# Patient Record
Sex: FEMALE | Race: WHITE | URBAN - METROPOLITAN AREA
[De-identification: names, ages, dates, MRNs, and addresses within clinical notes are randomized per-mention and may not be internally consistent; named-entity substitution may affect disease eponyms.]

---

## 2022-07-11 ENCOUNTER — OFFICE (OUTPATIENT)
Dept: URBAN - METROPOLITAN AREA CLINIC 109 | Facility: CLINIC | Age: 77
Setting detail: OPHTHALMOLOGY
End: 2022-07-11
Payer: MEDICARE

## 2022-07-11 DIAGNOSIS — H40.013: ICD-10-CM

## 2022-07-11 DIAGNOSIS — H34.8312: ICD-10-CM

## 2022-07-11 DIAGNOSIS — D31.40: ICD-10-CM

## 2022-07-11 DIAGNOSIS — H25.13: ICD-10-CM

## 2022-07-11 PROCEDURE — 92250 FUNDUS PHOTOGRAPHY W/I&R: CPT | Performed by: OPHTHALMOLOGY

## 2022-07-11 PROCEDURE — 92004 COMPRE OPH EXAM NEW PT 1/>: CPT | Performed by: OPHTHALMOLOGY

## 2022-07-11 PROCEDURE — 92083 EXTENDED VISUAL FIELD XM: CPT | Performed by: OPHTHALMOLOGY

## 2022-07-11 PROCEDURE — 92020 GONIOSCOPY: CPT | Performed by: OPHTHALMOLOGY

## 2022-07-11 ASSESSMENT — CORNEAL DYSTROPHY - POSTERIOR
OS_POSTERIORDYSTROPHY: GUTTATA
OD_POSTERIORDYSTROPHY: GUTTATA

## 2022-07-11 ASSESSMENT — CONFRONTATIONAL VISUAL FIELD TEST (CVF)
OD_FINDINGS: FULL
OS_FINDINGS: FULL

## 2022-07-11 ASSESSMENT — SUPERFICIAL PUNCTATE KERATITIS (SPK)
OD_SPK: 1+
OS_SPK: 1+

## 2022-07-12 ASSESSMENT — VISUAL ACUITY
OS_BCVA: 20/400
OD_BCVA: 20/125

## 2022-07-12 ASSESSMENT — SPHEQUIV_DERIVED
OS_SPHEQUIV: -1.5
OD_SPHEQUIV: -0.875

## 2022-07-12 ASSESSMENT — REFRACTION_AUTOREFRACTION
OD_AXIS: 091
OS_SPHERE: 0.00
OD_CYLINDER: -2.25
OS_AXIS: 098
OD_SPHERE: +0.25
OS_CYLINDER: -3.00

## 2022-07-14 ENCOUNTER — OFFICE (OUTPATIENT)
Dept: URBAN - METROPOLITAN AREA CLINIC 32 | Facility: CLINIC | Age: 77
Setting detail: OPHTHALMOLOGY
End: 2022-07-14
Payer: MEDICARE

## 2022-07-14 DIAGNOSIS — H40.013: ICD-10-CM

## 2022-07-14 DIAGNOSIS — D31.40: ICD-10-CM

## 2022-07-14 DIAGNOSIS — H35.033: ICD-10-CM

## 2022-07-14 DIAGNOSIS — H43.813: ICD-10-CM

## 2022-07-14 DIAGNOSIS — H43.393: ICD-10-CM

## 2022-07-14 DIAGNOSIS — H25.13: ICD-10-CM

## 2022-07-14 DIAGNOSIS — H34.8112: ICD-10-CM

## 2022-07-14 PROCEDURE — 67028 INJECTION EYE DRUG: CPT | Performed by: OPHTHALMOLOGY

## 2022-07-14 PROCEDURE — 92235 FLUORESCEIN ANGRPH MLTIFRAME: CPT | Performed by: OPHTHALMOLOGY

## 2022-07-14 PROCEDURE — 92134 CPTRZ OPH DX IMG PST SGM RTA: CPT | Performed by: OPHTHALMOLOGY

## 2022-07-14 PROCEDURE — 92201 OPSCPY EXTND RTA DRAW UNI/BI: CPT | Performed by: OPHTHALMOLOGY

## 2022-07-14 PROCEDURE — 92014 COMPRE OPH EXAM EST PT 1/>: CPT | Performed by: OPHTHALMOLOGY

## 2022-07-14 ASSESSMENT — REFRACTION_AUTOREFRACTION
OD_SPHERE: +0.25
OD_AXIS: 091
OS_AXIS: 098
OS_CYLINDER: -3.00
OS_SPHERE: 0.00
OD_CYLINDER: -2.25

## 2022-07-14 ASSESSMENT — SUPERFICIAL PUNCTATE KERATITIS (SPK)
OS_SPK: 1+
OD_SPK: 1+

## 2022-07-14 ASSESSMENT — CONFRONTATIONAL VISUAL FIELD TEST (CVF)
OD_FINDINGS: FULL
OS_FINDINGS: FULL

## 2022-07-14 ASSESSMENT — CORNEAL DYSTROPHY - POSTERIOR
OS_POSTERIORDYSTROPHY: GUTTATA
OD_POSTERIORDYSTROPHY: GUTTATA

## 2022-07-14 ASSESSMENT — TONOMETRY
OD_IOP_MMHG: 15
OS_IOP_MMHG: 19

## 2022-07-14 ASSESSMENT — VISUAL ACUITY
OS_BCVA: CF3FT
OD_BCVA: 20/125

## 2022-07-14 ASSESSMENT — SPHEQUIV_DERIVED
OD_SPHEQUIV: -0.875
OS_SPHEQUIV: -1.5

## 2022-07-25 ENCOUNTER — OFFICE (OUTPATIENT)
Dept: URBAN - METROPOLITAN AREA CLINIC 109 | Facility: CLINIC | Age: 77
Setting detail: OPHTHALMOLOGY
End: 2022-07-25
Payer: MEDICARE

## 2022-07-25 DIAGNOSIS — H25.13: ICD-10-CM

## 2022-07-25 DIAGNOSIS — H35.033: ICD-10-CM

## 2022-07-25 DIAGNOSIS — H34.8112: ICD-10-CM

## 2022-07-25 PROCEDURE — 99214 OFFICE O/P EST MOD 30 MIN: CPT | Performed by: OPHTHALMOLOGY

## 2022-07-25 ASSESSMENT — REFRACTION_AUTOREFRACTION
OD_AXIS: 080
OD_CYLINDER: -1.75
OS_CYLINDER: -3.00
OS_SPHERE: +0.25
OS_AXIS: 092
OD_SPHERE: +0.50

## 2022-07-25 ASSESSMENT — VISUAL ACUITY
OD_BCVA: 20/60-1
OS_BCVA: 20/150

## 2022-07-25 ASSESSMENT — SUPERFICIAL PUNCTATE KERATITIS (SPK)
OD_SPK: 1+
OS_SPK: 1+

## 2022-07-25 ASSESSMENT — CORNEAL DYSTROPHY - POSTERIOR
OD_POSTERIORDYSTROPHY: GUTTATA
OS_POSTERIORDYSTROPHY: GUTTATA

## 2022-07-25 ASSESSMENT — KERATOMETRY
OD_K1POWER_DIOPTERS: 44.37
OS_AXISANGLE_DEGREES: 007
OS_K1POWER_DIOPTERS: 44.50
OD_AXISANGLE_DEGREES: 159
OD_K2POWER_DIOPTERS: 45.12
OS_K2POWER_DIOPTERS: 46.00

## 2022-07-25 ASSESSMENT — CONFRONTATIONAL VISUAL FIELD TEST (CVF)
OS_FINDINGS: FULL
OD_FINDINGS: FULL

## 2022-07-25 ASSESSMENT — AXIALLENGTH_DERIVED
OS_AL: 23.4371
OD_AL: 23.28

## 2022-07-25 ASSESSMENT — SPHEQUIV_DERIVED
OS_SPHEQUIV: -1.25
OD_SPHEQUIV: -0.375

## 2022-08-01 ENCOUNTER — OFFICE (OUTPATIENT)
Dept: URBAN - METROPOLITAN AREA CLINIC 109 | Facility: CLINIC | Age: 77
Setting detail: OPHTHALMOLOGY
End: 2022-08-01
Payer: MEDICARE

## 2022-08-01 DIAGNOSIS — H40.013: ICD-10-CM

## 2022-08-01 PROCEDURE — 92133 CPTRZD OPH DX IMG PST SGM ON: CPT | Performed by: OPHTHALMOLOGY

## 2022-08-01 PROCEDURE — 99213 OFFICE O/P EST LOW 20 MIN: CPT | Performed by: OPHTHALMOLOGY

## 2022-08-01 ASSESSMENT — REFRACTION_AUTOREFRACTION
OD_AXIS: 080
OD_SPHERE: +0.50
OS_CYLINDER: -3.00
OS_SPHERE: +0.25
OD_CYLINDER: -1.75
OS_AXIS: 092

## 2022-08-01 ASSESSMENT — SPHEQUIV_DERIVED
OD_SPHEQUIV: -0.375
OS_SPHEQUIV: -1.25

## 2022-08-01 ASSESSMENT — KERATOMETRY
OS_K2POWER_DIOPTERS: 46.00
OD_K2POWER_DIOPTERS: 45.12
OS_AXISANGLE_DEGREES: 007
OD_AXISANGLE_DEGREES: 159
OD_K1POWER_DIOPTERS: 44.37
OS_K1POWER_DIOPTERS: 44.50

## 2022-08-01 ASSESSMENT — CONFRONTATIONAL VISUAL FIELD TEST (CVF)
OS_FINDINGS: FULL
OD_FINDINGS: FULL

## 2022-08-01 ASSESSMENT — CORNEAL DYSTROPHY - POSTERIOR
OS_POSTERIORDYSTROPHY: GUTTATA
OD_POSTERIORDYSTROPHY: GUTTATA

## 2022-08-01 ASSESSMENT — AXIALLENGTH_DERIVED
OD_AL: 23.28
OS_AL: 23.4371

## 2022-08-01 ASSESSMENT — SUPERFICIAL PUNCTATE KERATITIS (SPK)
OS_SPK: 1+
OD_SPK: 1+

## 2022-08-01 ASSESSMENT — VISUAL ACUITY
OD_BCVA: 20/70-2
OS_BCVA: 20/150

## 2022-08-01 ASSESSMENT — TONOMETRY
OS_IOP_MMHG: 16
OD_IOP_MMHG: 16

## 2022-08-08 ENCOUNTER — OFFICE (OUTPATIENT)
Dept: URBAN - METROPOLITAN AREA CLINIC 109 | Facility: CLINIC | Age: 77
Setting detail: OPHTHALMOLOGY
End: 2022-08-08
Payer: MEDICARE

## 2022-08-08 DIAGNOSIS — H25.12: ICD-10-CM

## 2022-08-08 DIAGNOSIS — H25.13: ICD-10-CM

## 2022-08-08 DIAGNOSIS — H34.8112: ICD-10-CM

## 2022-08-08 PROCEDURE — 92134 CPTRZ OPH DX IMG PST SGM RTA: CPT | Performed by: OPHTHALMOLOGY

## 2022-08-08 PROCEDURE — 99213 OFFICE O/P EST LOW 20 MIN: CPT | Performed by: OPHTHALMOLOGY

## 2022-08-08 PROCEDURE — 92136 OPHTHALMIC BIOMETRY: CPT | Performed by: OPHTHALMOLOGY

## 2022-08-08 ASSESSMENT — REFRACTION_AUTOREFRACTION
OS_AXIS: 108
OD_CYLINDER: -1.50
OS_CYLINDER: -4.00
OD_AXIS: 072
OS_SPHERE: +0.75
OD_SPHERE: +0.50

## 2022-08-08 ASSESSMENT — CONFRONTATIONAL VISUAL FIELD TEST (CVF)
OD_FINDINGS: FULL
OS_FINDINGS: FULL

## 2022-08-08 ASSESSMENT — KERATOMETRY
OS_K1POWER_DIOPTERS: 44.37
OS_K1POWER_DIOPTERS: 44.37
OD_AXISANGLE_DEGREES: 171
OS_K1K2_AVERAGE: 45.245
OD_AXISANGLE_DEGREES: 171
OD_K1POWER_DIOPTERS: 44.37
OD_K1POWER_DIOPTERS: 44.37
OD_K2POWER_DIOPTERS: 45.37
OS_K2POWER_DIOPTERS: 46.12
OS_CYLAXISANGLE_DEGREES: 9
OS_AXISANGLE_DEGREES: 009
OD_CYLPOWER_DEGREES: 1
OS_K2POWER_DIOPTERS: 46.12
OS_CYLPOWER_DEGREES: 1.75
OD_K1K2_AVERAGE: 44.87
OD_K2POWER_DIOPTERS: 45.37
OD_CYLAXISANGLE_DEGREES: 171
OS_AXISANGLE_DEGREES: 009
OD_AXISANGLE2_DEGREES: 171
OS_AXISANGLE2_DEGREES: 009

## 2022-08-08 ASSESSMENT — AXIALLENGTH_DERIVED
OD_AL: 23.19
OS_AL: 23.439
OS_AL: 23.439

## 2022-08-08 ASSESSMENT — CORNEAL DYSTROPHY - POSTERIOR
OS_POSTERIORDYSTROPHY: GUTTATA
OD_POSTERIORDYSTROPHY: GUTTATA

## 2022-08-08 ASSESSMENT — SUPERFICIAL PUNCTATE KERATITIS (SPK)
OS_SPK: 1+
OD_SPK: 1+

## 2022-08-08 ASSESSMENT — REFRACTION_MANIFEST
OS_VA1: 20/NI
OS_AXIS: 108
OS_CYLINDER: -4.00
OS_SPHERE: +0.75

## 2022-08-08 ASSESSMENT — VISUAL ACUITY
OD_BCVA: 20/70-2
OS_BCVA: 20/150

## 2022-08-08 ASSESSMENT — SPHEQUIV_DERIVED
OD_SPHEQUIV: -0.25
OS_SPHEQUIV: -1.25
OS_SPHEQUIV: -1.25

## 2022-08-15 ENCOUNTER — OFFICE (OUTPATIENT)
Dept: URBAN - METROPOLITAN AREA CLINIC 32 | Facility: CLINIC | Age: 77
Setting detail: OPHTHALMOLOGY
End: 2022-08-15
Payer: MEDICARE

## 2022-08-15 DIAGNOSIS — H34.8112: ICD-10-CM

## 2022-08-15 PROCEDURE — 92012 INTRM OPH EXAM EST PATIENT: CPT | Performed by: OPHTHALMOLOGY

## 2022-08-15 PROCEDURE — 92250 FUNDUS PHOTOGRAPHY W/I&R: CPT | Performed by: OPHTHALMOLOGY

## 2022-08-15 PROCEDURE — 67028 INJECTION EYE DRUG: CPT | Performed by: OPHTHALMOLOGY

## 2022-08-15 ASSESSMENT — AXIALLENGTH_DERIVED
OD_AL: 23.19
OS_AL: 23.439

## 2022-08-15 ASSESSMENT — KERATOMETRY
OS_K2POWER_DIOPTERS: 46.12
OS_K1POWER_DIOPTERS: 44.37
OD_K2POWER_DIOPTERS: 45.37
OD_K1POWER_DIOPTERS: 44.37
OS_AXISANGLE_DEGREES: 009
OD_AXISANGLE_DEGREES: 171

## 2022-08-15 ASSESSMENT — CORNEAL DYSTROPHY - POSTERIOR
OS_POSTERIORDYSTROPHY: GUTTATA
OD_POSTERIORDYSTROPHY: GUTTATA

## 2022-08-15 ASSESSMENT — REFRACTION_AUTOREFRACTION
OD_CYLINDER: -1.50
OD_SPHERE: +0.50
OS_SPHERE: +0.75
OS_AXIS: 108
OS_CYLINDER: -4.00
OD_AXIS: 072

## 2022-08-15 ASSESSMENT — CONFRONTATIONAL VISUAL FIELD TEST (CVF)
OS_FINDINGS: FULL
OD_FINDINGS: FULL

## 2022-08-15 ASSESSMENT — VISUAL ACUITY
OS_BCVA: 20/70-
OD_BCVA: 20/60

## 2022-08-15 ASSESSMENT — SPHEQUIV_DERIVED
OS_SPHEQUIV: -1.25
OD_SPHEQUIV: -0.25

## 2022-08-15 ASSESSMENT — SUPERFICIAL PUNCTATE KERATITIS (SPK)
OD_SPK: 1+
OS_SPK: 1+

## 2022-08-30 ENCOUNTER — AMBULATORY SURGERY CENTER (OUTPATIENT)
Dept: URBAN - METROPOLITAN AREA SURGERY 19 | Facility: SURGERY | Age: 77
Setting detail: OPHTHALMOLOGY
End: 2022-08-30
Payer: MEDICARE

## 2022-08-30 DIAGNOSIS — H52.222: ICD-10-CM

## 2022-08-30 DIAGNOSIS — H25.12: ICD-10-CM

## 2022-08-30 PROCEDURE — 66984 XCAPSL CTRC RMVL W/O ECP: CPT | Performed by: OPHTHALMOLOGY

## 2022-08-30 PROCEDURE — V2787 ASTIGMATISM-CORRECT FUNCTION: HCPCS | Performed by: OPHTHALMOLOGY

## 2022-08-31 ENCOUNTER — RX ONLY (RX ONLY)
Age: 77
End: 2022-08-31

## 2022-08-31 ENCOUNTER — OFFICE (OUTPATIENT)
Dept: URBAN - METROPOLITAN AREA CLINIC 109 | Facility: CLINIC | Age: 77
Setting detail: OPHTHALMOLOGY
End: 2022-08-31
Payer: MEDICARE

## 2022-08-31 DIAGNOSIS — Z96.1: ICD-10-CM

## 2022-08-31 PROCEDURE — 99024 POSTOP FOLLOW-UP VISIT: CPT | Performed by: OPTOMETRIST

## 2022-08-31 ASSESSMENT — REFRACTION_AUTOREFRACTION
OS_AXIS: 108
OD_SPHERE: +0.50
OS_SPHERE: +0.75
OD_CYLINDER: -1.50
OS_CYLINDER: -4.00
OD_AXIS: 072

## 2022-08-31 ASSESSMENT — KERATOMETRY
OD_K2POWER_DIOPTERS: 45.37
OS_AXISANGLE_DEGREES: 009
OD_K1POWER_DIOPTERS: 44.37
OD_AXISANGLE_DEGREES: 171
OS_K2POWER_DIOPTERS: 46.12
OS_K1POWER_DIOPTERS: 44.37

## 2022-08-31 ASSESSMENT — SPHEQUIV_DERIVED
OD_SPHEQUIV: -0.25
OS_SPHEQUIV: -1.25

## 2022-08-31 ASSESSMENT — VISUAL ACUITY
OS_BCVA: 20/70-1
OD_BCVA: 20/25

## 2022-08-31 ASSESSMENT — CORNEAL DYSTROPHY - POSTERIOR
OD_POSTERIORDYSTROPHY: GUTTATA
OS_POSTERIORDYSTROPHY: GUTTATA

## 2022-08-31 ASSESSMENT — AXIALLENGTH_DERIVED
OS_AL: 23.439
OD_AL: 23.19

## 2022-08-31 ASSESSMENT — TONOMETRY: OS_IOP_MMHG: 21

## 2022-08-31 ASSESSMENT — SUPERFICIAL PUNCTATE KERATITIS (SPK)
OD_SPK: 1+
OS_SPK: 1+

## 2022-09-20 ENCOUNTER — OFFICE (OUTPATIENT)
Dept: URBAN - METROPOLITAN AREA CLINIC 32 | Facility: CLINIC | Age: 77
Setting detail: OPHTHALMOLOGY
End: 2022-09-20
Payer: MEDICARE

## 2022-09-20 DIAGNOSIS — H34.8112: ICD-10-CM

## 2022-09-20 PROCEDURE — 67028 INJECTION EYE DRUG: CPT | Performed by: OPHTHALMOLOGY

## 2022-09-20 PROCEDURE — 92134 CPTRZ OPH DX IMG PST SGM RTA: CPT | Performed by: OPHTHALMOLOGY

## 2022-09-20 ASSESSMENT — REFRACTION_AUTOREFRACTION
OD_CYLINDER: -1.50
OD_AXIS: 072
OD_SPHERE: +0.50
OS_SPHERE: +0.75
OS_CYLINDER: -4.00
OS_AXIS: 108

## 2022-09-20 ASSESSMENT — CORNEAL DYSTROPHY - POSTERIOR
OS_POSTERIORDYSTROPHY: GUTTATA
OD_POSTERIORDYSTROPHY: GUTTATA

## 2022-09-20 ASSESSMENT — KERATOMETRY
OS_K2POWER_DIOPTERS: 46.12
OD_AXISANGLE_DEGREES: 171
OD_K1POWER_DIOPTERS: 44.37
OD_K2POWER_DIOPTERS: 45.37
OS_AXISANGLE_DEGREES: 009
OS_K1POWER_DIOPTERS: 44.37

## 2022-09-20 ASSESSMENT — VISUAL ACUITY
OD_BCVA: 20/20
OS_BCVA: 20/60-2

## 2022-09-20 ASSESSMENT — SUPERFICIAL PUNCTATE KERATITIS (SPK)
OS_SPK: 1+
OD_SPK: 1+

## 2022-09-20 ASSESSMENT — AXIALLENGTH_DERIVED
OD_AL: 23.19
OS_AL: 23.439

## 2022-09-20 ASSESSMENT — SPHEQUIV_DERIVED
OS_SPHEQUIV: -1.25
OD_SPHEQUIV: -0.25

## 2022-09-28 ENCOUNTER — OFFICE (OUTPATIENT)
Dept: URBAN - METROPOLITAN AREA CLINIC 109 | Facility: CLINIC | Age: 77
Setting detail: OPHTHALMOLOGY
End: 2022-09-28
Payer: MEDICARE

## 2022-09-28 DIAGNOSIS — H25.11: ICD-10-CM

## 2022-09-28 DIAGNOSIS — H34.8112: ICD-10-CM

## 2022-09-28 DIAGNOSIS — Z96.1: ICD-10-CM

## 2022-09-28 PROCEDURE — 99024 POSTOP FOLLOW-UP VISIT: CPT | Performed by: OPHTHALMOLOGY

## 2022-09-28 ASSESSMENT — KERATOMETRY
OS_AXISANGLE_DEGREES: 009
OS_K1POWER_DIOPTERS: 44.37
OD_K2POWER_DIOPTERS: 45.37
OD_AXISANGLE_DEGREES: 171
OS_K2POWER_DIOPTERS: 46.12
OD_K1POWER_DIOPTERS: 44.37

## 2022-09-28 ASSESSMENT — AXIALLENGTH_DERIVED
OD_AL: 23.19
OS_AL: 23.439

## 2022-09-28 ASSESSMENT — REFRACTION_AUTOREFRACTION
OD_SPHERE: +0.50
OS_CYLINDER: -4.00
OS_AXIS: 108
OD_CYLINDER: -1.50
OS_SPHERE: +0.75
OD_AXIS: 072

## 2022-09-28 ASSESSMENT — TONOMETRY: OS_IOP_MMHG: 18

## 2022-09-28 ASSESSMENT — CORNEAL DYSTROPHY - POSTERIOR
OD_POSTERIORDYSTROPHY: GUTTATA
OS_POSTERIORDYSTROPHY: GUTTATA

## 2022-09-28 ASSESSMENT — VISUAL ACUITY
OS_BCVA: 20/60-2
OD_BCVA: 20/20-3

## 2022-09-28 ASSESSMENT — SUPERFICIAL PUNCTATE KERATITIS (SPK)
OS_SPK: 1+
OD_SPK: 1+

## 2022-09-28 ASSESSMENT — SPHEQUIV_DERIVED
OD_SPHEQUIV: -0.25
OS_SPHEQUIV: -1.25

## 2022-09-28 ASSESSMENT — CONFRONTATIONAL VISUAL FIELD TEST (CVF)
OD_FINDINGS: FULL
OS_FINDINGS: FULL

## 2022-10-26 ENCOUNTER — APPOINTMENT (RX ONLY)
Dept: URBAN - METROPOLITAN AREA CLINIC 341 | Facility: CLINIC | Age: 77
Setting detail: DERMATOLOGY
End: 2022-10-26

## 2022-10-26 DIAGNOSIS — D485 NEOPLASM OF UNCERTAIN BEHAVIOR OF SKIN: ICD-10-CM

## 2022-10-26 PROBLEM — D48.5 NEOPLASM OF UNCERTAIN BEHAVIOR OF SKIN: Status: ACTIVE | Noted: 2022-10-26

## 2022-10-26 PROCEDURE — ? BIOPSY BY SHAVE METHOD

## 2022-10-26 PROCEDURE — 11102 TANGNTL BX SKIN SINGLE LES: CPT

## 2022-10-26 ASSESSMENT — LOCATION ZONE DERM: LOCATION ZONE: HAND

## 2022-10-26 ASSESSMENT — LOCATION DETAILED DESCRIPTION DERM: LOCATION DETAILED: LEFT RADIAL DORSAL HAND

## 2022-10-26 ASSESSMENT — LOCATION SIMPLE DESCRIPTION DERM: LOCATION SIMPLE: LEFT HAND

## 2022-11-08 ENCOUNTER — OFFICE (OUTPATIENT)
Dept: URBAN - METROPOLITAN AREA CLINIC 32 | Facility: CLINIC | Age: 77
Setting detail: OPHTHALMOLOGY
End: 2022-11-08
Payer: MEDICARE

## 2022-11-08 DIAGNOSIS — H34.8112: ICD-10-CM

## 2022-11-08 DIAGNOSIS — H43.813: ICD-10-CM

## 2022-11-08 DIAGNOSIS — H43.393: ICD-10-CM

## 2022-11-08 PROBLEM — H25.11 CATARACT SENILE NUCLEAR SCLEROSIS;  , RIGHT EYE: Status: ACTIVE | Noted: 2022-08-31

## 2022-11-08 PROBLEM — D31.40 IRIS NEVUS ; RIGHT EYE: Status: ACTIVE | Noted: 2022-07-11

## 2022-11-08 PROBLEM — H40.013 GLAUCOMA SUSPECT, LOW RISK 1-2 FACTORS; BOTH EYES: Status: ACTIVE | Noted: 2022-07-11

## 2022-11-08 PROBLEM — H16.223 DRY EYE SYNDROME K SICCA; BOTH EYES: Status: ACTIVE | Noted: 2022-07-11

## 2022-11-08 PROBLEM — H35.033 HYPERTENSIVE RETINOPATHY; BOTH EYES: Status: ACTIVE | Noted: 2022-07-14

## 2022-11-08 PROBLEM — H16.223 KERATOCONJUNCTIVITIS SICCA ; BOTH EYES: Status: ACTIVE | Noted: 2022-07-11

## 2022-11-08 PROBLEM — Z96.1 PSEUDOPHAKIA ; LEFT EYE: Status: ACTIVE | Noted: 2022-08-31

## 2022-11-08 PROCEDURE — 67028 INJECTION EYE DRUG: CPT | Performed by: OPHTHALMOLOGY

## 2022-11-08 PROCEDURE — 92134 CPTRZ OPH DX IMG PST SGM RTA: CPT | Performed by: OPHTHALMOLOGY

## 2022-11-08 ASSESSMENT — REFRACTION_AUTOREFRACTION
OD_CYLINDER: -1.50
OS_AXIS: 108
OD_AXIS: 072
OS_SPHERE: +0.75
OS_CYLINDER: -4.00
OD_SPHERE: +0.50

## 2022-11-08 ASSESSMENT — SUPERFICIAL PUNCTATE KERATITIS (SPK)
OD_SPK: 1+
OS_SPK: 1+

## 2022-11-08 ASSESSMENT — CONFRONTATIONAL VISUAL FIELD TEST (CVF)
OS_FINDINGS: FULL
OD_FINDINGS: FULL

## 2022-11-08 ASSESSMENT — CORNEAL DYSTROPHY - POSTERIOR
OS_POSTERIORDYSTROPHY: GUTTATA
OD_POSTERIORDYSTROPHY: GUTTATA

## 2022-11-08 ASSESSMENT — KERATOMETRY
OD_K1POWER_DIOPTERS: 44.37
OD_K2POWER_DIOPTERS: 45.37
OS_K2POWER_DIOPTERS: 46.12
OS_AXISANGLE_DEGREES: 009
OS_K1POWER_DIOPTERS: 44.37
OD_AXISANGLE_DEGREES: 171

## 2022-11-08 ASSESSMENT — VISUAL ACUITY
OD_BCVA: 20/25-
OS_BCVA: 20/60-

## 2022-11-08 ASSESSMENT — AXIALLENGTH_DERIVED
OS_AL: 23.439
OD_AL: 23.19

## 2022-11-08 ASSESSMENT — SPHEQUIV_DERIVED
OD_SPHEQUIV: -0.25
OS_SPHEQUIV: -1.25

## 2022-11-21 ENCOUNTER — APPOINTMENT (RX ONLY)
Dept: URBAN - METROPOLITAN AREA CLINIC 341 | Facility: CLINIC | Age: 77
Setting detail: DERMATOLOGY
End: 2022-11-21

## 2022-11-21 DIAGNOSIS — L57.0 ACTINIC KERATOSIS: ICD-10-CM | Status: RESOLVED

## 2022-11-21 DIAGNOSIS — D22 MELANOCYTIC NEVI: ICD-10-CM

## 2022-11-21 DIAGNOSIS — L81.4 OTHER MELANIN HYPERPIGMENTATION: ICD-10-CM

## 2022-11-21 DIAGNOSIS — L82.1 OTHER SEBORRHEIC KERATOSIS: ICD-10-CM

## 2022-11-21 DIAGNOSIS — D18.0 HEMANGIOMA: ICD-10-CM

## 2022-11-21 PROBLEM — D18.01 HEMANGIOMA OF SKIN AND SUBCUTANEOUS TISSUE: Status: ACTIVE | Noted: 2022-11-21

## 2022-11-21 PROBLEM — D22.5 MELANOCYTIC NEVI OF TRUNK: Status: ACTIVE | Noted: 2022-11-21

## 2022-11-21 PROCEDURE — ? PATHOLOGY DISCUSSION

## 2022-11-21 PROCEDURE — ? COUNSELING

## 2022-11-21 PROCEDURE — 99213 OFFICE O/P EST LOW 20 MIN: CPT

## 2022-11-21 PROCEDURE — ? OTC TREATMENT REGIMEN

## 2022-11-21 ASSESSMENT — LOCATION DETAILED DESCRIPTION DERM
LOCATION DETAILED: LEFT INFERIOR UPPER BACK
LOCATION DETAILED: LEFT RADIAL DORSAL HAND
LOCATION DETAILED: LEFT POSTERIOR SHOULDER
LOCATION DETAILED: EPIGASTRIC SKIN
LOCATION DETAILED: INFERIOR THORACIC SPINE

## 2022-11-21 ASSESSMENT — LOCATION SIMPLE DESCRIPTION DERM
LOCATION SIMPLE: LEFT UPPER BACK
LOCATION SIMPLE: LEFT HAND
LOCATION SIMPLE: UPPER BACK
LOCATION SIMPLE: ABDOMEN
LOCATION SIMPLE: LEFT SHOULDER

## 2022-11-21 ASSESSMENT — LOCATION ZONE DERM
LOCATION ZONE: HAND
LOCATION ZONE: ARM
LOCATION ZONE: TRUNK

## 2023-01-03 ENCOUNTER — OFFICE (OUTPATIENT)
Dept: URBAN - METROPOLITAN AREA CLINIC 93 | Facility: CLINIC | Age: 78
Setting detail: OPHTHALMOLOGY
End: 2023-01-03
Payer: MEDICARE

## 2023-01-03 DIAGNOSIS — H43.393: ICD-10-CM

## 2023-01-03 DIAGNOSIS — H43.813: ICD-10-CM

## 2023-01-03 DIAGNOSIS — H34.8112: ICD-10-CM

## 2023-01-03 PROCEDURE — 92012 INTRM OPH EXAM EST PATIENT: CPT | Performed by: OPHTHALMOLOGY

## 2023-01-03 PROCEDURE — 67028 INJECTION EYE DRUG: CPT | Performed by: OPHTHALMOLOGY

## 2023-01-03 PROCEDURE — 92134 CPTRZ OPH DX IMG PST SGM RTA: CPT | Performed by: OPHTHALMOLOGY

## 2023-01-03 ASSESSMENT — KERATOMETRY
OS_AXISANGLE_DEGREES: 009
OD_K1POWER_DIOPTERS: 44.37
OS_K1POWER_DIOPTERS: 44.37
OS_K2POWER_DIOPTERS: 46.12
OD_K2POWER_DIOPTERS: 45.37
OD_AXISANGLE_DEGREES: 171

## 2023-01-03 ASSESSMENT — AXIALLENGTH_DERIVED
OD_AL: 23.19
OS_AL: 23.439

## 2023-01-03 ASSESSMENT — SPHEQUIV_DERIVED
OD_SPHEQUIV: -0.25
OS_SPHEQUIV: -1.25

## 2023-01-03 ASSESSMENT — REFRACTION_AUTOREFRACTION
OS_SPHERE: +0.75
OS_AXIS: 108
OD_AXIS: 072
OD_CYLINDER: -1.50
OS_CYLINDER: -4.00
OD_SPHERE: +0.50

## 2023-01-03 ASSESSMENT — VISUAL ACUITY
OS_BCVA: 20/60-1
OD_BCVA: 20/20-3

## 2023-03-14 ENCOUNTER — OFFICE (OUTPATIENT)
Dept: URBAN - METROPOLITAN AREA CLINIC 32 | Facility: CLINIC | Age: 78
Setting detail: OPHTHALMOLOGY
End: 2023-03-14
Payer: MEDICARE

## 2023-03-14 DIAGNOSIS — H43.393: ICD-10-CM

## 2023-03-14 DIAGNOSIS — H34.8112: ICD-10-CM

## 2023-03-14 DIAGNOSIS — H43.813: ICD-10-CM

## 2023-03-14 PROCEDURE — 92134 CPTRZ OPH DX IMG PST SGM RTA: CPT | Performed by: OPHTHALMOLOGY

## 2023-03-14 PROCEDURE — 67028 INJECTION EYE DRUG: CPT | Performed by: OPHTHALMOLOGY

## 2023-03-14 ASSESSMENT — CORNEAL DYSTROPHY - POSTERIOR
OS_POSTERIORDYSTROPHY: GUTTATA
OD_POSTERIORDYSTROPHY: GUTTATA

## 2023-03-14 ASSESSMENT — SPHEQUIV_DERIVED
OS_SPHEQUIV: -1.25
OD_SPHEQUIV: -0.25

## 2023-03-14 ASSESSMENT — CONFRONTATIONAL VISUAL FIELD TEST (CVF)
OS_FINDINGS: FULL
OD_FINDINGS: FULL

## 2023-03-14 ASSESSMENT — SUPERFICIAL PUNCTATE KERATITIS (SPK)
OD_SPK: 1+
OS_SPK: 1+

## 2023-03-14 ASSESSMENT — VISUAL ACUITY
OD_BCVA: 20/25
OS_BCVA: 20/60-2

## 2023-03-14 ASSESSMENT — AXIALLENGTH_DERIVED
OS_AL: 23.439
OD_AL: 23.19

## 2023-03-14 ASSESSMENT — REFRACTION_AUTOREFRACTION
OD_AXIS: 072
OS_CYLINDER: -4.00
OD_CYLINDER: -1.50
OS_AXIS: 108
OD_SPHERE: +0.50
OS_SPHERE: +0.75

## 2023-03-14 ASSESSMENT — KERATOMETRY
OS_K2POWER_DIOPTERS: 46.12
OS_AXISANGLE_DEGREES: 009
OS_K1POWER_DIOPTERS: 44.37
OD_K2POWER_DIOPTERS: 45.37
OD_AXISANGLE_DEGREES: 171
OD_K1POWER_DIOPTERS: 44.37

## 2023-07-25 ENCOUNTER — INPATIENT (INPATIENT)
Facility: HOSPITAL | Age: 78
LOS: 8 days | Discharge: NURSING, REHAB OR LONG-TERM CARE | DRG: 444 | End: 2023-08-03
Attending: HOSPITALIST | Admitting: STUDENT IN AN ORGANIZED HEALTH CARE EDUCATION/TRAINING PROGRAM
Payer: MEDICARE

## 2023-07-25 VITALS
HEIGHT: 63 IN | WEIGHT: 160.06 LBS | TEMPERATURE: 98 F | HEART RATE: 108 BPM | OXYGEN SATURATION: 97 % | DIASTOLIC BLOOD PRESSURE: 76 MMHG | SYSTOLIC BLOOD PRESSURE: 114 MMHG | RESPIRATION RATE: 18 BRPM

## 2023-07-25 LAB
ALBUMIN SERPL ELPH-MCNC: 3.2 G/DL — LOW (ref 3.3–5)
ALP SERPL-CCNC: 104 U/L — SIGNIFICANT CHANGE UP (ref 40–120)
ALT FLD-CCNC: 58 U/L — SIGNIFICANT CHANGE UP (ref 12–78)
ANION GAP SERPL CALC-SCNC: 9 MMOL/L — SIGNIFICANT CHANGE UP (ref 5–17)
APTT BLD: 29.8 SEC — SIGNIFICANT CHANGE UP (ref 24.5–35.6)
AST SERPL-CCNC: 109 U/L — HIGH (ref 15–37)
BILIRUB SERPL-MCNC: 1.1 MG/DL — SIGNIFICANT CHANGE UP (ref 0.2–1.2)
BUN SERPL-MCNC: 28 MG/DL — HIGH (ref 7–23)
CALCIUM SERPL-MCNC: 9.2 MG/DL — SIGNIFICANT CHANGE UP (ref 8.5–10.1)
CHLORIDE SERPL-SCNC: 104 MMOL/L — SIGNIFICANT CHANGE UP (ref 96–108)
CK MB BLD-MCNC: 0.3 % — SIGNIFICANT CHANGE UP (ref 0–3.5)
CK MB CFR SERPL CALC: 14.6 NG/ML — HIGH (ref 0–3.6)
CK SERPL-CCNC: 5502 U/L — HIGH (ref 26–192)
CO2 SERPL-SCNC: 22 MMOL/L — SIGNIFICANT CHANGE UP (ref 22–31)
CREAT SERPL-MCNC: 1.5 MG/DL — HIGH (ref 0.5–1.3)
EGFR: 36 ML/MIN/1.73M2 — LOW
GLUCOSE SERPL-MCNC: 126 MG/DL — HIGH (ref 70–99)
HCT VFR BLD CALC: 43.2 % — SIGNIFICANT CHANGE UP (ref 34.5–45)
HGB BLD-MCNC: 14.8 G/DL — SIGNIFICANT CHANGE UP (ref 11.5–15.5)
INR BLD: 1.28 RATIO — HIGH (ref 0.85–1.18)
MCHC RBC-ENTMCNC: 31.2 PG — SIGNIFICANT CHANGE UP (ref 27–34)
MCHC RBC-ENTMCNC: 34.3 GM/DL — SIGNIFICANT CHANGE UP (ref 32–36)
MCV RBC AUTO: 90.9 FL — SIGNIFICANT CHANGE UP (ref 80–100)
NT-PROBNP SERPL-SCNC: 3588 PG/ML — HIGH (ref 0–450)
PLATELET # BLD AUTO: 199 K/UL — SIGNIFICANT CHANGE UP (ref 150–400)
POTASSIUM SERPL-MCNC: 3.7 MMOL/L — SIGNIFICANT CHANGE UP (ref 3.5–5.3)
POTASSIUM SERPL-SCNC: 3.7 MMOL/L — SIGNIFICANT CHANGE UP (ref 3.5–5.3)
PROT SERPL-MCNC: 7.5 G/DL — SIGNIFICANT CHANGE UP (ref 6–8.3)
PROTHROM AB SERPL-ACNC: 14.9 SEC — HIGH (ref 9.5–13)
RBC # BLD: 4.75 M/UL — SIGNIFICANT CHANGE UP (ref 3.8–5.2)
RBC # FLD: 13.5 % — SIGNIFICANT CHANGE UP (ref 10.3–14.5)
SODIUM SERPL-SCNC: 135 MMOL/L — SIGNIFICANT CHANGE UP (ref 135–145)
TROPONIN I, HIGH SENSITIVITY RESULT: 76.9 NG/L — HIGH
WBC # BLD: 18.46 K/UL — HIGH (ref 3.8–10.5)
WBC # FLD AUTO: 18.46 K/UL — HIGH (ref 3.8–10.5)

## 2023-07-25 PROCEDURE — 93010 ELECTROCARDIOGRAM REPORT: CPT

## 2023-07-25 PROCEDURE — 99285 EMERGENCY DEPT VISIT HI MDM: CPT

## 2023-07-25 PROCEDURE — 71045 X-RAY EXAM CHEST 1 VIEW: CPT | Mod: 26

## 2023-07-25 PROCEDURE — 70450 CT HEAD/BRAIN W/O DYE: CPT | Mod: 26,MA

## 2023-07-25 PROCEDURE — 71250 CT THORAX DX C-: CPT | Mod: 26,MA

## 2023-07-25 PROCEDURE — 73080 X-RAY EXAM OF ELBOW: CPT | Mod: 26,50

## 2023-07-25 PROCEDURE — 72125 CT NECK SPINE W/O DYE: CPT | Mod: 26,MA

## 2023-07-25 RX ORDER — TETANUS TOXOID, REDUCED DIPHTHERIA TOXOID AND ACELLULAR PERTUSSIS VACCINE, ADSORBED 5; 2.5; 8; 8; 2.5 [IU]/.5ML; [IU]/.5ML; UG/.5ML; UG/.5ML; UG/.5ML
0.5 SUSPENSION INTRAMUSCULAR ONCE
Refills: 0 | Status: COMPLETED | OUTPATIENT
Start: 2023-07-25 | End: 2023-07-25

## 2023-07-25 RX ORDER — SODIUM CHLORIDE 9 MG/ML
1000 INJECTION INTRAMUSCULAR; INTRAVENOUS; SUBCUTANEOUS ONCE
Refills: 0 | Status: COMPLETED | OUTPATIENT
Start: 2023-07-25 | End: 2023-07-25

## 2023-07-25 RX ADMIN — SODIUM CHLORIDE 1000 MILLILITER(S): 9 INJECTION INTRAMUSCULAR; INTRAVENOUS; SUBCUTANEOUS at 22:39

## 2023-07-25 NOTE — ED ADULT NURSE NOTE - OBJECTIVE STATEMENT
Received pt awake and alert, c/o slip out of chair, hit back of head, noted with abrasion to arms and knees, on ASA.

## 2023-07-25 NOTE — ED PROVIDER NOTE - SKIN, MLM
Skin normal color for race, warm, dry and intact. No evidence of rash.  Abrasions/"rug burn" to b/l elbows.  Abrasions to b/l knee.

## 2023-07-25 NOTE — ED PROVIDER NOTE - MUSCULOSKELETAL, MLM
Spine appears normal, range of motion is not limited, no muscle or joint tenderness.  Full ROM of b/l elbows and knees.  Hips and pelvis stable.  No LS spine tenderness or deformity

## 2023-07-25 NOTE — ED PROVIDER NOTE - DIFFERENTIAL DIAGNOSIS
Differential Diagnosis Ddx includes but not limited to concussion, ICH, subdural/epidural hematoma, c-spine fracture, fracture/dislocation of elbow/knee, new-onset a-fib, CHF, ACS, MI, NSTEMI, pulmonary edema

## 2023-07-25 NOTE — ED ADULT NURSE NOTE - PRO INTERPRETER NEED 2
Pt here for consult for bone mets. Arrived per stretcher by transport. Pain is level 8 on scale 0-10. Family present during consult.     Héctor Pike RN English

## 2023-07-25 NOTE — ED PROVIDER NOTE - CARE PLAN
1 Principal Discharge DX:	New onset atrial fibrillation  Secondary Diagnosis:	Acute CHF  Secondary Diagnosis:	ACS (acute coronary syndrome)  Secondary Diagnosis:	Rhabdomyolysis

## 2023-07-25 NOTE — ED PROVIDER NOTE - CLINICAL SUMMARY MEDICAL DECISION MAKING FREE TEXT BOX
78 year old female p/w b/l elbow pain and posterior headache s/p fall backwards out of chair.  No LOC. On ASA 81.  Patient found to be in a-fib with RVR on EKG, offers no cardiac complaints.  CT head/c-spine, x-ray elbows, labs, CE, BNP, cardiology consult, admit

## 2023-07-25 NOTE — ED PROVIDER NOTE - CONSTITUTIONAL, MLM
Well appearing, awake, alert, oriented to person, place, time/situation and in no apparent distress.  Head NC/AT with full ROM of c-spine, no midline tenderness or deformity normal...

## 2023-07-25 NOTE — ED ADULT TRIAGE NOTE - CHIEF COMPLAINT QUOTE
c/o fall today while sitting in chair. endorsing head trauma, takes 81mg ASA every day. denies headache, dizziness, lightheadedness, n/v. abrasions noted to b/l forearms, b/l knees. no active bleeding noted. pmhx htn, hld.

## 2023-07-25 NOTE — ED PROVIDER NOTE - OBJECTIVE STATEMENT
78-year-old female with a history of HTN, HLD presents with bilateral elbow pain, posterior headache, bilateral knee pain status post mechanical fall.  Patient lives alone, daughter-in-law at the bedside.  Patient states that she was sitting in a chair, and she attempted to stand up and the chair went out from underneath her.  She fell backwards hitting the back of her head.  Denies LOC.  She managed to turn over onto her abdomen and crawled on her elbows and knees to get to her phone to call her daughter-in-law.  Patient states she was unable to get up on her own.  Unknown last tetanus.  Patient takes ASA 81 mg daily.  She walks unassisted. Currently only c/o b/l elbow pain, states headache has resolved. Patient does not have a doctor or cardiologist.  Daughter-in-law states that patient did not like her primary care doctor and has not seen anyone in over a year.

## 2023-07-25 NOTE — ED PROVIDER NOTE - NSICDXFAMILYHX_GEN_ALL_CORE_FT
FAMILY HISTORY:  Mother  Still living? No  Family history of myocardial infarction, Age at diagnosis: Age Unknown

## 2023-07-25 NOTE — ED PROVIDER NOTE - NSICDXPASTMEDICALHX_GEN_ALL_CORE_FT
PAST MEDICAL HISTORY:  HTN (hypertension)     Hyperlipidemia     Seasonal allergies     Tinnitus right ear

## 2023-07-25 NOTE — ED ADULT NURSE NOTE - NSFALLRISKINTERV_ED_ALL_ED

## 2023-07-25 NOTE — ED PROVIDER NOTE - IV ALTEPLASE INCLUSION HIDDEN
show Rhomboid Transposition Flap Text: The defect edges were debeveled with a #15 scalpel blade.  Given the location of the defect and the proximity to free margins a rhomboid transposition flap was deemed most appropriate.  Using a sterile surgical marker, an appropriate rhomboid flap was drawn incorporating the defect.    The area thus outlined was incised deep to adipose tissue with a #15 scalpel blade.  The skin margins were undermined to an appropriate distance in all directions utilizing iris scissors.

## 2023-07-25 NOTE — ED PROVIDER NOTE - PROGRESS NOTE DETAILS
Results of work-up discussed with patient and daughter-in-law.  No known history of a-fib. Patient states that after she fell she was on the floor for 1.5 hours before she was able to get up and crawl to her phone.  Patient states that she takes only over-the-counter medications, vitamins and a baby aspirin.  Explained to patient that she needs to be admitted for new onset A-fib, CHF, ACS, and cardiology consultation. She will likely be lost to follow up.  Patient agreeable to admission.

## 2023-07-25 NOTE — ED PROVIDER NOTE - CPE EDP NEURO NORM
GENERAL PRE-PROCEDURE:   Procedure:  US guided renal biopsy  Date/Time:  7/15/2020 8:51 AM    Verbal consent obtained?: Yes    Written consent obtained?: Yes    Risks and benefits: Risks, benefits and alternatives were discussed    Consent given by:  Patient  Patient states understanding of procedure being performed: Yes    Patient's understanding of procedure matches consent: Yes    Procedure consent matches procedure scheduled: Yes    Expected level of sedation:  Moderate  Appropriately NPO:  Yes  ASA Class:  Class 1- healthy patient  Mallampati  :  Grade 1- soft palate, uvula, tonsillar pillars, and posterior pharyngeal wall visible  Lungs:  Lungs clear with good breath sounds bilaterally  Heart:  Normal heart sounds and rate  History & Physical reviewed:  History and physical reviewed and no updates needed  Statement of review:  I have reviewed the lab findings, diagnostic data, medications, and the plan for sedation    
normal...

## 2023-07-26 DIAGNOSIS — R09.89 OTHER SPECIFIED SYMPTOMS AND SIGNS INVOLVING THE CIRCULATORY AND RESPIRATORY SYSTEMS: ICD-10-CM

## 2023-07-26 DIAGNOSIS — D72.829 ELEVATED WHITE BLOOD CELL COUNT, UNSPECIFIED: ICD-10-CM

## 2023-07-26 DIAGNOSIS — M62.82 RHABDOMYOLYSIS: ICD-10-CM

## 2023-07-26 DIAGNOSIS — I48.91 UNSPECIFIED ATRIAL FIBRILLATION: ICD-10-CM

## 2023-07-26 DIAGNOSIS — Z96.659 PRESENCE OF UNSPECIFIED ARTIFICIAL KNEE JOINT: Chronic | ICD-10-CM

## 2023-07-26 DIAGNOSIS — R79.89 OTHER SPECIFIED ABNORMAL FINDINGS OF BLOOD CHEMISTRY: ICD-10-CM
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
## 2023-07-26 DIAGNOSIS — J84.10 PULMONARY FIBROSIS, UNSPECIFIED: ICD-10-CM

## 2023-07-26 DIAGNOSIS — E78.5 HYPERLIPIDEMIA, UNSPECIFIED: ICD-10-CM

## 2023-07-26 DIAGNOSIS — I10 ESSENTIAL (PRIMARY) HYPERTENSION: ICD-10-CM

## 2023-07-26 DIAGNOSIS — R77.8 OTHER SPECIFIED ABNORMALITIES OF PLASMA PROTEINS: ICD-10-CM

## 2023-07-26 DIAGNOSIS — Z29.9 ENCOUNTER FOR PROPHYLACTIC MEASURES, UNSPECIFIED: ICD-10-CM

## 2023-07-26 DIAGNOSIS — K81.0 ACUTE CHOLECYSTITIS: ICD-10-CM

## 2023-07-26 LAB
A1C WITH ESTIMATED AVERAGE GLUCOSE RESULT: 5.6 % — SIGNIFICANT CHANGE UP (ref 4–5.6)
ALBUMIN SERPL ELPH-MCNC: 3.1 G/DL — LOW (ref 3.3–5)
ALP SERPL-CCNC: 99 U/L — SIGNIFICANT CHANGE UP (ref 40–120)
ALT FLD-CCNC: 58 U/L — SIGNIFICANT CHANGE UP (ref 12–78)
ANION GAP SERPL CALC-SCNC: 8 MMOL/L — SIGNIFICANT CHANGE UP (ref 5–17)
AST SERPL-CCNC: 106 U/L — HIGH (ref 15–37)
BASOPHILS # BLD AUTO: 0.03 K/UL — SIGNIFICANT CHANGE UP (ref 0–0.2)
BASOPHILS # BLD AUTO: 0.04 K/UL — SIGNIFICANT CHANGE UP (ref 0–0.2)
BASOPHILS NFR BLD AUTO: 0.2 % — SIGNIFICANT CHANGE UP (ref 0–2)
BASOPHILS NFR BLD AUTO: 0.2 % — SIGNIFICANT CHANGE UP (ref 0–2)
BILIRUB SERPL-MCNC: 0.9 MG/DL — SIGNIFICANT CHANGE UP (ref 0.2–1.2)
BUN SERPL-MCNC: 27 MG/DL — HIGH (ref 7–23)
CALCIUM SERPL-MCNC: 8.3 MG/DL — LOW (ref 8.5–10.1)
CHLORIDE SERPL-SCNC: 107 MMOL/L — SIGNIFICANT CHANGE UP (ref 96–108)
CHOLEST SERPL-MCNC: 130 MG/DL — SIGNIFICANT CHANGE UP
CK MB BLD-MCNC: 0.2 % — SIGNIFICANT CHANGE UP (ref 0–3.5)
CK MB CFR SERPL CALC: 14 NG/ML — HIGH (ref 0–3.6)
CK SERPL-CCNC: 5621 U/L — HIGH (ref 26–192)
CO2 SERPL-SCNC: 24 MMOL/L — SIGNIFICANT CHANGE UP (ref 22–31)
CREAT SERPL-MCNC: 1.2 MG/DL — SIGNIFICANT CHANGE UP (ref 0.5–1.3)
EGFR: 47 ML/MIN/1.73M2 — LOW
EOSINOPHIL # BLD AUTO: 0.01 K/UL — SIGNIFICANT CHANGE UP (ref 0–0.5)
EOSINOPHIL # BLD AUTO: 0.05 K/UL — SIGNIFICANT CHANGE UP (ref 0–0.5)
EOSINOPHIL NFR BLD AUTO: 0.1 % — SIGNIFICANT CHANGE UP (ref 0–6)
EOSINOPHIL NFR BLD AUTO: 0.3 % — SIGNIFICANT CHANGE UP (ref 0–6)
ESTIMATED AVERAGE GLUCOSE: 114 MG/DL — SIGNIFICANT CHANGE UP (ref 68–114)
GLUCOSE SERPL-MCNC: 113 MG/DL — HIGH (ref 70–99)
HCT VFR BLD CALC: 42.2 % — SIGNIFICANT CHANGE UP (ref 34.5–45)
HDLC SERPL-MCNC: 48 MG/DL — LOW
HGB BLD-MCNC: 13.9 G/DL — SIGNIFICANT CHANGE UP (ref 11.5–15.5)
IMM GRANULOCYTES NFR BLD AUTO: 0.4 % — SIGNIFICANT CHANGE UP (ref 0–0.9)
IMM GRANULOCYTES NFR BLD AUTO: 0.8 % — SIGNIFICANT CHANGE UP (ref 0–0.9)
LACTATE SERPL-SCNC: 1.1 MMOL/L — SIGNIFICANT CHANGE UP (ref 0.7–2)
LIPID PNL WITH DIRECT LDL SERPL: 67 MG/DL — SIGNIFICANT CHANGE UP
LYMPHOCYTES # BLD AUTO: 0.6 K/UL — LOW (ref 1–3.3)
LYMPHOCYTES # BLD AUTO: 0.76 K/UL — LOW (ref 1–3.3)
LYMPHOCYTES # BLD AUTO: 3.3 % — LOW (ref 13–44)
LYMPHOCYTES # BLD AUTO: 5.4 % — LOW (ref 13–44)
MAGNESIUM SERPL-MCNC: 2.3 MG/DL — SIGNIFICANT CHANGE UP (ref 1.6–2.6)
MANUAL SMEAR VERIFICATION: SIGNIFICANT CHANGE UP
MCHC RBC-ENTMCNC: 31 PG — SIGNIFICANT CHANGE UP (ref 27–34)
MCHC RBC-ENTMCNC: 32.9 GM/DL — SIGNIFICANT CHANGE UP (ref 32–36)
MCV RBC AUTO: 94 FL — SIGNIFICANT CHANGE UP (ref 80–100)
MONOCYTES # BLD AUTO: 1.15 K/UL — HIGH (ref 0–0.9)
MONOCYTES # BLD AUTO: 1.63 K/UL — HIGH (ref 0–0.9)
MONOCYTES NFR BLD AUTO: 8.1 % — SIGNIFICANT CHANGE UP (ref 2–14)
MONOCYTES NFR BLD AUTO: 8.8 % — SIGNIFICANT CHANGE UP (ref 2–14)
NEUTROPHILS # BLD AUTO: 12.17 K/UL — HIGH (ref 1.8–7.4)
NEUTROPHILS # BLD AUTO: 15.99 K/UL — HIGH (ref 1.8–7.4)
NEUTROPHILS NFR BLD AUTO: 85.8 % — HIGH (ref 43–77)
NEUTROPHILS NFR BLD AUTO: 86.6 % — HIGH (ref 43–77)
NON HDL CHOLESTEROL: 82 MG/DL — SIGNIFICANT CHANGE UP
NRBC # BLD: 0 /100 WBCS — SIGNIFICANT CHANGE UP (ref 0–0)
NRBC # BLD: 0 /100 WBCS — SIGNIFICANT CHANGE UP (ref 0–0)
PHOSPHATE SERPL-MCNC: 3.2 MG/DL — SIGNIFICANT CHANGE UP (ref 2.5–4.5)
PLAT MORPH BLD: NORMAL — SIGNIFICANT CHANGE UP
PLATELET # BLD AUTO: 178 K/UL — SIGNIFICANT CHANGE UP (ref 150–400)
POTASSIUM SERPL-MCNC: 3.1 MMOL/L — LOW (ref 3.5–5.3)
POTASSIUM SERPL-SCNC: 3.1 MMOL/L — LOW (ref 3.5–5.3)
PROCALCITONIN SERPL-MCNC: 2.42 NG/ML — HIGH
PROT SERPL-MCNC: 6.3 G/DL — SIGNIFICANT CHANGE UP (ref 6–8.3)
RAPID RVP RESULT: SIGNIFICANT CHANGE UP
RBC # BLD: 4.49 M/UL — SIGNIFICANT CHANGE UP (ref 3.8–5.2)
RBC # FLD: 13.6 % — SIGNIFICANT CHANGE UP (ref 10.3–14.5)
RBC BLD AUTO: NORMAL — SIGNIFICANT CHANGE UP
SARS-COV-2 RNA SPEC QL NAA+PROBE: SIGNIFICANT CHANGE UP
SODIUM SERPL-SCNC: 139 MMOL/L — SIGNIFICANT CHANGE UP (ref 135–145)
T3 SERPL-MCNC: 69 NG/DL — LOW (ref 80–200)
T4 FREE SERPL-MCNC: 1.8 NG/DL — SIGNIFICANT CHANGE UP (ref 0.9–1.8)
TRIGL SERPL-MCNC: 74 MG/DL — SIGNIFICANT CHANGE UP
TROPONIN I, HIGH SENSITIVITY RESULT: 76.1 NG/L — HIGH
TSH SERPL-MCNC: 1.48 UIU/ML — SIGNIFICANT CHANGE UP (ref 0.36–3.74)
WBC # BLD: 14.18 K/UL — HIGH (ref 3.8–10.5)
WBC # FLD AUTO: 14.18 K/UL — HIGH (ref 3.8–10.5)

## 2023-07-26 PROCEDURE — 99223 1ST HOSP IP/OBS HIGH 75: CPT

## 2023-07-26 PROCEDURE — 93010 ELECTROCARDIOGRAM REPORT: CPT

## 2023-07-26 PROCEDURE — 76705 ECHO EXAM OF ABDOMEN: CPT | Mod: 26

## 2023-07-26 PROCEDURE — 93306 TTE W/DOPPLER COMPLETE: CPT | Mod: 26

## 2023-07-26 PROCEDURE — 78226 HEPATOBILIARY SYSTEM IMAGING: CPT | Mod: 26

## 2023-07-26 PROCEDURE — 93970 EXTREMITY STUDY: CPT | Mod: 26

## 2023-07-26 PROCEDURE — 71045 X-RAY EXAM CHEST 1 VIEW: CPT | Mod: 26

## 2023-07-26 PROCEDURE — 99233 SBSQ HOSP IP/OBS HIGH 50: CPT | Mod: GC

## 2023-07-26 RX ORDER — PIPERACILLIN AND TAZOBACTAM 4; .5 G/20ML; G/20ML
3.38 INJECTION, POWDER, LYOPHILIZED, FOR SOLUTION INTRAVENOUS ONCE
Refills: 0 | Status: COMPLETED | OUTPATIENT
Start: 2023-07-26 | End: 2023-07-26

## 2023-07-26 RX ORDER — SODIUM CHLORIDE 9 MG/ML
1000 INJECTION, SOLUTION INTRAVENOUS
Refills: 0 | Status: DISCONTINUED | OUTPATIENT
Start: 2023-07-26 | End: 2023-07-26

## 2023-07-26 RX ORDER — SODIUM CHLORIDE 9 MG/ML
1000 INJECTION, SOLUTION INTRAVENOUS
Refills: 0 | Status: DISCONTINUED | OUTPATIENT
Start: 2023-07-26 | End: 2023-07-27

## 2023-07-26 RX ORDER — ACETAMINOPHEN 500 MG
650 TABLET ORAL EVERY 6 HOURS
Refills: 0 | Status: DISCONTINUED | OUTPATIENT
Start: 2023-07-26 | End: 2023-08-03

## 2023-07-26 RX ORDER — METOPROLOL TARTRATE 50 MG
25 TABLET ORAL
Refills: 0 | Status: DISCONTINUED | OUTPATIENT
Start: 2023-07-26 | End: 2023-07-28

## 2023-07-26 RX ORDER — ENOXAPARIN SODIUM 100 MG/ML
70 INJECTION SUBCUTANEOUS EVERY 12 HOURS
Refills: 0 | Status: DISCONTINUED | OUTPATIENT
Start: 2023-07-26 | End: 2023-07-26

## 2023-07-26 RX ORDER — SODIUM CHLORIDE 9 MG/ML
1000 INJECTION INTRAMUSCULAR; INTRAVENOUS; SUBCUTANEOUS ONCE
Refills: 0 | Status: COMPLETED | OUTPATIENT
Start: 2023-07-26 | End: 2023-07-26

## 2023-07-26 RX ORDER — POTASSIUM CHLORIDE 20 MEQ
40 PACKET (EA) ORAL ONCE
Refills: 0 | Status: COMPLETED | OUTPATIENT
Start: 2023-07-26 | End: 2023-07-26

## 2023-07-26 RX ORDER — PIPERACILLIN AND TAZOBACTAM 4; .5 G/20ML; G/20ML
3.38 INJECTION, POWDER, LYOPHILIZED, FOR SOLUTION INTRAVENOUS EVERY 8 HOURS
Refills: 0 | Status: DISCONTINUED | OUTPATIENT
Start: 2023-07-26 | End: 2023-07-31

## 2023-07-26 RX ORDER — ENOXAPARIN SODIUM 100 MG/ML
70 INJECTION SUBCUTANEOUS ONCE
Refills: 0 | Status: COMPLETED | OUTPATIENT
Start: 2023-07-26 | End: 2023-07-26

## 2023-07-26 RX ORDER — POTASSIUM CHLORIDE 20 MEQ
40 PACKET (EA) ORAL EVERY 4 HOURS
Refills: 0 | Status: DISCONTINUED | OUTPATIENT
Start: 2023-07-26 | End: 2023-07-26

## 2023-07-26 RX ORDER — MORPHINE SULFATE 50 MG/1
3 CAPSULE, EXTENDED RELEASE ORAL ONCE
Refills: 0 | Status: DISCONTINUED | OUTPATIENT
Start: 2023-07-26 | End: 2023-07-26

## 2023-07-26 RX ORDER — LANOLIN ALCOHOL/MO/W.PET/CERES
3 CREAM (GRAM) TOPICAL AT BEDTIME
Refills: 0 | Status: DISCONTINUED | OUTPATIENT
Start: 2023-07-26 | End: 2023-08-03

## 2023-07-26 RX ORDER — DILTIAZEM HCL 120 MG
20 CAPSULE, EXT RELEASE 24 HR ORAL ONCE
Refills: 0 | Status: COMPLETED | OUTPATIENT
Start: 2023-07-26 | End: 2023-07-26

## 2023-07-26 RX ORDER — DILTIAZEM HCL 120 MG
10 CAPSULE, EXT RELEASE 24 HR ORAL ONCE
Refills: 0 | Status: COMPLETED | OUTPATIENT
Start: 2023-07-26 | End: 2023-07-26

## 2023-07-26 RX ORDER — ONDANSETRON 8 MG/1
4 TABLET, FILM COATED ORAL EVERY 8 HOURS
Refills: 0 | Status: DISCONTINUED | OUTPATIENT
Start: 2023-07-26 | End: 2023-08-03

## 2023-07-26 RX ORDER — ASPIRIN/CALCIUM CARB/MAGNESIUM 324 MG
81 TABLET ORAL DAILY
Refills: 0 | Status: DISCONTINUED | OUTPATIENT
Start: 2023-07-26 | End: 2023-07-26

## 2023-07-26 RX ADMIN — SODIUM CHLORIDE 1000 MILLILITER(S): 9 INJECTION INTRAMUSCULAR; INTRAVENOUS; SUBCUTANEOUS at 03:12

## 2023-07-26 RX ADMIN — Medication 25 MILLIGRAM(S): at 06:14

## 2023-07-26 RX ADMIN — Medication 40 MILLIEQUIVALENT(S): at 15:18

## 2023-07-26 RX ADMIN — Medication 81 MILLIGRAM(S): at 15:14

## 2023-07-26 RX ADMIN — SODIUM CHLORIDE 60 MILLILITER(S): 9 INJECTION, SOLUTION INTRAVENOUS at 17:15

## 2023-07-26 RX ADMIN — Medication 10 MILLIGRAM(S): at 21:32

## 2023-07-26 RX ADMIN — PIPERACILLIN AND TAZOBACTAM 25 GRAM(S): 4; .5 INJECTION, POWDER, LYOPHILIZED, FOR SOLUTION INTRAVENOUS at 21:09

## 2023-07-26 RX ADMIN — Medication 25 MILLIGRAM(S): at 17:15

## 2023-07-26 RX ADMIN — Medication 40 MILLIEQUIVALENT(S): at 06:14

## 2023-07-26 RX ADMIN — MORPHINE SULFATE 3 MILLIGRAM(S): 50 CAPSULE, EXTENDED RELEASE ORAL at 11:18

## 2023-07-26 RX ADMIN — ENOXAPARIN SODIUM 70 MILLIGRAM(S): 100 INJECTION SUBCUTANEOUS at 00:48

## 2023-07-26 RX ADMIN — TETANUS TOXOID, REDUCED DIPHTHERIA TOXOID AND ACELLULAR PERTUSSIS VACCINE, ADSORBED 0.5 MILLILITER(S): 5; 2.5; 8; 8; 2.5 SUSPENSION INTRAMUSCULAR at 00:18

## 2023-07-26 RX ADMIN — SODIUM CHLORIDE 1000 MILLILITER(S): 9 INJECTION INTRAMUSCULAR; INTRAVENOUS; SUBCUTANEOUS at 00:18

## 2023-07-26 RX ADMIN — PIPERACILLIN AND TAZOBACTAM 25 GRAM(S): 4; .5 INJECTION, POWDER, LYOPHILIZED, FOR SOLUTION INTRAVENOUS at 07:51

## 2023-07-26 RX ADMIN — Medication 3 MILLIGRAM(S): at 00:53

## 2023-07-26 RX ADMIN — Medication 20 MILLIGRAM(S): at 00:24

## 2023-07-26 RX ADMIN — MORPHINE SULFATE 3 MILLIGRAM(S): 50 CAPSULE, EXTENDED RELEASE ORAL at 11:11

## 2023-07-26 RX ADMIN — PIPERACILLIN AND TAZOBACTAM 200 GRAM(S): 4; .5 INJECTION, POWDER, LYOPHILIZED, FOR SOLUTION INTRAVENOUS at 03:12

## 2023-07-26 RX ADMIN — PIPERACILLIN AND TAZOBACTAM 25 GRAM(S): 4; .5 INJECTION, POWDER, LYOPHILIZED, FOR SOLUTION INTRAVENOUS at 15:13

## 2023-07-26 NOTE — H&P ADULT - TIME BILLING
activities including direct patient care, counseling and/or coordinating care, reviewing notes/lab data/imaging, discussion with pt/family re: atrial fibrillation and stroke risk and necessary work up; discussion with multidisciplinary team (excluding time spent on resident teaching)

## 2023-07-26 NOTE — PROGRESS NOTE ADULT - PROBLEM SELECTOR PLAN 7
History of HTN, no longer on medications  Used to be on ACE-inhibitor and BB-thiazide  Continue with lopressor 25mg BID  Monitor hemodynamics Lives alone and walks unassisted at baseline  Fell off chair and was unable to get up  Fall precautions  PT evaluation No prior history of kidney disease  Possibly prerenal azotemia or secondary to rhabdo  No baseline to compare it to so could be CKD  Will continue gentle IV fluids and monitor renal indices

## 2023-07-26 NOTE — CONSULT NOTE ADULT - ASSESSMENT
78y female with PMHx of HTN, HLD presented to the ED for the evaluation of fall out of chair. States that she was sitting on the chair and fell backwards, hitting her head. Was on the floor for over an hour and was unable to get up.    pulm nodule - 1.8 cm  eval for acute enrique  HTN  HLD  OP  OA  Dyspnea  Ataxic gait  Hard of hearing  AF  Rhabdo    serial renal indices  I and O  monitor VS and HD and Sat  trend LABS  on o2 support - monitor for dyspnea - Blood gas - CT imaging without acute pulm path -   Pulm nodule - 1.8 cm - will need f/u and repeat imaging and likely PET scan  Fall prec - PT assessment  AF management - eval for HF - on AC full dose -   TTE  trend TROPS  cardio eval  tele monitoring  proBNP noted  caution with IVF

## 2023-07-26 NOTE — H&P ADULT - PROBLEM SELECTOR PLAN 6
No prior history of kidney disease  Possibly prerenal azotemia or secondary to rhabdo  No baseline to compare it to so could be CKD  Will continue gentle IV fluids and monitor renal indices Lives alone and walks unassisted at baseline  Fell off chair and was unable to get up  Fall precautions  PT evaluation

## 2023-07-26 NOTE — PROGRESS NOTE ADULT - PROBLEM SELECTOR PLAN 1
Admitted to telemetry  Given 20mg IV cardizem in the ER with improvement in rates  started lopressor 25mg BID for rate control (was on BB in the past)  [id number] 4: will start full dose LMWH for thromboprophylaxis  Can consider change to DOAC (i.e. eliquis) in AM   ordered 2D ECHO to assess cardiac structure and function, r/o valvular pathology will f/u  Monitor and replete electrolytes for optimal arrhythmia control  repeat EKG showed rate of 95   Check TSH and free thyroxine in AM  LE doppler resulted - no DVT evidence b/l   Cardiology consulted: Dr. Louie -CT scan in the ER revealed findings consistent w/ acute enrique (Distended GB w/ jd-cholecystic fluid)  -RUQ ultrasound showed Cholelithiasis with a small amount of pericholecystic fluid and nonspecific gallbladder wall thickening  -has no n/v/d or RUQ pain    -surgery consulted will f/u   -HIDA scan positive, scheduling percutaneous choley w/ surg tomorrow, will D/C heparin  -pt is currently medically optimized for surgery   -NPO currently if HIDA scan negative will start a diet  -currently on zosyn -CT scan in the ER revealed findings consistent w/ acute enrique (Distended GB w/ jd-cholecystic fluid)  -RUQ ultrasound showed Cholelithiasis with a small amount of pericholecystic fluid and nonspecific gallbladder wall thickening  -HIDA scan positive, scheduling percutaneous choley w/ surg tomorrow, will D/C heparin  -pt is currently medically optimized for surgery   - C/w zosyn   - Clear liquid diet for now, NPO after midnight   - Surgery following -CT scan in the ER revealed findings consistent w/ acute enrique (Distended GB w/ jd-cholecystic fluid)  -RUQ ultrasound showed Cholelithiasis with a small amount of pericholecystic fluid and nonspecific gallbladder wall thickening  -HIDA scan positive, scheduling percutaneous choley w/ surg tomorrow, will D/C heparin  -pt is currently medically optimized for surgery   - C/w  iv abx zosyn   - Clear liquid diet for now, NPO after midnight   - Surgery following

## 2023-07-26 NOTE — H&P ADULT - PROBLEM SELECTOR PLAN 1
Admit to telemetry  Given 20mg IV cardizem in the ER with improvement in rates  Will start lopressor 25mg BID for rate control (was on BB in the past)  [id number] 4: will start full dose LMWH for thromboprophylaxis  Can consider change to DOAC (i.e. eliquis) in AM  Check 2D ECHO to assess cardiac structure and function, r/o valvular pathology  Monitor and replete electrolytes for optimal arrhythmia control  Check TSH and free thyroxine  Cardiology consult Dr. Louie Admit to telemetry  Given 20mg IV cardizem in the ER with improvement in rates  Will start lopressor 25mg BID for rate control (was on BB in the past)  [id number] 4: will start full dose LMWH for thromboprophylaxis  Can consider change to DOAC (i.e. eliquis) in AM  Check 2D ECHO to assess cardiac structure and function, r/o valvular pathology  Monitor and replete electrolytes for optimal arrhythmia control  Check TSH and free thyroxine in AM  Will check LE dopplers to rule out DVT given left calf tenderness  Cardiology consulted: Dr. Louie

## 2023-07-26 NOTE — PROGRESS NOTE ADULT - PROBLEM SELECTOR PLAN 9
VTE prophylaxis: already therapeutically anticoagulated on LMWH  Fall precautions Used to be on atorvastatin 40mg daily, no longer taking  Would resume statin drug when CK improves  Follow up lipid profile History of HTN, no longer on medications  Used to be on ACE-inhibitor and BB-thiazide  Continue with lopressor 25mg BID  Monitor hemodynamics History of HTN, no longer on medications  Used to be on ACE-inhibitor and BB-thiazide  Continue with lopressor 25mg BID

## 2023-07-26 NOTE — CONSULT NOTE ADULT - NS ATTEND AMEND GEN_ALL_CORE FT
Pt seen and examined this morning.  Admitted overnight for fall and diagnosed with new a fib.  Imaging incidentally found distended GB with stones and mild pericholecystic fluid and wall thickening.  WBC elevated to 14K.  Pt denies known history of gallstones.  Denies typical billary symptoms or colic.  Does describe an episode of nausea and vomiting this past weekend but only following one meal which seemed to have resolved spontaneously.  No other sick contacts.    Pt found in bed. Comfortable but SOB.  NC oxygen in place.  Bruises to b/l upper extremity/elbows secondary to fall.  Denies abdominal pain, nausea.  Abdomen mildly obese, soft, nondistended, non tender.  Negative Martell's sign.  Non palpable masses    Suspicious fo acute on chronic cholecystitis however in light of new cardiac findings further Cardiac workup is indicated to be cleared for any potential surgical intervention if indicated.   In the interim, HIDA scan has been order to confirm or r/o acute cholecystitis.  If medically stable/cleared for surgery may consider cholecystectomy prior to discharge, if however she cannot be immediately cleared, then consideration for percutaneous cholecystostomy by IR if HIDA positive for acute cholecystitis.    Continue IV antibiotics.  Supplemental O2 as needed.    Will follow with any additional recommendations following HIDA scan.

## 2023-07-26 NOTE — H&P ADULT - PROBLEM SELECTOR PLAN 5
Patient is without chest pain on admission  EKG with afib RVR with likely rate related ischemic changes  Suspect demand ischemia in setting of rapid AF and rhabdomyolysis  Will trend cardiac enzymes  Follow up 2D ECHO  Repeat EKG when rate is controlled  Continue ASA 81mg daily  Resume statin when CK improves Patient is without chest pain on admission, doubt ACS  EKG with afib RVR with likely rate related ischemic changes  Suspect demand ischemia in setting of rapid AF and rhabdomyolysis  Will trend cardiac enzymes  Follow up 2D ECHO  Repeat EKG when rate is controlled  Continue ASA 81mg daily  Resume statin when CK improves No prior history of kidney disease  Possibly prerenal azotemia or secondary to rhabdo  No baseline to compare it to so could be CKD  Will continue gentle IV fluids and monitor renal indices

## 2023-07-26 NOTE — PROGRESS NOTE ADULT - PROBLEM SELECTOR PLAN 8
Used to be on atorvastatin 40mg daily, no longer taking  Would resume statin drug when CK improves  Follow up lipid profile History of HTN, no longer on medications  Used to be on ACE-inhibitor and BB-thiazide  Continue with lopressor 25mg BID  Monitor hemodynamics Lives alone and walks unassisted at baseline  Fell off chair and was unable to get up  Fall precautions  PT evaluation

## 2023-07-26 NOTE — PROGRESS NOTE ADULT - SUBJECTIVE AND OBJECTIVE BOX
Pt seen and examined at bedside, denies complaints at this time. Vital signs stable. Denies pain. Patient NPO except medications. Denies any nausea, vomiting, chest pain, palpitations, shortness of breath.    T(C): 36.3 (07-26-23 @ 08:10), Max: 37 (07-26-23 @ 03:30)  HR: 86 (07-26-23 @ 08:10) (86 - 125)  BP: 131/71 (07-26-23 @ 08:10) (100/55 - 141/62)  RR: 30 (07-26-23 @ 08:10) (18 - 30)  SpO2: 97% (07-26-23 @ 08:10) (97% - 99%)  Wt(kg): --    PHYSICAL EXAM:  General: no acute distress, on 02  HEENT: normocephalic, anicteric  Pulm: labored respirations  Cardio: RRR  Abdomen: soft, nontender, nondistended    I&O's Detail      LABS:                        13.9   14.18 )-----------( 178      ( 26 Jul 2023 03:00 )             42.2     07-26    139  |  107  |  27<H>  ----------------------------<  113<H>  3.1<L>   |  24  |  1.20    Ca    8.3<L>      26 Jul 2023 03:00  Phos  3.2     07-26  Mg     2.3     07-26    TPro  6.3  /  Alb  3.1<L>  /  TBili  0.9  /  DBili  x   /  AST  106<H>  /  ALT  58  /  AlkPhos  99  07-26    PT/INR - ( 25 Jul 2023 22:37 )   PT: 14.9 sec;   INR: 1.28 ratio         PTT - ( 25 Jul 2023 22:37 )  PTT:29.8 sec  Urinalysis Basic - ( 26 Jul 2023 03:00 )    Color: x / Appearance: x / SG: x / pH: x  Gluc: 113 mg/dL / Ketone: x  / Bili: x / Urobili: x   Blood: x / Protein: x / Nitrite: x   Leuk Esterase: x / RBC: x / WBC x   Sq Epi: x / Non Sq Epi: x / Bacteria: x        CAPILLARY BLOOD GLUCOSE          RADIOLOGY & ADDITIONAL STUDIES:    MEDICATIONS:  acetaminophen     Tablet .. 650 milliGRAM(s) Oral every 6 hours PRN  acetaminophen     Tablet .. 650 milliGRAM(s) Oral every 6 hours PRN  aluminum hydroxide/magnesium hydroxide/simethicone Suspension 30 milliLiter(s) Oral every 4 hours PRN  aspirin enteric coated 81 milliGRAM(s) Oral daily  enoxaparin Injectable 70 milliGRAM(s) SubCutaneous every 12 hours  lactated ringers. 1000 milliLiter(s) IV Continuous <Continuous>  melatonin 3 milliGRAM(s) Oral at bedtime PRN  metoprolol tartrate 25 milliGRAM(s) Oral two times a day  ondansetron Injectable 4 milliGRAM(s) IV Push every 8 hours PRN  piperacillin/tazobactam IVPB.. 3.375 Gram(s) IV Intermittent every 8 hours  potassium chloride   Powder 40 milliEquivalent(s) Oral once              77 y/o F presenting with asymptomatic cholecystitis found on imaging.      - Pt NPO except meds  - IV Zosyn  - RUQ U/S showed a 2.0 cm gallstone and gallbladder sludge with mild nonspecific wall thickening and a small amount of pericholecystic fluid.  - HIDA ordered, will f/u results  - Pain control, anti-pyretics prn  - F/u urine, blood cultures  - Further recs pending HIDA scan       Surgical Team  Spectralink: [id number]

## 2023-07-26 NOTE — ED ADULT NURSE REASSESSMENT NOTE - NS ED NURSE REASSESS COMMENT FT1
Pt to be admitted to telemetry unit, Pt made aware of plan, resting comfortably on stretcher, will continue to monitor closely.

## 2023-07-26 NOTE — CONSULT NOTE ADULT - ATTENDING COMMENTS
I have personally seen and examined the patient in detail.  I have spoken to the provider regarding the assessment and plan of care.  I have made changes to the note accordingly.    78y female with PMHx of HTN, HLD presented to the ED for the evaluation of fall out of chair.   Cardiology consulted for new onset atrial fibrillation with RVR.    - Patient is not complaining of any cardiac symptoms at this time.  - No clear evidence of acute ischemia, trops elevated but downtrended 76.9 --> 76.1. No need to further trend.  - CK elevated 5502 --> 5621, however doubt acute plaque rupture in setting of fall and rhabdomyolysis.   - Monitor closely    New onset Afib with RVR  - c/w metoprolol  - Monitor on telemetry  - Continue full AC with lovenox with eventual transition to DOAC pending surgery plan for possible cholecystectomy    ?CHF  - Patient with shortness of breath, now on 2L O2 NC and elevated BNP 3588. Not on home O2, and no prior history of HF  - No previous TTE  - TTE already ordered, follow up results  - HOLD IV fluids and observe for now  - Consider Lasix if patient has worsened difficulty breathing

## 2023-07-26 NOTE — CONSULT NOTE ADULT - SUBJECTIVE AND OBJECTIVE BOX
------------CHARTING IN PROGRESS-----------    Patient is a 78y old  Female who presents with a chief complaint of fall, new onset afib RVR (2023 06:44)      HPI:  78y female with PMHx of HTN, HLD presented to the ED for the evaluation of fall out of chair. States that she was sitting on the chair and fell backwards, hitting her head. Was on the floor for over an hour and was unable to get up. Eventually was able to flip over and crawl on her elbows and knees to call her daughter in law for assistance. She initially had a headache which has resolved. Only has some mild soreness in her elbows. States that she was feeling unwell on  and had an episode of vomiting then. Now feels back to her baseline. No fevers or chills. No urinary symptoms. Denies chest pain, palpitations, dyspnea, current headache, dizziness, abdominal pain, n/v/d. She has no prior history of kidney issues.    In the ED, she was given IV cardizem 20mg IVP with improvement in her rates and 70mg of subcutaneous enoxaparin. (2023 01:00)      INTERVAL HPI: Patient seen and examined at bedside. Patient now on 2L O2 NC. Patient not on home O2. Patient reports that she does not have history of atrial fibrillation, abnormal heart rhythm, heart failure. Never has seen an outpatient cardiologist. Denies chest pain, palpitations. Improved SOB with oxygen supplementation. Patient reports that she does not have shortness of breath when walking long distances.     PAST MEDICAL & SURGICAL HISTORY:  HTN (hypertension)      Hyperlipidemia      Tinnitus  right ear      Seasonal allergies      S/P knee replacement                ECHO  FINDINGS:  No prior ECHO      MEDICATIONS  (STANDING):  aspirin enteric coated 81 milliGRAM(s) Oral daily  enoxaparin Injectable 70 milliGRAM(s) SubCutaneous every 12 hours  lactated ringers. 1000 milliLiter(s) (60 mL/Hr) IV Continuous <Continuous>  metoprolol tartrate 25 milliGRAM(s) Oral two times a day  piperacillin/tazobactam IVPB.. 3.375 Gram(s) IV Intermittent every 8 hours  potassium chloride   Powder 40 milliEquivalent(s) Oral once    MEDICATIONS  (PRN):  acetaminophen     Tablet .. 650 milliGRAM(s) Oral every 6 hours PRN Mild Pain (1 - 3)  acetaminophen     Tablet .. 650 milliGRAM(s) Oral every 6 hours PRN Temp greater or equal to 38C (100.4F)  aluminum hydroxide/magnesium hydroxide/simethicone Suspension 30 milliLiter(s) Oral every 4 hours PRN Dyspepsia  melatonin 3 milliGRAM(s) Oral at bedtime PRN Insomnia  ondansetron Injectable 4 milliGRAM(s) IV Push every 8 hours PRN Nausea and/or Vomiting      FAMILY HISTORY:  Family history of myocardial infarction (Mother)      Denies Family history of CAD or early MI      Constitutional: denies fever, chills  HEENT: denies blurry vision, difficulty hearing  Respiratory: denies SOB, SHEPPARD, cough  Cardiovascular: denies CP, palpitations, orthopnea, PND, LE edema  Gastrointestinal: denies nausea, vomiting, abdominal pain  Genitourinary: denies urinary changes  Skin: Denies rashes, itching  Neurologic: denies headache, weakness, dizziness  Hematology/Oncology: denies bleeding, easy bruising  ROS negative except as noted above      SOCIAL HISTORY:    No tobacco, Alcohol or Ddrug use    Vital Signs Last 24 Hrs  T(C): 36.3 (2023 08:10), Max: 37 (2023 03:30)  T(F): 97.4 (2023 08:10), Max: 98.6 (2023 03:30)  HR: 86 (2023 08:10) (86 - 125)  BP: 131/71 (2023 08:10) (100/55 - 141/62)  BP(mean): --  RR: 30 (2023 08:10) (18 - 30)  SpO2: 97% (2023 08:10) (97% - 99%)    Parameters below as of 2023 08:10  Patient On (Oxygen Delivery Method): nasal cannula  O2 Flow (L/min): 2      Physical Exam:  General: NAD, sitting up in bed  HEENT: NCAT, dry mucous membranes   Neurology: A&Ox3, nonfocal, sensation intact   Respiratory: CTA B/L, increased WOB  CV: irregular rhythm, +S1/S2  Abdominal: Soft, NT, ND +BSx4, no palpable masses  Extremities: No C/C/E, + peripheral pulses  Heme: +ecchymoses bilateral elbows      EC/26 2:03 Afib, rate 95    20:54 Afib with RVR, rate 143    I&O's Detail      LABS:                        13.9   14.18 )-----------( 178      ( 2023 03:00 )             42.2     07-    139  |  107  |  27<H>  ----------------------------<  113<H>  3.1<L>   |  24  |  1.20    Ca    8.3<L>      2023 03:00  Phos  3.2       Mg     2.3         TPro  6.3  /  Alb  3.1<L>  /  TBili  0.9  /  DBili  x   /  AST  106<H>  /  ALT  58  /  AlkPhos  99  07-26    CARDIAC MARKERS ( 2023 03:00 )  x     / x     / 5621 U/L / x     / 14.0 ng/mL  CARDIAC MARKERS ( 2023 22:37 )  x     / x     / 5502 U/L / x     / 14.6 ng/mL      PT/INR - ( 2023 22:37 )   PT: 14.9 sec;   INR: 1.28 ratio         PTT - ( 2023 22:37 )  PTT:29.8 sec  Urinalysis Basic - ( 2023 03:00 )    Color: x / Appearance: x / SG: x / pH: x  Gluc: 113 mg/dL / Ketone: x  / Bili: x / Urobili: x   Blood: x / Protein: x / Nitrite: x   Leuk Esterase: x / RBC: x / WBC x   Sq Epi: x / Non Sq Epi: x / Bacteria: x      I&O's Summary    BNP 3588  RADIOLOGY & ADDITIONAL STUDIES: Patient is a 78y old  Female who presents with a chief complaint of fall, new onset afib RVR (2023 06:44)      HPI:  78y female with PMHx of HTN, HLD presented to the ED for the evaluation of fall out of chair. States that she was sitting on the chair and fell backwards, hitting her head. Was on the floor for over an hour and was unable to get up. Eventually was able to flip over and crawl on her elbows and knees to call her daughter in law for assistance. She initially had a headache which has resolved. Only has some mild soreness in her elbows. States that she was feeling unwell on  and had an episode of vomiting then. Now feels back to her baseline. No fevers or chills. No urinary symptoms. Denies chest pain, palpitations, dyspnea, current headache, dizziness, abdominal pain, n/v/d. She has no prior history of kidney issues.    In the ED, she was given IV cardizem 20mg IVP with improvement in her rates and 70mg of subcutaneous enoxaparin. (2023 01:00)      INTERVAL HPI: Patient seen and examined at bedside. Patient now on 2L O2 NC. Patient not on home O2. Patient reports that she does not have history of atrial fibrillation, abnormal heart rhythm, heart failure. Never has seen an outpatient cardiologist. Did not feel palpitations when she arrived to ED although she was tachycardic, in atrial fibrillation with RVR. Denies chest pain, palpitations. Improved SOB with oxygen supplementation. Patient reports that she normally does not have shortness of breath when walking long distances. Of note, patient was ill over the weekend with nausea and vomiting but no abdominal pain; found to have cholelithiasis with distended thick-walled gallbladder, concern for acute cholecystitis on CT.     PAST MEDICAL & SURGICAL HISTORY:  HTN (hypertension)      Hyperlipidemia      Tinnitus  right ear      Seasonal allergies      S/P knee replacement                ECHO  FINDINGS:  No prior ECHO      MEDICATIONS  (STANDING):  aspirin enteric coated 81 milliGRAM(s) Oral daily  enoxaparin Injectable 70 milliGRAM(s) SubCutaneous every 12 hours  lactated ringers. 1000 milliLiter(s) (60 mL/Hr) IV Continuous <Continuous>  metoprolol tartrate 25 milliGRAM(s) Oral two times a day  piperacillin/tazobactam IVPB.. 3.375 Gram(s) IV Intermittent every 8 hours  potassium chloride   Powder 40 milliEquivalent(s) Oral once    MEDICATIONS  (PRN):  acetaminophen     Tablet .. 650 milliGRAM(s) Oral every 6 hours PRN Mild Pain (1 - 3)  acetaminophen     Tablet .. 650 milliGRAM(s) Oral every 6 hours PRN Temp greater or equal to 38C (100.4F)  aluminum hydroxide/magnesium hydroxide/simethicone Suspension 30 milliLiter(s) Oral every 4 hours PRN Dyspepsia  melatonin 3 milliGRAM(s) Oral at bedtime PRN Insomnia  ondansetron Injectable 4 milliGRAM(s) IV Push every 8 hours PRN Nausea and/or Vomiting      FAMILY HISTORY:  Family history of myocardial infarction (Mother)      Denies Family history of CAD or early MI      Constitutional: denies fever, chills  HEENT: denies blurry vision, difficulty hearing  Respiratory: denies SOB, SHEPPARD, cough  Cardiovascular: denies CP, palpitations, orthopnea, PND, LE edema  Gastrointestinal: denies nausea, vomiting, abdominal pain  Hematology/Oncology: denies bleeding, easy bruising  ROS negative except as noted above      SOCIAL HISTORY:    No tobacco, Alcohol or Ddrug use    Vital Signs Last 24 Hrs  T(C): 36.3 (2023 08:10), Max: 37 (2023 03:30)  T(F): 97.4 (2023 08:10), Max: 98.6 (2023 03:30)  HR: 86 (2023 08:10) (86 - 125)  BP: 131/71 (2023 08:10) (100/55 - 141/62)  BP(mean): --  RR: 30 (2023 08:10) (18 - 30)  SpO2: 97% (2023 08:10) (97% - 99%)    Parameters below as of 2023 08:10  Patient On (Oxygen Delivery Method): nasal cannula  O2 Flow (L/min): 2      Physical Exam:  General: NAD, sitting up in bed  HEENT: NCAT, dry mucous membranes   Neurology: A&Ox3, nonfocal, sensation intact   Respiratory: CTA B/L, increased WOB  CV: irregular rhythm, +S1/S2  Abdominal: Soft, NT, ND +BSx4, no palpable masses  Extremities: No C/C/E, + peripheral pulses  Heme: +ecchymoses bilateral elbows      EC/26 2:03 Afib, rate 95    20:54 Afib with RVR, rate 143    I&O's Detail      LABS:                        13.9   14.18 )-----------( 178      ( 2023 03:00 )             42.2     07    139  |  107  |  27<H>  ----------------------------<  113<H>  3.1<L>   |  24  |  1.20    Ca    8.3<L>      2023 03:00  Phos  3.2       Mg     2.3         TPro  6.3  /  Alb  3.1<L>  /  TBili  0.9  /  DBili  x   /  AST  106<H>  /  ALT  58  /  AlkPhos  99  0726    CARDIAC MARKERS ( 2023 03:00 )  x     / x     / 5621 U/L / x     / 14.0 ng/mL  CARDIAC MARKERS ( 2023 22:37 )  x     / x     / 5502 U/L / x     / 14.6 ng/mL      PT/INR - ( 2023 22:37 )   PT: 14.9 sec;   INR: 1.28 ratio         PTT - ( 2023 22:37 )  PTT:29.8 sec  Urinalysis Basic - ( 2023 03:00 )    Color: x / Appearance: x / SG: x / pH: x  Gluc: 113 mg/dL / Ketone: x  / Bili: x / Urobili: x   Blood: x / Protein: x / Nitrite: x   Leuk Esterase: x / RBC: x / WBC x   Sq Epi: x / Non Sq Epi: x / Bacteria: x      I&O's Summary    BNP 3588  RADIOLOGY & ADDITIONAL STUDIES:

## 2023-07-26 NOTE — H&P ADULT - NSHPSOCIALHISTORY_GEN_ALL_CORE
Lives at home alone in [place]. Remote smoking history in her teens, was exposed to secondhand smoke from her . Denies EtOH use or illicit drug use. Ambulates independently.

## 2023-07-26 NOTE — PROGRESS NOTE ADULT - PROBLEM SELECTOR PLAN 3
WBC 18.46k on admission - possibly reactive  Did feel unwell with vomiting on 7/24 - now resolved, no other symptoms  Will check UA, blood/urine cultures  US doppler was unremarkable for DVT   Monitor off antibiotics for now  Trend TLC and monitor for fevers Patient with elevated CK after fall and being on the floor  on admission does not appear volume overloaded despite elevated proBNP   Got 1L of NS in the ED  hold IVF with LR at 75cc for now due to SOB this morning- will repeat CXR and f/u with ECHO to assess for volume overload and heart function   Trend CK and monitor renal function Patient with elevated CK after fall and being on the floor  on admission does not appear volume overloaded despite elevated proBNP   Got 1L of NS in the ED , continue iv fluid   will repeat CXR   no  pvc and f/u with ECHO to assess for volume overload and heart function   Trend CK and monitor renal function

## 2023-07-26 NOTE — CARE COORDINATION ASSESSMENT. - OTHER PERTINENT REFERRAL INFORMATION
CM met with patient, daughter Melissa, daughter in law Bernice and son Adolfo at bedside to explain role and transitions of care. Patient lives alone in a private house with 1 steps to get in and 12 to the second floor. However patient has her bedroom in the first floor.  Daughter and son are her caregiver.  No home care or DMEs.  Family will transport patient home when ready to be discharge.  Unclear needs at this moment.  CM explained  home care expectation, process, insurance provision and home safety with good understanding.  CM to make referral if appropriate. Patient and family verbalized understanding of plans after discharge and are in agreement.  Resource folder left at bedside.  All questions answered to the best of my abilities.  CM remains available throughout the hospital stay.

## 2023-07-26 NOTE — H&P ADULT - NSHPPHYSICALEXAM_GEN_ALL_CORE
T(C): 36.7 (07-25-23 @ 22:27), Max: 36.7 (07-25-23 @ 22:27)  HR: 95 (07-26-23 @ 00:26) (95 - 125)  BP: 100/55 (07-26-23 @ 00:26) (100/55 - 114/76)  RR: 20 (07-26-23 @ 00:26) (18 - 20)  SpO2: 98% (07-26-23 @ 00:26) (97% - 98%)    General: No apparent distress, pleasant  Head: normocephalic, atraumatic  Eyes: EOMI, anicteric  ENT: moist mucous membranes, no pharyngeal exudates  Heart: irregularly irregular, S1, S2  Chest: CTA b/l, no rales, rhonchi, or wheezes  Abd: BS+, soft, NT, ND  Back: no CVA tenderness  Extr: no edema or cyanosis; left calf with tenderness to palpation  Skin: warm, well perfused; bilateral elbow abrasions  Neuro: AA&Ox3, no focal weakness, sensation to light touch intact  Psych: normal affect

## 2023-07-26 NOTE — CONSULT NOTE ADULT - SUBJECTIVE AND OBJECTIVE BOX
SURGERY PA CONSULT NOTE:    CHIEF COMPLAINT:  Patient is a 78y old  Female who presents with a chief complaint of fall, new onset afib RVR (26 Jul 2023 01:00)    HPI FROM ED:  78y female with PMHx of HTN, HLD presented to the ED for the evaluation of fall out of chair. States that she was sitting on the chair and fell backwards, hitting her head. Was on the floor for over an hour and was unable to get up. Eventually was able to flip over and crawl on her elbows and knees to call her daughter in law for assistance. She initially had a headache which has resolved. Only has some mild soreness in her elbows. States that she was feeling unwell on Monday 7/24 and had an episode of vomiting then. Now feels back to her baseline. No fevers or chills. No urinary symptoms. Denies chest pain, palpitations, dyspnea, current headache, dizziness, abdominal pain, n/v/d. She has no prior history of kidney issues.  In the ED, she was given IV cardizem 20mg IVP with improvement in her rates and 70mg of subcutaneous enoxaparin. (26 Jul 2023 01:00)    INTERVAL:   History verified as above from pt. 77 y/o female w/ PMH HTN, HLD p/w fall out of chair while at home. Pt reports attempting to stand up from chair at home but instead fell and hit her head. Pt denies weakness or dizziness that precipitated event. General surgery was consulted after a noncontrast CT scan in the ER revealed findings consistent w/ acute enrique (Distended GB w/ jd-cholecystic fluid). Pt reports that she an upset stomach on Monday and was unable to tolerate PO intake, reporting that she would vomit when attempting to do so. At rest, pt denies abdominal pain or N/V during that time. Pt reports that symptoms on following Tuesday had resolved, endorses feeling at her baseline. Denies having prior knowledge of cholelithiasis or past gallbladder disease.  Denies subjective fever, chills, N/V at this time, change in bowel/urinary habits, BRBPR, melena, or LOC    PAST MEDICAL & SURGICAL HISTORY:  HTN (hypertension)    Hyperlipidemia    Tinnitus  right ear    Seasonal allergies    S/P knee replacement    REVIEW OF SYSTEMS:  General/Constitutional: No acute distress, no headache, weakness, fevers, or chills   HEENT: Denies auditory or visual changes/disturbances, no vertigo, no throat pain, no dysphagia    Respiratory: Denies cough/hemoptysis, denies wheezing/SOB/dyspnea  Cardiac: Denies chest pain, palpitations  Abdomen: SEE HPI  Extremities: Denies sores, swelling, discoloration bilat UE/LE  Genitourinary: Denies urinary issues or complaints, denies dysuria/hematuria  Neuro: Denies weakness, paraesthesias, paralysis, syncope, loss of vision  Skin: Denies pruritus, pain, rashes  Psych: Denies hallucinations, visual disturbances, or depression    MEDICATIONS:  Home Medications:  Aspir 81 81 mg oral tablet: 1 tab(s) orally once a day (26 Jul 2023 00:44)    MEDICATIONS  (STANDING):  aspirin enteric coated 81 milliGRAM(s) Oral daily  enoxaparin Injectable 70 milliGRAM(s) SubCutaneous every 12 hours  lactated ringers. 1000 milliLiter(s) (75 mL/Hr) IV Continuous <Continuous>  metoprolol tartrate 25 milliGRAM(s) Oral two times a day  piperacillin/tazobactam IVPB.- 3.375 Gram(s) IV Intermittent once  piperacillin/tazobactam IVPB.. 3.375 Gram(s) IV Intermittent every 8 hours  potassium chloride    Tablet ER 40 milliEquivalent(s) Oral every 4 hours    MEDICATIONS  (PRN):  acetaminophen     Tablet .. 650 milliGRAM(s) Oral every 6 hours PRN Temp greater or equal to 38C (100.4F)  acetaminophen     Tablet .. 650 milliGRAM(s) Oral every 6 hours PRN Mild Pain (1 - 3)  aluminum hydroxide/magnesium hydroxide/simethicone Suspension 30 milliLiter(s) Oral every 4 hours PRN Dyspepsia  melatonin 3 milliGRAM(s) Oral at bedtime PRN Insomnia  ondansetron Injectable 4 milliGRAM(s) IV Push every 8 hours PRN Nausea and/or Vomiting    ALLERGIES:  No Known Allergies    SOCIAL HISTORY:  Lives at home alone in [place]. Remote smoking history in her teens, was exposed to secondhand smoke from her . Denies EtOH use or illicit drug use. Ambulates independently. (26 Jul 2023 01:00)    FAMILY HISTORY:  Family history of myocardial infarction (Mother)    VITAL SIGNS:  Vital Signs Last 24 Hrs  T(C): 37 (26 Jul 2023 03:30), Max: 37 (26 Jul 2023 03:30)  T(F): 98.6 (26 Jul 2023 03:30), Max: 98.6 (26 Jul 2023 03:30)  HR: 98 (26 Jul 2023 03:30) (95 - 125)  BP: 141/62 (26 Jul 2023 03:30) (100/55 - 141/62)  BP(mean): --  RR: 18 (26 Jul 2023 03:30) (18 - 20)  SpO2: 99% (26 Jul 2023 03:30) (97% - 99%)    Parameters below as of 26 Jul 2023 00:26  Patient On (Oxygen Delivery Method): room air    PHYSICAL EXAM:  General: No acute distress, appears comfortable, well nourished, well-groomed, appears stated age  Head, Eyes, Ears, Nose, Throat: Normal cephalic/atraumatic, anicteric, conjunctiva-non injected and moist, vision grossly intact, hearing grossly intact  Abdomen: Obese, soft, nondistended, nontender; no rebound ttp, no guarding, no manas peritonitic findings  Extremity: No swelling, or open sores, no gross deformities,  good range of motion  Neuro: Alert and oriented x3, motor and sensory intact  Skin: Good color, turgor, texture with no gross lesions, no eruptions, no rashes, no subcutaneous nodules and normal temperature.     LABS:                      13.9   14.18 )-----------( 178      ( 26 Jul 2023 03:00 )             42.2     07-26    139  |  107  |  27<H>  ----------------------------<  113<H>  3.1<L>   |  24  |  1.20    Ca    8.3<L>      26 Jul 2023 03:00  Phos  3.2     07-26  Mg     2.3     07-26    TPro  6.3  /  Alb  3.1<L>  /  TBili  0.9  /  DBili  x   /  AST  106<H>  /  ALT  58  /  AlkPhos  99  07-26    PT/INR - ( 25 Jul 2023 22:37 )   PT: 14.9 sec;   INR: 1.28 ratio    PTT - ( 25 Jul 2023 22:37 )  PTT:29.8 sec  Urinalysis Basic - ( 26 Jul 2023 03:00 )    Color: x / Appearance: x / SG: x / pH: x  Gluc: 113 mg/dL / Ketone: x  / Bili: x / Urobili: x   Blood: x / Protein: x / Nitrite: x   Leuk Esterase: x / RBC: x / WBC x   Sq Epi: x / Non Sq Epi: x / Bacteria: x    LIVER FUNCTIONS - ( 26 Jul 2023 03:00 )  Alb: 3.1 g/dL / Pro: 6.3 g/dL / ALK PHOS: 99 U/L / ALT: 58 U/L / AST: 106 U/L / GGT: x           RADIOLOGY & ADDITIONAL STUDIES:  ******PRELIMINARY REPORT******   ACC: [id number] EXAM:  CT CHEST   ORDERED BY: MICAH CEJA   PROCEDURE DATE:  07/25/2023    ******PRELIMINARY REPORT******      FINDINGS:  Lungs: 1.8 x 1.2 cm right upper lobe pulmonary nodule.  Pleural spaces: Unremarkable. No pneumothorax. No pleural effusion.  Heart: Unremarkable. No cardiomegaly. No pericardial effusion.  Lymph nodes: Unremarkable. No enlarged lymph nodes.  Vasculature: Unremarkable. No aortic aneurysm.    Liver: Low-attenuation of the liver is compatible with hepatic steatosis.  Gallbladder and bile ducts: There is a stone in the gallbladder.   Gallbladder  wall is thickened with surrounding stranding.  Bones/joints: Unremarkable. No acute fracture.  Soft tissues: Unremarkable.    IMPRESSION:  1.   No acute intrathoracic findings.  2.   Cholelithiasis with gallbladder wall thickening and pericholecystic   fluid  compatible with acute cholecystitis.  3.   Hepatic steatosis.  4.   1.8 cm right upper lobe pulmonary nodule. Consider PET CT for further  evaluation. Defer to on-site radiologist for ultimate follow-up   recommendations  for this finding.    ******PRELIMINARY REPORT******    TIFFANIE PEÑA M.D.;[facility] RADIOLOGIST  This document is a PRELIMINARY interpretation and is pending final   attending approval. Jul 26 2023  2:25AM

## 2023-07-26 NOTE — CONSULT NOTE ADULT - ASSESSMENT
77 y/o female w/ PMH HTN, HLD p/w fall out of chair while at home. Pt reports attempting to stand up from chair at home but instead fell and hit her head. Pt denies weakness or dizziness that precipitated event. General surgery was consulted after a noncontrast CT scan in the ER revealed findings consistent w/ acute enrique (Distended GB w/ jd-cholecystic fluid).   At time of encounter, pt is asymptomatic, inquires as to when she would be able to eat again.  In ER, VSS. Labs reveal leukocytosis of 18K (improved to 14K). AST elevated ~3x ULN.  Exam reveals a soft, nondistended, and nontender abdomen, inconsistent with acute cholecystitis 79 y/o female w/ PMH HTN, HLD p/w fall out of chair while at home. Pt reports attempting to stand up from chair at home but instead fell and hit her head. Pt denies weakness or dizziness that precipitated event. General surgery was consulted after a noncontrast CT scan in the ER revealed findings consistent w/ acute enrique (Distended GB w/ jd-cholecystic fluid).   At time of encounter, pt is asymptomatic, inquires as to when she would be able to eat again.  In ER, VSS. Labs reveal leukocytosis of 18K (improved to 14K). AST elevated ~3x ULN.  Exam reveals a soft, nondistended, and nontender abdomen, inconsistent with acute cholecystitis

## 2023-07-26 NOTE — H&P ADULT - PROBLEM SELECTOR PLAN 4
Lives alone and walks unassisted at baseline  Fell off chair and was unable to get up  Fall precautions  PT evaluation Patient is without chest pain on admission, doubt ACS  EKG with afib RVR with likely rate related ischemic changes  Suspect demand ischemia in setting of rapid AF and rhabdomyolysis  Will trend cardiac enzymes  Follow up 2D ECHO  Repeat EKG when rate is controlled  Continue ASA 81mg daily  Resume statin when CK improves

## 2023-07-26 NOTE — H&P ADULT - PROBLEM SELECTOR PLAN 7
History of HTN, no longer on medications  Used to be on ACE-inhibitor and BB-thiazide  Continue with lopressor 25mg BID  Monitor hemodynamics

## 2023-07-26 NOTE — PROGRESS NOTE ADULT - PROBLEM SELECTOR PLAN 6
Lives alone and walks unassisted at baseline  Fell off chair and was unable to get up  Fall precautions  PT evaluation No prior history of kidney disease  Possibly prerenal azotemia or secondary to rhabdo  No baseline to compare it to so could be CKD  Will continue gentle IV fluids and monitor renal indices Patient is without chest pain on admission, doubt ACS  EKG with afib RVR with likely rate related ischemic changes  Suspect demand ischemia in setting of rapid AF and rhabdomyolysis  Will trend cardiac enzymes  Follow up 2D ECHO  most recent EKG showed rate of 95  Continue ASA 81mg daily  Resume statin when CK improves

## 2023-07-26 NOTE — H&P ADULT - PROBLEM SELECTOR PLAN 2
Patient with elevated CK after fall and being on the floor  Does not appear volume overloaded despite elevated proBNP  Got 1L of NS in the ED  Continue with IVF with LR at 75cc for now  Trend CK and monitor renal function

## 2023-07-26 NOTE — PROGRESS NOTE ADULT - PROBLEM SELECTOR PLAN 4
Patient is without chest pain on admission, doubt ACS  EKG with afib RVR with likely rate related ischemic changes  Suspect demand ischemia in setting of rapid AF and rhabdomyolysis  Will trend cardiac enzymes  Follow up 2D ECHO  most recent EKG showed rate of 95  Continue ASA 81mg daily  Resume statin when CK improves WBC 18.46k on admission - possibly reactive  Did feel unwell with vomiting on 7/24 - now resolved, no other symptoms  Will check UA, blood/urine cultures  US doppler was unremarkable for DVT   Monitor off antibiotics for now  Trend TLC and monitor for fevers CT chest showed evidence of pulmonary scarring which may be d/t pulmonary fibrosis   Imaging also showed:   1.8 cm pulmonary nodule right upper lobe.  Several small subcentimeter right-sided pulmonary nodule.  Findings compatible with lung cancer until proven otherwise.  Consider PET/CT scan for further evaluation.  Cholelithiasis with distended thick-walled gallbladder.  Correlate for acute cholecystitis.    - Will continue IV fluids for now as etiology of dyspnea may be secondary to pulm fibrosis over CHF

## 2023-07-26 NOTE — CARE COORDINATION ASSESSMENT. - NSCAREPROVIDERS_GEN_ALL_CORE_FT
CARE PROVIDERS:  Accepting Physician: Delta Castro  Access Services: Perry Celis  Administration: Margarito Dong  Administration: Olena Maloney  Admitting: Delta Castro  Attending: Delta Castro  Cardiology Technician: Glory Gu  Cardiology Technician: Nahomy Alex  Consultant: Sridhar Scott  Consultant: Feli Louie  Consultant: Etta Rankin  Consultant: Hector Alaniz  Consultant: Alli Pascual  Consultant: Kirk Ragnel  Consultant: Cassandra Duran  Consultant: Cade Gray  Consultant: Glenis Grossman  Consultant: Juanita Valencia  Consultant: Kota Ferrera  Consultant: Elton Oliva  Consultant: Jenna Truong  ED Attending: Yolanda Soto  ED Nurse: Carlo Garcia  Emergency Medicine: Yolanda Soto  Nurse: Karolyn Arvizu  Nurse: Aliya Fernandez  Nurse: Feli Jones  Ordered: Delta Castro  Ordered: ServiceAccount, SCMMLM  Ordered: ServiceAccount, SCMMLM  Ordered: Doctor, Unknown  Ordered: ADM, User  Override: Feli Jones  PCA/Nursing Assistant: Silver Wise  Physical Therapy: Dalia Beltran  Primary Team: Stephan Montenegro  Primary Team: Janae Andrews  Primary Team: Mike Golden  Primary Team: Epi Barreto  Primary Team: Pauline Granger  Primary Team: Rodolfo Leahy  Radiology Technician: Nayeli Amado  : Erika Perrin  Student: Justina Desir

## 2023-07-26 NOTE — H&P ADULT - HISTORY OF PRESENT ILLNESS
78y female with PMHx of HTN, HLD presented to the ED for the evaluation of fall out of chair. States that she was sitting on the chair and fell backwards, hitting her head. Was on the floor for over an hour and was unable to get up. Eventually was able to flip over and crawl on her elbows and knees to call her daughter in law for assistance. She initially had a headache which has resolved. Only has some mild soreness in her elbows. States that she was feeling unwell on Monday 7/24 and had an episode of vomiting then. Now feels back to her baseline. No fevers or chills. No urinary symptoms. Denies chest pain, palpitations, dyspnea, current headache, dizziness, abdominal pain, n/v/d. She has no prior history of kidney issues.    In the ED, she was given IV cardizem 20mg IVP with improvement in her rates and 70mg of subcutaneous enoxaparin.

## 2023-07-26 NOTE — PROGRESS NOTE ADULT - PROBLEM SELECTOR PLAN 2
Patient with elevated CK after fall and being on the floor  on admission does not appear volume overloaded despite elevated proBNP   Got 1L of NS in the ED  hold IVF with LR at 75cc for now due to SOB this morning- will repeat CXR and f/u with ECHO to assess for volume overload and heart function   Trend CK and monitor renal function Admitted to telemetry  Given 20mg IV cardizem in the ER with improvement in rates  started lopressor 25mg BID for rate control (was on BB in the past)  [id number] 4: will start full dose LMWH for thromboprophylaxis  Can consider change to DOAC (i.e. eliquis) in AM   ordered 2D ECHO to assess cardiac structure and function, r/o valvular pathology will f/u  Monitor and replete electrolytes for optimal arrhythmia control  repeat EKG showed rate of 95   Check TSH and free thyroxine in AM  LE doppler resulted - no DVT evidence b/l   Cardiology consulted: Dr. Louie Admitted to telemetry  Given 20mg IV cardizem in the ER with improvement in rates  started lopressor 25mg BID for rate control (was on BB in the past)  [id number] 4: will start full dose LMWH for thromboprophylaxis  Can consider change to DOAC (i.e. eliquis) in AM  - but hold for ir guided gb  drain - hida +  ordered 2D ECHO to assess cardiac structure and function, r/o valvular pathology will f/u  Monitor and replete electrolytes for optimal arrhythmia control  repeat EKG showed rate of 95   Check TSH and free thyroxine in AM  LE doppler resulted - no DVT evidence b/l   Cardiology consulted: Dr. Louie

## 2023-07-26 NOTE — PATIENT PROFILE ADULT - FALL HARM RISK - HARM RISK INTERVENTIONS
Assistance with ambulation/Assistance OOB with selected safe patient handling equipment/Communicate Risk of Fall with Harm to all staff/Discuss with provider need for PT consult/Monitor gait and stability/Reinforce activity limits and safety measures with patient and family/Tailored Fall Risk Interventions/Visual Cue: Yellow wristband and red socks/Bed in lowest position, wheels locked, appropriate side rails in place/Call bell, personal items and telephone in reach/Instruct patient to call for assistance before getting out of bed or chair/Non-slip footwear when patient is out of bed/[place] to call system/Physically safe environment - no spills, clutter or unnecessary equipment/Purposeful Proactive Rounding/Room/bathroom lighting operational, light cord in reach

## 2023-07-26 NOTE — CONSULT NOTE ADULT - ASSESSMENT
78y female with PMHx of HTN, HLD presented to the ED for the evaluation of fall out of chair.   Cardiology consulted for new onset atrial fibrillation with RVR.    - Patient is not complaining of any cardiac symptoms at this time.  - No clear evidence of acute ischemia, trops elevated but downtrended 76.9 --> 76.1. Will follow up third set.  - CK elevated 5502 --> 5621, however doubt acute plaque rupture in setting of fall and rhabdomyolysis.   - Monitor for the development of anginal symptoms or clinical signs of ischemia.     - Most recent EKG: atrial fibrillation, rate 95  - EKG on admission showed atrial fibrillation with RVR, rate 143  - Previous record of EKG from 06/2014 showed NSR  - Continue metoprolol for rate control    - Patient with shortness of breath and elevated BNP 3588. Not on home O2, and no prior history of HF  - No previous TTE  - TTE already ordered, follow up results    #History of HTN, currently not on medications  - Patient used to take ACEi/BB (bisoprolol-HCTZ)  - /67, monitor routine hemodynamics.  - Continue metoprolol    - Monitor and replete lytes, keep K>4, Mg>2.    - Strict I/Os, daily weights.    - Other cardiovascular workup will depend on clinical course.  - All other workup per primary team.  - Will continue to follow.             78y female with PMHx of HTN, HLD presented to the ED for the evaluation of fall out of chair.   Cardiology consulted for new onset atrial fibrillation with RVR.    - Patient is not complaining of any cardiac symptoms at this time.  - No clear evidence of acute ischemia, trops elevated but downtrended 76.9 --> 76.1. No need to further trend.  - CK elevated 5502 --> 5621, however doubt acute plaque rupture in setting of fall and rhabdomyolysis.   - Monitor for the development of anginal symptoms or clinical signs of ischemia.     #New onset Afib with RVR  - s/p cardizem 20mg IVP, metoprolol  - EKG on admission showed atrial fibrillation with RVR, rate 143  - Remote tele: currently in Afib, rate 90  - Most recent EKG: atrial fibrillation, rate 95  - Previous record of EKG from 06/2014 showed NSR  - Monitor on telemetry  - Continue full AC with lovenox with eventual transition to DOAC pending surgery plan for possible cholecystectomy  - Continue metoprolol for rate control    #?CHF  - Patient with shortness of breath, now on 2L O2 NC and elevated BNP 3588. Not on home O2, and no prior history of HF  - No previous TTE  - TTE already ordered, follow up results  - HOLD IV fluids and observe for now  - Consider Lasix if patient has worsened difficulty breathing    #History of HTN, currently not on medications  - Patient used to take BB/ACEi (bisoprolol-HCTZ)  - /67, monitor routine hemodynamics.  - Continue metoprolol    - Monitor and replete lytes, keep K>4, Mg>2.    - Strict I/Os, daily weights.    - Other cardiovascular workup will depend on clinical course.  - All other workup per primary team.  - Will continue to follow.

## 2023-07-26 NOTE — PATIENT CHOICE NOTE. - NSPTCHOICESTATE_GEN_ALL_CORE

## 2023-07-26 NOTE — CAREGIVER ENGAGEMENT NOTE - CAREGIVER OUTREACH NOTES - FREE TEXT
CM met with caregivers at bedside to explain role and transitions of care.  Resource folder left at bedside.  All questions answered to the best of my abilities.  CM remains available throughout the hospital stay.

## 2023-07-26 NOTE — H&P ADULT - NSHPREVIEWOFSYSTEMS_GEN_ALL_CORE
General: no fever, chills  Eyes: no vision changes  ENT: no hearing changes, nasal congestion, or sore throat  CV: no chest pain or palpitations  Pulm: no SOB, wheezing, cough, or hemoptysis  Abd/GI: no nausea, vomiting, diarrhea, constipation, abd pain  : no dysuria, hematuria, urinary frequency  MSK: no joint pain or myalgias; ADMITS elbow pain  Neuro: no syncope, dizziness, focal weakness  Psych: no anxiety or depression  Endo: no heat or cold intolerance

## 2023-07-26 NOTE — H&P ADULT - PROBLEM SELECTOR PLAN 8
Used to be on atorvastatin 40mg daily, no longer taking  Would resume statin drug when CK improves  Follow up lipid profile

## 2023-07-26 NOTE — PROGRESS NOTE ADULT - PROBLEM SELECTOR PLAN 11
-Lovenox held now as pt to go percutaneous drainage gallbladder tmrw.  Fall precautions -Lovenox held now as pt to go percutaneous drainage gallbladder in am   Fall precautions -Lovenox held now as pt to go percutaneous drainage gallbladder in am   Fall precautions  ASA held for procedure tomorrow as well- discussed with Surgery

## 2023-07-26 NOTE — PROGRESS NOTE ADULT - SUBJECTIVE AND OBJECTIVE BOX
Patient is a 78y old  Female who presents with a chief complaint of fall, new onset afib RVR (26 Jul 2023 09:04)      Subjective:  INTERVAL HPI/OVERNIGHT EVENTS: Patient seen and examined at bedside. pt was started on metoprolol. Patient has complains of some SOB with RR 30 in the AM so fluids were stopped temporarily. currently on 3L NC. Denies fevers, chills, headache, lightheadedness, chest pain, dyspnea, abdominal pain, n/v/d/c.     MEDICATIONS  (STANDING):  aspirin enteric coated 81 milliGRAM(s) Oral daily  enoxaparin Injectable 70 milliGRAM(s) SubCutaneous every 12 hours  metoprolol tartrate 25 milliGRAM(s) Oral two times a day  piperacillin/tazobactam IVPB.. 3.375 Gram(s) IV Intermittent every 8 hours  potassium chloride   Powder 40 milliEquivalent(s) Oral once    MEDICATIONS  (PRN):  acetaminophen     Tablet .. 650 milliGRAM(s) Oral every 6 hours PRN Mild Pain (1 - 3)  acetaminophen     Tablet .. 650 milliGRAM(s) Oral every 6 hours PRN Temp greater or equal to 38C (100.4F)  aluminum hydroxide/magnesium hydroxide/simethicone Suspension 30 milliLiter(s) Oral every 4 hours PRN Dyspepsia  melatonin 3 milliGRAM(s) Oral at bedtime PRN Insomnia  ondansetron Injectable 4 milliGRAM(s) IV Push every 8 hours PRN Nausea and/or Vomiting      Allergies    No Known Allergies    Intolerances        REVIEW OF SYSTEMS:  CONSTITUTIONAL: No fever or chills  HEENT:  No headache, no sore throat  RESPIRATORY: No cough, wheezing, + shortness of breath  CARDIOVASCULAR: No chest pain, palpitations  GASTROINTESTINAL: No abd pain, nausea, vomiting, or diarrhea  GENITOURINARY: No dysuria, frequency, or hematuria  NEUROLOGICAL: no focal weakness or dizziness  MUSCULOSKELETAL: no myalgias     Objective:  Vital Signs Last 24 Hrs  T(C): 36.3 (26 Jul 2023 08:10), Max: 37 (26 Jul 2023 03:30)  T(F): 97.4 (26 Jul 2023 08:10), Max: 98.6 (26 Jul 2023 03:30)  HR: 91 (26 Jul 2023 11:25) (86 - 125)  BP: 131/66 (26 Jul 2023 11:25) (100/55 - 141/62)  BP(mean): --  RR: 21 (26 Jul 2023 11:25) (18 - 30)  SpO2: 99% (26 Jul 2023 11:25) (97% - 99%)    Parameters below as of 26 Jul 2023 11:25  Patient On (Oxygen Delivery Method): nasal cannula  O2 Flow (L/min): 3      GENERAL: NAD, lying in bed comfortably  HEAD:  Atraumatic, Normocephalic  EYES: EOMI, PERRLA, conjunctiva and sclera clear  ENT: Moist mucous membranes  NECK: Supple, No JVD  CHEST/LUNG: Clear to auscultation bilaterally; No r/r/w. Unlabored respirations  HEART: Regular rate and rhythm; No m/r/g  ABDOMEN: Bowel sounds present; Soft, Nontender, Nondistended. No hepatomegaly  EXTREMITIES:  2+ Peripheral Pulses, brisk capillary refill. No clubbing, cyanosis, or edema  NERVOUS SYSTEM:  Alert & Oriented X3, speech clear. No deficits   MSK: FROM all 4 extremities, full and equal strength  SKIN: No rashes or lesions    LABS:                        13.9   14.18 )-----------( 178      ( 26 Jul 2023 03:00 )             42.2     CBC Full  -  ( 26 Jul 2023 03:00 )  WBC Count : 14.18 K/uL  Hemoglobin : 13.9 g/dL  Hematocrit : 42.2 %  Platelet Count - Automated : 178 K/uL  Mean Cell Volume : 94.0 fl  Mean Cell Hemoglobin : 31.0 pg  Mean Cell Hemoglobin Concentration : 32.9 gm/dL  Auto Neutrophil # : 12.17 K/uL  Auto Lymphocyte # : 0.76 K/uL  Auto Monocyte # : 1.15 K/uL  Auto Eosinophil # : 0.01 K/uL  Auto Basophil # : 0.03 K/uL  Auto Neutrophil % : 85.8 %  Auto Lymphocyte % : 5.4 %  Auto Monocyte % : 8.1 %  Auto Eosinophil % : 0.1 %  Auto Basophil % : 0.2 %    26 Jul 2023 03:00    139    |  107    |  27     ----------------------------<  113    3.1     |  24     |  1.20     Ca    8.3        26 Jul 2023 03:00  Phos  3.2       26 Jul 2023 03:00  Mg     2.3       26 Jul 2023 03:00    TPro  6.3    /  Alb  3.1    /  TBili  0.9    /  DBili  x      /  AST  106    /  ALT  58     /  AlkPhos  99     26 Jul 2023 03:00    PT/INR - ( 25 Jul 2023 22:37 )   PT: 14.9 sec;   INR: 1.28 ratio         PTT - ( 25 Jul 2023 22:37 )  PTT:29.8 sec  Urinalysis Basic - ( 26 Jul 2023 03:00 )    Color: x / Appearance: x / SG: x / pH: x  Gluc: 113 mg/dL / Ketone: x  / Bili: x / Urobili: x   Blood: x / Protein: x / Nitrite: x   Leuk Esterase: x / RBC: x / WBC x   Sq Epi: x / Non Sq Epi: x / Bacteria: x      CAPILLARY BLOOD GLUCOSE              RADIOLOGY & ADDITIONAL TESTS:    Personally reviewed.     Consultant(s) Notes Reviewed:  [x] YES  [ ] NO     Patient is a 78y old  Female who presents with a chief complaint of fall, new onset afib RVR (26 Jul 2023 09:04)      Subjective:  INTERVAL HPI/OVERNIGHT EVENTS: Patient seen and examined at bedside. pt was started on metoprolol. Patient complains of sob so fluids were initially held, however repeat cxray was clear and CT chest showed evidence of lung scarring/ pulm fibrosis so fluids were resumed. Currently on clear liquid diet but NPO after midnight for perc enrique drainage by IR ((HIDA positive). Denies fevers, chills, headache, lightheadedness, chest pain, dyspnea, abdominal pain, n/v/d/c.   Lovenox held for now for procedure.   Daughter updated at bedside.     MEDICATIONS  (STANDING):  aspirin enteric coated 81 milliGRAM(s) Oral daily  enoxaparin Injectable 70 milliGRAM(s) SubCutaneous every 12 hours  metoprolol tartrate 25 milliGRAM(s) Oral two times a day  piperacillin/tazobactam IVPB.. 3.375 Gram(s) IV Intermittent every 8 hours  potassium chloride   Powder 40 milliEquivalent(s) Oral once    MEDICATIONS  (PRN):  acetaminophen     Tablet .. 650 milliGRAM(s) Oral every 6 hours PRN Mild Pain (1 - 3)  acetaminophen     Tablet .. 650 milliGRAM(s) Oral every 6 hours PRN Temp greater or equal to 38C (100.4F)  aluminum hydroxide/magnesium hydroxide/simethicone Suspension 30 milliLiter(s) Oral every 4 hours PRN Dyspepsia  melatonin 3 milliGRAM(s) Oral at bedtime PRN Insomnia  ondansetron Injectable 4 milliGRAM(s) IV Push every 8 hours PRN Nausea and/or Vomiting      Allergies    No Known Allergies    Intolerances        REVIEW OF SYSTEMS:  CONSTITUTIONAL: No fever or chills  HEENT:  No headache, no sore throat  RESPIRATORY: No cough, wheezing, + shortness of breath  CARDIOVASCULAR: No chest pain, palpitations  GASTROINTESTINAL: No abd pain, nausea, vomiting, or diarrhea  GENITOURINARY: No dysuria, frequency, or hematuria  NEUROLOGICAL: no focal weakness or dizziness  MUSCULOSKELETAL: no myalgias     Objective:  Vital Signs Last 24 Hrs  T(C): 36.3 (26 Jul 2023 08:10), Max: 37 (26 Jul 2023 03:30)  T(F): 97.4 (26 Jul 2023 08:10), Max: 98.6 (26 Jul 2023 03:30)  HR: 91 (26 Jul 2023 11:25) (86 - 125)  BP: 131/66 (26 Jul 2023 11:25) (100/55 - 141/62)  BP(mean): --  RR: 21 (26 Jul 2023 11:25) (18 - 30)  SpO2: 99% (26 Jul 2023 11:25) (97% - 99%)    Parameters below as of 26 Jul 2023 11:25  Patient On (Oxygen Delivery Method): nasal cannula  O2 Flow (L/min): 3      GENERAL: NAD, lying in bed comfortably  HEAD:  Atraumatic, Normocephalic  EYES: EOMI, PERRLA, conjunctiva and sclera clear  ENT: Moist mucous membranes  NECK: Supple, No JVD  CHEST/LUNG: Clear to auscultation bilaterally; No r/r/w. Unlabored respirations  HEART: Regular rate and rhythm; No m/r/g  ABDOMEN: Bowel sounds present; Soft, Nontender, Nondistended. No hepatomegaly  EXTREMITIES:  2+ Peripheral Pulses, brisk capillary refill. No clubbing, cyanosis, or edema  NERVOUS SYSTEM:  Alert & Oriented X3, speech clear. No deficits   MSK: FROM all 4 extremities, full and equal strength  SKIN: No rashes or lesions    LABS:                        13.9   14.18 )-----------( 178      ( 26 Jul 2023 03:00 )             42.2     CBC Full  -  ( 26 Jul 2023 03:00 )  WBC Count : 14.18 K/uL  Hemoglobin : 13.9 g/dL  Hematocrit : 42.2 %  Platelet Count - Automated : 178 K/uL  Mean Cell Volume : 94.0 fl  Mean Cell Hemoglobin : 31.0 pg  Mean Cell Hemoglobin Concentration : 32.9 gm/dL  Auto Neutrophil # : 12.17 K/uL  Auto Lymphocyte # : 0.76 K/uL  Auto Monocyte # : 1.15 K/uL  Auto Eosinophil # : 0.01 K/uL  Auto Basophil # : 0.03 K/uL  Auto Neutrophil % : 85.8 %  Auto Lymphocyte % : 5.4 %  Auto Monocyte % : 8.1 %  Auto Eosinophil % : 0.1 %  Auto Basophil % : 0.2 %    26 Jul 2023 03:00    139    |  107    |  27     ----------------------------<  113    3.1     |  24     |  1.20     Ca    8.3        26 Jul 2023 03:00  Phos  3.2       26 Jul 2023 03:00  Mg     2.3       26 Jul 2023 03:00    TPro  6.3    /  Alb  3.1    /  TBili  0.9    /  DBili  x      /  AST  106    /  ALT  58     /  AlkPhos  99     26 Jul 2023 03:00    PT/INR - ( 25 Jul 2023 22:37 )   PT: 14.9 sec;   INR: 1.28 ratio         PTT - ( 25 Jul 2023 22:37 )  PTT:29.8 sec  Urinalysis Basic - ( 26 Jul 2023 03:00 )    Color: x / Appearance: x / SG: x / pH: x  Gluc: 113 mg/dL / Ketone: x  / Bili: x / Urobili: x   Blood: x / Protein: x / Nitrite: x   Leuk Esterase: x / RBC: x / WBC x   Sq Epi: x / Non Sq Epi: x / Bacteria: x      CAPILLARY BLOOD GLUCOSE              RADIOLOGY & ADDITIONAL TESTS:    Personally reviewed.     Consultant(s) Notes Reviewed:  [x] YES  [ ] NO     Patient is a 78y old  Female who presents with a chief complaint of fall, new onset afib RVR (26 Jul 2023 09:04)      Subjective:  INTERVAL HPI/OVERNIGHT EVENTS: Patient seen and examined at bedside. pt was started on metoprolol. Patient complains of sob so fluids were initially held, however repeat cxray was clear and CT chest showed evidence of lung scarring/ pulm fibrosis so fluids were resumed. Currently on clear liquid diet but NPO after midnight for perc enrique drainage by IR ((HIDA positive). Denies fevers, chills, headache, lightheadedness, chest pain, dyspnea, abdominal pain,   Lovenox held for now for procedure.   Daughter updated at bedside.             REVIEW OF SYSTEMS:  CONSTITUTIONAL: No fever or chills  HEENT:  No headache, no sore throat  RESPIRATORY: No cough, wheezing, + shortness of breath  CARDIOVASCULAR: No chest pain, palpitations  GASTROINTESTINAL: No abd pain, nausea, vomiting, or diarrhea  GENITOURINARY: No dysuria, frequency, or hematuria  NEUROLOGICAL: no focal weakness or dizziness  MUSCULOSKELETAL: no myalgias     Objective:  Vital Signs Last 24 Hrs  T(C): 36.3 (26 Jul 2023 08:10), Max: 37 (26 Jul 2023 03:30)  T(F): 97.4 (26 Jul 2023 08:10), Max: 98.6 (26 Jul 2023 03:30)  HR: 91 (26 Jul 2023 11:25) (86 - 125)  BP: 131/66 (26 Jul 2023 11:25) (100/55 - 141/62)  BP(mean): --  RR: 21 (26 Jul 2023 11:25) (18 - 30)  SpO2: 99% (26 Jul 2023 11:25) (97% - 99%)    Parameters below as of 26 Jul 2023 11:25  Patient On (Oxygen Delivery Method): nasal cannula  O2 Flow (L/min): 3      GENERAL: NAD,   HEAD:  Atraumatic, Normocephalic  EYES: EOMI, PERRLA, conjunctiva and sclera clear  ENT: Moist mucous membranes  NECK: Supple, No JVD  CHEST/LUNG:  bilaterally  no rale , no wheezing   HEART: Regular rate and rhythm; No  murmur   ABDOMEN: Bowel sounds present; Soft, Nontender, Nondistended.   EXTREMITIES:  2+ Peripheral Pulses, brisk capillary refill. No clubbing, cyanosis, or edema  NERVOUS SYSTEM:  Alert & Oriented X3, sensory / motor intact   MSK: FROM all 4 extremities, full and equal strength  SKIN: No rashes or lesions   gu intact      MEDICATIONS  (STANDING):  aspirin enteric coated 81 milliGRAM(s) Oral daily  enoxaparin Injectable 70 milliGRAM(s) SubCutaneous every 12 hours  metoprolol tartrate 25 milliGRAM(s) Oral two times a day  piperacillin/tazobactam IVPB.. 3.375 Gram(s) IV Intermittent every 8 hours  potassium chloride   Powder 40 milliEquivalent(s) Oral once    MEDICATIONS  (PRN):  acetaminophen     Tablet .. 650 milliGRAM(s) Oral every 6 hours PRN Mild Pain (1 - 3)  acetaminophen     Tablet .. 650 milliGRAM(s) Oral every 6 hours PRN Temp greater or equal to 38C (100.4F)  aluminum hydroxide/magnesium hydroxide/simethicone Suspension 30 milliLiter(s) Oral every 4 hours PRN Dyspepsia  melatonin 3 milliGRAM(s) Oral at bedtime PRN Insomnia  ondansetron Injectable 4 milliGRAM(s) IV Push every 8 hours PRN Nausea and/or Vomiting      Allergies    No Known Allergies    Intolerances    LABS:                        13.9   14.18 )-----------( 178      ( 26 Jul 2023 03:00 )             42.2     CBC Full  -  ( 26 Jul 2023 03:00 )  WBC Count : 14.18 K/uL  Hemoglobin : 13.9 g/dL  Hematocrit : 42.2 %  Platelet Count - Automated : 178 K/uL  Mean Cell Volume : 94.0 fl  Mean Cell Hemoglobin : 31.0 pg  Mean Cell Hemoglobin Concentration : 32.9 gm/dL  Auto Neutrophil # : 12.17 K/uL  Auto Lymphocyte # : 0.76 K/uL  Auto Monocyte # : 1.15 K/uL  Auto Eosinophil # : 0.01 K/uL  Auto Basophil # : 0.03 K/uL  Auto Neutrophil % : 85.8 %  Auto Lymphocyte % : 5.4 %  Auto Monocyte % : 8.1 %  Auto Eosinophil % : 0.1 %  Auto Basophil % : 0.2 %    26 Jul 2023 03:00    139    |  107    |  27     ----------------------------<  113    3.1     |  24     |  1.20     Ca    8.3        26 Jul 2023 03:00  Phos  3.2       26 Jul 2023 03:00  Mg     2.3       26 Jul 2023 03:00    TPro  6.3    /  Alb  3.1    /  TBili  0.9    /  DBili  x      /  AST  106    /  ALT  58     /  AlkPhos  99     26 Jul 2023 03:00    PT/INR - ( 25 Jul 2023 22:37 )   PT: 14.9 sec;   INR: 1.28 ratio         PTT - ( 25 Jul 2023 22:37 )  PTT:29.8 sec  Urinalysis Basic - ( 26 Jul 2023 03:00 )    Color: x / Appearance: x / SG: x / pH: x  Gluc: 113 mg/dL / Ketone: x  / Bili: x / Urobili: x   Blood: x / Protein: x / Nitrite: x   Leuk Esterase: x / RBC: x / WBC x   Sq Epi: x / Non Sq Epi: x / Bacteria: x      CAPILLARY BLOOD GLUCOSE              RADIOLOGY & ADDITIONAL TESTS:    Personally reviewed.     Consultant(s) Notes Reviewed:  [x] YES  [ ] NO

## 2023-07-26 NOTE — CONSULT NOTE ADULT - SUBJECTIVE AND OBJECTIVE BOX
Date/Time Patient Seen:  		  Referring MD:   Data Reviewed	       Patient is a 78y old  Female who presents with a chief complaint of fall, new onset afib RVR (26 Jul 2023 05:00)      Subjective/HPI   78y female with PMHx of HTN, HLD presented to the ED for the evaluation of fall out of chair. States that she was sitting on the chair and fell backwards, hitting her head. Was on the floor for over an hour and was unable to get up. Eventually was able to flip over and crawl on her elbows and knees to call her daughter in law for assistance. She initially had a headache which has resolved. Only has some mild soreness in her elbows. States that she was feeling unwell on Monday 7/24 and had an episode of vomiting then. Now feels back to her baseline. No fevers or chills. No urinary symptoms. Denies chest pain, palpitations, dyspnea, current headache, dizziness, abdominal pain, n/v/d. She has no prior history of kidney issues.  PAST MEDICAL & SURGICAL HISTORY:  HTN (hypertension)    Hyperlipidemia    Tinnitus  right ear    Seasonal allergies    No significant past surgical history    S/P knee replacement    FAMILY HISTORY:  Mother  Still living? No  Family history of myocardial infarction, Age at diagnosis: Age Unknown.     Social History:  · Substance use	No  · Social History (marital status, living situation, occupation, and sexual history)	Lives at home alone in [place]. Remote smoking history in her teens, was exposed to secondhand smoke from her . Denies EtOH use or illicit drug use. Ambulates independently.     Tobacco Screening:  · Core Measure Site	Yes  · Has the patient used tobacco in the past 30 days?	No    Risk Assessment:    Present on Admission:  Deep Venous Thrombosis	suspected  Pulmonary Embolus	no     HIV Screening:  · In accordance with [place] State law, we offer every patient who comes to our ED an HIV test. Would you like to be tested today?	Opt out        Medication list         MEDICATIONS  (STANDING):  aspirin enteric coated 81 milliGRAM(s) Oral daily  enoxaparin Injectable 70 milliGRAM(s) SubCutaneous every 12 hours  lactated ringers. 1000 milliLiter(s) (75 mL/Hr) IV Continuous <Continuous>  metoprolol tartrate 25 milliGRAM(s) Oral two times a day  piperacillin/tazobactam IVPB.- 3.375 Gram(s) IV Intermittent once  piperacillin/tazobactam IVPB.. 3.375 Gram(s) IV Intermittent every 8 hours  potassium chloride   Powder 40 milliEquivalent(s) Oral once    MEDICATIONS  (PRN):  acetaminophen     Tablet .. 650 milliGRAM(s) Oral every 6 hours PRN Temp greater or equal to 38C (100.4F)  acetaminophen     Tablet .. 650 milliGRAM(s) Oral every 6 hours PRN Mild Pain (1 - 3)  aluminum hydroxide/magnesium hydroxide/simethicone Suspension 30 milliLiter(s) Oral every 4 hours PRN Dyspepsia  melatonin 3 milliGRAM(s) Oral at bedtime PRN Insomnia  ondansetron Injectable 4 milliGRAM(s) IV Push every 8 hours PRN Nausea and/or Vomiting         Vitals log        ICU Vital Signs Last 24 Hrs  T(C): 37 (26 Jul 2023 03:30), Max: 37 (26 Jul 2023 03:30)  T(F): 98.6 (26 Jul 2023 03:30), Max: 98.6 (26 Jul 2023 03:30)  HR: 98 (26 Jul 2023 03:30) (95 - 125)  BP: 141/62 (26 Jul 2023 03:30) (100/55 - 141/62)  BP(mean): --  ABP: --  ABP(mean): --  RR: 18 (26 Jul 2023 03:30) (18 - 20)  SpO2: 99% (26 Jul 2023 03:30) (97% - 99%)    O2 Parameters below as of 26 Jul 2023 00:26  Patient On (Oxygen Delivery Method): room air                 Input and Output:  I&O's Detail      Lab Data                        13.9   14.18 )-----------( 178      ( 26 Jul 2023 03:00 )             42.2     07-26    139  |  107  |  27<H>  ----------------------------<  113<H>  3.1<L>   |  24  |  1.20    Ca    8.3<L>      26 Jul 2023 03:00  Phos  3.2     07-26  Mg     2.3     07-26    TPro  6.3  /  Alb  3.1<L>  /  TBili  0.9  /  DBili  x   /  AST  106<H>  /  ALT  58  /  AlkPhos  99  07-26      CARDIAC MARKERS ( 26 Jul 2023 03:00 )  x     / x     / 5621 U/L / x     / 14.0 ng/mL  CARDIAC MARKERS ( 25 Jul 2023 22:37 )  x     / x     / 5502 U/L / x     / 14.6 ng/mL        Review of Systems	  sob  winters  weakness  on o2 support  hard of hearing      Objective     Physical Examination  head nc  head at  heart s1s2  lung dc bS  abd soft  verbal  alert  cn grossly int        Pertinent Lab findings & Imaging      Ornelas:  NO   Adequate UO     I&O's Detail           Discussed with:     Cultures:	        Radiology        ******PRELIMINARY REPORT******      ******PRELIMINARY REPORT******         ACC: [id number] EXAM:  CT CHEST   ORDERED BY: MICAH CEJA     PROCEDURE DATE:  07/25/2023    ******PRELIMINARY REPORT******      ******PRELIMINARY REPORT******           INTERPRETATION:  [facility] RADIOLOGIST PRELIMINARY REPORT    PROCEDURE INFORMATION:  Exam: CT Chest Without Contrast; Diagnostic  Exam date and time: 7/25/2023 11:44 PM  Age: 78 years old  Clinical indication: A-fib, chf    TECHNIQUE:  Imaging protocol: Diagnostic computed tomography of the chest without   contrast.    COMPARISON:  DX XR CHEST IMMEDIATE 7/25/2023 10:39 PM    FINDINGS:  Lungs: 1.8 x 1.2 cm right upper lobe pulmonary nodule.  Pleural spaces: Unremarkable. No pneumothorax. No pleural effusion.  Heart: Unremarkable. No cardiomegaly. No pericardial effusion.  Lymph nodes: Unremarkable. No enlarged lymph nodes.  Vasculature: Unremarkable. No aortic aneurysm.    Liver: Low-attenuation of the liver is compatible with hepatic steatosis.  Gallbladder and bile ducts: There is a stone in the gallbladder.   Gallbladder  wall is thickened with surrounding stranding.  Bones/joints: Unremarkable. No acute fracture.  Soft tissues: Unremarkable.    IMPRESSION:  1.   No acute intrathoracic findings.  2.   Cholelithiasis with gallbladder wall thickening and pericholecystic   fluid  compatible with acute cholecystitis.  3.   Hepatic steatosis.  4.   1.8 cm right upper lobe pulmonary nodule. Consider PET CT for further  evaluation. Defer to on-site radiologist for ultimate follow-up   recommendations  for this finding.        ******PRELIMINARY REPORT******      ******PRELIMINARY REPORT******         TIFFANIE PEÑA M.D.;[facility] RADIOLOGIST  This document is a PRELIMINARY interpretation and is pending final   attending approval. Jul 26 2023  2:25AM

## 2023-07-26 NOTE — H&P ADULT - NSHPOUTPATIENTPROVIDERS_GEN_ALL_CORE
No PCP (used to see Dr. Joaquín Vallejo)  Planning on following with daughter in law's PMD Dr. Elena Cody ([facility])

## 2023-07-26 NOTE — ED ADULT NURSE REASSESSMENT NOTE - NS ED NURSE REASSESS COMMENT FT1
pt sitting up in stretcher bedside cardiac monitor pt intact, pt HR notes fluctuate  at times and pt notes to be tachypneic, dr. rivera informed pt denies fever chills, chest pain or sob at this time. Mynor ALCALA

## 2023-07-26 NOTE — CARE COORDINATION ASSESSMENT. - NSPASTMEDSURGHISTORY_GEN_ALL_CORE_FT
PAST MEDICAL & SURGICAL HISTORY:  Seasonal allergies      Tinnitus  right ear      Hyperlipidemia      HTN (hypertension)      S/P knee replacement

## 2023-07-26 NOTE — H&P ADULT - NSHPLABSRESULTS_GEN_ALL_CORE
.  CXR - no acute infiltrate or effusions (personal review)    XR bilateral elbows - no acute fracture seen (personal review)    CT Head - Motion degraded exam. No gross acute intracranial hemorrhage, mass effect, or acute osseous fracture. Moderate chronic ischemic changes in the frontoparietal white matter.    CT C-Spine - No acute cervical spine fracture or evidence of traumatic malalignment. Cervical spondylosis.    CT Chest - no lobar PNA, pulmonary edema or effusions (personal review)    CT Abdomen/Pelvis - no obstructing calculi or hydronephrosis (personal review)    EKG - atrial fibrillation with RVR at 143bpm, ST depressions in lateral leads, ST abn in inferior and lateral leads (personal review)    Labs, Imaging and EKG all personally reviewed by the attending physician.

## 2023-07-26 NOTE — H&P ADULT - PROBLEM SELECTOR PLAN 3
WBC 18.46k on admission - possibly reactive  Did feel unwell with vomiting on 7/24 - now resolved, no other symptoms  Will check UA, blood/urine cultures  Follow up CT CAP  Monitor off antibiotics for now  Trend TLC and monitor for fevers WBC 18.46k on admission - possibly reactive  Did feel unwell with vomiting on 7/24 - now resolved, no other symptoms  Will check UA, blood/urine cultures  Follow up CT CAP and US dopplers  Monitor off antibiotics for now  Trend TLC and monitor for fevers

## 2023-07-26 NOTE — PROGRESS NOTE ADULT - PROBLEM SELECTOR PLAN 10
-CT scan in the ER revealed findings consistent w/ acute enrique (Distended GB w/ jd-cholecystic fluid)  -RUQ ultrasound showed Cholelithiasis with a small amount of pericholecystic fluid and nonspecific gallbladder wall thickening  -has no n/v/d or RUQ pain    -surgery consulted will f/u   -HIDA scan ordered   -NPO currently if HIDA scan negative will start a diet  -currently on zosyn VTE prophylaxis: already therapeutically anticoagulated on LMWH  Fall precautions -Lovenox held now as pt to go percutaneous drainage gallbladder tmrw.  VTE prophylaxis: already therapeutically anticoagulated on LMWH  Fall precautions Used to be on atorvastatin 40mg daily, no longer taking  Would resume statin drug when CK improves  Follow up lipid profile

## 2023-07-26 NOTE — H&P ADULT - ASSESSMENT
78y female with PMHx of HTN, HLD admitted with new onset atrial fibrillation with rapid ventricular response and rhabdomyolysis s/p fall at home.

## 2023-07-26 NOTE — CASE MANAGEMENT PROGRESS NOTE - NSCMPROGRESSNOTE_GEN_ALL_CORE
CM consult noted.  Patient lives alone and has been independent with care.  Family very involved. Son and daughter in law lives around the corner from patient.  Family considering to add cameras to the patient's home and give her a purse unit to maintain her independence while keeping her safe.

## 2023-07-26 NOTE — ED ADULT NURSE REASSESSMENT NOTE - NS ED NURSE REASSESS COMMENT FT1
(shift change) Pt sitting up in stretcher no acute distress noted at this time, respiration even and unlabored,  bedside cardiac monitor intact, HR 98 pt denies fever chills, chest pain or sob at this time. pt iv site noted to be dry and intact, no signs of infiltration noted, safety checks completed, will continue to monitor pt. Mynor ALCALA

## 2023-07-26 NOTE — PROGRESS NOTE ADULT - PROBLEM SELECTOR PLAN 5
No prior history of kidney disease  Possibly prerenal azotemia or secondary to rhabdo  No baseline to compare it to so could be CKD  Will continue gentle IV fluids and monitor renal indices Patient is without chest pain on admission, doubt ACS  EKG with afib RVR with likely rate related ischemic changes  Suspect demand ischemia in setting of rapid AF and rhabdomyolysis  Will trend cardiac enzymes  Follow up 2D ECHO  most recent EKG showed rate of 95  Continue ASA 81mg daily  Resume statin when CK improves WBC 18.46k on admission - possibly reactive  Did feel unwell with vomiting on 7/24 - now resolved, no other symptoms  Will check UA, blood/urine cultures  US doppler was unremarkable for DVT   Monitor off antibiotics for now  Trend TLC and monitor for fevers

## 2023-07-27 LAB
-  STREPTOCOCCUS SP. (NOT GRP A, B OR S PNEUMONIAE): SIGNIFICANT CHANGE UP
ALBUMIN SERPL ELPH-MCNC: 2.5 G/DL — LOW (ref 3.3–5)
ALP SERPL-CCNC: 108 U/L — SIGNIFICANT CHANGE UP (ref 40–120)
ALT FLD-CCNC: 56 U/L — SIGNIFICANT CHANGE UP (ref 12–78)
ANION GAP SERPL CALC-SCNC: 7 MMOL/L — SIGNIFICANT CHANGE UP (ref 5–17)
ANION GAP SERPL CALC-SCNC: 9 MMOL/L — SIGNIFICANT CHANGE UP (ref 5–17)
APPEARANCE UR: ABNORMAL
AST SERPL-CCNC: 74 U/L — HIGH (ref 15–37)
BASOPHILS # BLD AUTO: 0 K/UL — SIGNIFICANT CHANGE UP (ref 0–0.2)
BASOPHILS NFR BLD AUTO: 0 % — SIGNIFICANT CHANGE UP (ref 0–2)
BILIRUB SERPL-MCNC: 0.9 MG/DL — SIGNIFICANT CHANGE UP (ref 0.2–1.2)
BILIRUB UR-MCNC: NEGATIVE — SIGNIFICANT CHANGE UP
BUN SERPL-MCNC: 16 MG/DL — SIGNIFICANT CHANGE UP (ref 7–23)
BUN SERPL-MCNC: 19 MG/DL — SIGNIFICANT CHANGE UP (ref 7–23)
CALCIUM SERPL-MCNC: 8.4 MG/DL — LOW (ref 8.5–10.1)
CALCIUM SERPL-MCNC: 8.7 MG/DL — SIGNIFICANT CHANGE UP (ref 8.5–10.1)
CHLORIDE SERPL-SCNC: 105 MMOL/L — SIGNIFICANT CHANGE UP (ref 96–108)
CHLORIDE SERPL-SCNC: 106 MMOL/L — SIGNIFICANT CHANGE UP (ref 96–108)
CK SERPL-CCNC: 1848 U/L — HIGH (ref 26–192)
CO2 SERPL-SCNC: 23 MMOL/L — SIGNIFICANT CHANGE UP (ref 22–31)
CO2 SERPL-SCNC: 25 MMOL/L — SIGNIFICANT CHANGE UP (ref 22–31)
COLOR SPEC: YELLOW — SIGNIFICANT CHANGE UP
COMMENT - URINE: SIGNIFICANT CHANGE UP
CREAT SERPL-MCNC: 0.74 MG/DL — SIGNIFICANT CHANGE UP (ref 0.5–1.3)
CREAT SERPL-MCNC: 0.89 MG/DL — SIGNIFICANT CHANGE UP (ref 0.5–1.3)
DIFF PNL FLD: ABNORMAL
EGFR: 67 ML/MIN/1.73M2 — SIGNIFICANT CHANGE UP
EGFR: 83 ML/MIN/1.73M2 — SIGNIFICANT CHANGE UP
EOSINOPHIL # BLD AUTO: 0 K/UL — SIGNIFICANT CHANGE UP (ref 0–0.5)
EOSINOPHIL NFR BLD AUTO: 0 % — SIGNIFICANT CHANGE UP (ref 0–6)
EPI CELLS # UR: PRESENT
GLUCOSE SERPL-MCNC: 150 MG/DL — HIGH (ref 70–99)
GLUCOSE SERPL-MCNC: 97 MG/DL — SIGNIFICANT CHANGE UP (ref 70–99)
GLUCOSE UR QL: NEGATIVE MG/DL — SIGNIFICANT CHANGE UP
GRAM STN FLD: SIGNIFICANT CHANGE UP
HCT VFR BLD CALC: 37.6 % — SIGNIFICANT CHANGE UP (ref 34.5–45)
HCV AB S/CO SERPL IA: 0.07 S/CO — SIGNIFICANT CHANGE UP (ref 0–0.99)
HCV AB SERPL-IMP: SIGNIFICANT CHANGE UP
HGB BLD-MCNC: 12.3 G/DL — SIGNIFICANT CHANGE UP (ref 11.5–15.5)
KETONES UR-MCNC: 15 MG/DL
LEUKOCYTE ESTERASE UR-ACNC: ABNORMAL
LYMPHOCYTES # BLD AUTO: 0.92 K/UL — LOW (ref 1–3.3)
LYMPHOCYTES # BLD AUTO: 6 % — LOW (ref 13–44)
MAGNESIUM SERPL-MCNC: 2.4 MG/DL — SIGNIFICANT CHANGE UP (ref 1.6–2.6)
MCHC RBC-ENTMCNC: 31 PG — SIGNIFICANT CHANGE UP (ref 27–34)
MCHC RBC-ENTMCNC: 32.7 GM/DL — SIGNIFICANT CHANGE UP (ref 32–36)
MCV RBC AUTO: 94.7 FL — SIGNIFICANT CHANGE UP (ref 80–100)
METHOD TYPE: SIGNIFICANT CHANGE UP
MONOCYTES # BLD AUTO: 1.38 K/UL — HIGH (ref 0–0.9)
MONOCYTES NFR BLD AUTO: 9 % — SIGNIFICANT CHANGE UP (ref 2–14)
NEUTROPHILS # BLD AUTO: 13 K/UL — HIGH (ref 1.8–7.4)
NEUTROPHILS NFR BLD AUTO: 77 % — SIGNIFICANT CHANGE UP (ref 43–77)
NITRITE UR-MCNC: NEGATIVE — SIGNIFICANT CHANGE UP
NRBC # BLD: SIGNIFICANT CHANGE UP /100 WBCS (ref 0–0)
PH UR: 6 — SIGNIFICANT CHANGE UP (ref 5–8)
PHOSPHATE SERPL-MCNC: 3.2 MG/DL — SIGNIFICANT CHANGE UP (ref 2.5–4.5)
PLATELET # BLD AUTO: 170 K/UL — SIGNIFICANT CHANGE UP (ref 150–400)
POTASSIUM SERPL-MCNC: 2.8 MMOL/L — CRITICAL LOW (ref 3.5–5.3)
POTASSIUM SERPL-MCNC: 3.2 MMOL/L — LOW (ref 3.5–5.3)
POTASSIUM SERPL-MCNC: 3.9 MMOL/L — SIGNIFICANT CHANGE UP (ref 3.5–5.3)
POTASSIUM SERPL-SCNC: 2.8 MMOL/L — CRITICAL LOW (ref 3.5–5.3)
POTASSIUM SERPL-SCNC: 3.2 MMOL/L — LOW (ref 3.5–5.3)
POTASSIUM SERPL-SCNC: 3.9 MMOL/L — SIGNIFICANT CHANGE UP (ref 3.5–5.3)
PROT SERPL-MCNC: 6.7 G/DL — SIGNIFICANT CHANGE UP (ref 6–8.3)
PROT UR-MCNC: 100 MG/DL
RBC # BLD: 3.97 M/UL — SIGNIFICANT CHANGE UP (ref 3.8–5.2)
RBC # FLD: 13.5 % — SIGNIFICANT CHANGE UP (ref 10.3–14.5)
RBC CASTS # UR COMP ASSIST: 2 /HPF — SIGNIFICANT CHANGE UP (ref 0–4)
SODIUM SERPL-SCNC: 137 MMOL/L — SIGNIFICANT CHANGE UP (ref 135–145)
SODIUM SERPL-SCNC: 138 MMOL/L — SIGNIFICANT CHANGE UP (ref 135–145)
SP GR SPEC: 1.02 — SIGNIFICANT CHANGE UP (ref 1–1.03)
SPECIMEN SOURCE: SIGNIFICANT CHANGE UP
SPECIMEN SOURCE: SIGNIFICANT CHANGE UP
UROBILINOGEN FLD QL: 1 MG/DL — SIGNIFICANT CHANGE UP (ref 0.2–1)
WBC # BLD: 15.29 K/UL — HIGH (ref 3.8–10.5)
WBC # FLD AUTO: 15.29 K/UL — HIGH (ref 3.8–10.5)
WBC UR QL: 25 /HPF — HIGH (ref 0–5)

## 2023-07-27 PROCEDURE — 99232 SBSQ HOSP IP/OBS MODERATE 35: CPT

## 2023-07-27 PROCEDURE — 76937 US GUIDE VASCULAR ACCESS: CPT | Mod: 26

## 2023-07-27 PROCEDURE — 99233 SBSQ HOSP IP/OBS HIGH 50: CPT

## 2023-07-27 PROCEDURE — 47490 INCISION OF GALLBLADDER: CPT

## 2023-07-27 PROCEDURE — 99233 SBSQ HOSP IP/OBS HIGH 50: CPT | Mod: GC

## 2023-07-27 PROCEDURE — 93306 TTE W/DOPPLER COMPLETE: CPT | Mod: 26

## 2023-07-27 PROCEDURE — 76000 FLUOROSCOPY <1 HR PHYS/QHP: CPT | Mod: 26,XU

## 2023-07-27 RX ORDER — DILTIAZEM HCL 120 MG
10 CAPSULE, EXT RELEASE 24 HR ORAL ONCE
Refills: 0 | Status: COMPLETED | OUTPATIENT
Start: 2023-07-27 | End: 2023-07-27

## 2023-07-27 RX ORDER — POTASSIUM CHLORIDE 20 MEQ
10 PACKET (EA) ORAL
Refills: 0 | Status: DISCONTINUED | OUTPATIENT
Start: 2023-07-27 | End: 2023-07-27

## 2023-07-27 RX ORDER — POTASSIUM CHLORIDE 20 MEQ
20 PACKET (EA) ORAL EVERY 4 HOURS
Refills: 0 | Status: DISCONTINUED | OUTPATIENT
Start: 2023-07-27 | End: 2023-07-27

## 2023-07-27 RX ORDER — ACETAMINOPHEN 500 MG
1000 TABLET ORAL ONCE
Refills: 0 | Status: COMPLETED | OUTPATIENT
Start: 2023-07-27 | End: 2023-07-27

## 2023-07-27 RX ORDER — POTASSIUM CHLORIDE 20 MEQ
40 PACKET (EA) ORAL ONCE
Refills: 0 | Status: COMPLETED | OUTPATIENT
Start: 2023-07-27 | End: 2023-07-27

## 2023-07-27 RX ORDER — POTASSIUM CHLORIDE 20 MEQ
10 PACKET (EA) ORAL
Refills: 0 | Status: COMPLETED | OUTPATIENT
Start: 2023-07-27 | End: 2023-07-27

## 2023-07-27 RX ORDER — SODIUM CHLORIDE 9 MG/ML
1000 INJECTION, SOLUTION INTRAVENOUS
Refills: 0 | Status: COMPLETED | OUTPATIENT
Start: 2023-07-27 | End: 2023-07-28

## 2023-07-27 RX ADMIN — Medication 100 MILLIEQUIVALENT(S): at 20:50

## 2023-07-27 RX ADMIN — Medication 25 MILLIGRAM(S): at 05:29

## 2023-07-27 RX ADMIN — SODIUM CHLORIDE 60 MILLILITER(S): 9 INJECTION, SOLUTION INTRAVENOUS at 17:41

## 2023-07-27 RX ADMIN — Medication 10 MILLIGRAM(S): at 23:22

## 2023-07-27 RX ADMIN — SODIUM CHLORIDE 60 MILLILITER(S): 9 INJECTION, SOLUTION INTRAVENOUS at 04:51

## 2023-07-27 RX ADMIN — Medication 100 MILLIEQUIVALENT(S): at 22:10

## 2023-07-27 RX ADMIN — Medication 100 MILLIEQUIVALENT(S): at 19:36

## 2023-07-27 RX ADMIN — Medication 100 MILLIEQUIVALENT(S): at 12:23

## 2023-07-27 RX ADMIN — PIPERACILLIN AND TAZOBACTAM 25 GRAM(S): 4; .5 INJECTION, POWDER, LYOPHILIZED, FOR SOLUTION INTRAVENOUS at 14:36

## 2023-07-27 RX ADMIN — Medication 40 MILLIEQUIVALENT(S): at 19:36

## 2023-07-27 RX ADMIN — Medication 100 MILLIEQUIVALENT(S): at 13:25

## 2023-07-27 RX ADMIN — Medication 25 MILLIGRAM(S): at 17:34

## 2023-07-27 RX ADMIN — Medication 400 MILLIGRAM(S): at 04:50

## 2023-07-27 RX ADMIN — PIPERACILLIN AND TAZOBACTAM 25 GRAM(S): 4; .5 INJECTION, POWDER, LYOPHILIZED, FOR SOLUTION INTRAVENOUS at 05:29

## 2023-07-27 RX ADMIN — PIPERACILLIN AND TAZOBACTAM 25 GRAM(S): 4; .5 INJECTION, POWDER, LYOPHILIZED, FOR SOLUTION INTRAVENOUS at 22:39

## 2023-07-27 RX ADMIN — Medication 40 MILLIEQUIVALENT(S): at 22:11

## 2023-07-27 RX ADMIN — Medication 100 MILLIEQUIVALENT(S): at 11:31

## 2023-07-27 RX ADMIN — Medication 1000 MILLIGRAM(S): at 06:07

## 2023-07-27 NOTE — PROGRESS NOTE ADULT - PROBLEM SELECTOR PLAN 7
No prior history of kidney disease  Possibly prerenal azotemia or secondary to rhabdo  No baseline to compare it to so could be CKD  Will continue gentle IV fluids and monitor renal indices

## 2023-07-27 NOTE — PROGRESS NOTE ADULT - SUBJECTIVE AND OBJECTIVE BOX
Pt seen and examined at bedside, resting comfortably in bed. On 3L nasal cannula. Pt had a-fib with RVR overnight up to 140s, given Cardizem 20mg x1, rate now in the 90s. Also with fever to 101 last night. Pt NPO, feeling well and denies any nausea, vomiting, and abdominal pain and has not had a BM. Denies any other complaints at this time.    T(C): 36.7 (07-27-23 @ 06:03), Max: 38.3 (07-27-23 @ 04:47)  HR: 121 (07-27-23 @ 04:30) (91 - 138)  BP: 130/80 (07-27-23 @ 04:30) (117/83 - 160/83)  RR: 32 (07-27-23 @ 04:30) (21 - 34)  SpO2: 97% (07-27-23 @ 04:30) (93% - 99%)  Wt(kg): --    PHYSICAL EXAM:  General: no acute distress  HEENT: normocephalic, anicteric  Pulm: non labored respirations, on 3L NC  Cardio: irregularly irregular rhythm  Abdomen: soft, nontender, nondistended  Extremities: no calf tenderness noted    I&O's Detail    26 Jul 2023 07:01  -  27 Jul 2023 07:00  --------------------------------------------------------  IN:    IV PiggyBack: 100 mL    Lactated Ringers: 60 mL    Lactated Ringers: 960 mL    Oral Fluid: 800 mL  Total IN: 1920 mL    OUT:    Voided (mL): 600 mL  Total OUT: 600 mL    Total NET: 1320 mL          LABS:                        13.9   14.18 )-----------( 178      ( 26 Jul 2023 03:00 )             42.2     07-26    139  |  107  |  27<H>  ----------------------------<  113<H>  3.1<L>   |  24  |  1.20    Ca    8.3<L>      26 Jul 2023 03:00  Phos  3.2     07-26  Mg     2.3     07-26    TPro  6.3  /  Alb  3.1<L>  /  TBili  0.9  /  DBili  x   /  AST  106<H>  /  ALT  58  /  AlkPhos  99  07-26    PT/INR - ( 25 Jul 2023 22:37 )   PT: 14.9 sec;   INR: 1.28 ratio         PTT - ( 25 Jul 2023 22:37 )  PTT:29.8 sec  Urinalysis Basic - ( 26 Jul 2023 03:00 )    Color: x / Appearance: x / SG: x / pH: x  Gluc: 113 mg/dL / Ketone: x  / Bili: x / Urobili: x   Blood: x / Protein: x / Nitrite: x   Leuk Esterase: x / RBC: x / WBC x   Sq Epi: x / Non Sq Epi: x / Bacteria: x        Culture - Blood (collected 26 Jul 2023 03:00)  Source: .Blood Blood-Peripheral  Gram Stain (27 Jul 2023 06:08):    Growth in anaerobic bottle: Gram Positive Cocci in Pairs and Chains    Growth in aerobic bottle: Gram Positive Cocci in Pairs and Chains  Preliminary Report (27 Jul 2023 06:09):    Growth in anaerobic bottle: Gram Positive Cocci in Pairs and Chains    Growth in aerobic bottle: Gram Positive Cocci in Pairs and Chains    Direct identification is available within approximately 3-5    hours either by Blood Panel Multiplexed PCR or Direct    MALDI-TOF. Details: [email or web address]  Organism: Blood Culture PCR (27 Jul 2023 05:04)  Organism: Blood Culture PCR (27 Jul 2023 05:04)    Culture - Blood (collected 26 Jul 2023 02:58)  Source: .Blood Blood-Peripheral  Gram Stain (27 Jul 2023 08:19):    Growth in anaerobic bottle: Gram Positive Cocci in Pairs and Chains    Growth in aerobic bottle: Gram Positive Cocci in Pairs and Chains  Preliminary Report (27 Jul 2023 08:19):    Growth in anaerobic bottle: Gram Positive Cocci in Pairs and Chains    Growth in aerobic bottle: Gram Positive Cocci in Pairs and Chains      CAPILLARY BLOOD GLUCOSE          RADIOLOGY & ADDITIONAL STUDIES:    MEDICATIONS:  acetaminophen     Tablet .. 650 milliGRAM(s) Oral every 6 hours PRN  acetaminophen     Tablet .. 650 milliGRAM(s) Oral every 6 hours PRN  aluminum hydroxide/magnesium hydroxide/simethicone Suspension 30 milliLiter(s) Oral every 4 hours PRN  lactated ringers. 1000 milliLiter(s) IV Continuous <Continuous>  melatonin 3 milliGRAM(s) Oral at bedtime PRN  metoprolol tartrate 25 milliGRAM(s) Oral two times a day  ondansetron Injectable 4 milliGRAM(s) IV Push every 8 hours PRN  piperacillin/tazobactam IVPB.. 3.375 Gram(s) IV Intermittent every 8 hours              79 y/o F presenting with asymptomatic cholecystitis found on imaging.    - Blood cx +, cont IV Zosyn per medicine team  - RUQ U/S showed a 2.0 cm gallstone and gallbladder sludge with mild nonspecific wall thickening and a small amount of pericholecystic fluid.  - HIDA positive  - NPO for IR perc enrique drainage this afternoon, AC held  - Pain control, anti-pyretics prn  - F/u urine cx       Surgical Team  Spectralink: [phone]

## 2023-07-27 NOTE — PROGRESS NOTE ADULT - PROBLEM SELECTOR PLAN 11
-Lovenox held now as pt to go percutaneous drainage gallbladder in am   Fall precautions  ASA held for procedure tomorrow as well- discussed with Surgery

## 2023-07-27 NOTE — PHYSICAL THERAPY INITIAL EVALUATION ADULT - ADDITIONAL COMMENTS
history obtained from patient and dtr bedside. Patient lives alone in private house with no stairs to negotiate. Patient performs all functional mobility independently. patient reports her children can assist at home of needed. Patient thinks she owns rolling walker if needed.

## 2023-07-27 NOTE — PHYSICAL THERAPY INITIAL EVALUATION ADULT - PERTINENT HX OF CURRENT PROBLEM, REHAB EVAL
78y female with PMHx of HTN, HLD presented to the ED for the evaluation of fall out of chair. States that she was sitting on the chair and fell backwards, hitting her head. Was on the floor for over an hour and was unable to get up. Eventually was able to flip over and crawl on her elbows and knees to call her daughter in law for assistance. She initially had a headache which has resolved. Only has some mild soreness in her elbows. States that she was feeling unwell on Monday 7/24 and had an episode of vomiting then. Now feels back to her baseline. No fevers or chills. No urinary symptoms. Denies chest pain, palpitations, dyspnea, current headache, dizziness, abdominal pain, n/v/d. She has no prior history of kidney issues.

## 2023-07-27 NOTE — PROGRESS NOTE ADULT - PROBLEM SELECTOR PLAN 2
-Admitted to telemetry  -Given 20mg IV cardizem in the ER with improvement in rates  started lopressor 25mg BID for rate control (was on BB in the past)  -[id number] 4: will start full dose LMWH for thromboprophylaxis  -Can consider change to DOAC (i.e. eliquis) in AM  - but hold for ir guided gb  drain - hida +  ordered 2D ECHO to assess cardiac structure and function, r/o valvular pathology will F/U  -Monitor and replete electrolytes for optimal arrhythmia control  -repeat EKG showed rate of 95   -TSH w/in normal limits, T3 is low, 69  -LE doppler resulted - no DVT evidence b/l   -Cardiology consulted: Dr. Louie  -PT in Afib w/ RVR overnight, given IV cardizem 10mg x1 -Admitted to telemetry  -Given 20mg IV cardizem in the ER with improvement in rates  started lopressor 25mg BID for rate control (was on BB in the past)  -[id number] 4: will start full dose LMWH for thromboprophylaxis  -Can consider change to DOAC (i.e. eliquis) in AM  - but hold for ir guided gb  drain - hida +  ordered 2D ECHO to assess cardiac structure and function, r/o valvular pathology will F/U  -Monitor and replete electrolytes for optimal arrhythmia control  -repeat EKG showed rate of 95   -TSH w/in normal limits, T3 is low, 69  -LE doppler resulted - no DVT evidence b/l   -Cardiology consulted: Dr. Louie  -PT in Afib w/ RVR overnight, given IV cardizem 10mg x1  -AC held for procedure, need to resume tonight

## 2023-07-27 NOTE — PROGRESS NOTE ADULT - SUBJECTIVE AND OBJECTIVE BOX
Date/Time Patient Seen:  		  Referring MD:   Data Reviewed	       Patient is a 78y old  Female who presents with a chief complaint of fall, new onset afib RVR (26 Jul 2023 12:23)      Subjective/HPI     PAST MEDICAL & SURGICAL HISTORY:  HTN (hypertension)    Hyperlipidemia    Tinnitus  right ear    Seasonal allergies    No significant past surgical history    S/P knee replacement          Medication list         MEDICATIONS  (STANDING):  lactated ringers. 1000 milliLiter(s) (60 mL/Hr) IV Continuous <Continuous>  metoprolol tartrate 25 milliGRAM(s) Oral two times a day  piperacillin/tazobactam IVPB.. 3.375 Gram(s) IV Intermittent every 8 hours    MEDICATIONS  (PRN):  acetaminophen     Tablet .. 650 milliGRAM(s) Oral every 6 hours PRN Temp greater or equal to 38C (100.4F)  acetaminophen     Tablet .. 650 milliGRAM(s) Oral every 6 hours PRN Mild Pain (1 - 3)  aluminum hydroxide/magnesium hydroxide/simethicone Suspension 30 milliLiter(s) Oral every 4 hours PRN Dyspepsia  melatonin 3 milliGRAM(s) Oral at bedtime PRN Insomnia  ondansetron Injectable 4 milliGRAM(s) IV Push every 8 hours PRN Nausea and/or Vomiting         Vitals log        ICU Vital Signs Last 24 Hrs  T(C): 36.7 (27 Jul 2023 06:03), Max: 38.3 (27 Jul 2023 04:47)  T(F): 98 (27 Jul 2023 06:03), Max: 101 (27 Jul 2023 04:47)  HR: 121 (27 Jul 2023 04:30) (86 - 138)  BP: 130/80 (27 Jul 2023 04:30) (117/83 - 160/83)  BP(mean): --  ABP: --  ABP(mean): --  RR: 32 (27 Jul 2023 04:30) (21 - 34)  SpO2: 97% (27 Jul 2023 04:30) (93% - 99%)    O2 Parameters below as of 27 Jul 2023 04:30  Patient On (Oxygen Delivery Method): nasal cannula  O2 Flow (L/min): 3               Input and Output:  I&O's Detail    26 Jul 2023 07:01  -  27 Jul 2023 07:00  --------------------------------------------------------  IN:    IV PiggyBack: 100 mL    Lactated Ringers: 60 mL    Lactated Ringers: 960 mL    Oral Fluid: 800 mL  Total IN: 1920 mL    OUT:    Voided (mL): 600 mL  Total OUT: 600 mL    Total NET: 1320 mL          Lab Data                        13.9   14.18 )-----------( 178      ( 26 Jul 2023 03:00 )             42.2     07-26    139  |  107  |  27<H>  ----------------------------<  113<H>  3.1<L>   |  24  |  1.20    Ca    8.3<L>      26 Jul 2023 03:00  Phos  3.2     07-26  Mg     2.3     07-26    TPro  6.3  /  Alb  3.1<L>  /  TBili  0.9  /  DBili  x   /  AST  106<H>  /  ALT  58  /  AlkPhos  99  07-26      CARDIAC MARKERS ( 26 Jul 2023 03:00 )  x     / x     / 5621 U/L / x     / 14.0 ng/mL  CARDIAC MARKERS ( 25 Jul 2023 22:37 )  x     / x     / 5502 U/L / x     / 14.6 ng/mL        Review of Systems	      Objective     Physical Examination    heart s1s2  lung dc BS  headnc      Pertinent Lab findings & Imaging      Ceci:  NO   Adequate UO     I&O's Detail    26 Jul 2023 07:01  -  27 Jul 2023 07:00  --------------------------------------------------------  IN:    IV PiggyBack: 100 mL    Lactated Ringers: 60 mL    Lactated Ringers: 960 mL    Oral Fluid: 800 mL  Total IN: 1920 mL    OUT:    Voided (mL): 600 mL  Total OUT: 600 mL    Total NET: 1320 mL               Discussed with:     Cultures:	        Radiology

## 2023-07-27 NOTE — PROGRESS NOTE ADULT - ASSESSMENT
78y female with PMHx of HTN, HLD presented to the ED for the evaluation of fall out of chair. States that she was sitting on the chair and fell backwards, hitting her head. Was on the floor for over an hour and was unable to get up.    pulm nodule - 1.8 cm  eval for acute enrique  HTN  HLD  OP  OA  Dyspnea  Ataxic gait  Hard of hearing  AF  Rhabdo    febrile  on o2 support  HIDA scan pos for Acute Enrique  Surgery follow up      serial renal indices  I and O  monitor VS and HD and Sat  trend LABS  on o2 support - monitor for dyspnea - Blood gas - CT imaging without acute pulm path -   Pulm nodule - 1.8 cm - will need f/u and repeat imaging and likely PET scan  Fall prec - PT assessment  AF management - eval for HF - on AC full dose -   TTE  trend TROPS  cardio eval  tele monitoring  proBNP noted  caution with IVF

## 2023-07-27 NOTE — PROCEDURE NOTE - PROCEDURE FINDINGS AND DETAILS
Successful placement of percutaneous cholecystostomy with drainage of 55cc of brown cloudy purulent fluid.

## 2023-07-27 NOTE — ASU PREOP CHECKLIST - HEIGHT IN CM
Submitted refill request for Heladio 2 sensors to [facility] via [employer]  Pt has not been seen in over 6M and insurance may not fill  [employer] message sent to pt notifying him  [phone]

## 2023-07-27 NOTE — PHYSICAL THERAPY INITIAL EVALUATION ADULT - BED MOBILITY TRAINING, PT EVAL
Patient will improve supine <-> sit with CGA within 3-5 sessions in order to safely get in and out of bed.

## 2023-07-27 NOTE — CHART NOTE - NSCHARTNOTEFT_GEN_A_CORE
RN called for pt w/ tachycardia (130s). Pt evaluated at bedside, states that she feels well. Denies chest pain, headache, lightheadedness, shortness of breath, N/V. Vitals reviewed. On exam, tachycardia noted.     A/P: RN called for pt w/ tachycardia (130s). Pt evaluated at bedside, states that she feels well. Denies chest pain, headache, lightheadedness, shortness of breath, N/V. Vitals reviewed. On exam, tachycardia noted.     Spoke with remote tele tech who confirmed pt's HR trending upwards since 8:20pm, 120s to 150s. Pt currently in AFib, .     A/P:  - Pt admitted for AFib w/ RVR. Currently tachycardic w/  in AFib.  - IV cardizem 10mg x1 ordered  - Continue to monitor on tele  - RN to call if any changes    ------------------------------------------  Addendum: 7/27, 3:30am    Pt's HR initially improved to 110s following administration of IV cardizem. RN called for pt w/ increasing HR to 130s. Spoke w/ remote tele tech who confirmed pt's HR has been fluctuating between 120s-130s, unsustained, since 3:00am. Vitals otherwise stable.     - If pt continues to have sustained tachycardia, will order IV cardizem 10mg x1  - Pt will receive standing lopressor 25mg at 6am  - Continue to monitor on tele  - RN to call if any changes RN called for pt w/ tachycardia (130s). Pt evaluated at bedside, states that she feels well. Denies chest pain, headache, lightheadedness, shortness of breath, N/V. Vitals reviewed. On exam, tachycardia noted.     Spoke with remote tele tech who confirmed pt's HR trending upwards since 8:20pm, 120s to 150s. Pt currently in AFib, .     A/P:  - Pt admitted for AFib w/ RVR. Currently tachycardic w/  in AFib.  - IV cardizem 10mg x1 ordered  - Continue to monitor on tele  - RN to call if any changes    ------------------------------------------  Addendum: 7/27, 3:30am    Pt's HR initially improved to 110s following administration of IV cardizem. RN called for pt w/ increasing HR to 130s. Spoke w/ remote tele tech who confirmed pt's HR has been fluctuating between 120s-130s, unsustained, since 3:00am. Vitals otherwise stable.     - If pt continues to have sustained tachycardia, will order IV cardizem 10mg x1  - Pt will receive standing lopressor 25mg at 6am  - Continue to monitor on tele  - RN to call if any changes      -----------------------------------------    Addendum: 7/27 4:59am:    Called by RN with fever 101 rectal. Patient otherwise asx.    - tachycardia and fever likely 2/2 to acute cholecystitis  - IV tylenol 1g x1 STAT ordered  - continue current abx regimen with zosyn for acute enrique coverage

## 2023-07-27 NOTE — PROGRESS NOTE ADULT - PROBLEM SELECTOR PLAN 5
WBC 18.46k on admission - possibly reactive, downtrending to 14.18 today  Did feel unwell with vomiting on 7/24 - now resolved, no other symptoms  -UA & UCx -  F/U results  -BCx + for gram + cocci in pairs/chains in both aerobic/anearobic sample  US doppler was unremarkable for DVT   -Pt on Zosyn  Trend TLC and monitor for fevers WBC 18.46k on admission - possibly reactive, downtrending to 14.18 today  Did feel unwell with vomiting on 7/24 - now resolved, no other symptoms  -UA & UCx -  F/U results  -BCx + for gram + cocci in pairs/chains in both aerobic/anearobic sample  US doppler was unremarkable for DVT   -Pt on iv abx  Zosyn no

## 2023-07-27 NOTE — PRE PROCEDURE NOTE - PRE PROCEDURE EVALUATION
Interventional Radiology    HPI: 78y Female with MHx of HTN, HLD admitted with new onset atrial fibrillation with rapid ventricular response and rhabdomyolysis s/p fall at home. GB wall thickening seen on chest ct on admission, RUQ US with gallstone, pericholecystic fluid, GB wall thickening Abnormal HIDA. IR consulted for percutaneous cholecystostomy. Pt with evolving sepsis- leukocytosis, fever, bacteremia, team requesting to be performed emergently.     Allergies: No Known Allergies    Medications (Abx/Cardiac/Anticoagulation/Blood Products)  aspirin enteric coated: 81 milliGRAM(s) Oral (07-26 @ 15:14)  diltiazem Injectable: 10 milliGRAM(s) IV Push (07-26 @ 21:32)  diltiazem Injectable: 20 milliGRAM(s) IV Push (07-26 @ 00:24)  enoxaparin Injectable: 70 milliGRAM(s) SubCutaneous (07-26 @ 00:48)  metoprolol tartrate: 25 milliGRAM(s) Oral (07-27 @ 05:29)  piperacillin/tazobactam IVPB.: 200 mL/Hr IV Intermittent (07-26 @ 03:12)  piperacillin/tazobactam IVPB.-: 25 mL/Hr IV Intermittent (07-26 @ 07:51)  piperacillin/tazobactam IVPB..: 25 mL/Hr IV Intermittent (07-27 @ 14:36)    Data:  167.6  84.5  T(C): 36.9  HR: 112  BP: 133/85  RR: 25  SpO2: 100%    Exam  General: No acute distress  Chest: Non labored breathing  Abdomen: Non-distended  Extremities: No swelling, warm    -WBC 15.29 / HgB 12.3 / Hct 37.6 / Plt 170  -Na 137 / Cl 105 / BUN 19 / Glucose 97  -K 2.8 / CO2 23 / Cr 0.89  -ALT 56 / Alk Phos 108 / T.Bili 0.9  -INR1.28    Imaging:   - CT chest 7/25/23, US abdomen 7/26/23, HIDA 7/26/23 reviewed     Plan:     -- IR will plan for perc enrique   -- Relevant imaging and labs were reviewed.   -- Risks, benefits, and alternatives were explained to the patient and informed consent was obtained.

## 2023-07-27 NOTE — PHYSICAL THERAPY INITIAL EVALUATION ADULT - TRANSFER TRAINING, PT EVAL
Patient will improve sit <-> stand with supervision 3-5 sessions in order for patient to safely get in and out of chair

## 2023-07-27 NOTE — PROGRESS NOTE ADULT - PROBLEM SELECTOR PLAN 1
-CT scan in the ER revealed findings consistent w/ acute enrique (Distended GB w/ jd-cholecystic fluid)  -RUQ ultrasound showed Cholelithiasis with a small amount of pericholecystic fluid and nonspecific gallbladder wall thickening  -HIDA scan positive, scheduling percutaneous choley w/ surg tomorrow, will D/C heparin  -pt is currently medically optimized for surgery   - C/w  iv abx zosyn  -BCx growing gram+ cocci in pairs in chairs in both aerobic & anearobic samples   -UCx pending collection, F/U  - Clear liquid diet for now, NPO after midnight   - Surgery following -CT scan in the ER revealed findings consistent w/ acute enrique (Distended GB w/ jd-cholecystic fluid)  -RUQ ultrasound showed Cholelithiasis with a small amount of pericholecystic fluid and nonspecific gallbladder wall thickening  -HIDA scan positive, scheduling percutaneous choley w/ surg tomorrow, will D/C heparin  -pt is currently medically optimized for surgery   - C/w  iv abx zosyn  -BCx growing gram+ cocci in pairs in chairs in both aerobic & anearobic samples   -Repeat BCx ordered  -UCx pending collection, F/U  - Clear liquid diet for now, NPO after midnight   -Resume clear liquid diet after procedure  - Surgery following -CT scan in the ER revealed findings consistent w/ acute enrique (Distended GB w/ jd-cholecystic fluid)  -RUQ ultrasound showed Cholelithiasis with a small amount of pericholecystic fluid and nonspecific gallbladder wall thickening  -HIDA scan positive, scheduling percutaneous choley w/ surg tomorrow, will D/C heparin  -pt is currently medically optimized for surgery   - C/w  iv abx zosyn  -BCx growing gram+ cocci in pairs in chairs in both aerobic & anearobic samples   -Repeat BCx ordered  -UCx pending collection, F/U  - Clear liquid diet for now, NPO after midnight   -Resume clear liquid diet after procedure  -K+ 2.8 today, IV K+ repletion being given. Will repeat BMP at 4:00 and replete as needed.   - Surgery following -CT scan in the ER revealed findings consistent w/ acute enrique (Distended GB w/ jd-cholecystic fluid)  -RUQ ultrasound showed Cholelithiasis with a small amount of pericholecystic fluid and nonspecific gallbladder wall thickening  -HIDA scan positive, scheduling percutaneous choley w/  IR  today    -pt is currently medically optimized for   emergent   procedure  with evolving sepsis / fever , leucocytosis , bacteremia   - C/w  iv abx zosyn  -BCx growing gram+ cocci in pairs in chain   strep pna + in both aerobic & anaerobic samples   -Repeat BCx ordered  -UCx pending collection, F/U  - NPO after midnight   -Resume clear liquid diet after procedure  -K+ 2.8 today, IV K+ repletion being given. Will repeat BMP at 4:00 and replete as needed.   - Surgery following

## 2023-07-27 NOTE — PROGRESS NOTE ADULT - SUBJECTIVE AND OBJECTIVE BOX
[facility] -- [place] Pannella, Patel, Savella [facility]  Office # [phone]      Follow Up:    af  Subjective/Observations:   Overnight with tachycardia   denies chest pain dizziness palpitations   remains on nasal cannula     REVIEW OF SYSTEMS: All other review of systems is negative unless indicated above    PAST MEDICAL & SURGICAL HISTORY:  HTN (hypertension)      Hyperlipidemia      Tinnitus  right ear      Seasonal allergies      S/P knee replacement          MEDICATIONS  (STANDING):  lactated ringers. 1000 milliLiter(s) (60 mL/Hr) IV Continuous <Continuous>  metoprolol tartrate 25 milliGRAM(s) Oral two times a day  piperacillin/tazobactam IVPB.. 3.375 Gram(s) IV Intermittent every 8 hours  potassium chloride   Powder 20 milliEquivalent(s) Oral every 4 hours  potassium chloride  10 mEq/100 mL IVPB 10 milliEquivalent(s) IV Intermittent every 2 hours    MEDICATIONS  (PRN):  acetaminophen     Tablet .. 650 milliGRAM(s) Oral every 6 hours PRN Temp greater or equal to 38C (100.4F)  acetaminophen     Tablet .. 650 milliGRAM(s) Oral every 6 hours PRN Mild Pain (1 - 3)  aluminum hydroxide/magnesium hydroxide/simethicone Suspension 30 milliLiter(s) Oral every 4 hours PRN Dyspepsia  melatonin 3 milliGRAM(s) Oral at bedtime PRN Insomnia  ondansetron Injectable 4 milliGRAM(s) IV Push every 8 hours PRN Nausea and/or Vomiting      Allergies    No Known Allergies    Intolerances        Vital Signs Last 24 Hrs  T(C): 36.7 (2023 06:03), Max: 38.3 (2023 04:47)  T(F): 98 (2023 06:03), Max: 101 (2023 04:47)  HR: 96 (2023 09:52) (96 - 138)  BP: 130/80 (2023 04:30) (117/83 - 160/83)  BP(mean): --  RR: 32 (2023 04:30) (30 - 34)  SpO2: 97% (2023 04:30) (93% - 97%)    Parameters below as of 2023 04:30  Patient On (Oxygen Delivery Method): nasal cannula  O2 Flow (L/min): 3      I&O's Summary    2023 07:01  -  2023 07:00  --------------------------------------------------------  IN: 1920 mL / OUT: 600 mL / NET: 1320 mL          PHYSICAL EXAM:  TELE: Af  Constitutional: NAD, awake and alert, well-developed  HEENT: Moist Mucous Membranes, Anicteric  Pulmonary: Non-labored, breath sounds are clear bilaterally, No wheezing, crackles or rhonchi  Cardiovascular: IRRegular, S1 and S2 nl, No murmurs, rubs, gallops or clicks  Gastrointestinal: Bowel Sounds present, soft, nontender.   Lymph: No lymphadenopathy. No peripheral edema.  Skin: No visible rashes or ulcers.  Psych:  Mood & affect appropriate    LABS: All Labs Reviewed:                        12.3   15.29 )-----------( 170      ( 2023 07:47 )             37.6                         13.9   14.18 )-----------( 178      ( 2023 03:00 )             42.2                         14.8   18.46 )-----------( 199      ( 2023 22:37 )             43.2     2023 07:47    137    |  105    |  19     ----------------------------<  97     2.8     |  23     |  0.89   2023 03:00    139    |  107    |  27     ----------------------------<  113    3.1     |  24     |  1.20   2023 22:37    135    |  104    |  28     ----------------------------<  126    3.7     |  22     |  1.50     Ca    8.7        2023 07:47  Ca    8.3        2023 03:00  Ca    9.2        2023 22:37  Phos  3.2       2023 07:47  Phos  3.2       2023 03:00  Mg     2.4       2023 07:47  Mg     2.3       2023 03:00    TPro  6.7    /  Alb  2.5    /  TBili  0.9    /  DBili  x      /  AST  74     /  ALT  56     /  AlkPhos  108    2023 07:47  TPro  6.3    /  Alb  3.1    /  TBili  0.9    /  DBili  x      /  AST  106    /  ALT  58     /  AlkPhos  99     2023 03:00  TPro  7.5    /  Alb  3.2    /  TBili  1.1    /  DBili  x      /  AST  109    /  ALT  58     /  AlkPhos  104    2023 22:37    PT/INR - ( 2023 22:37 )   PT: 14.9 sec;   INR: 1.28 ratio         PTT - ( 2023 22:37 )  PTT:29.8 sec  CARDIAC MARKERS ( 2023 07:47 )  x     / x     / 1848 U/L / x     / x      CARDIAC MARKERS ( 2023 03:00 )  x     / x     / 5621 U/L / x     / 14.0 ng/mL  CARDIAC MARKERS ( 2023 22:37 )  x     / x     / 5502 U/L / x     / 14.6 ng/mL         EC Lead ECG:   Ventricular Rate 95 BPM    QRS Duration 70 ms    Q-T Interval 388 ms    QTC Calculation(Bazett) 487 ms    R Axis 38 degrees    T Axis 96 degrees    Diagnosis Line Atrial fibrillation  Nonspecific T wave abnormality  Prolonged QT  Abnormal ECG  Whencompared with ECG of 2023 20:54, (Unconfirmed)  Vent. rate has decreased BY  48 BPM  Confirmed by Feli Louie ([id number]) on 2023 10:10:05 AM (23 @ 02:03)        Radiology:

## 2023-07-27 NOTE — PROGRESS NOTE ADULT - PROBLEM SELECTOR PLAN 9
History of HTN, no longer on medications  Used to be on ACE-inhibitor and BB-thiazide  Continue with lopressor 25mg BID

## 2023-07-27 NOTE — PROGRESS NOTE ADULT - ASSESSMENT
78y female with PMHx of HTN, HLD presented to the ED for the evaluation of fall out of chair.   Cardiology consulted for new onset atrial fibrillation with RVR.    Admitted with new onset afib, rhabdo sp fall, acute cholecystitis     - No clear evidence of acute ischemia, trops elevated but downtrended 76.9 --> 76.1. No need to further trend.  - CK elevated 5502 --> 5621, however doubt acute plaque rupture in setting of fall and rhabdomyolysis. now trending down   - Monitor for the development of anginal symptoms or clinical signs of ischemia.     New onset Afib with RVR  - EKG on admission showed atrial fibrillation with RVR, rate 143  - tele overnight with periods of tachycardia , can increase bb to every 8 hours  - Continue full AC with lovenox with eventual transition to DOAC pending surgery plan for possible cholecystectomy  -echo pending      -chest showed evidence of pulmonary scarring which may be d/t pulmonary fibrosis - fu pulmonary recs     -Seen by surgery, acute cholecystis for percutaneous enrique 7/28     Monitor and replete electrolytes. Keep K>4.0 and Mg>2.0.  Telma Cabrera FNP-C  Cardiology NP  call teams

## 2023-07-27 NOTE — PROGRESS NOTE ADULT - PROBLEM SELECTOR PLAN 4
-CT chest showed evidence of pulmonary scarring which may be d/t pulmonary fibrosis   Imaging also showed:   -1.8 cm pulmonary nodule right upper lobe.  -Several small subcentimeter right-sided pulmonary nodule.  --Findings compatible with lung cancer until proven otherwise.  Consider PET/CT scan for further evaluation.  -Cholelithiasis with distended thick-walled gallbladder.  -Correlate for acute cholecystitis.    - Will continue IV fluids for now as etiology of dyspnea may be secondary to pulm fibrosis over CHF

## 2023-07-27 NOTE — PROGRESS NOTE ADULT - PROBLEM SELECTOR PLAN 8
Lives alone and walks unassisted at baseline  Fell off chair and was unable to get up  Fall precautions  PT evaluation

## 2023-07-27 NOTE — PROGRESS NOTE ADULT - SUBJECTIVE AND OBJECTIVE BOX
Patient is a 78y old  Female who presents with a chief complaint of fall, new onset afib RVR (27 Jul 2023 08:20)      Subjective:  INTERVAL HPI/OVERNIGHT EVENTS: Patient seen and examined at bedside. Overnight, pt was in Afrib w RVR, -150s. Given IV Cardizem 10mg x1. Pt is scheduled for IR perc enrique drainage this afternoon. AC held today.    MEDICATIONS  (STANDING):  lactated ringers. 1000 milliLiter(s) (60 mL/Hr) IV Continuous <Continuous>  metoprolol tartrate 25 milliGRAM(s) Oral two times a day  piperacillin/tazobactam IVPB.. 3.375 Gram(s) IV Intermittent every 8 hours    MEDICATIONS  (PRN):  acetaminophen     Tablet .. 650 milliGRAM(s) Oral every 6 hours PRN Temp greater or equal to 38C (100.4F)  acetaminophen     Tablet .. 650 milliGRAM(s) Oral every 6 hours PRN Mild Pain (1 - 3)  aluminum hydroxide/magnesium hydroxide/simethicone Suspension 30 milliLiter(s) Oral every 4 hours PRN Dyspepsia  melatonin 3 milliGRAM(s) Oral at bedtime PRN Insomnia  ondansetron Injectable 4 milliGRAM(s) IV Push every 8 hours PRN Nausea and/or Vomiting      Allergies    No Known Allergies    Intolerances        REVIEW OF SYSTEMS:  CONSTITUTIONAL: No fever or chills  HEENT:  No headache, no blurry vision  RESPIRATORY: No cough, wheezing, or shortness of breath  CARDIOVASCULAR: No chest pain, palpitations  GASTROINTESTINAL: No abd pain, nausea, vomiting, or diarrhea  GENITOURINARY: No dysuria, frequency, or hematuria  NEUROLOGICAL: no dizziness  MUSCULOSKELETAL: +calf tenderness       Objective:  Vital Signs Last 24 Hrs  T(C): 36.7 (27 Jul 2023 06:03), Max: 38.3 (27 Jul 2023 04:47)  T(F): 98 (27 Jul 2023 06:03), Max: 101 (27 Jul 2023 04:47)  HR: 121 (27 Jul 2023 04:30) (91 - 138)  BP: 130/80 (27 Jul 2023 04:30) (117/83 - 160/83)  BP(mean): --  RR: 32 (27 Jul 2023 04:30) (21 - 34)  SpO2: 97% (27 Jul 2023 04:30) (93% - 99%)    Parameters below as of 27 Jul 2023 04:30  Patient On (Oxygen Delivery Method): nasal cannula  O2 Flow (L/min): 3      GENERAL: NAD, lying in bed comfortably  HEAD:  Atraumatic, Normocephalic  EYES: PERRLA, conjunctiva and sclera clear  NECK: No JVD  CHEST/LUNG: CTA B/L. Symmetric chest rise.  HEART:  irregularly regular. +S1, S2.  ABDOMEN: Bowel sounds present; Soft, Nontender, Nondistended.   EXTREMITIES:  +1 LE + UE edema bilaterally  NERVOUS SYSTEM:  Alert & Oriented X3, speech clear. No deficits     LABS:    CBC Full  -  ( 26 Jul 2023 03:00 )  WBC Count : 14.18 K/uL  Hemoglobin : 13.9 g/dL  Hematocrit : 42.2 %  Platelet Count - Automated : 178 K/uL  Mean Cell Volume : 94.0 fl  Mean Cell Hemoglobin : 31.0 pg  Mean Cell Hemoglobin Concentration : 32.9 gm/dL  Auto Neutrophil # : 12.17 K/uL  Auto Lymphocyte # : 0.76 K/uL  Auto Monocyte # : 1.15 K/uL  Auto Eosinophil # : 0.01 K/uL  Auto Basophil # : 0.03 K/uL  Auto Neutrophil % : 85.8 %  Auto Lymphocyte % : 5.4 %  Auto Monocyte % : 8.1 %  Auto Eosinophil % : 0.1 %  Auto Basophil % : 0.2 %      Ca    8.3        26 Jul 2023 03:00      PT/INR - ( 25 Jul 2023 22:37 )   PT: 14.9 sec;   INR: 1.28 ratio         PTT - ( 25 Jul 2023 22:37 )  PTT:29.8 sec  Urinalysis Basic - ( 26 Jul 2023 03:00 )    Color: x / Appearance: x / SG: x / pH: x  Gluc: 113 mg/dL / Ketone: x  / Bili: x / Urobili: x   Blood: x / Protein: x / Nitrite: x   Leuk Esterase: x / RBC: x / WBC x   Sq Epi: x / Non Sq Epi: x / Bacteria: x      CAPILLARY BLOOD GLUCOSE            Culture - Blood (collected 07-26-23 @ 03:00)  Source: .Blood Blood-Peripheral  Gram Stain (07-27-23 @ 06:08):    Growth in anaerobic bottle: Gram Positive Cocci in Pairs and Chains    Growth in aerobic bottle: Gram Positive Cocci in Pairs and Chains  Preliminary Report (07-27-23 @ 06:09):    Growth in anaerobic bottle: Gram Positive Cocci in Pairs and Chains    Growth in aerobic bottle: Gram Positive Cocci in Pairs and Chains    Direct identification is available within approximately 3-5    hours either by Blood Panel Multiplexed PCR or Direct    MALDI-TOF. Details: [email or web address]  Organism: Blood Culture PCR (07-27-23 @ 05:04)  Organism: Blood Culture PCR (07-27-23 @ 05:04)      Method Type: PCR      -  Streptococcus sp. (Not Grp A, B or S pneumoniae): Detec    Culture - Blood (collected 07-26-23 @ 02:58)  Source: .Blood Blood-Peripheral  Gram Stain (07-27-23 @ 08:19):    Growth in anaerobic bottle: Gram Positive Cocci in Pairs and Chains    Growth in aerobic bottle: Gram Positive Cocci in Pairs and Chains  Preliminary Report (07-27-23 @ 08:19):    Growth in anaerobic bottle: Gram Positive Cocci in Pairs and Chains    Growth in aerobic bottle: Gram Positive Cocci in Pairs and Chains        RADIOLOGY & ADDITIONAL TESTS:    Personally reviewed.     Consultant(s) Notes Reviewed:  [x] YES  [ ] NO     Patient is a 78y old  Female who presents with a chief complaint of fall, new onset afib RVR (27 Jul 2023 08:20)      Subjective:  INTERVAL HPI/OVERNIGHT EVENTS: Patient seen and examined at bedside. Overnight, pt was in Afrib w RVR, -150s. Given IV Cardizem 10mg x1. Pt is scheduled for IR perc enrique drainage this afternoon. AC held today. Will resume tomorrow pending surgical rx.     MEDICATIONS  (STANDING):  lactated ringers. 1000 milliLiter(s) (60 mL/Hr) IV Continuous <Continuous>  metoprolol tartrate 25 milliGRAM(s) Oral two times a day  piperacillin/tazobactam IVPB.. 3.375 Gram(s) IV Intermittent every 8 hours    MEDICATIONS  (PRN):  acetaminophen     Tablet .. 650 milliGRAM(s) Oral every 6 hours PRN Temp greater or equal to 38C (100.4F)  acetaminophen     Tablet .. 650 milliGRAM(s) Oral every 6 hours PRN Mild Pain (1 - 3)  aluminum hydroxide/magnesium hydroxide/simethicone Suspension 30 milliLiter(s) Oral every 4 hours PRN Dyspepsia  melatonin 3 milliGRAM(s) Oral at bedtime PRN Insomnia  ondansetron Injectable 4 milliGRAM(s) IV Push every 8 hours PRN Nausea and/or Vomiting      Allergies    No Known Allergies    Intolerances        REVIEW OF SYSTEMS:  CONSTITUTIONAL: No fever or chills  HEENT:  No headache, no blurry vision  RESPIRATORY: No cough, wheezing, or shortness of breath  CARDIOVASCULAR: No chest pain, palpitations  GASTROINTESTINAL: No abd pain, nausea, vomiting, or diarrhea  GENITOURINARY: No dysuria, frequency, or hematuria  NEUROLOGICAL: no dizziness  MUSCULOSKELETAL: +calf tenderness       Objective:  Vital Signs Last 24 Hrs  T(C): 36.7 (27 Jul 2023 06:03), Max: 38.3 (27 Jul 2023 04:47)  T(F): 98 (27 Jul 2023 06:03), Max: 101 (27 Jul 2023 04:47)  HR: 121 (27 Jul 2023 04:30) (91 - 138)  BP: 130/80 (27 Jul 2023 04:30) (117/83 - 160/83)  BP(mean): --  RR: 32 (27 Jul 2023 04:30) (21 - 34)  SpO2: 97% (27 Jul 2023 04:30) (93% - 99%)    Parameters below as of 27 Jul 2023 04:30  Patient On (Oxygen Delivery Method): nasal cannula  O2 Flow (L/min): 3      GENERAL: NAD, lying in bed comfortably  HEAD:  Atraumatic, Normocephalic  EYES: PERRLA, conjunctiva and sclera clear  NECK: No JVD  CHEST/LUNG: CTA B/L. Symmetric chest rise.  HEART:  irregularly regular. +S1, S2.  ABDOMEN: Bowel sounds present; Soft, Nontender, Nondistended.   EXTREMITIES:  +1 LE + UE edema bilaterally  NERVOUS SYSTEM:  Alert & Oriented X3, speech clear. No deficits     LABS:    CBC Full  -  ( 26 Jul 2023 03:00 )  WBC Count : 14.18 K/uL  Hemoglobin : 13.9 g/dL  Hematocrit : 42.2 %  Platelet Count - Automated : 178 K/uL  Mean Cell Volume : 94.0 fl  Mean Cell Hemoglobin : 31.0 pg  Mean Cell Hemoglobin Concentration : 32.9 gm/dL  Auto Neutrophil # : 12.17 K/uL  Auto Lymphocyte # : 0.76 K/uL  Auto Monocyte # : 1.15 K/uL  Auto Eosinophil # : 0.01 K/uL  Auto Basophil # : 0.03 K/uL  Auto Neutrophil % : 85.8 %  Auto Lymphocyte % : 5.4 %  Auto Monocyte % : 8.1 %  Auto Eosinophil % : 0.1 %  Auto Basophil % : 0.2 %      Ca    8.3        26 Jul 2023 03:00      PT/INR - ( 25 Jul 2023 22:37 )   PT: 14.9 sec;   INR: 1.28 ratio         PTT - ( 25 Jul 2023 22:37 )  PTT:29.8 sec  Urinalysis Basic - ( 26 Jul 2023 03:00 )    Color: x / Appearance: x / SG: x / pH: x  Gluc: 113 mg/dL / Ketone: x  / Bili: x / Urobili: x   Blood: x / Protein: x / Nitrite: x   Leuk Esterase: x / RBC: x / WBC x   Sq Epi: x / Non Sq Epi: x / Bacteria: x      CAPILLARY BLOOD GLUCOSE            Culture - Blood (collected 07-26-23 @ 03:00)  Source: .Blood Blood-Peripheral  Gram Stain (07-27-23 @ 06:08):    Growth in anaerobic bottle: Gram Positive Cocci in Pairs and Chains    Growth in aerobic bottle: Gram Positive Cocci in Pairs and Chains  Preliminary Report (07-27-23 @ 06:09):    Growth in anaerobic bottle: Gram Positive Cocci in Pairs and Chains    Growth in aerobic bottle: Gram Positive Cocci in Pairs and Chains    Direct identification is available within approximately 3-5    hours either by Blood Panel Multiplexed PCR or Direct    MALDI-TOF. Details: [email or web address]  Organism: Blood Culture PCR (07-27-23 @ 05:04)  Organism: Blood Culture PCR (07-27-23 @ 05:04)      Method Type: PCR      -  Streptococcus sp. (Not Grp A, B or S pneumoniae): Detec    Culture - Blood (collected 07-26-23 @ 02:58)  Source: .Blood Blood-Peripheral  Gram Stain (07-27-23 @ 08:19):    Growth in anaerobic bottle: Gram Positive Cocci in Pairs and Chains    Growth in aerobic bottle: Gram Positive Cocci in Pairs and Chains  Preliminary Report (07-27-23 @ 08:19):    Growth in anaerobic bottle: Gram Positive Cocci in Pairs and Chains    Growth in aerobic bottle: Gram Positive Cocci in Pairs and Chains        RADIOLOGY & ADDITIONAL TESTS:    Personally reviewed.     Consultant(s) Notes Reviewed:  [x] YES  [ ] NO     Patient is a 78y old  Female who presents with a chief complaint of fall, new onset afib RVR (27 Jul 2023 08:20)      Subjective:  INTERVAL HPI/OVERNIGHT EVENTS: Patient seen and examined at bedside. Overnight, pt was in Afrib w RVR, -150s. Given IV Cardizem 10mg x1. Pt is scheduled for IR perc enrique drainage this afternoon. AC held today. Will resume tomorrow pending surgical rx.         MEDICATIONS  (STANDING):  lactated ringers. 1000 milliLiter(s) (60 mL/Hr) IV Continuous <Continuous>  metoprolol tartrate 25 milliGRAM(s) Oral two times a day  piperacillin/tazobactam IVPB.. 3.375 Gram(s) IV Intermittent every 8 hours    MEDICATIONS  (PRN):  acetaminophen     Tablet .. 650 milliGRAM(s) Oral every 6 hours PRN Temp greater or equal to 38C (100.4F)  acetaminophen     Tablet .. 650 milliGRAM(s) Oral every 6 hours PRN Mild Pain (1 - 3)  aluminum hydroxide/magnesium hydroxide/simethicone Suspension 30 milliLiter(s) Oral every 4 hours PRN Dyspepsia  melatonin 3 milliGRAM(s) Oral at bedtime PRN Insomnia  ondansetron Injectable 4 milliGRAM(s) IV Push every 8 hours PRN Nausea and/or Vomiting      Allergies    No Known Allergies    Intolerances        REVIEW OF SYSTEMS:  CONSTITUTIONAL: No fever or chills  HEENT:  No headache, no blurry vision  RESPIRATORY: No cough, wheezing, or shortness of breath  CARDIOVASCULAR: No chest pain, palpitations  GASTROINTESTINAL: No abd pain, nausea, vomiting, or diarrhea  GENITOURINARY: No dysuria, frequency, or hematuria  NEUROLOGICAL: no dizziness  MUSCULOSKELETAL: +calf tenderness       Objective:  Vital Signs Last 24 Hrs  T(C): 36.7 (27 Jul 2023 06:03), Max: 38.3 (27 Jul 2023 04:47)  T(F): 98 (27 Jul 2023 06:03), Max: 101 (27 Jul 2023 04:47)  HR: 121 (27 Jul 2023 04:30) (91 - 138)  BP: 130/80 (27 Jul 2023 04:30) (117/83 - 160/83)  BP(mean): --  RR: 32 (27 Jul 2023 04:30) (21 - 34)  SpO2: 97% (27 Jul 2023 04:30) (93% - 99%)    Parameters below as of 27 Jul 2023 04:30  Patient On (Oxygen Delivery Method): nasal cannula  O2 Flow (L/min): 3      GENERAL: NAD, lying in bed comfortably  HEAD:  Atraumatic, Normocephalic  EYES: PERRLA, conjunctiva and sclera clear  NECK: No JVD  CHEST/LUNG: CTA B/L. Symmetric chest rise.  HEART:  irregularly regular. +S1, S2.  ABDOMEN: Bowel sounds present; Soft, Nontender, Nondistended.   EXTREMITIES:  +1 LE + UE edema bilaterally  NERVOUS SYSTEM:  Alert & Oriented X3, speech clear. No deficits     LABS:    CBC Full  -  ( 26 Jul 2023 03:00 )  WBC Count : 14.18 K/uL  Hemoglobin : 13.9 g/dL  Hematocrit : 42.2 %  Platelet Count - Automated : 178 K/uL  Mean Cell Volume : 94.0 fl  Mean Cell Hemoglobin : 31.0 pg  Mean Cell Hemoglobin Concentration : 32.9 gm/dL  Auto Neutrophil # : 12.17 K/uL  Auto Lymphocyte # : 0.76 K/uL  Auto Monocyte # : 1.15 K/uL  Auto Eosinophil # : 0.01 K/uL  Auto Basophil # : 0.03 K/uL  Auto Neutrophil % : 85.8 %  Auto Lymphocyte % : 5.4 %  Auto Monocyte % : 8.1 %  Auto Eosinophil % : 0.1 %  Auto Basophil % : 0.2 %      Ca    8.3        26 Jul 2023 03:00      PT/INR - ( 25 Jul 2023 22:37 )   PT: 14.9 sec;   INR: 1.28 ratio         PTT - ( 25 Jul 2023 22:37 )  PTT:29.8 sec  Urinalysis Basic - ( 26 Jul 2023 03:00 )    Color: x / Appearance: x / SG: x / pH: x  Gluc: 113 mg/dL / Ketone: x  / Bili: x / Urobili: x   Blood: x / Protein: x / Nitrite: x   Leuk Esterase: x / RBC: x / WBC x   Sq Epi: x / Non Sq Epi: x / Bacteria: x      CAPILLARY BLOOD GLUCOSE            Culture - Blood (collected 07-26-23 @ 03:00)  Source: .Blood Blood-Peripheral  Gram Stain (07-27-23 @ 06:08):    Growth in anaerobic bottle: Gram Positive Cocci in Pairs and Chains    Growth in aerobic bottle: Gram Positive Cocci in Pairs and Chains  Preliminary Report (07-27-23 @ 06:09):    Growth in anaerobic bottle: Gram Positive Cocci in Pairs and Chains    Growth in aerobic bottle: Gram Positive Cocci in Pairs and Chains    Direct identification is available within approximately 3-5    hours either by Blood Panel Multiplexed PCR or Direct    MALDI-TOF. Details: [email or web address]  Organism: Blood Culture PCR (07-27-23 @ 05:04)  Organism: Blood Culture PCR (07-27-23 @ 05:04)      Method Type: PCR      -  Streptococcus sp. (Not Grp A, B or S pneumoniae): Detec    Culture - Blood (collected 07-26-23 @ 02:58)  Source: .Blood Blood-Peripheral  Gram Stain (07-27-23 @ 08:19):    Growth in anaerobic bottle: Gram Positive Cocci in Pairs and Chains    Growth in aerobic bottle: Gram Positive Cocci in Pairs and Chains  Preliminary Report (07-27-23 @ 08:19):    Growth in anaerobic bottle: Gram Positive Cocci in Pairs and Chains    Growth in aerobic bottle: Gram Positive Cocci in Pairs and Chains        RADIOLOGY & ADDITIONAL TESTS:    Personally reviewed.     Consultant(s) Notes Reviewed:  [x] YES  [ ] NO     Patient is a 78y old  Female who presents with a chief complaint of fall, new onset afib RVR (27 Jul 2023 08:20)      Subjective:  INTERVAL HPI/OVERNIGHT EVENTS: Patient seen and examined at bedside. Overnight, pt was in Afrib w RVR, -150s. Given IV Cardizem 10mg x1. Pt is scheduled for IR perc enrique drainage this afternoon. AC held today. Will resume tomorrow pending surgical rx.   -K+ 2.8 today, repletion ordered and optimized for surgery. Will repeat BMP at 4:00 PM and replete as necessary.         MEDICATIONS  (STANDING):  lactated ringers. 1000 milliLiter(s) (60 mL/Hr) IV Continuous <Continuous>  metoprolol tartrate 25 milliGRAM(s) Oral two times a day  piperacillin/tazobactam IVPB.. 3.375 Gram(s) IV Intermittent every 8 hours    MEDICATIONS  (PRN):  acetaminophen     Tablet .. 650 milliGRAM(s) Oral every 6 hours PRN Temp greater or equal to 38C (100.4F)  acetaminophen     Tablet .. 650 milliGRAM(s) Oral every 6 hours PRN Mild Pain (1 - 3)  aluminum hydroxide/magnesium hydroxide/simethicone Suspension 30 milliLiter(s) Oral every 4 hours PRN Dyspepsia  melatonin 3 milliGRAM(s) Oral at bedtime PRN Insomnia  ondansetron Injectable 4 milliGRAM(s) IV Push every 8 hours PRN Nausea and/or Vomiting      Allergies    No Known Allergies    Intolerances        REVIEW OF SYSTEMS:  CONSTITUTIONAL: No fever or chills  HEENT:  No headache, no blurry vision  RESPIRATORY: No cough, wheezing, or shortness of breath  CARDIOVASCULAR: No chest pain, palpitations  GASTROINTESTINAL: No abd pain, nausea, vomiting, or diarrhea  GENITOURINARY: No dysuria, frequency, or hematuria  NEUROLOGICAL: no dizziness  MUSCULOSKELETAL: +calf tenderness       Objective:  Vital Signs Last 24 Hrs  T(C): 36.7 (27 Jul 2023 06:03), Max: 38.3 (27 Jul 2023 04:47)  T(F): 98 (27 Jul 2023 06:03), Max: 101 (27 Jul 2023 04:47)  HR: 121 (27 Jul 2023 04:30) (91 - 138)  BP: 130/80 (27 Jul 2023 04:30) (117/83 - 160/83)  BP(mean): --  RR: 32 (27 Jul 2023 04:30) (21 - 34)  SpO2: 97% (27 Jul 2023 04:30) (93% - 99%)    Parameters below as of 27 Jul 2023 04:30  Patient On (Oxygen Delivery Method): nasal cannula  O2 Flow (L/min): 3      GENERAL: NAD, lying in bed comfortably  HEAD:  Atraumatic, Normocephalic  EYES: PERRLA, conjunctiva and sclera clear  NECK: No JVD  CHEST/LUNG: CTA B/L. Symmetric chest rise.  HEART:  irregularly regular. +S1, S2.  ABDOMEN: Bowel sounds present; Soft, Nontender, Nondistended.   EXTREMITIES:  +1 LE + UE edema bilaterally  NERVOUS SYSTEM:  Alert & Oriented X3, speech clear. No deficits     LABS:    CBC Full  -  ( 26 Jul 2023 03:00 )  WBC Count : 14.18 K/uL  Hemoglobin : 13.9 g/dL  Hematocrit : 42.2 %  Platelet Count - Automated : 178 K/uL  Mean Cell Volume : 94.0 fl  Mean Cell Hemoglobin : 31.0 pg  Mean Cell Hemoglobin Concentration : 32.9 gm/dL  Auto Neutrophil # : 12.17 K/uL  Auto Lymphocyte # : 0.76 K/uL  Auto Monocyte # : 1.15 K/uL  Auto Eosinophil # : 0.01 K/uL  Auto Basophil # : 0.03 K/uL  Auto Neutrophil % : 85.8 %  Auto Lymphocyte % : 5.4 %  Auto Monocyte % : 8.1 %  Auto Eosinophil % : 0.1 %  Auto Basophil % : 0.2 %      Ca    8.3        26 Jul 2023 03:00      PT/INR - ( 25 Jul 2023 22:37 )   PT: 14.9 sec;   INR: 1.28 ratio         PTT - ( 25 Jul 2023 22:37 )  PTT:29.8 sec  Urinalysis Basic - ( 26 Jul 2023 03:00 )    Color: x / Appearance: x / SG: x / pH: x  Gluc: 113 mg/dL / Ketone: x  / Bili: x / Urobili: x   Blood: x / Protein: x / Nitrite: x   Leuk Esterase: x / RBC: x / WBC x   Sq Epi: x / Non Sq Epi: x / Bacteria: x      CAPILLARY BLOOD GLUCOSE            Culture - Blood (collected 07-26-23 @ 03:00)  Source: .Blood Blood-Peripheral  Gram Stain (07-27-23 @ 06:08):    Growth in anaerobic bottle: Gram Positive Cocci in Pairs and Chains    Growth in aerobic bottle: Gram Positive Cocci in Pairs and Chains  Preliminary Report (07-27-23 @ 06:09):    Growth in anaerobic bottle: Gram Positive Cocci in Pairs and Chains    Growth in aerobic bottle: Gram Positive Cocci in Pairs and Chains    Direct identification is available within approximately 3-5    hours either by Blood Panel Multiplexed PCR or Direct    MALDI-TOF. Details: [email or web address]  Organism: Blood Culture PCR (07-27-23 @ 05:04)  Organism: Blood Culture PCR (07-27-23 @ 05:04)      Method Type: PCR      -  Streptococcus sp. (Not Grp A, B or S pneumoniae): Detec    Culture - Blood (collected 07-26-23 @ 02:58)  Source: .Blood Blood-Peripheral  Gram Stain (07-27-23 @ 08:19):    Growth in anaerobic bottle: Gram Positive Cocci in Pairs and Chains    Growth in aerobic bottle: Gram Positive Cocci in Pairs and Chains  Preliminary Report (07-27-23 @ 08:19):    Growth in anaerobic bottle: Gram Positive Cocci in Pairs and Chains    Growth in aerobic bottle: Gram Positive Cocci in Pairs and Chains        RADIOLOGY & ADDITIONAL TESTS:    Personally reviewed.     Consultant(s) Notes Reviewed:  [x] YES  [ ] NO     Patient is a 78y old  Female who presents with a chief complaint of fall, new onset afib RVR (27 Jul 2023 08:20)      Subjective:  INTERVAL HPI/OVERNIGHT EVENTS: Patient seen and examined at bedside. Overnight, pt was in Afrib w RVR, -150s. Given IV Cardizem 10mg x1. Pt is scheduled for IR perc enrique drainage this afternoon. AC held today. Will resume tomorrow pending surgical rx.   -K+ 2.8 today, repletion ordered and optimized for surgery. Will repeat BMP at 4:00 PM and replete as necessary.               REVIEW OF SYSTEMS:  CONSTITUTIONAL: No fever or chills  HEENT:  No headache, no blurry vision  RESPIRATORY: No cough, wheezing, or shortness of breath  CARDIOVASCULAR: No chest pain, palpitations  GASTROINTESTINAL: No abd pain, nausea, vomiting, or diarrhea  GENITOURINARY: No dysuria, frequency, or hematuria  NEUROLOGICAL: no dizziness  MUSCULOSKELETAL: no calf tenderness       Objective:  Vital Signs Last 24 Hrs  T(C): 36.7 (27 Jul 2023 06:03), Max: 38.3 (27 Jul 2023 04:47)  T(F): 98 (27 Jul 2023 06:03), Max: 101 (27 Jul 2023 04:47)  HR: 121 (27 Jul 2023 04:30) (91 - 138)  BP: 130/80 (27 Jul 2023 04:30) (117/83 - 160/83)  BP(mean): --  RR: 32 (27 Jul 2023 04:30) (21 - 34)  SpO2: 97% (27 Jul 2023 04:30) (93% - 99%)    Parameters below as of 27 Jul 2023 04:30  Patient On (Oxygen Delivery Method): nasal cannula  O2 Flow (L/min): 3      GENERAL: NAD, lying in bed comfortably  HEAD:  Atraumatic, Normocephalic  EYES: PERRLA, conjunctiva and sclera clear  NECK: No JVD  CHEST/LUNG: CTA B/L  no rale , no wheez , no ronchi   HEART:  irregularly regular. +S1, S2 no tachy   ABDOMEN: Bowel sounds present; Soft, Nontender, Nondistended.   EXTREMITIES:  +1 LE + UE edema bilaterally  NERVOUS SYSTEM:  Alert & Oriented X3, speech clear. No deficits   gu intact      MEDICATIONS  (STANDING):  lactated ringers. 1000 milliLiter(s) (60 mL/Hr) IV Continuous <Continuous>  metoprolol tartrate 25 milliGRAM(s) Oral two times a day  piperacillin/tazobactam IVPB.. 3.375 Gram(s) IV Intermittent every 8 hours    MEDICATIONS  (PRN):  acetaminophen     Tablet .. 650 milliGRAM(s) Oral every 6 hours PRN Temp greater or equal to 38C (100.4F)  acetaminophen     Tablet .. 650 milliGRAM(s) Oral every 6 hours PRN Mild Pain (1 - 3)  aluminum hydroxide/magnesium hydroxide/simethicone Suspension 30 milliLiter(s) Oral every 4 hours PRN Dyspepsia  melatonin 3 milliGRAM(s) Oral at bedtime PRN Insomnia  ondansetron Injectable 4 milliGRAM(s) IV Push every 8 hours PRN Nausea and/or Vomiting      Allergies    No Known Allergies    Intolerances      LABS:    CBC Full  -  ( 26 Jul 2023 03:00 )  WBC Count : 14.18 K/uL  Hemoglobin : 13.9 g/dL  Hematocrit : 42.2 %  Platelet Count - Automated : 178 K/uL  Mean Cell Volume : 94.0 fl  Mean Cell Hemoglobin : 31.0 pg  Mean Cell Hemoglobin Concentration : 32.9 gm/dL  Auto Neutrophil # : 12.17 K/uL  Auto Lymphocyte # : 0.76 K/uL  Auto Monocyte # : 1.15 K/uL  Auto Eosinophil # : 0.01 K/uL  Auto Basophil # : 0.03 K/uL  Auto Neutrophil % : 85.8 %  Auto Lymphocyte % : 5.4 %  Auto Monocyte % : 8.1 %  Auto Eosinophil % : 0.1 %  Auto Basophil % : 0.2 %      Ca    8.3        26 Jul 2023 03:00      PT/INR - ( 25 Jul 2023 22:37 )   PT: 14.9 sec;   INR: 1.28 ratio         PTT - ( 25 Jul 2023 22:37 )  PTT:29.8 sec  Urinalysis Basic - ( 26 Jul 2023 03:00 )    Color: x / Appearance: x / SG: x / pH: x  Gluc: 113 mg/dL / Ketone: x  / Bili: x / Urobili: x   Blood: x / Protein: x / Nitrite: x   Leuk Esterase: x / RBC: x / WBC x   Sq Epi: x / Non Sq Epi: x / Bacteria: x      CAPILLARY BLOOD GLUCOSE            Culture - Blood (collected 07-26-23 @ 03:00)  Source: .Blood Blood-Peripheral  Gram Stain (07-27-23 @ 06:08):    Growth in anaerobic bottle: Gram Positive Cocci in Pairs and Chains    Growth in aerobic bottle: Gram Positive Cocci in Pairs and Chains  Preliminary Report (07-27-23 @ 06:09):    Growth in anaerobic bottle: Gram Positive Cocci in Pairs and Chains    Growth in aerobic bottle: Gram Positive Cocci in Pairs and Chains    Direct identification is available within approximately 3-5    hours either by Blood Panel Multiplexed PCR or Direct    MALDI-TOF. Details: [email or web address]  Organism: Blood Culture PCR (07-27-23 @ 05:04)  Organism: Blood Culture PCR (07-27-23 @ 05:04)      Method Type: PCR      -  Streptococcus sp. (Not Grp A, B or S pneumoniae): Detec    Culture - Blood (collected 07-26-23 @ 02:58)  Source: .Blood Blood-Peripheral  Gram Stain (07-27-23 @ 08:19):    Growth in anaerobic bottle: Gram Positive Cocci in Pairs and Chains    Growth in aerobic bottle: Gram Positive Cocci in Pairs and Chains  Preliminary Report (07-27-23 @ 08:19):    Growth in anaerobic bottle: Gram Positive Cocci in Pairs and Chains    Growth in aerobic bottle: Gram Positive Cocci in Pairs and Chains        RADIOLOGY & ADDITIONAL TESTS:    Personally reviewed.     Consultant(s) Notes Reviewed:  [x] YES  [ ] NO

## 2023-07-27 NOTE — PROGRESS NOTE ADULT - PROBLEM SELECTOR PLAN 3
Patient with elevated CK after fall and being on the floor  on admission does not appear volume overloaded despite elevated proBNP   Got 1L of NS in the ED , continue iv fluid   will repeat CXR   no  pvc and f/u with ECHO to assess for volume overload and heart function   Trend CK and monitor renal function

## 2023-07-27 NOTE — PHYSICAL THERAPY INITIAL EVALUATION ADULT - WEIGHT-BEARING RESTRICTIONS: GAIT, REHAB EVAL
October 21, 2020     Marvin SOLOMON [place] .  [id number] [address] [address]       Dear Marvin,    Welcome to [facility]! Our goal is to make care effective, proactive and convenient by using data you send us from home to better treat your chronic conditions.                My name is Debi Brock, and I am your dedicated Digital Medicine clinician. As an expert in medication management, I will help ensure that the medications you are taking continue to provide the intended benefits and help you reach your goals. You can reach me directly at [phone] or by sending me a message directly through your [facility] account.      I am Tia Byrne and I will be your health . My job is to help you identify lifestyle changes to improve your disease control. We will talk about nutrition, exercise, and other ways you may be able to adjust your current habits to better your health. Additionally, we will help ensure you are completing the tests and screenings that are necessary to help manage your conditions. You can reach me directly at [phone] or by sending me a message directly through your [facility] account.    Most importantly, YOU are at the center of this team. Together, we will work to improve your overall health and encourage you to meet your goals for a healthier lifestyle.     What we expect from YOU:  · Please take frequent home blood pressure measurements. We ask that you take at least 1 blood pressure reading per week, but more information will better help us get you know you. Be sure you rest for a few minutes before taking the reading in a quiet, comfortable place.     Be available to receive phone calls or [employer] messages, when appropriate, from your care team. Please let us know if there are any specific days or times that work best for us to reach you via phone.     Complete routine tests and screenings. Dont worry, we will help keep you on track!           What you  should expect from your Digital Medicine Care Team:   We will work with you to create a personalized plan of care and provide you with encouragement and education, including regarding lifestyle changes, that could help you manage your disease states.     We will adjust your current medications, if needed, and continue to monitor your long-term progress.     We will provide you and your physician with monthly progress reports after you have been in the program for more than 30 days.     We will send you reminders through [employer] and text messages to help ensure you do not miss any testing deadlines to help manage your disease states.    You will be able to reach us by phone or through your [employer] account by clicking our names under Care Team on the right side of the home screen.    We look forward to working with you to help manage your health,    Sincerely,    Your Digital Medicine Team    Please visit our websites to learn more:   · Hypertension: [email or web address]      Remember, we are not available for emergencies. If you have an emergency, please contact your doctors office directly or call [facility] on-call ([phone] or [phone]) or 911.          full weight-bearing

## 2023-07-27 NOTE — PHYSICAL THERAPY INITIAL EVALUATION ADULT - GAIT TRAINING, PT EVAL
Patient will ambulate 300 feet within 3-5 sessions to allow patient to ambulate community distances.

## 2023-07-27 NOTE — PHYSICAL THERAPY INITIAL EVALUATION ADULT - PHYSICAL ASSIST/NONPHYSICAL ASSIST: GAIT, REHAB EVAL
I have personally seen and examined this patient.  I have fully participated in the care of this patient. I have reviewed all pertinent clinical information, including history, physical exam, plan and the Resident’s note and agree except as noted. supervision

## 2023-07-27 NOTE — CHART NOTE - NSCHARTNOTEFT_GEN_A_CORE
Called by RN for patient w/ tachycardia (120-130s). Pt evaluated at bedside, states that she feels well. Denies chest pain, headache, lightheadedness, shortness of breath, N/V.     Spoke with remote tele tech who confirmed pt's HR trending upwards since 21:00, 120s to 150s. Pt currently in AFib w/ RVR, .     T(C): 36.3 (07-27-23 @ 19:41), Max: 38.3 (07-27-23 @ 04:47)  HR: 107 (07-27-23 @ 19:41) (94 - 127)  BP: 148/85 (07-27-23 @ 19:41) (108/57 - 158/96)  RR: 20 (07-27-23 @ 19:41) (20 - 32)  SpO2: 94% (07-27-23 @ 19:41) (93% - 100%)    Physical Exam:  Gen: well appearing, NAD  HEENT: NCAT  Cardio: tachycardic  Pulm: CTA b/l  Abdomen: soft, nontender, nondistended, +BS x4 quadrants, no guarding  Extremities: no edema, +2 pedal pulses  Neuro: AAOx3    Assessment/Plan  77yo Female with PMH of HTN, HLD admitted with new onset atrial fibrillation with rapid ventricular response and rhabdomyolysis s/p fall at home. Called by RN for tachycardia.  - Pt admitted for AFib w/ RVR. Currently in AFib w/ RVR, .   - IV cardizem 10mg x1 ordered  - Continue to monitor on tele  - RN to call if any changes Called by RN for patient w/ tachycardia (120-130s). Pt evaluated at bedside, states that she feels well. Denies chest pain, headache, lightheadedness, shortness of breath, N/V.     Spoke with remote tele tech who confirmed pt's HR trending upwards since 21:00, sustained between 120s-150s. Pt currently in AFib w/ RVR, .     T(C): 36.3 (07-27-23 @ 19:41), Max: 38.3 (07-27-23 @ 04:47)  HR: 107 (07-27-23 @ 19:41) (94 - 127)  BP: 148/85 (07-27-23 @ 19:41) (108/57 - 158/96)  RR: 20 (07-27-23 @ 19:41) (20 - 32)  SpO2: 94% (07-27-23 @ 19:41) (93% - 100%)    Physical Exam:  Gen: well appearing, NAD  HEENT: NCAT  Cardio: tachycardic  Pulm: CTA b/l  Abdomen: soft, nontender, nondistended, +BS x4 quadrants, no guarding  Extremities: no edema, +2 pedal pulses  Neuro: AAOx3    Assessment/Plan  77yo Female with PMH of HTN, HLD admitted with new onset atrial fibrillation with rapid ventricular response and rhabdomyolysis s/p fall at home. Called by RN for tachycardia.  - Pt admitted for AFib w/ RVR. Currently in AFib w/ RVR, .   - IV cardizem 10mg x1 ordered  - Continue to monitor on tele  - RN to call if any changes

## 2023-07-27 NOTE — PROGRESS NOTE ADULT - PROBLEM SELECTOR PLAN 6
Patient is without chest pain on admission, doubt ACS  EKG with afib RVR with likely rate related ischemic changes  Suspect demand ischemia in setting of rapid AF and rhabdomyolysis  Will trend cardiac enzymes  Follow up 2D ECHO  most recent EKG showed rate of 95  Continue ASA 81mg daily  Resume statin when CK improves Patient is without chest pain on admission, doubt ACS  EKG with afib RVR with likely rate related ischemic changes  Suspect demand ischemia in setting of rapid AF and rhabdomyolysis  Will trend cardiac enzymes  Follow up 2D ECHO  most recent EKG showed rate of 95  hold  ASA 81mg daily , hold full ac  Lovenox   Resume statin when CK improves

## 2023-07-27 NOTE — ASU PREOP CHECKLIST - TAMPON REMOVED
Arlene Dow with [facility] called to report she was at the patient's home and the patient has a few open areas on the back of both calves. She said patient told her she would prefer not to go to wound care for treatment.  Arlene asked if it was okay for her to apply an antibacterial silver cream or Maxorb to patient's wounds before wrapping her legs.  Arlene was informed Aleksandra and Dr. Manuel were in with patients.  She stated she is wrapping patient's legs now and has to leave after that.  Per Arlene, they have another home health visit scheduled for Friday(12/10), so if the provider would like for them to apply one of the products mentioned to patient's wounds, please call and let her know.    Arlene can be reached at [phone].   n/a

## 2023-07-28 ENCOUNTER — TRANSCRIPTION ENCOUNTER (OUTPATIENT)
Age: 78
End: 2023-07-28

## 2023-07-28 LAB
-  CEFTRIAXONE: SIGNIFICANT CHANGE UP
-  CLINDAMYCIN: SIGNIFICANT CHANGE UP
-  ERYTHROMYCIN: SIGNIFICANT CHANGE UP
-  LEVOFLOXACIN: SIGNIFICANT CHANGE UP
-  PENICILLIN: SIGNIFICANT CHANGE UP
-  VANCOMYCIN: SIGNIFICANT CHANGE UP
ALBUMIN SERPL ELPH-MCNC: 2.7 G/DL — LOW (ref 3.3–5)
ALP SERPL-CCNC: 112 U/L — SIGNIFICANT CHANGE UP (ref 40–120)
ALT FLD-CCNC: 67 U/L — SIGNIFICANT CHANGE UP (ref 12–78)
ANION GAP SERPL CALC-SCNC: 6 MMOL/L — SIGNIFICANT CHANGE UP (ref 5–17)
AST SERPL-CCNC: 74 U/L — HIGH (ref 15–37)
BASOPHILS # BLD AUTO: 0.02 K/UL — SIGNIFICANT CHANGE UP (ref 0–0.2)
BASOPHILS NFR BLD AUTO: 0.2 % — SIGNIFICANT CHANGE UP (ref 0–2)
BILIRUB SERPL-MCNC: 0.9 MG/DL — SIGNIFICANT CHANGE UP (ref 0.2–1.2)
BUN SERPL-MCNC: 13 MG/DL — SIGNIFICANT CHANGE UP (ref 7–23)
CALCIUM SERPL-MCNC: 8.9 MG/DL — SIGNIFICANT CHANGE UP (ref 8.5–10.1)
CHLORIDE SERPL-SCNC: 107 MMOL/L — SIGNIFICANT CHANGE UP (ref 96–108)
CO2 SERPL-SCNC: 25 MMOL/L — SIGNIFICANT CHANGE UP (ref 22–31)
CREAT SERPL-MCNC: 0.83 MG/DL — SIGNIFICANT CHANGE UP (ref 0.5–1.3)
CULTURE RESULTS: NO GROWTH — SIGNIFICANT CHANGE UP
CULTURE RESULTS: SIGNIFICANT CHANGE UP
CULTURE RESULTS: SIGNIFICANT CHANGE UP
EGFR: 73 ML/MIN/1.73M2 — SIGNIFICANT CHANGE UP
EOSINOPHIL # BLD AUTO: 0.08 K/UL — SIGNIFICANT CHANGE UP (ref 0–0.5)
EOSINOPHIL NFR BLD AUTO: 0.7 % — SIGNIFICANT CHANGE UP (ref 0–6)
GLUCOSE SERPL-MCNC: 134 MG/DL — HIGH (ref 70–99)
GRAM STN FLD: SIGNIFICANT CHANGE UP
HCT VFR BLD CALC: 38.4 % — SIGNIFICANT CHANGE UP (ref 34.5–45)
HGB BLD-MCNC: 12.6 G/DL — SIGNIFICANT CHANGE UP (ref 11.5–15.5)
IMM GRANULOCYTES NFR BLD AUTO: 0.5 % — SIGNIFICANT CHANGE UP (ref 0–0.9)
LYMPHOCYTES # BLD AUTO: 0.82 K/UL — LOW (ref 1–3.3)
LYMPHOCYTES # BLD AUTO: 6.9 % — LOW (ref 13–44)
MAGNESIUM SERPL-MCNC: 2.3 MG/DL — SIGNIFICANT CHANGE UP (ref 1.6–2.6)
MCHC RBC-ENTMCNC: 30.7 PG — SIGNIFICANT CHANGE UP (ref 27–34)
MCHC RBC-ENTMCNC: 32.8 GM/DL — SIGNIFICANT CHANGE UP (ref 32–36)
MCV RBC AUTO: 93.7 FL — SIGNIFICANT CHANGE UP (ref 80–100)
METHOD TYPE: SIGNIFICANT CHANGE UP
MONOCYTES # BLD AUTO: 1.06 K/UL — HIGH (ref 0–0.9)
MONOCYTES NFR BLD AUTO: 8.9 % — SIGNIFICANT CHANGE UP (ref 2–14)
NEUTROPHILS # BLD AUTO: 9.92 K/UL — HIGH (ref 1.8–7.4)
NEUTROPHILS NFR BLD AUTO: 82.8 % — HIGH (ref 43–77)
NRBC # BLD: 0 /100 WBCS — SIGNIFICANT CHANGE UP (ref 0–0)
ORGANISM # SPEC MICROSCOPIC CNT: SIGNIFICANT CHANGE UP
PHOSPHATE SERPL-MCNC: 1.6 MG/DL — LOW (ref 2.5–4.5)
PLATELET # BLD AUTO: 221 K/UL — SIGNIFICANT CHANGE UP (ref 150–400)
POTASSIUM SERPL-MCNC: 3.8 MMOL/L — SIGNIFICANT CHANGE UP (ref 3.5–5.3)
POTASSIUM SERPL-SCNC: 3.8 MMOL/L — SIGNIFICANT CHANGE UP (ref 3.5–5.3)
PROT SERPL-MCNC: 7.4 G/DL — SIGNIFICANT CHANGE UP (ref 6–8.3)
RBC # BLD: 4.1 M/UL — SIGNIFICANT CHANGE UP (ref 3.8–5.2)
RBC # FLD: 13.5 % — SIGNIFICANT CHANGE UP (ref 10.3–14.5)
SODIUM SERPL-SCNC: 138 MMOL/L — SIGNIFICANT CHANGE UP (ref 135–145)
SPECIMEN SOURCE: SIGNIFICANT CHANGE UP
WBC # BLD: 11.96 K/UL — HIGH (ref 3.8–10.5)
WBC # FLD AUTO: 11.96 K/UL — HIGH (ref 3.8–10.5)

## 2023-07-28 PROCEDURE — 99233 SBSQ HOSP IP/OBS HIGH 50: CPT | Mod: GC

## 2023-07-28 PROCEDURE — 99232 SBSQ HOSP IP/OBS MODERATE 35: CPT

## 2023-07-28 PROCEDURE — 99233 SBSQ HOSP IP/OBS HIGH 50: CPT

## 2023-07-28 RX ORDER — ENOXAPARIN SODIUM 100 MG/ML
60 INJECTION SUBCUTANEOUS EVERY 12 HOURS
Refills: 0 | Status: DISCONTINUED | OUTPATIENT
Start: 2023-07-28 | End: 2023-07-28

## 2023-07-28 RX ORDER — METOPROLOL TARTRATE 50 MG
25 TABLET ORAL ONCE
Refills: 0 | Status: COMPLETED | OUTPATIENT
Start: 2023-07-28 | End: 2023-07-28

## 2023-07-28 RX ORDER — METOPROLOL TARTRATE 50 MG
50 TABLET ORAL
Refills: 0 | Status: DISCONTINUED | OUTPATIENT
Start: 2023-07-28 | End: 2023-07-29

## 2023-07-28 RX ORDER — POTASSIUM PHOSPHATE, MONOBASIC POTASSIUM PHOSPHATE, DIBASIC 236; 224 MG/ML; MG/ML
30 INJECTION, SOLUTION INTRAVENOUS ONCE
Refills: 0 | Status: COMPLETED | OUTPATIENT
Start: 2023-07-28 | End: 2023-07-28

## 2023-07-28 RX ORDER — ENOXAPARIN SODIUM 100 MG/ML
80 INJECTION SUBCUTANEOUS EVERY 12 HOURS
Refills: 0 | Status: DISCONTINUED | OUTPATIENT
Start: 2023-07-28 | End: 2023-07-30

## 2023-07-28 RX ORDER — METOPROLOL TARTRATE 50 MG
50 TABLET ORAL ONCE
Refills: 0 | Status: COMPLETED | OUTPATIENT
Start: 2023-07-28 | End: 2023-07-28

## 2023-07-28 RX ADMIN — PIPERACILLIN AND TAZOBACTAM 25 GRAM(S): 4; .5 INJECTION, POWDER, LYOPHILIZED, FOR SOLUTION INTRAVENOUS at 13:50

## 2023-07-28 RX ADMIN — Medication 25 MILLIGRAM(S): at 04:19

## 2023-07-28 RX ADMIN — Medication 25 MILLIGRAM(S): at 10:12

## 2023-07-28 RX ADMIN — PIPERACILLIN AND TAZOBACTAM 25 GRAM(S): 4; .5 INJECTION, POWDER, LYOPHILIZED, FOR SOLUTION INTRAVENOUS at 05:21

## 2023-07-28 RX ADMIN — SODIUM CHLORIDE 60 MILLILITER(S): 9 INJECTION, SOLUTION INTRAVENOUS at 07:47

## 2023-07-28 RX ADMIN — ENOXAPARIN SODIUM 80 MILLIGRAM(S): 100 INJECTION SUBCUTANEOUS at 17:41

## 2023-07-28 RX ADMIN — Medication 3 MILLIGRAM(S): at 22:36

## 2023-07-28 RX ADMIN — PIPERACILLIN AND TAZOBACTAM 25 GRAM(S): 4; .5 INJECTION, POWDER, LYOPHILIZED, FOR SOLUTION INTRAVENOUS at 22:36

## 2023-07-28 RX ADMIN — ENOXAPARIN SODIUM 80 MILLIGRAM(S): 100 INJECTION SUBCUTANEOUS at 11:24

## 2023-07-28 RX ADMIN — POTASSIUM PHOSPHATE, MONOBASIC POTASSIUM PHOSPHATE, DIBASIC 83.33 MILLIMOLE(S): 236; 224 INJECTION, SOLUTION INTRAVENOUS at 12:57

## 2023-07-28 RX ADMIN — Medication 50 MILLIGRAM(S): at 21:06

## 2023-07-28 NOTE — PROGRESS NOTE ADULT - PROBLEM SELECTOR PLAN 10
Used to be on atorvastatin 40mg daily, no longer taking  Would resume statin drug when CK improves  Follow up lipid profile Used to be on atorvastatin 40mg daily, no longer taking  Would resume statin drug when CK improves  ldl 67

## 2023-07-28 NOTE — PROGRESS NOTE ADULT - PROBLEM SELECTOR PLAN 3
The patient has been examined and the H&P has been reviewed:    I concur with the findings and no changes have occurred since H&P was written.    Anesthesia/Surgery risks, benefits and alternative options discussed and understood by patient/family.          There are no hospital problems to display for this patient.     Patient with elevated CK after fall and being on the floor  on admission does not appear volume overloaded despite elevated proBNP   Got 1L of NS in the ED , continue iv fluid   will repeat CXR   no  pvc and f/u with ECHO to assess for volume overload and heart function   Trend CK and monitor renal function Patient with elevated CK after fall and being on the floor   on admission does not appear volume overloaded despite elevated proBNP   Got 1L of NS in the ED , continue iv fluid   will repeat CXR   no  pvc and f/u with ECHO to assess for volume overload and heart function   Trend CK and monitor renal function

## 2023-07-28 NOTE — DISCHARGE NOTE PROVIDER - HOSPITAL COURSE
HPI:  78y female with PMHx of HTN, HLD presented to the ED for the evaluation of fall out of chair. States that she was sitting on the chair and fell backwards, hitting her head. Was on the floor for over an hour and was unable to get up. Eventually was able to flip over and crawl on her elbows and knees to call her daughter in law for assistance. She initially had a headache which has resolved. Only has some mild soreness in her elbows. States that she was feeling unwell on Monday 7/24 and had an episode of vomiting then. Now feels back to her baseline. No fevers or chills. No urinary symptoms. Denies chest pain, palpitations, dyspnea, current headache, dizziness, abdominal pain, n/v/d. She has no prior history of kidney issues.    In the ED, she was given IV cardizem 20mg IVP with improvement in her rates and 70mg of subcutaneous enoxaparin. (26 Jul 2023 01:00)      ---  HOSPITAL COURSE: Patient admitted to medicine floor for management of new onset Afib. You were treated with IV cardizem and metoprolol. Lovenox was started for anticoagulation AFib. TTE showed LV EF 45. Mildly calcified. Trace tricuspid/mitral regurg.  - CT Head No Cont: No gross acute intracranial hemorrhage, mass   effect, or acute osseous fracture.  -CT Chest No Cont: 1.8 cm pulmonary nodule RUL -> PET/CT scan for further evaluation. Cholelithiasis with distended thick-walled gallbladder.  Correlate for acute cholecystitis.  -US Abdomen Upper Quadrant:  Cholelithiasis with a small amount of pericholecystic fluid and   nonspecific gallbladder wall thickening which can be secondary to   underlying gallbladder or liver disease; clinical correlation is   recommended for acute cholecystitis and a HIDA scan could be obtained for   further evaluation.  -US Duplex Venous Lower Ext Complete, Bilateral: -No evidence of deep venous thrombosis in either lower extremity.  -MRCP: Findings are compatible with acute cholecystitis.  -Patient underwent IR percutaneous drainage of galbladder.     Pt seen and examined on day of discharge. Patient is medically optimized for discharge to home with close outpatient followup.    PHYSICAL EXAM ON DAY OF DISCHARGE:  The patient was seen and examined on the day of discharge. Please see progress note from day of discharge for further information.         ---  CONSULTANTS:   Pulm - Dr. Alaniz  Cardio - Dr. Cronin  Radiology - Dr. Luong    ---  TIME SPENT:  I, the attending physician, was physically present for the key portions of the evaluation and management (E/M) service provided. The total amount of time spent reviewing the hospital notes, laboratory values, imaging findings, assessing/counseling the patient, discussing with consultant physicians, social work, nursing staff was -- minutes    ---  Primary care provider was made aware of plan for discharge:      [  ] NO     [  ] YES   HPI:  78y female with PMHx of HTN, HLD presented to the ED for the evaluation of fall out of chair. States that she was sitting on the chair and fell backwards, hitting her head. Was on the floor for over an hour and was unable to get up. Eventually was able to flip over and crawl on her elbows and knees to call her daughter in law for assistance. She initially had a headache which has resolved. Only has some mild soreness in her elbows. States that she was feeling unwell on Monday 7/24 and had an episode of vomiting then. Now feels back to her baseline. No fevers or chills. No urinary symptoms. Denies chest pain, palpitations, dyspnea, current headache, dizziness, abdominal pain, n/v/d. She has no prior history of kidney issues.    In the ED, she was given IV cardizem 20mg IVP with improvement in her rates and 70mg of subcutaneous enoxaparin. (26 Jul 2023 01:00)      ---  HOSPITAL COURSE: Patient admitted to medicine floor for management of new onset Afib. You were treated with IV cardizem and metoprolol. Lovenox was started for anticoagulation AFib. TTE showed LV EF 45. Mildly calcified. Echo ordered showing: LV EF 45% . Mildly calcified aortic . Trace tricuspid/mitral regurg.  - CT Head No Cont: No gross acute intracranial hemorrhage, mass   effect, or acute osseous fracture.  -CT Chest No Cont: 1.8 cm pulmonary nodule RUL -> PET/CT scan for further evaluation. Cholelithiasis with distended thick-walled gallbladder.  Correlate for acute cholecystitis.  -US Abdomen Upper Quadrant:  Cholelithiasis with a small amount of pericholecystic fluid and   nonspecific gallbladder wall thickening which can be secondary to   underlying gallbladder or liver disease; clinical correlation is   recommended for acute cholecystitis and a HIDA scan could be obtained for   further evaluation.  -US Duplex Venous Lower Ext Complete, Bilateral: -No evidence of deep venous thrombosis in either lower extremity.  -MRCP: Findings are compatible with acute cholecystitis.  -Patient underwent IR percutaneous drainage of galbladder.   -BCx_____ -UCx______    Pt seen and examined on day of discharge. Patient is medically optimized for discharge to home with close outpatient followup.    PHYSICAL EXAM ON DAY OF DISCHARGE:  The patient was seen and examined on the day of discharge. Please see progress note from day of discharge for further information.         ---  CONSULTANTS:   Pulm - Dr. Alaniz  Cardio - Dr. Cronin  Radiology - Dr. Luong    ---  TIME SPENT:  I, the attending physician, was physically present for the key portions of the evaluation and management (E/M) service provided. The total amount of time spent reviewing the hospital notes, laboratory values, imaging findings, assessing/counseling the patient, discussing with consultant physicians, social work, nursing staff was -- minutes    ---  Primary care provider was made aware of plan for discharge:      [  ] NO     [  ] YES   HPI:  78y female with PMHx of HTN, HLD presented to the ED for the evaluation of fall out of chair. States that she was sitting on the chair and fell backwards, hitting her head. Was on the floor for over an hour and was unable to get up. Eventually was able to flip over and crawl on her elbows and knees to call her daughter in law for assistance. She initially had a headache which has resolved. Only has some mild soreness in her elbows. States that she was feeling unwell on Monday 7/24 and had an episode of vomiting then. Now feels back to her baseline. No fevers or chills. No urinary symptoms. Denies chest pain, palpitations, dyspnea, current headache, dizziness, abdominal pain, n/v/d. She has no prior history of kidney issues.    In the ED, she was given IV cardizem 20mg IVP with improvement in her rates and 70mg of subcutaneous enoxaparin. (26 Jul 2023 01:00)    ---  HOSPITAL COURSE: Patient admitted to medicine floor for management of new onset Afib. You were treated with IV cardizem and metoprolol. Lovenox was started for anticoagulation AFib. TTE showed LV EF 45. Mildly calcified. Echo ordered showing: LV EF 45% . Mildly calcified aortic . Trace tricuspid/mitral regurg.  - CT Head No Cont: No gross acute intracranial hemorrhage, mass   effect, or acute osseous fracture.  -CT Chest No Cont: 1.8 cm pulmonary nodule RUL -> PET/CT scan for further evaluation. Cholelithiasis with distended thick-walled gallbladder.  Correlate for acute cholecystitis.  -US Abdomen Upper Quadrant:  Cholelithiasis with a small amount of pericholecystic fluid and   nonspecific gallbladder wall thickening which can be secondary to   underlying gallbladder or liver disease; clinical correlation is   recommended for acute cholecystitis and a HIDA scan could be obtained for   further evaluation.  -US Duplex Venous Lower Ext Complete, Bilateral: -No evidence of deep venous thrombosis in either lower extremity.  -MRCP: Findings are compatible with acute cholecystitis.  -Patient underwent IR percutaneous drainage of galbladder.   -BCx growing Gram+ cocci in pairs and chain, Strep anginosus in both aerobic and anaerobic samples  -UCx moderate leukocyte esterase     Patient had a percutaneous cholecystectomy performed on 7/27, cholecystomy bilious output decreased over hospital stay. Low fat diet was encouraged and patient was on Zosyn abx. Patient had overnight episodes of tachycardia with associated Afib for which the patient was given lopressor, switched to Diltiazem and Lovenox was switched to Eliquis.    Pt seen and examined on day of discharge. Patient is medically optimized for discharge to home with close outpatient followup.    PHYSICAL EXAM ON DAY OF DISCHARGE:  The patient was seen and examined on the day of discharge. Please see progress note from day of discharge for further information.         ---  CONSULTANTS:   Pulm - Dr. Alaniz  Cardio - Dr. Cronin  Radiology - Dr. Luong    ---  TIME SPENT:  I, the attending physician, was physically present for the key portions of the evaluation and management (E/M) service provided. The total amount of time spent reviewing the hospital notes, laboratory values, imaging findings, assessing/counseling the patient, discussing with consultant physicians, social work, nursing staff was -- minutes    ---  Primary care provider was made aware of plan for discharge:      [  ] NO     [  ] YES   HPI:  78y female with PMHx of HTN, HLD presented to the ED for the evaluation of fall out of chair. States that she was sitting on the chair and fell backwards, hitting her head. Was on the floor for over an hour and was unable to get up. Eventually was able to flip over and crawl on her elbows and knees to call her daughter in law for assistance. She initially had a headache which has resolved. Only has some mild soreness in her elbows. States that she was feeling unwell on Monday 7/24 and had an episode of vomiting then. Now feels back to her baseline. No fevers or chills. No urinary symptoms. Denies chest pain, palpitations, dyspnea, current headache, dizziness, abdominal pain, n/v/d. She has no prior history of kidney issues.    In the ED, she was given IV cardizem 20mg IVP with improvement in her rates and 70mg of subcutaneous enoxaparin. (26 Jul 2023 01:00)    ---  HOSPITAL COURSE:   Pt was admitted for new onset Afib with RVR and rhabdomyolysis s/p fall at home. CT on admission revealed acute cholecystitis and RUQ US revealed cholelithiasis with a small amount of pericholecystic fluid and nonspecific gallbladder wall thickening. IV abx were started. Surgery was consulted. HIDA scan was positive and pt underwent percutaneous cholecystostomy with IR. Blood cultures were positive for streptococcus anginosus and repeat blood cultures were negative. Cardio was consulted for new-onset Afib. PT was started on Metoprolol and Diltiazem for rate control and started on full-dose Lovenox AC prior to IR procedure, which was transitioned to Eliquis s/p IR procedure. Echo was done which revealed EF 45%. For rhabdomyolysis, IVF were administered and CK was trended. Hospital course was complicated by suspected neurological changes. Neuro was consulted. CT imaging was negative. MRI revealed______. PT was consulted and recommended    ***Updated through 8/1***    Pt seen and examined on day of discharge. Patient is medically optimized for discharge to home with close outpatient followup.    PHYSICAL EXAM ON DAY OF DISCHARGE:  The patient was seen and examined on the day of discharge. Please see progress note from day of discharge for further information.         ---  CONSULTANTS:   Pulm - Dr. Alaniz  Cardio - Dr. Cronin  Radiology - Dr. Luong  Surgery - Dr. Pascual     ---  TIME SPENT:  I, the attending physician, was physically present for the key portions of the evaluation and management (E/M) service provided. The total amount of time spent reviewing the hospital notes, laboratory values, imaging findings, assessing/counseling the patient, discussing with consultant physicians, social work, nursing staff was -- minutes    ---  Primary care provider was made aware of plan for discharge:      [  ] NO     [  ] YES   HPI:  78y female with PMHx of HTN, HLD presented to the ED for the evaluation of fall out of chair. States that she was sitting on the chair and fell backwards, hitting her head. Was on the floor for over an hour and was unable to get up. Eventually was able to flip over and crawl on her elbows and knees to call her daughter in law for assistance. She initially had a headache which has resolved. Only has some mild soreness in her elbows. States that she was feeling unwell on Monday 7/24 and had an episode of vomiting then. Now feels back to her baseline. No fevers or chills. No urinary symptoms. Denies chest pain, palpitations, dyspnea, current headache, dizziness, abdominal pain, n/v/d. She has no prior history of kidney issues.    In the ED, she was given IV cardizem 20mg IVP with improvement in her rates and 70mg of subcutaneous enoxaparin. (26 Jul 2023 01:00)    ---  HOSPITAL COURSE:   Pt was admitted for new onset Afib with RVR and rhabdomyolysis s/p fall at home. CT on admission revealed acute cholecystitis and RUQ US revealed cholelithiasis with a small amount of pericholecystic fluid and nonspecific gallbladder wall thickening. IV abx were started. Surgery was consulted. HIDA scan was positive and pt underwent percutaneous cholecystostomy with IR. Blood cultures were positive for streptococcus anginosus and repeat blood cultures were negative. Cardio was consulted for new-onset Afib. PT was started on Metoprolol and Diltiazem for rate control and started on full-dose Lovenox AC prior to IR procedure, which was transitioned to Eliquis s/p IR procedure. Echo was done which revealed EF 45%. For rhabdomyolysis, IVF were administered and CK was trended. Hospital course was complicated by suspected neurological changes. Neuro was consulted. CT imaging was negative. MRI revealed Right cerebellar infarct and hence patient was started on lipitor 80mg daily. PT was consulted and recommended [facility].. Patient is medically optimized for discharge to [facility] with close outpatient followup.    PHYSICAL EXAM ON DAY OF DISCHARGE:  The patient was seen and examined on the day of discharge. Please see progress note from day of discharge for further information.         ---  CONSULTANTS:   Pulm - Dr. Alaniz  Cardio - Dr. Cronin  Radiology - Dr. Luong  Surgery - Dr. Pascual     ---  TIME SPENT:  I, the attending physician, was physically present for the key portions of the evaluation and management (E/M) service provided. The total amount of time spent reviewing the hospital notes, laboratory values, imaging findings, assessing/counseling the patient, discussing with consultant physicians, social work, nursing staff was -- minutes    ---  Primary care provider was made aware of plan for discharge:      [  ] NO     [  ] YES   HPI:  78y female with PMHx of HTN, HLD presented to the ED for the evaluation of fall out of chair. States that she was sitting on the chair and fell backwards, hitting her head. Was on the floor for over an hour and was unable to get up. Eventually was able to flip over and crawl on her elbows and knees to call her daughter in law for assistance. She initially had a headache which has resolved. Only has some mild soreness in her elbows. States that she was feeling unwell on Monday 7/24 and had an episode of vomiting then. Now feels back to her baseline. No fevers or chills. No urinary symptoms. Denies chest pain, palpitations, dyspnea, current headache, dizziness, abdominal pain, n/v/d. She has no prior history of kidney issues.    In the ED, she was given IV cardizem 20mg IVP with improvement in her rates and 70mg of subcutaneous enoxaparin. (26 Jul 2023 01:00)    ---  HOSPITAL COURSE:   Pt was admitted for new onset Afib with RVR and rhabdomyolysis s/p fall at home. CT on admission revealed acute cholecystitis and RUQ US revealed cholelithiasis with a small amount of pericholecystic fluid and nonspecific gallbladder wall thickening. IV abx were started. Surgery was consulted. HIDA scan was positive and pt underwent percutaneous cholecystostomy with IR. Blood cultures were positive for streptococcus anginosus and repeat blood cultures were negative. Cardio was consulted for new-onset Afib. PT was started on Metoprolol and Diltiazem for rate control and started on full-dose Lovenox AC prior to IR procedure, which was transitioned to Eliquis s/p IR procedure. Echo was done which revealed EF 45%. For rhabdomyolysis, IVF were administered and CK was trended. Hospital course was complicated by suspected neurological changes. Neuro was consulted. CT imaging was negative. MRI revealed Right cerebellar infarct and hence patient was started on lipitor 80mg daily. PT was consulted and recommended [facility].. Patient is medically optimized for discharge to [facility] with close outpatient followup.    Vital Signs Last 24 Hrs  T(C): 36.8 (03 Aug 2023 05:03), Max: 36.8 (02 Aug 2023 20:36)  T(F): 98.3 (03 Aug 2023 05:03), Max: 98.3 (02 Aug 2023 20:36)  HR: 86 (03 Aug 2023 05:03) (86 - 93)  BP: 159/95 (03 Aug 2023 05:03) (140/82 - 159/95)  BP(mean): --  RR: 18 (03 Aug 2023 05:03) (18 - 18)  SpO2: 98% (03 Aug 2023 05:03) (96% - 98%)    Parameters below as of 03 Aug 2023 05:03  Patient On (Oxygen Delivery Method): room air      PHYSICAL EXAM:  GENERAL: NAD  HEAD: NCAT  EYES: EOMI, PERRLA  CHEST/LUNG:  CTA b/l, no rales, wheezes, or rhonchi, no use of accessory muscles   HEART:  RRR, S1, S2  ABDOMEN:  BS+, soft, nontender, nondistended  EXTREMITIES: no edema, cyanosis, or calf tenderness   NERVOUS SYSTEM: AOx3, cerebellar function tests negative, answers questions and follows commands appropriately, no focal deficits       ---  CONSULTANTS:   Pulm - Dr. Alaniz  Cardio - Dr. Cronin  Radiology - Dr. Luong  Surgery - Dr. Pascual     ---  TIME SPENT:  I, the attending physician, was physically present for the key portions of the evaluation and management (E/M) service provided. The total amount of time spent reviewing the hospital notes, laboratory values, imaging findings, assessing/counseling the patient, discussing with consultant physicians, social work, nursing staff was -- minutes    ---  Primary care provider was made aware of plan for discharge:      [  ] NO     [  ] YES

## 2023-07-28 NOTE — PROGRESS NOTE ADULT - PROBLEM SELECTOR PLAN 6
Patient is without chest pain on admission, doubt ACS  EKG with afib RVR with likely rate related ischemic changes  Suspect demand ischemia in setting of rapid AF and rhabdomyolysis  Will trend cardiac enzymes  Follow up 2D ECHO  most recent EKG showed rate of 95  hold  ASA 81mg daily , hold full ac  Lovenox   Resume statin when CK improves

## 2023-07-28 NOTE — DISCHARGE NOTE PROVIDER - NSDCCPCAREPLAN_GEN_ALL_CORE_FT
PRINCIPAL DISCHARGE DIAGNOSIS  Diagnosis: New onset atrial fibrillation  Assessment and Plan of Treatment: You presented to the hospital with an abnormal heart rhythm. You were started on a rate-control medication and a blood thinner. START Metoprolol and Eliquis as prescribed. These medications have been sent to your pharmacy. FOLLOW UP with Cardio within 1 week of discharge.      SECONDARY DISCHARGE DIAGNOSES  Diagnosis: Cholecystitis  Assessment and Plan of Treatment: You were found to have infected gallbladder on admission. You had a drain placed in your gallbladder to help heal the infection. FOLLOW UP with Surgery within 1 week of discharge to discuss elective cholecystectomy in the future.    Diagnosis: Rhabdomyolysis  Assessment and Plan of Treatment: You were found to have elevated enzymes indicating muscle breakdown after your fall. You were treated with IV fluids with improvement    Diagnosis: Abnormal finding on CT scan  Assessment and Plan of Treatment: You had an abnormal finding on CT chest as listed below  CT Chest No Cont: 1.8 cm pulmonary nodule RUL   It is recommended that you follow up with your PCP and have a repeat CT scan for further evaluation.    Diagnosis: Hyperlipidemia  Assessment and Plan of Treatment: START statin    Diagnosis: Hypertension  Assessment and Plan of Treatment: START Metoprolol     PRINCIPAL DISCHARGE DIAGNOSIS  Diagnosis: New onset atrial fibrillation  Assessment and Plan of Treatment: You presented to the hospital with an abnormal heart rhythm. You were started on a rate-control medication and a blood thinner. START Metoprolol and Eliquis as prescribed. FOLLOW UP with Cardio within 1 week of discharge.      SECONDARY DISCHARGE DIAGNOSES  Diagnosis: Rhabdomyolysis  Assessment and Plan of Treatment: You were found to have elevated enzymes indicating muscle breakdown after your fall. You were treated with IV fluids with improvement    Diagnosis: Abnormal finding on CT scan  Assessment and Plan of Treatment: You had an abnormal finding on CT chest as listed below  CT Chest No Cont: 1.8 cm pulmonary nodule RUL   It is recommended that you follow up with your PCP and have a repeat CT scan for further evaluation.    Diagnosis: Hyperlipidemia  Assessment and Plan of Treatment: START statin    Diagnosis: Hypertension  Assessment and Plan of Treatment: START Metoprolol    Diagnosis: Cholecystitis  Assessment and Plan of Treatment: You were found to have infected gallbladder on admission. You had a drain placed in your gallbladder to help heal the infection.  You were treated with IV antibitoics. Please take levofloxacin 750mg PO daily til 8/10.  FOLLOW UP with Surgery within 1 week of discharge to discuss elective cholecystectomy in the future.

## 2023-07-28 NOTE — DISCHARGE NOTE PROVIDER - NSDCMRMEDTOKEN_GEN_ALL_CORE_FT
Aspir 81 81 mg oral tablet: 1 tab(s) orally once a day   apixaban 5 mg oral tablet: 1 tab(s) orally every 12 hours  Aspir 81 81 mg oral tablet: 1 tab(s) orally once a day  dilTIAZem 120 mg/24 hours oral capsule, extended release: 1 cap(s) orally once a day   apixaban 5 mg oral tablet: 1 tab(s) orally every 12 hours  Aspir 81 81 mg oral tablet: 1 tab(s) orally once a day  atorvastatin 80 mg oral tablet: 1 tab(s) orally once a day (at bedtime)  dilTIAZem 120 mg/24 hours oral capsule, extended release: 1 cap(s) orally once a day  levoFLOXacin 750 mg oral tablet: 1 tab(s) orally once a day TIL AND INCL UDING 8/10  metoprolol succinate 100 mg oral tablet, extended release: 1 tab(s) orally once a day

## 2023-07-28 NOTE — PROGRESS NOTE ADULT - PROBLEM SELECTOR PLAN 1
-CT scan in the ER revealed findings consistent w/ acute enrique (Distended GB w/ jd-cholecystic fluid)  -RUQ ultrasound showed Cholelithiasis with a small amount of pericholecystic fluid and nonspecific gallbladder wall thickening  -HIDA scan positive, pt underwent perc enrique drainage with IR   -pt is currently medically optimized for   emergent   procedure  with evolving sepsis / fever , leucocytosis , bacteremia   - C/w  iv abx zosyn  -BCx growing gram+ cocci in pairs in chain   strep pna + in both aerobic & anaerobic samples   -Repeat BCx ordered  -UCx pending collection, F/U  - NPO after midnight   -Resume clear liquid diet after procedure  -K+ 2.8 today, IV K+ repletion being given. Will repeat BMP at 4:00 and replete as needed.   - Surgery following -CT scan in the ER revealed findings consistent w/ acute enrique (Distended GB w/ jd-cholecystic fluid)  -RUQ ultrasound showed Cholelithiasis with a small amount of pericholecystic fluid and nonspecific gallbladder wall thickening  -HIDA scan positive, pt underwent perc enrique drainage with IR   - C/w  iv abx zosyn  -BCx growing gram+ cocci in pairs in chain   strep pna + in both aerobic & anaerobic samples   -Repeat BCx ordered  -UCx received, F/U  - NPO after midnight   -Resume clear liquid diet after procedure  -K+ 3.8 today  - Surgery following -CT scan in the ER revealed findings consistent w/ acute enrique (Distended GB w/ jd-cholecystic fluid)  -RUQ ultrasound showed Cholelithiasis with a small amount of pericholecystic fluid and nonspecific gallbladder wall thickening  -HIDA scan positive, pt underwent perc enrique drainage with IR   - C/w  iv abx zosyn  - 1 bottle  BCx growing gram+ cocci in pairs in chain   strep pna + in both aerobic & anaerobic samples   -Repeat BCx ordered , ucult pending   -UCx received, F/U  - NPO after midnight   -Resume clear liquid diet after procedure  -K+ 3.8 today  - Surgery following -CT scan in the ER revealed findings consistent w/ acute enrique (Distended GB w/ jd-cholecystic fluid)  -RUQ ultrasound showed Cholelithiasis with a small amount of pericholecystic fluid and nonspecific gallbladder wall thickening  -HIDA scan positive, pt underwent perc enrique drainage with IR  post procedure day 1  - C/w  iv abx zosyn  - 1 bottle  BCx growing gram+ cocci in pairs in chain   strep pna + in both aerobic & anaerobic samples   -Repeat BCx  neg    - low fat diet   , Surgery following dr rousseau

## 2023-07-28 NOTE — PROGRESS NOTE ADULT - SUBJECTIVE AND OBJECTIVE BOX
Patient is a 78y old  Female who presents with a chief complaint of fall, new onset afib RVR (28 Jul 2023 07:50)      Subjective:  INTERVAL HPI/OVERNIGHT EVENTS: Patient seen and examined at bedside. Overnight, pt was in Afib w/ -150S - IVp Cardizem 10 given and metoprolol given early at 4 AM. Pt went for IR perc enrique drainage yesterday. Lovenox resumed today. Regular, low fat diet started.     MEDICATIONS  (STANDING):  enoxaparin Injectable 80 milliGRAM(s) SubCutaneous every 12 hours  lactated ringers. 1000 milliLiter(s) (60 mL/Hr) IV Continuous <Continuous>  metoprolol tartrate 50 milliGRAM(s) Oral two times a day  piperacillin/tazobactam IVPB.. 3.375 Gram(s) IV Intermittent every 8 hours    MEDICATIONS  (PRN):  acetaminophen     Tablet .. 650 milliGRAM(s) Oral every 6 hours PRN Temp greater or equal to 38C (100.4F)  acetaminophen     Tablet .. 650 milliGRAM(s) Oral every 6 hours PRN Mild Pain (1 - 3)  aluminum hydroxide/magnesium hydroxide/simethicone Suspension 30 milliLiter(s) Oral every 4 hours PRN Dyspepsia  melatonin 3 milliGRAM(s) Oral at bedtime PRN Insomnia  ondansetron Injectable 4 milliGRAM(s) IV Push every 8 hours PRN Nausea and/or Vomiting      Allergies    No Known Allergies    Intolerances        REVIEW OF SYSTEMS:  CONSTITUTIONAL: No fever or chills  HEENT:  No headache, no blurry vision  RESPIRATORY:  +cough. No wheezing, or shortness of breath  CARDIOVASCULAR: No chest pain, palpitations  GASTROINTESTINAL: No abd pain, nausea, vomiting, or diarrhea  GENITOURINARY: No dysuria, frequency, or hematuria  NEUROLOGICAL: no focal weakness or dizziness      Objective:  Vital Signs Last 24 Hrs  T(C): 37 (28 Jul 2023 04:10), Max: 37 (28 Jul 2023 04:10)  T(F): 98.6 (28 Jul 2023 04:10), Max: 98.6 (28 Jul 2023 04:10)  HR: 132 (28 Jul 2023 09:58) (99 - 148)  BP: 146/83 (28 Jul 2023 09:58) (128/80 - 167/92)  BP(mean): 113 (27 Jul 2023 14:11) (113 - 113)  RR: 20 (28 Jul 2023 04:10) (18 - 28)  SpO2: 91% (28 Jul 2023 04:10) (91% - 100%)    Parameters below as of 28 Jul 2023 04:10  Patient On (Oxygen Delivery Method): room air        GENERAL: NAD, lying in bed comfortably  HEAD:  Atraumatic, Normocephalic  EYES: EOMI, PERRLA, conjunctiva and sclera clear  ENT: Moist mucous membranes  NECK: Supple, No JVD  CHEST/LUNG: +irregularly regular. +S1, S2.  HEART: Regular rate and rhythm; No murmurs, rubs, or gallops  ABDOMEN: +IR drainage tube intact, clean and free of erythema. Bowel sounds present; Soft, Nontender, Nondistended.   EXTREMITIES:  2+ Peripheral Pulses, brisk capillary refill. No clubbing, cyanosis, or edema  NERVOUS SYSTEM:  Alert & Oriented X3, speech clear. No deficits   MSK: FROM all 4 extremities, full and equal strength  SKIN: No rashes or lesions    LABS:                        12.6   11.96 )-----------( 221      ( 28 Jul 2023 08:40 )             38.4     CBC Full  -  ( 28 Jul 2023 08:40 )  WBC Count : 11.96 K/uL  Hemoglobin : 12.6 g/dL  Hematocrit : 38.4 %  Platelet Count - Automated : 221 K/uL  Mean Cell Volume : 93.7 fl  Mean Cell Hemoglobin : 30.7 pg  Mean Cell Hemoglobin Concentration : 32.8 gm/dL  Auto Neutrophil # : 9.92 K/uL  Auto Lymphocyte # : 0.82 K/uL  Auto Monocyte # : 1.06 K/uL  Auto Eosinophil # : 0.08 K/uL  Auto Basophil # : 0.02 K/uL  Auto Neutrophil % : 82.8 %  Auto Lymphocyte % : 6.9 %  Auto Monocyte % : 8.9 %  Auto Eosinophil % : 0.7 %  Auto Basophil % : 0.2 %    28 Jul 2023 08:40    138    |  107    |  13     ----------------------------<  134    3.8     |  25     |  0.83     Ca    8.9        28 Jul 2023 08:40  Phos  1.6       28 Jul 2023 08:40  Mg     2.3       28 Jul 2023 08:40    TPro  7.4    /  Alb  2.7    /  TBili  0.9    /  DBili  x      /  AST  74     /  ALT  67     /  AlkPhos  112    28 Jul 2023 08:40      Urinalysis Basic - ( 28 Jul 2023 08:40 )    Color: x / Appearance: x / SG: x / pH: x  Gluc: 134 mg/dL / Ketone: x  / Bili: x / Urobili: x   Blood: x / Protein: x / Nitrite: x   Leuk Esterase: x / RBC: x / WBC x   Sq Epi: x / Non Sq Epi: x / Bacteria: x      CAPILLARY BLOOD GLUCOSE            Culture - Body Fluid with Gram Stain (collected 07-27-23 @ 16:20)  Source: Bile Bile Fluid  Gram Stain (07-28-23 @ 05:59):    No polymorphonuclear leukocytes seen    No organisms seen    by cytocentrifuge    Culture - Blood (collected 07-26-23 @ 03:00)  Source: .Blood Blood-Peripheral  Gram Stain (07-27-23 @ 06:08):    Growth in anaerobic bottle: Gram Positive Cocci in Pairs and Chains    Growth in aerobic bottle: Gram Positive Cocci in Pairs and Chains  Preliminary Report (07-27-23 @ 20:32):    Growth in aerobic and anaerobic bottles: Streptococcus anginosus    Susceptibility to follow.    Direct identification is available within approximately 3-5    hours either by Blood Panel Multiplexed PCR or Direct    MALDI-TOF. Details: [email or web address]  Organism: Blood Culture PCR (07-27-23 @ 05:04)  Organism: Blood Culture PCR (07-27-23 @ 05:04)      Method Type: PCR      -  Streptococcus sp. (Not Grp A, B or S pneumoniae): Detec    Culture - Blood (collected 07-26-23 @ 02:58)  Source: .Blood Blood-Peripheral  Gram Stain (07-27-23 @ 08:19):    Growth in anaerobic bottle: Gram Positive Cocci in Pairs and Chains    Growth in aerobic bottle: Gram Positive Cocci in Pairs and Chains  Preliminary Report (07-27-23 @ 21:10):    Growth in aerobic and anaerobic bottles: Streptococcus anginosus    See previous culture [id number]        RADIOLOGY & ADDITIONAL TESTS:    Personally reviewed.     Consultant(s) Notes Reviewed:  [x] YES  [ ] NO     Patient is a 78y old  Female who presents with a chief complaint of fall, new onset afib RVR (28 Jul 2023 07:50)      Subjective:  INTERVAL HPI/OVERNIGHT EVENTS: Patient seen and examined at bedside. Overnight, pt was in Afib w/ -150S - IVp Cardizem 10 given and metoprolol given early at 4 AM. Pt went for IR perc enrique drainage yesterday. Lovenox resumed today. Regular, low fat diet started.     MEDICATIONS  (STANDING):  enoxaparin Injectable 80 milliGRAM(s) SubCutaneous every 12 hours  lactated ringers. 1000 milliLiter(s) (60 mL/Hr) IV Continuous <Continuous>  metoprolol tartrate 50 milliGRAM(s) Oral two times a day  piperacillin/tazobactam IVPB.. 3.375 Gram(s) IV Intermittent every 8 hours    MEDICATIONS  (PRN):  acetaminophen     Tablet .. 650 milliGRAM(s) Oral every 6 hours PRN Temp greater or equal to 38C (100.4F)  acetaminophen     Tablet .. 650 milliGRAM(s) Oral every 6 hours PRN Mild Pain (1 - 3)  aluminum hydroxide/magnesium hydroxide/simethicone Suspension 30 milliLiter(s) Oral every 4 hours PRN Dyspepsia  melatonin 3 milliGRAM(s) Oral at bedtime PRN Insomnia  ondansetron Injectable 4 milliGRAM(s) IV Push every 8 hours PRN Nausea and/or Vomiting      Allergies    No Known Allergies    Intolerances        REVIEW OF SYSTEMS:  CONSTITUTIONAL: No fever or chills  HEENT:  No headache, no blurry vision  RESPIRATORY:  +cough. No wheezing, or shortness of breath  CARDIOVASCULAR: No chest pain, palpitations  GASTROINTESTINAL: No abd pain, nausea, vomiting, or diarrhea  GENITOURINARY: No dysuria, frequency, or hematuria  NEUROLOGICAL: no focal weakness or dizziness      Objective:  Vital Signs Last 24 Hrs  T(C): 37 (28 Jul 2023 04:10), Max: 37 (28 Jul 2023 04:10)  T(F): 98.6 (28 Jul 2023 04:10), Max: 98.6 (28 Jul 2023 04:10)  HR: 132 (28 Jul 2023 09:58) (99 - 148)  BP: 146/83 (28 Jul 2023 09:58) (128/80 - 167/92)  BP(mean): 113 (27 Jul 2023 14:11) (113 - 113)  RR: 20 (28 Jul 2023 04:10) (18 - 28)  SpO2: 91% (28 Jul 2023 04:10) (91% - 100%)    Parameters below as of 28 Jul 2023 04:10  Patient On (Oxygen Delivery Method): room air        GENERAL: NAD, sitting in cardiac chair comfortably.   HEAD:  Atraumatic, Normocephalic  EYES: EOMI, PERRLA, conjunctiva and sclera clear  ENT: Moist mucous membranes  NECK: Supple, No JVD  CHEST/LUNG: lungs clear to expansion bilaterally. normal chest excursion.  HEART:  +irregularly regular. +S1, S2.  ABDOMEN: +perc enrique tube intact, clean and free of erythema. Bowel sounds present; Soft, Nontender, Nondistended.   EXTREMITIES:  +LE edema  NERVOUS SYSTEM:  Alert & Oriented X3, speech clear. No deficits     LABS:                        12.6   11.96 )-----------( 221      ( 28 Jul 2023 08:40 )             38.4     CBC Full  -  ( 28 Jul 2023 08:40 )  WBC Count : 11.96 K/uL  Hemoglobin : 12.6 g/dL  Hematocrit : 38.4 %  Platelet Count - Automated : 221 K/uL  Mean Cell Volume : 93.7 fl  Mean Cell Hemoglobin : 30.7 pg  Mean Cell Hemoglobin Concentration : 32.8 gm/dL  Auto Neutrophil # : 9.92 K/uL  Auto Lymphocyte # : 0.82 K/uL  Auto Monocyte # : 1.06 K/uL  Auto Eosinophil # : 0.08 K/uL  Auto Basophil # : 0.02 K/uL  Auto Neutrophil % : 82.8 %  Auto Lymphocyte % : 6.9 %  Auto Monocyte % : 8.9 %  Auto Eosinophil % : 0.7 %  Auto Basophil % : 0.2 %    28 Jul 2023 08:40    138    |  107    |  13     ----------------------------<  134    3.8     |  25     |  0.83     Ca    8.9        28 Jul 2023 08:40  Phos  1.6       28 Jul 2023 08:40  Mg     2.3       28 Jul 2023 08:40    TPro  7.4    /  Alb  2.7    /  TBili  0.9    /  DBili  x      /  AST  74     /  ALT  67     /  AlkPhos  112    28 Jul 2023 08:40      Urinalysis Basic - ( 28 Jul 2023 08:40 )    Color: x / Appearance: x / SG: x / pH: x  Gluc: 134 mg/dL / Ketone: x  / Bili: x / Urobili: x   Blood: x / Protein: x / Nitrite: x   Leuk Esterase: x / RBC: x / WBC x   Sq Epi: x / Non Sq Epi: x / Bacteria: x      CAPILLARY BLOOD GLUCOSE            Culture - Body Fluid with Gram Stain (collected 07-27-23 @ 16:20)  Source: Bile Bile Fluid  Gram Stain (07-28-23 @ 05:59):    No polymorphonuclear leukocytes seen    No organisms seen    by cytocentrifuge    Culture - Blood (collected 07-26-23 @ 03:00)  Source: .Blood Blood-Peripheral  Gram Stain (07-27-23 @ 06:08):    Growth in anaerobic bottle: Gram Positive Cocci in Pairs and Chains    Growth in aerobic bottle: Gram Positive Cocci in Pairs and Chains  Preliminary Report (07-27-23 @ 20:32):    Growth in aerobic and anaerobic bottles: Streptococcus anginosus    Susceptibility to follow.    Direct identification is available within approximately 3-5    hours either by Blood Panel Multiplexed PCR or Direct    MALDI-TOF. Details: [email or web address]  Organism: Blood Culture PCR (07-27-23 @ 05:04)  Organism: Blood Culture PCR (07-27-23 @ 05:04)      Method Type: PCR      -  Streptococcus sp. (Not Grp A, B or S pneumoniae): Detec    Culture - Blood (collected 07-26-23 @ 02:58)  Source: .Blood Blood-Peripheral  Gram Stain (07-27-23 @ 08:19):    Growth in anaerobic bottle: Gram Positive Cocci in Pairs and Chains    Growth in aerobic bottle: Gram Positive Cocci in Pairs and Chains  Preliminary Report (07-27-23 @ 21:10):    Growth in aerobic and anaerobic bottles: Streptococcus anginosus    See previous culture [id number]        RADIOLOGY & ADDITIONAL TESTS:    Personally reviewed.     Consultant(s) Notes Reviewed:  [x] YES  [ ] NO     Patient is a 78y old  Female who presents with a chief complaint of fall, new onset afib RVR (28 Jul 2023 07:50)      Subjective:  INTERVAL HPI/OVERNIGHT EVENTS: Patient seen and examined at bedside. Overnight, pt was in Afib w/ -150S - IVp Cardizem 10 given and metoprolol given early at 4 AM. Pt went for IR perc enrique drainage yesterday.   Lovenox resumed today. Regular, low fat diet started.             REVIEW OF SYSTEMS:  CONSTITUTIONAL: No fever or chills  HEENT:  No headache, no blurry vision  RESPIRATORY:  +cough. No wheezing, or shortness of breath  CARDIOVASCULAR: No chest pain, palpitations  GASTROINTESTINAL: No abd pain, nausea, vomiting, or diarrhea  GENITOURINARY: No dysuria, frequency, or hematuria  NEUROLOGICAL: no focal weakness or dizziness      Objective:  Vital Signs Last 24 Hrs  T(C): 37 (28 Jul 2023 04:10), Max: 37 (28 Jul 2023 04:10)  T(F): 98.6 (28 Jul 2023 04:10), Max: 98.6 (28 Jul 2023 04:10)  HR: 132 (28 Jul 2023 09:58) (99 - 148)  BP: 146/83 (28 Jul 2023 09:58) (128/80 - 167/92)  BP(mean): 113 (27 Jul 2023 14:11) (113 - 113)  RR: 20 (28 Jul 2023 04:10) (18 - 28)  SpO2: 91% (28 Jul 2023 04:10) (91% - 100%)    Parameters below as of 28 Jul 2023 04:10  Patient On (Oxygen Delivery Method): room air        GENERAL: NAD, sitting in cardiac chair comfortably.   HEAD:  Atraumatic, Normocephalic  EYES: EOMI, PERRLA, conjunctiva and sclera clear  ENT: Moist mucous membranes  NECK: Supple, No JVD  CHEST/LUNG: lungs clear to expansion bilaterally. normal chest excursion.  HEART:  +irregularly regular. +S1, S2. tachy +   ABDOMEN: +perc enrique tube intact, clean and free of erythema. Bowel sounds present; Soft, Nontender, Nondistended.   EXTREMITIES:  +LE edema  NERVOUS SYSTEM:  Alert & Oriented X3, speech clear. No deficits     LABS:                        12.6   11.96 )-----------( 221      ( 28 Jul 2023 08:40 )             38.4     CBC Full  -  ( 28 Jul 2023 08:40 )  WBC Count : 11.96 K/uL  Hemoglobin : 12.6 g/dL  Hematocrit : 38.4 %  Platelet Count - Automated : 221 K/uL  Mean Cell Volume : 93.7 fl  Mean Cell Hemoglobin : 30.7 pg  Mean Cell Hemoglobin Concentration : 32.8 gm/dL  Auto Neutrophil # : 9.92 K/uL  Auto Lymphocyte # : 0.82 K/uL  Auto Monocyte # : 1.06 K/uL  Auto Eosinophil # : 0.08 K/uL  Auto Basophil # : 0.02 K/uL  Auto Neutrophil % : 82.8 %  Auto Lymphocyte % : 6.9 %  Auto Monocyte % : 8.9 %  Auto Eosinophil % : 0.7 %  Auto Basophil % : 0.2 %    28 Jul 2023 08:40    138    |  107    |  13     ----------------------------<  134    3.8     |  25     |  0.83     Ca    8.9        28 Jul 2023 08:40  Phos  1.6       28 Jul 2023 08:40  Mg     2.3       28 Jul 2023 08:40    TPro  7.4    /  Alb  2.7    /  TBili  0.9    /  DBili  x      /  AST  74     /  ALT  67     /  AlkPhos  112    28 Jul 2023 08:40      Urinalysis Basic - ( 28 Jul 2023 08:40 )    Color: x / Appearance: x / SG: x / pH: x  Gluc: 134 mg/dL / Ketone: x  / Bili: x / Urobili: x   Blood: x / Protein: x / Nitrite: x   Leuk Esterase: x / RBC: x / WBC x   Sq Epi: x / Non Sq Epi: x / Bacteria: x      CAPILLARY BLOOD GLUCOSE            Culture - Body Fluid with Gram Stain (collected 07-27-23 @ 16:20)  Source: Bile Bile Fluid  Gram Stain (07-28-23 @ 05:59):    No polymorphonuclear leukocytes seen    No organisms seen    by cytocentrifuge    Culture - Blood (collected 07-26-23 @ 03:00)  Source: .Blood Blood-Peripheral  Gram Stain (07-27-23 @ 06:08):    Growth in anaerobic bottle: Gram Positive Cocci in Pairs and Chains    Growth in aerobic bottle: Gram Positive Cocci in Pairs and Chains  Preliminary Report (07-27-23 @ 20:32):    Growth in aerobic and anaerobic bottles: Streptococcus anginosus    Susceptibility to follow.    Direct identification is available within approximately 3-5    hours either by Blood Panel Multiplexed PCR or Direct    MALDI-TOF. Details: [email or web address]  Organism: Blood Culture PCR (07-27-23 @ 05:04)  Organism: Blood Culture PCR (07-27-23 @ 05:04)      Method Type: PCR      -  Streptococcus sp. (Not Grp A, B or S pneumoniae): Detec    Culture - Blood (collected 07-26-23 @ 02:58)  Source: .Blood Blood-Peripheral  Gram Stain (07-27-23 @ 08:19):    Growth in anaerobic bottle: Gram Positive Cocci in Pairs and Chains    Growth in aerobic bottle: Gram Positive Cocci in Pairs and Chains  Preliminary Report (07-27-23 @ 21:10):    Growth in aerobic and anaerobic bottles: Streptococcus anginosus    See previous culture [id number]        RADIOLOGY & ADDITIONAL TESTS:    Personally reviewed.     Consultant(s) Notes Reviewed:  [x] YES  [ ] NO     Patient is a 78y old  Female who presents with a chief complaint of fall, new onset afib RVR (28 Jul 2023 07:50)      Subjective:  INTERVAL HPI/OVERNIGHT EVENTS: Patient seen and examined at bedside. Overnight, pt was in Afib w/ -150S - IVp Cardizem 10 given and metoprolol given early at 4 AM. Pt went for IR perc enrique drainage yesterday.   Lovenox resumed today. Regular, low fat diet started.             REVIEW OF SYSTEMS:  CONSTITUTIONAL: No fever or chills  HEENT:  No headache, no blurry vision  RESPIRATORY:  +cough. No wheezing, or shortness of breath  CARDIOVASCULAR: No chest pain, palpitations  GASTROINTESTINAL: No abd pain, nausea, vomiting, or diarrhea  GENITOURINARY: No dysuria, frequency, or hematuria  NEUROLOGICAL: no focal weakness or dizziness      Objective:  Vital Signs Last 24 Hrs  T(C): 37 (28 Jul 2023 04:10), Max: 37 (28 Jul 2023 04:10)  T(F): 98.6 (28 Jul 2023 04:10), Max: 98.6 (28 Jul 2023 04:10)  HR: 132 (28 Jul 2023 09:58) (99 - 148)  BP: 146/83 (28 Jul 2023 09:58) (128/80 - 167/92)  BP(mean): 113 (27 Jul 2023 14:11) (113 - 113)  RR: 20 (28 Jul 2023 04:10) (18 - 28)  SpO2: 91% (28 Jul 2023 04:10) (91% - 100%)    Parameters below as of 28 Jul 2023 04:10  Patient On (Oxygen Delivery Method): room air        GENERAL: NAD, sitting in cardiac chair comfortably.   HEAD:  Atraumatic, Normocephalic  EYES: EOMI, PERRLA, conjunctiva and sclera clear  ENT: Moist mucous membranes  NECK: Supple, No JVD  CHEST/LUNG: chest  bilaterally , no rale , no wheez   HEART:  +irregularly regular. +S1, S2. tachy +   ABDOMEN: +perc enrique tube intact, clean and free of erythema. Bowel sounds present; Soft, Nontender, Nondistended.   EXTREMITIES:  +LE edema  NERVOUS SYSTEM:  Alert & Oriented X3, speech clear. No deficits     LABS:                        12.6   11.96 )-----------( 221      ( 28 Jul 2023 08:40 )             38.4     CBC Full  -  ( 28 Jul 2023 08:40 )  WBC Count : 11.96 K/uL  Hemoglobin : 12.6 g/dL  Hematocrit : 38.4 %  Platelet Count - Automated : 221 K/uL  Mean Cell Volume : 93.7 fl  Mean Cell Hemoglobin : 30.7 pg  Mean Cell Hemoglobin Concentration : 32.8 gm/dL  Auto Neutrophil # : 9.92 K/uL  Auto Lymphocyte # : 0.82 K/uL  Auto Monocyte # : 1.06 K/uL  Auto Eosinophil # : 0.08 K/uL  Auto Basophil # : 0.02 K/uL  Auto Neutrophil % : 82.8 %  Auto Lymphocyte % : 6.9 %  Auto Monocyte % : 8.9 %  Auto Eosinophil % : 0.7 %  Auto Basophil % : 0.2 %    28 Jul 2023 08:40    138    |  107    |  13     ----------------------------<  134    3.8     |  25     |  0.83     Ca    8.9        28 Jul 2023 08:40  Phos  1.6       28 Jul 2023 08:40  Mg     2.3       28 Jul 2023 08:40    TPro  7.4    /  Alb  2.7    /  TBili  0.9    /  DBili  x      /  AST  74     /  ALT  67     /  AlkPhos  112    28 Jul 2023 08:40      Urinalysis Basic - ( 28 Jul 2023 08:40 )    Color: x / Appearance: x / SG: x / pH: x  Gluc: 134 mg/dL / Ketone: x  / Bili: x / Urobili: x   Blood: x / Protein: x / Nitrite: x   Leuk Esterase: x / RBC: x / WBC x   Sq Epi: x / Non Sq Epi: x / Bacteria: x      CAPILLARY BLOOD GLUCOSE            Culture - Body Fluid with Gram Stain (collected 07-27-23 @ 16:20)  Source: Bile Bile Fluid  Gram Stain (07-28-23 @ 05:59):    No polymorphonuclear leukocytes seen    No organisms seen    by cytocentrifuge    Culture - Blood (collected 07-26-23 @ 03:00)  Source: .Blood Blood-Peripheral  Gram Stain (07-27-23 @ 06:08):    Growth in anaerobic bottle: Gram Positive Cocci in Pairs and Chains    Growth in aerobic bottle: Gram Positive Cocci in Pairs and Chains  Preliminary Report (07-27-23 @ 20:32):    Growth in aerobic and anaerobic bottles: Streptococcus anginosus    Susceptibility to follow.    Direct identification is available within approximately 3-5    hours either by Blood Panel Multiplexed PCR or Direct    MALDI-TOF. Details: [email or web address]  Organism: Blood Culture PCR (07-27-23 @ 05:04)  Organism: Blood Culture PCR (07-27-23 @ 05:04)      Method Type: PCR      -  Streptococcus sp. (Not Grp A, B or S pneumoniae): Detec    Culture - Blood (collected 07-26-23 @ 02:58)  Source: .Blood Blood-Peripheral  Gram Stain (07-27-23 @ 08:19):    Growth in anaerobic bottle: Gram Positive Cocci in Pairs and Chains    Growth in aerobic bottle: Gram Positive Cocci in Pairs and Chains  Preliminary Report (07-27-23 @ 21:10):    Growth in aerobic and anaerobic bottles: Streptococcus anginosus    See previous culture [id number]        RADIOLOGY & ADDITIONAL TESTS:    Personally reviewed.     Consultant(s) Notes Reviewed:  [x] YES  [ ] NO

## 2023-07-28 NOTE — PROGRESS NOTE ADULT - ASSESSMENT
79 y/o F presenting with asymptomatic cholecystitis found on imaging.    - s/p perc enrique drain (7/27)  - cont IV Zosyn per medicine team  - Pain control, anti-pyretics prn  - afib management per cardiology  - recommend low fat diet  - will need visiting nurse for perc enrique drain upon discharge       Surgical Team  Spectralink: [id number]

## 2023-07-28 NOTE — PROGRESS NOTE ADULT - SUBJECTIVE AND OBJECTIVE BOX
[facility] -- Neo Vega Pannella, Patel, Savella, Goodger  Office # [phone]    Follow Up: Afib with RVR, HFrEF     Subjective/Observations: Sitting on chair awake and alert, denies postop pain.  Denies N/V.  Denies any form of respiratory or cardiac discomfort.  Tele noted.  Episodes of RVR    REVIEW OF SYSTEMS: All other review of systems is negative unless indicated above  PAST MEDICAL & SURGICAL HISTORY:  HTN (hypertension)  Hyperlipidemia  Tinnitus  right ear  Seasonal allergies  S/P knee replacement    MEDICATIONS  (STANDING):  enoxaparin Injectable 80 milliGRAM(s) SubCutaneous every 12 hours  lactated ringers. 1000 milliLiter(s) (60 mL/Hr) IV Continuous <Continuous>  metoprolol tartrate 50 milliGRAM(s) Oral two times a day  piperacillin/tazobactam IVPB.. 3.375 Gram(s) IV Intermittent every 8 hours    MEDICATIONS  (PRN):  acetaminophen     Tablet .. 650 milliGRAM(s) Oral every 6 hours PRN Mild Pain (1 - 3)  acetaminophen     Tablet .. 650 milliGRAM(s) Oral every 6 hours PRN Temp greater or equal to 38C (100.4F)  aluminum hydroxide/magnesium hydroxide/simethicone Suspension 30 milliLiter(s) Oral every 4 hours PRN Dyspepsia  melatonin 3 milliGRAM(s) Oral at bedtime PRN Insomnia  ondansetron Injectable 4 milliGRAM(s) IV Push every 8 hours PRN Nausea and/or Vomiting    Allergies    No Known Allergies    Intolerances    Vital Signs Last 24 Hrs  T(C): 36.8 (28 Jul 2023 12:45), Max: 37 (28 Jul 2023 04:10)  T(F): 98.3 (28 Jul 2023 12:45), Max: 98.6 (28 Jul 2023 04:10)  HR: 112 (28 Jul 2023 12:45) (99 - 148)  BP: 150/87 (28 Jul 2023 12:45) (128/80 - 167/92)  BP(mean): 113 (27 Jul 2023 14:11) (113 - 113)  RR: 20 (28 Jul 2023 12:45) (18 - 28)  SpO2: 96% (28 Jul 2023 12:45) (91% - 100%)    Parameters below as of 28 Jul 2023 12:45  Patient On (Oxygen Delivery Method): room air    I&O's Summary    27 Jul 2023 07:01  -  28 Jul 2023 07:00  --------------------------------------------------------  IN: 0 mL / OUT: 150 mL / NET: -150 mL      Weight (kg): 84.5 (07-27 @ 14:05)    PHYSICAL EXAM:  TELE:   Constitutional: NAD, awake and alert, obese  HEENT: Moist Mucous Membranes, Anicteric  Pulmonary: Non-labored, breath sounds are clear bilaterally, No wheezing, rales or rhonchi  Cardiovascular: IRRR, S1 and S2, No murmurs, rubs, gallops or clicks  Gastrointestinal: Bowel Sounds present, soft, nontender.   Rt enrique tube  Lymph: No peripheral edema. No lymphadenopathy.  Skin: No visible rashes or ulcers.  Psych:  Mood & affect appropriate  LABS: All Labs Reviewed:                        12.6   11.96 )-----------( 221      ( 28 Jul 2023 08:40 )             38.4                         12.3   15.29 )-----------( 170      ( 27 Jul 2023 07:47 )             37.6                         13.9   14.18 )-----------( 178      ( 26 Jul 2023 03:00 )             42.2     28 Jul 2023 08:40    138    |  107    |  13     ----------------------------<  134    3.8     |  25     |  0.83   27 Jul 2023 20:44    x      |  x      |  x      ----------------------------<  x      3.9     |  x      |  x      27 Jul 2023 18:35    138    |  106    |  16     ----------------------------<  150    3.2     |  25     |  0.74     Ca    8.9        28 Jul 2023 08:40  Ca    8.4        27 Jul 2023 18:35  Ca    8.7        27 Jul 2023 07:47  Phos  1.6       28 Jul 2023 08:40  Phos  3.2       27 Jul 2023 07:47  Phos  3.2       26 Jul 2023 03:00  Mg     2.3       28 Jul 2023 08:40  Mg     2.4       27 Jul 2023 07:47  Mg     2.3       26 Jul 2023 03:00    TPro  7.4    /  Alb  2.7    /  TBili  0.9    /  DBili  x      /  AST  74     /  ALT  67     /  AlkPhos  112    28 Jul 2023 08:40  TPro  6.7    /  Alb  2.5    /  TBili  0.9    /  DBili  x      /  AST  74     /  ALT  56     /  AlkPhos  108    27 Jul 2023 07:47  TPro  6.3    /  Alb  3.1    /  TBili  0.9    /  DBili  x      /  AST  106    /  ALT  58     /  AlkPhos  99     26 Jul 2023 03:00    CARDIAC MARKERS ( 27 Jul 2023 07:47 )  x     / x     / 1848 U/L / x     / x        12 Lead ECG:   Ventricular Rate 95 BPM    QRS Duration 70 ms    Q-T Interval 388 ms    QTC Calculation(Bazett) 487 ms    R Axis 38 degrees    T Axis 96 degrees    Diagnosis Line Atrial fibrillation  Nonspecific T wave abnormality  Prolonged QT  Abnormal ECG  Whencompared with ECG of 25-JUL-2023 20:54, (Unconfirmed)  Vent. rate has decreased BY  48 BPM  Confirmed by Feli Louie ([id number]) on 7/26/2023 10:10:05 AM (07-26-23 @ 02:03)    TRANSTHORACIC ECHOCARDIOGRAM REPORT  ________________________________________________________________________________                                      _______  Pt. Name:       CHAY VELEZ Study Date:    7/26/2023  MRN:            [id number]         YOB: 1945  Accession #:    [id number]        Age:           78 years  Account#:       [id number]       Gender:        F  Heart Rate:                      Height:        62.99 in (160.00 cm)  Rhythm:                          Weight:        160.93 lb (73.00 kg)  Blood Pressure: 132/80 mmHg      BSA/BMI:       1.76 m² / 28.52 kg/m²  ________________________________________________________________________________________  Referring Physician:    Delta Castro  Interpreting Physician: Feli Louie MD  Primary Sonographer:    [facility]    CPT:               ECHO TTE WO CON COMP W DOPP - 11024.m  Indication(s):     Unspecified atrial fibrillation - I48.91  Procedure:         Transthoracic echocardiogram with 2-D, M-mode and complete                     spectral and color flow Doppler.  Ordering Location: [facility]  Study Information: Image quality for this study is technically difficult.    _______________________________________________________________________________________  CONCLUSIONS:      1. Technically difficult image quality.   2. The left ventricular endocardium is not well visualized. Overall the LV function appears to be mildly reduced with an ejection fraction of 45%. Cannot rule out segmental abnormalities.   3. Normal right ventricular cavity size, normal right ventricular wall thickness and normal right ventricular systolic function.   4. The aortic valve is not well visualized, appears mildly calcified with grossly normal opening.   5. Trace mitral regurgitation.   6. Trace tricuspid regurgitation.   7. The inferior vena cava is normal in size measuring 2.14 cm in diameter, (normal <2.1cm) with normal inspiratory collapse (normal >50%) consistent with normal right atrial pressure (~3, range 0-5mmHg).    _____________________________________________________________________________________  FINDINGS:     Left Ventricle:  The left ventricular systolic function is mildly decreased. The left ventricular endocardium is not well visualized. Overall the LV function appears to be mildly reduced with an ejection fraction of 45%. Cannot rule out segmental abnormalities.     Right Ventricle:  Normal right ventricular cavity size, normal wall thickness and normal right ventricular systolic function.     Left Atrium:  The left atrium is normal in size.     Right Atrium:  The right atrium is normal in size.     Aortic Valve:  The aortic valve is not well visualized, appears mildly calcified with grossly normal opening. There is noevidence of aortic regurgitation.     Mitral Valve:  There is trace mitral regurgitation.     Tricuspid Valve:  There is trace tricuspid regurgitation.     Pulmonic Valve:  Structurally normal pulmonic valve with normal leaflet excursion. There is trace pulmonic regurgitation.     Systemic Veins:  The inferior vena cava is normal in size measuring 2.14 cm in diameter, (normal <2.1cm) with normal inspiratory collapse (normal >50%) consistent with normal right atrial pressure (~3, range 0-5mmHg).  ____________________________________________________________________  Quantitative Data:  Left Ventricle Measurements: (Indexed to BSA)     IVSd (2D):   1.1 cm  LVPWd (2D):  0.9 cm  LVIDd (2D):  4.9 cm  LVIDs (2D):  3.6 cm  LV Mass:     177 g  100.7 g/m²     MV E Vmax:    1.00 m/s  e' lateral:   16.40 cm/s  e' medial:    12.30 cm/s  E/e' lateral: 6.07  E/e' medial:  8.10  E/e' Average: 7.23  MV DT:        118 msec    Aorta Measurements:     Ao Sinus: 3.1 cm    Left Atrium Measurements: (Indexedto BSA)  LA Diam 2D: 3.40 cm  LVOT / RVOT/ Qp/Qs Data: (Indexed to BSA)  Mitral Valve Measurements:     MV E Vmax: 1.0 m/s    Tricuspid Valve Measurements:     RA Pressure: 3 mmHg    ________________________________________________________________________________________  Electronically signed on 7/27/2023 at 4:22:35 PM by Feli Louie MD         *** Final ***

## 2023-07-28 NOTE — DISCHARGE NOTE PROVIDER - CARE PROVIDERS DIRECT ADDRESSES
,[email or web address],DirectAddress_Unknown,DirectAddress_Unknown

## 2023-07-28 NOTE — PROGRESS NOTE ADULT - PROBLEM SELECTOR PLAN 5
Patient seen and discussed with resident and fellow 11/17.  Agree with history and assessment and plan as outlined above.   Titrate meds as outlined above. Will continue to follow. WBC 18.46k on admission - possibly reactive, downtrending to 14.18 today  Did feel unwell with vomiting on 7/24 - now resolved, no other symptoms  -UA & UCx -  F/U results  -BCx + for gram + cocci in pairs/chains in both aerobic/anearobic sample  US doppler was unremarkable for DVT   -Pt on iv abx  Zosyn

## 2023-07-28 NOTE — PROGRESS NOTE ADULT - PROBLEM SELECTOR PLAN 2
-Admitted to telemetry  -Given 20mg IV cardizem in the ER with improvement in rates  -increased lopressor to 50mg BID for rate control (was on BB in the past)  -[id number] 4: will start full dose LMWH for thromboprophylaxis  -Can consider change to DOAC (i.e. eliquis) in AM  - but hold for ir guided gb  drain - hida +  ordered 2D ECHO to assess cardiac structure and function, r/o valvular pathology will F/U  -Lovenox 80mg q12 started for AC as IR does not recommend starting Eliquis for at least 2-3 days s/p drainage  -Monitor and replete electrolytes for optimal arrhythmia control  -Phos 1.6 -> repletion ordered.   -repeat EKG showed rate of 95   -TSH w/in normal limits, T3 is low, 69  -LE doppler resulted - no DVT evidence b/l   -Cardiology consulted: Dr. Louie  -PT in Afib w/ RVR overnight, given IV cardizem 10mg x1  -AC held for procedure, need to resume tonight -Admitted to telemetry  -Given 20mg IV cardizem in the ER with improvement in rates  -increased lopressor to 50mg BID for rate control (was on BB in the past)  -[id number] 4: will start full dose LMWH for thromboprophylaxis  -Can consider change to DOAC (i.e. eliquis) in AM  - but hold for ir guided gb  drain - hida +  ordered 2D ECHO to assess cardiac structure and function, r/o valvular pathology will F/U  -Lovenox 80mg q12 started for AC as IR does not recommend starting Eliquis for at least 2-3 days s/p drainage  -Monitor and replete electrolytes for optimal arrhythmia control  -Phos 1.6 -> repletion ordered.   -repeat EKG showed rate of 95   -TSH w/in normal limits, T3 is low, 69  -LE doppler resulted - no DVT evidence b/l   -Cardiology consulted: Dr. Louie gp   -PT in Afib w/ RVR overnight, given IV cardizem 10mg x1 , on full ac Lovenox

## 2023-07-28 NOTE — DISCHARGE NOTE PROVIDER - CARE PROVIDER_API CALL
Alli Pascual  Surgery  [address] [place] [address]  Phone: [phone]  Fax: [phone]  Follow Up Time: 1 week    Alexis Simon  Cardiovascular Disease  [address] [place], [place] [id number]  Phone: [phone]  Fax: [phone]  Follow Up Time: 1 week   Alli Pascual  Surgery  [address] [place] [address]  Phone: [phone]  Fax: [phone]  Follow Up Time: 1 week    Alexis Simon  Cardiovascular Disease  [address] [place], [place] [id number]  Phone: [phone]  Fax: [phone]  Follow Up Time: 1 week    BRIAN ALBA  [address] [place] [id number]  Phone: [phone]  Fax: [phone]  Follow Up Time:

## 2023-07-28 NOTE — DISCHARGE NOTE PROVIDER - PROVIDER TOKENS
PROVIDER:[TOKEN:[[id number]],FOLLOWUP:[1 week]],PROVIDER:[TOKEN:[[id number]],FOLLOWUP:[1 week]] PROVIDER:[TOKEN:[[id number]],FOLLOWUP:[1 week]],PROVIDER:[TOKEN:[[id number]],FOLLOWUP:[1 week]],PROVIDER:[TOKEN:[[id number]]]

## 2023-07-28 NOTE — PROGRESS NOTE ADULT - ASSESSMENT
78y female with PMHx of HTN, HLD presented to the ED for the evaluation of fall out of chair.   Cardiology consulted for new onset atrial fibrillation with RVR.    New onset afib, rhabdo sp fall, acute cholecystitis   - No clear evidence of acute ischemia, trops elevated but downtrended 76.9 --> 76.1. No need to further trend.  No anginal complaints  - CK elevated 5502 --> 5621; doubt acute plaque rupture in setting of fall and rhabdomyolysis.  Now trending down   - Monitor for the development of anginal symptoms or clinical signs of ischemia and do EKG    New onset Afib with RVR  - EKG on admission showed atrial fibrillation with RVR, rate 143  - Tele overnight with periods of tachycardia.  Increase BB and uptitrate as needed for better rate-control  - Can switch FD Lovenox to DOAC if no surgical contraindication  - TTE: EF 45% with no significant valvular disease    - Chest showed evidence of pulmonary scarring which may be d/t pulmonary fibrosis - f/u pulmonary recs   - On RA    - Monitor and replete electrolytes. Keep K>4.0 and Mg>2.0.  - Will continue to follow    Gretta Torres DNP, NP-C, AGACNP-C  Cardiology   Call TEAMS

## 2023-07-29 LAB
ANION GAP SERPL CALC-SCNC: 8 MMOL/L — SIGNIFICANT CHANGE UP (ref 5–17)
BASOPHILS # BLD AUTO: 0.02 K/UL — SIGNIFICANT CHANGE UP (ref 0–0.2)
BASOPHILS NFR BLD AUTO: 0.2 % — SIGNIFICANT CHANGE UP (ref 0–2)
BUN SERPL-MCNC: 15 MG/DL — SIGNIFICANT CHANGE UP (ref 7–23)
CALCIUM SERPL-MCNC: 8.7 MG/DL — SIGNIFICANT CHANGE UP (ref 8.5–10.1)
CHLORIDE SERPL-SCNC: 110 MMOL/L — HIGH (ref 96–108)
CO2 SERPL-SCNC: 22 MMOL/L — SIGNIFICANT CHANGE UP (ref 22–31)
CREAT SERPL-MCNC: 0.72 MG/DL — SIGNIFICANT CHANGE UP (ref 0.5–1.3)
EGFR: 86 ML/MIN/1.73M2 — SIGNIFICANT CHANGE UP
EOSINOPHIL # BLD AUTO: 0.06 K/UL — SIGNIFICANT CHANGE UP (ref 0–0.5)
EOSINOPHIL NFR BLD AUTO: 0.6 % — SIGNIFICANT CHANGE UP (ref 0–6)
GLUCOSE SERPL-MCNC: 133 MG/DL — HIGH (ref 70–99)
HCT VFR BLD CALC: 35.6 % — SIGNIFICANT CHANGE UP (ref 34.5–45)
HGB BLD-MCNC: 12 G/DL — SIGNIFICANT CHANGE UP (ref 11.5–15.5)
IMM GRANULOCYTES NFR BLD AUTO: 0.7 % — SIGNIFICANT CHANGE UP (ref 0–0.9)
LYMPHOCYTES # BLD AUTO: 1.08 K/UL — SIGNIFICANT CHANGE UP (ref 1–3.3)
LYMPHOCYTES # BLD AUTO: 10.2 % — LOW (ref 13–44)
MAGNESIUM SERPL-MCNC: 2.2 MG/DL — SIGNIFICANT CHANGE UP (ref 1.6–2.6)
MCHC RBC-ENTMCNC: 30.9 PG — SIGNIFICANT CHANGE UP (ref 27–34)
MCHC RBC-ENTMCNC: 33.7 GM/DL — SIGNIFICANT CHANGE UP (ref 32–36)
MCV RBC AUTO: 91.8 FL — SIGNIFICANT CHANGE UP (ref 80–100)
MONOCYTES # BLD AUTO: 1.1 K/UL — HIGH (ref 0–0.9)
MONOCYTES NFR BLD AUTO: 10.4 % — SIGNIFICANT CHANGE UP (ref 2–14)
NEUTROPHILS # BLD AUTO: 8.28 K/UL — HIGH (ref 1.8–7.4)
NEUTROPHILS NFR BLD AUTO: 77.9 % — HIGH (ref 43–77)
NRBC # BLD: 0 /100 WBCS — SIGNIFICANT CHANGE UP (ref 0–0)
PHOSPHATE SERPL-MCNC: 3.7 MG/DL — SIGNIFICANT CHANGE UP (ref 2.5–4.5)
PLATELET # BLD AUTO: 247 K/UL — SIGNIFICANT CHANGE UP (ref 150–400)
POTASSIUM SERPL-MCNC: 3.7 MMOL/L — SIGNIFICANT CHANGE UP (ref 3.5–5.3)
POTASSIUM SERPL-SCNC: 3.7 MMOL/L — SIGNIFICANT CHANGE UP (ref 3.5–5.3)
RBC # BLD: 3.88 M/UL — SIGNIFICANT CHANGE UP (ref 3.8–5.2)
RBC # FLD: 13.5 % — SIGNIFICANT CHANGE UP (ref 10.3–14.5)
SODIUM SERPL-SCNC: 140 MMOL/L — SIGNIFICANT CHANGE UP (ref 135–145)
WBC # BLD: 10.61 K/UL — HIGH (ref 3.8–10.5)
WBC # FLD AUTO: 10.61 K/UL — HIGH (ref 3.8–10.5)

## 2023-07-29 PROCEDURE — 99233 SBSQ HOSP IP/OBS HIGH 50: CPT

## 2023-07-29 PROCEDURE — 99232 SBSQ HOSP IP/OBS MODERATE 35: CPT

## 2023-07-29 PROCEDURE — 99233 SBSQ HOSP IP/OBS HIGH 50: CPT | Mod: GC

## 2023-07-29 RX ORDER — DILTIAZEM HCL 120 MG
10 CAPSULE, EXT RELEASE 24 HR ORAL ONCE
Refills: 0 | Status: COMPLETED | OUTPATIENT
Start: 2023-07-29 | End: 2023-07-29

## 2023-07-29 RX ORDER — METOPROLOL TARTRATE 50 MG
50 TABLET ORAL EVERY 6 HOURS
Refills: 0 | Status: COMPLETED | OUTPATIENT
Start: 2023-07-29 | End: 2023-07-31

## 2023-07-29 RX ORDER — OLANZAPINE 15 MG/1
2.5 TABLET, FILM COATED ORAL ONCE
Refills: 0 | Status: COMPLETED | OUTPATIENT
Start: 2023-07-29 | End: 2023-07-29

## 2023-07-29 RX ORDER — AMLODIPINE BESYLATE 2.5 MG/1
5 TABLET ORAL DAILY
Refills: 0 | Status: DISCONTINUED | OUTPATIENT
Start: 2023-07-29 | End: 2023-07-30

## 2023-07-29 RX ADMIN — Medication 50 MILLIGRAM(S): at 23:28

## 2023-07-29 RX ADMIN — AMLODIPINE BESYLATE 5 MILLIGRAM(S): 2.5 TABLET ORAL at 17:31

## 2023-07-29 RX ADMIN — ENOXAPARIN SODIUM 80 MILLIGRAM(S): 100 INJECTION SUBCUTANEOUS at 05:46

## 2023-07-29 RX ADMIN — Medication 50 MILLIGRAM(S): at 11:17

## 2023-07-29 RX ADMIN — PIPERACILLIN AND TAZOBACTAM 25 GRAM(S): 4; .5 INJECTION, POWDER, LYOPHILIZED, FOR SOLUTION INTRAVENOUS at 13:02

## 2023-07-29 RX ADMIN — ENOXAPARIN SODIUM 80 MILLIGRAM(S): 100 INJECTION SUBCUTANEOUS at 17:31

## 2023-07-29 RX ADMIN — Medication 10 MILLIGRAM(S): at 03:34

## 2023-07-29 RX ADMIN — Medication 3 MILLIGRAM(S): at 20:53

## 2023-07-29 RX ADMIN — OLANZAPINE 2.5 MILLIGRAM(S): 15 TABLET, FILM COATED ORAL at 22:25

## 2023-07-29 RX ADMIN — Medication 50 MILLIGRAM(S): at 17:31

## 2023-07-29 RX ADMIN — Medication 50 MILLIGRAM(S): at 05:46

## 2023-07-29 RX ADMIN — PIPERACILLIN AND TAZOBACTAM 25 GRAM(S): 4; .5 INJECTION, POWDER, LYOPHILIZED, FOR SOLUTION INTRAVENOUS at 05:46

## 2023-07-29 RX ADMIN — PIPERACILLIN AND TAZOBACTAM 25 GRAM(S): 4; .5 INJECTION, POWDER, LYOPHILIZED, FOR SOLUTION INTRAVENOUS at 20:53

## 2023-07-29 NOTE — PROGRESS NOTE ADULT - SUBJECTIVE AND OBJECTIVE BOX
SURGERY PA NOTE ON BEHALF OF DR. Pascual.     S: Patient seen and examined at bedside.     Pain well controlled.  Denies fever, chills.   Tolerating clears without n/v.   Ambulating.   Voiding.   No acute complaints.     MEDICATIONS:  acetaminophen     Tablet .. 650 milliGRAM(s) Oral every 6 hours PRN  acetaminophen     Tablet .. 650 milliGRAM(s) Oral every 6 hours PRN  aluminum hydroxide/magnesium hydroxide/simethicone Suspension 30 milliLiter(s) Oral every 4 hours PRN  enoxaparin Injectable 80 milliGRAM(s) SubCutaneous every 12 hours  lactated ringers. 1000 milliLiter(s) IV Continuous <Continuous>  melatonin 3 milliGRAM(s) Oral at bedtime PRN  metoprolol tartrate 50 milliGRAM(s) Oral every 6 hours  ondansetron Injectable 4 milliGRAM(s) IV Push every 8 hours PRN  piperacillin/tazobactam IVPB.. 3.375 Gram(s) IV Intermittent every 8 hours      O:  Vital Signs Last 24 Hrs  T(C): 36.6 (29 Jul 2023 08:02), Max: 36.8 (28 Jul 2023 12:45)  T(F): 97.8 (29 Jul 2023 08:02), Max: 98.3 (28 Jul 2023 12:45)  HR: 106 (29 Jul 2023 11:16) (97 - 130)  BP: 164/101 (29 Jul 2023 11:16) (147/92 - 180/102)  BP(mean): --  RR: 20 (28 Jul 2023 12:45) (20 - 20)  SpO2: 97% (29 Jul 2023 08:02) (96% - 97%)    Parameters below as of 29 Jul 2023 08:02  Patient On (Oxygen Delivery Method): nasal cannula  O2 Flow (L/min): 3      I&O SUMMARY:    07-28-23 @ 07:01  -  07-29-23 @ 07:00  --------------------------------------------------------  IN: 1320 mL / OUT: 20 mL / NET: 1300 mL        PHYSICAL EXAM:  Lungs: CTA bilat without W/R/R  Card: S1S2  Abd: Soft, NT, ND.  +BS x 4.  No rebound/guarding.    Ext: Calves soft, NT, without edema bilat    LABS:                        12.0   10.61 )-----------( 247      ( 29 Jul 2023 07:45 )             35.6     07-29    140  |  110<H>  |  15  ----------------------------<  133<H>  3.7   |  22  |  0.72    Ca    8.7      29 Jul 2023 07:45  Phos  3.7     07-29  Mg     2.2     07-29    TPro  7.4  /  Alb  2.7<L>  /  TBili  0.9  /  DBili  x   /  AST  74<H>  /  ALT  67  /  AlkPhos  112  07-28              RADIOLOGY:    A:  77 y/o F presenting with asymptomatic cholecystitis found on imaging, developed Afib with RVR, tachycardia 148 received cardizem for rate control and underwent Perc enrique on 7/27 with bile bag in place.     Plan:   # s/p perc enrique drain (7/27)  - cont IV Zosyn per medicine team  - Pain control, anti-pyretics prn  - afib management per cardiology  - recommend to continue low fat diet  - follow up BCx  - monitor drain output.   - will need visiting nurse for perc enrique drain upon discharge

## 2023-07-29 NOTE — CHART NOTE - NSCHARTNOTEFT_GEN_A_CORE
RN called for pt w/ tachycardia (150s). Spoke with remote tele tech who confirmed pt's HR trending upwards since 01:00am, 130s to 150s. Pt currently in AFib, .     A/P:  - Pt admitted for AFib w/ RVR. Currently tachycardic w/  in AFib.  - IV cardizem 10mg x1 ordered  - Continue to monitor on tele  - RN to call if any changes

## 2023-07-29 NOTE — CHART NOTE - NSCHARTNOTEFT_GEN_A_CORE
Patient agitated; pulled out IV  Patient AOx1 believes she is in [place] and has to sleep in special bed and tried to leave room  Will give zyprexa 2.5mg IM x 1 now

## 2023-07-29 NOTE — PROGRESS NOTE ADULT - PROBLEM SELECTOR PLAN 10
Used to be on atorvastatin 40mg daily, no longer taking  Would resume statin drug when CK improves  ldl 67

## 2023-07-29 NOTE — PROGRESS NOTE ADULT - PROBLEM SELECTOR PLAN 5
WBC 18.46k on admission - possibly reactive, downtrending to 14.18 today  Did feel unwell with vomiting on 7/24 - now resolved, no other symptoms  -UA & UCx -  F/U results  -BCx + for gram + cocci in pairs/chains in both aerobic/anearobic sample  US doppler was unremarkable for DVT   -Pt on iv abx  Zosyn

## 2023-07-29 NOTE — PROGRESS NOTE ADULT - PROBLEM SELECTOR PLAN 1
-CT scan in the ER revealed findings consistent w/ acute enrique (Distended GB w/ jd-cholecystic fluid)  -RUQ ultrasound showed Cholelithiasis with a small amount of pericholecystic fluid and nonspecific gallbladder wall thickening  -HIDA scan positive, pt underwent perc enrique drainage with IR  post procedure day 1  - C/w  iv abx zosyn  - 1 bottle  BCx growing gram+ cocci in pairs in chain   strep pna + in both aerobic & anaerobic samples   -Repeat BCx  neg    - low fat diet   , Surgery following dr rousseau -CT scan in the ER revealed findings consistent w/ acute enrique (Distended GB w/ jd-cholecystic fluid)  -RUQ ultrasound showed Cholelithiasis with a small amount of pericholecystic fluid and nonspecific gallbladder wall thickening  -HIDA scan positive, pt underwent perc enrique drainage with IR  post procedure day 2  - C/w  iv abx zosyn  - 1 bottle  BCx growing gram+ cocci in pairs in chain   Streptococcus anginosus+ in both aerobic & anaerobic samples   -Repeat BCx  neg    - low fat diet   , Surgery following dr rousseau

## 2023-07-29 NOTE — PROGRESS NOTE ADULT - PROBLEM SELECTOR PLAN 2
-Admitted to telemetry  -Given 20mg IV cardizem in the ER with improvement in rates  -increased lopressor to 50mg BID for rate control (was on BB in the past)  -[id number] 4: will start full dose LMWH for thromboprophylaxis  -Can consider change to DOAC (i.e. eliquis) in AM  - but hold for ir guided gb  drain - hida +  ordered 2D ECHO to assess cardiac structure and function, r/o valvular pathology will F/U  -Lovenox 80mg q12 started for AC as IR does not recommend starting Eliquis for at least 2-3 days s/p drainage  -Monitor and replete electrolytes for optimal arrhythmia control  -Phos 1.6 -> repletion ordered.   -repeat EKG showed rate of 95   -TSH w/in normal limits, T3 is low, 69  -LE doppler resulted - no DVT evidence b/l   -Cardiology consulted: Dr. Louie gp   -PT in Afib w/ RVR overnight, given IV cardizem 10mg x1 , on full ac Lovenox -Admitted to telemetry  -Given 20mg IV cardizem in the ER with improvement in rates  -increased lopressor to 50mg q6 for rate control /w hold parameters (was on BB in the past)  -[id number] 4: will start full dose LMWH for thromboprophylaxis  -Can consider change to DOAC (i.e. eliquis) in AM  - but hold for ir guided gb  drain - hida +  ordered 2D ECHO to assess cardiac structure and function, r/o valvular pathology will F/U  -Lovenox 80mg q12 started for AC as IR does not recommend starting Eliquis for at least 2-3 days s/p drainage  -Monitor and replete electrolytes for optimal arrhythmia control  -Phos w/in normal limit  -TSH w/in normal limits, T3 is low, 69  -LE doppler resulted - no DVT evidence b/l   -Cardiology consulted: Dr. Louie gp   -PT in Afib w/ RVR overnight again, given IV cardizem 10mg x1 , on full ac Lovenox  -Started on Norvasc 5mg daily for HTN

## 2023-07-29 NOTE — PROGRESS NOTE ADULT - SUBJECTIVE AND OBJECTIVE BOX
Patient is a 78y old  Female who presents with a chief complaint of fall, new onset afib RVR (29 Jul 2023 09:28)      Subjective:  INTERVAL HPI/OVERNIGHT EVENTS: Patient seen and examined at bedside. Pt tachy 130-150s overnight. Given IVp cardizem 10mg. Increased Metoprolol 50mg q6 now. Pt  agitated and confused today - refusing physical exam.    MEDICATIONS  (STANDING):  enoxaparin Injectable 80 milliGRAM(s) SubCutaneous every 12 hours  lactated ringers. 1000 milliLiter(s) (60 mL/Hr) IV Continuous <Continuous>  metoprolol tartrate 50 milliGRAM(s) Oral every 6 hours  piperacillin/tazobactam IVPB.. 3.375 Gram(s) IV Intermittent every 8 hours    MEDICATIONS  (PRN):  acetaminophen     Tablet .. 650 milliGRAM(s) Oral every 6 hours PRN Temp greater or equal to 38C (100.4F)  acetaminophen     Tablet .. 650 milliGRAM(s) Oral every 6 hours PRN Mild Pain (1 - 3)  aluminum hydroxide/magnesium hydroxide/simethicone Suspension 30 milliLiter(s) Oral every 4 hours PRN Dyspepsia  melatonin 3 milliGRAM(s) Oral at bedtime PRN Insomnia  ondansetron Injectable 4 milliGRAM(s) IV Push every 8 hours PRN Nausea and/or Vomiting      Allergies    No Known Allergies    Intolerances        REVIEW OF SYSTEMS:  CONSTITUTIONAL: No fever or chills  HEENT:  No headache, no blurry vision  RESPIRATORY: +cough, +wheeze. No shortness of breath  CARDIOVASCULAR: No chest pain, palpitations  GASTROINTESTINAL: No abd pain, nausea, vomiting, constipation or diarrhea  GENITOURINARY: No dysuria, frequency, or hematuria  NEUROLOGICAL: +confusion. no focal weakness or dizziness  MUSCULOSKELETAL: no myalgias     Objective:  Vital Signs Last 24 Hrs  T(C): 36.6 (29 Jul 2023 08:02), Max: 36.8 (28 Jul 2023 12:45)  T(F): 97.8 (29 Jul 2023 08:02), Max: 98.3 (28 Jul 2023 12:45)  HR: 97 (29 Jul 2023 08:02) (97 - 130)  BP: 156/90 (29 Jul 2023 08:02) (147/92 - 180/102)  BP(mean): --  RR: 20 (28 Jul 2023 12:45) (20 - 20)  SpO2: 97% (29 Jul 2023 08:02) (93% - 97%)    Parameters below as of 29 Jul 2023 08:02  Patient On (Oxygen Delivery Method): nasal cannula  O2 Flow (L/min): 3    PHYSICAL EXAM: Pt refusing physical exam.  GENERAL: sitting upright in cardiac chair eating breakfast  HEAD:  Atraumatic, Normocephalic  NECK: No JVD  NERVOUS SYSTEM:  Alert & Oriented X2.    LABS:                        12.0   10.61 )-----------( 247      ( 29 Jul 2023 07:45 )             35.6     CBC Full  -  ( 29 Jul 2023 07:45 )  WBC Count : 10.61 K/uL  Hemoglobin : 12.0 g/dL  Hematocrit : 35.6 %  Platelet Count - Automated : 247 K/uL  Mean Cell Volume : 91.8 fl  Mean Cell Hemoglobin : 30.9 pg  Mean Cell Hemoglobin Concentration : 33.7 gm/dL  Auto Neutrophil # : 8.28 K/uL  Auto Lymphocyte # : 1.08 K/uL  Auto Monocyte # : 1.10 K/uL  Auto Eosinophil # : 0.06 K/uL  Auto Basophil # : 0.02 K/uL  Auto Neutrophil % : 77.9 %  Auto Lymphocyte % : 10.2 %  Auto Monocyte % : 10.4 %  Auto Eosinophil % : 0.6 %  Auto Basophil % : 0.2 %    29 Jul 2023 07:45    140    |  110    |  15     ----------------------------<  133    3.7     |  22     |  0.72     Ca    8.7        29 Jul 2023 07:45  Phos  3.7       29 Jul 2023 07:45  Mg     2.2       29 Jul 2023 07:45        Urinalysis Basic - ( 29 Jul 2023 07:45 )    Color: x / Appearance: x / SG: x / pH: x  Gluc: 133 mg/dL / Ketone: x  / Bili: x / Urobili: x   Blood: x / Protein: x / Nitrite: x   Leuk Esterase: x / RBC: x / WBC x   Sq Epi: x / Non Sq Epi: x / Bacteria: x      CAPILLARY BLOOD GLUCOSE            Culture - Body Fluid with Gram Stain (collected 07-27-23 @ 16:20)  Source: Bile Bile Fluid  Gram Stain (07-28-23 @ 05:59):    No polymorphonuclear leukocytes seen    No organisms seen    by cytocentrifuge    Culture - Urine (collected 07-27-23 @ 13:34)  Source: Clean Catch Clean Catch (Midstream)  Final Report (07-28-23 @ 21:31):    No growth    Culture - Blood (collected 07-27-23 @ 09:45)  Source: .Blood Blood-Peripheral  Preliminary Report (07-28-23 @ 15:02):    No growth at 24 hours    Culture - Blood (collected 07-27-23 @ 09:40)  Source: .Blood Blood-Peripheral  Preliminary Report (07-28-23 @ 15:02):    No growth at 24 hours    Culture - Blood (collected 07-26-23 @ 03:00)  Source: .Blood Blood-Peripheral  Gram Stain (07-27-23 @ 06:08):    Growth in anaerobic bottle: Gram Positive Cocci in Pairs and Chains    Growth in aerobic bottle: Gram Positive Cocci in Pairs and Chains  Final Report (07-28-23 @ 20:40):    Growth in aerobic and anaerobic bottles: Streptococcus anginosus    Direct identification is available within approximately 3-5    hours either by Blood Panel Multiplexed PCR or Direct    MALDI-TOF. Details: [email or web address]  Organism: Blood Culture PCR  Streptococcus anginosus (07-28-23 @ 20:40)  Organism: Streptococcus anginosus (07-28-23 @ 20:40)      Method Type: SVETA      -  Ceftriaxone: S <=0.25      -  Clindamycin: R >0.5      -  Erythromycin: R >0.5      -  Levofloxacin: S 0.5      -  Penicillin: S <=0.03      -  Vancomycin: S <=0.12  Organism: Blood Culture PCR (07-28-23 @ 20:40)      Method Type: PCR      -  Streptococcus sp. (Not Grp A, B or S pneumoniae): Detec    Culture - Blood (collected 07-26-23 @ 02:58)  Source: .Blood Blood-Peripheral  Gram Stain (07-27-23 @ 08:19):    Growth in anaerobic bottle: Gram Positive Cocci in Pairs and Chains    Growth in aerobic bottle: Gram Positive Cocci in Pairs and Chains  Final Report (07-28-23 @ 20:40):    Growth in aerobic and anaerobic bottles: Streptococcus anginosus    See previous culture [id number]        RADIOLOGY & ADDITIONAL TESTS:    Personally reviewed.     Consultant(s) Notes Reviewed:  [x] YES  [ ] NO     Patient is a 78y old  Female who presents with a chief complaint of fall, new onset afib RVR (29 Jul 2023 09:28)      Subjective:  INTERVAL HPI/OVERNIGHT EVENTS: Patient seen and examined at bedside. Pt tachy 130-150s overnight. Given IVp cardizem 10mg. Increased Metoprolol 50mg q6 now. Pt  agitated and confused today - refusing physical exam - likely hospital delirium as pt is afebrile, WBC downtrending, no metabolic cause, IR tube draining ok.    MEDICATIONS  (STANDING):  enoxaparin Injectable 80 milliGRAM(s) SubCutaneous every 12 hours  lactated ringers. 1000 milliLiter(s) (60 mL/Hr) IV Continuous <Continuous>  metoprolol tartrate 50 milliGRAM(s) Oral every 6 hours  piperacillin/tazobactam IVPB.. 3.375 Gram(s) IV Intermittent every 8 hours    MEDICATIONS  (PRN):  acetaminophen     Tablet .. 650 milliGRAM(s) Oral every 6 hours PRN Temp greater or equal to 38C (100.4F)  acetaminophen     Tablet .. 650 milliGRAM(s) Oral every 6 hours PRN Mild Pain (1 - 3)  aluminum hydroxide/magnesium hydroxide/simethicone Suspension 30 milliLiter(s) Oral every 4 hours PRN Dyspepsia  melatonin 3 milliGRAM(s) Oral at bedtime PRN Insomnia  ondansetron Injectable 4 milliGRAM(s) IV Push every 8 hours PRN Nausea and/or Vomiting      Allergies    No Known Allergies    Intolerances        REVIEW OF SYSTEMS:  CONSTITUTIONAL: No fever or chills  HEENT:  No headache, no blurry vision  RESPIRATORY: +cough, +wheeze. No shortness of breath  CARDIOVASCULAR: No chest pain, palpitations  GASTROINTESTINAL: No abd pain, nausea, vomiting, constipation or diarrhea  GENITOURINARY: No dysuria, frequency, or hematuria  NEUROLOGICAL: +confusion. no focal weakness or dizziness  MUSCULOSKELETAL: no myalgias     Objective:  Vital Signs Last 24 Hrs  T(C): 36.6 (29 Jul 2023 08:02), Max: 36.8 (28 Jul 2023 12:45)  T(F): 97.8 (29 Jul 2023 08:02), Max: 98.3 (28 Jul 2023 12:45)  HR: 97 (29 Jul 2023 08:02) (97 - 130)  BP: 156/90 (29 Jul 2023 08:02) (147/92 - 180/102)  BP(mean): --  RR: 20 (28 Jul 2023 12:45) (20 - 20)  SpO2: 97% (29 Jul 2023 08:02) (93% - 97%)    Parameters below as of 29 Jul 2023 08:02  Patient On (Oxygen Delivery Method): nasal cannula  O2 Flow (L/min): 3    PHYSICAL EXAM: Pt refusing physical exam.  GENERAL: sitting upright in cardiac chair eating breakfast  HEAD:  Atraumatic, Normocephalic  NECK: No JVD  NERVOUS SYSTEM:  Alert & Oriented X2.    LABS:                        12.0   10.61 )-----------( 247      ( 29 Jul 2023 07:45 )             35.6     CBC Full  -  ( 29 Jul 2023 07:45 )  WBC Count : 10.61 K/uL  Hemoglobin : 12.0 g/dL  Hematocrit : 35.6 %  Platelet Count - Automated : 247 K/uL  Mean Cell Volume : 91.8 fl  Mean Cell Hemoglobin : 30.9 pg  Mean Cell Hemoglobin Concentration : 33.7 gm/dL  Auto Neutrophil # : 8.28 K/uL  Auto Lymphocyte # : 1.08 K/uL  Auto Monocyte # : 1.10 K/uL  Auto Eosinophil # : 0.06 K/uL  Auto Basophil # : 0.02 K/uL  Auto Neutrophil % : 77.9 %  Auto Lymphocyte % : 10.2 %  Auto Monocyte % : 10.4 %  Auto Eosinophil % : 0.6 %  Auto Basophil % : 0.2 %    29 Jul 2023 07:45    140    |  110    |  15     ----------------------------<  133    3.7     |  22     |  0.72     Ca    8.7        29 Jul 2023 07:45  Phos  3.7       29 Jul 2023 07:45  Mg     2.2       29 Jul 2023 07:45        Urinalysis Basic - ( 29 Jul 2023 07:45 )    Color: x / Appearance: x / SG: x / pH: x  Gluc: 133 mg/dL / Ketone: x  / Bili: x / Urobili: x   Blood: x / Protein: x / Nitrite: x   Leuk Esterase: x / RBC: x / WBC x   Sq Epi: x / Non Sq Epi: x / Bacteria: x      CAPILLARY BLOOD GLUCOSE            Culture - Body Fluid with Gram Stain (collected 07-27-23 @ 16:20)  Source: Bile Bile Fluid  Gram Stain (07-28-23 @ 05:59):    No polymorphonuclear leukocytes seen    No organisms seen    by cytocentrifuge    Culture - Urine (collected 07-27-23 @ 13:34)  Source: Clean Catch Clean Catch (Midstream)  Final Report (07-28-23 @ 21:31):    No growth    Culture - Blood (collected 07-27-23 @ 09:45)  Source: .Blood Blood-Peripheral  Preliminary Report (07-28-23 @ 15:02):    No growth at 24 hours    Culture - Blood (collected 07-27-23 @ 09:40)  Source: .Blood Blood-Peripheral  Preliminary Report (07-28-23 @ 15:02):    No growth at 24 hours    Culture - Blood (collected 07-26-23 @ 03:00)  Source: .Blood Blood-Peripheral  Gram Stain (07-27-23 @ 06:08):    Growth in anaerobic bottle: Gram Positive Cocci in Pairs and Chains    Growth in aerobic bottle: Gram Positive Cocci in Pairs and Chains  Final Report (07-28-23 @ 20:40):    Growth in aerobic and anaerobic bottles: Streptococcus anginosus    Direct identification is available within approximately 3-5    hours either by Blood Panel Multiplexed PCR or Direct    MALDI-TOF. Details: [email or web address]  Organism: Blood Culture PCR  Streptococcus anginosus (07-28-23 @ 20:40)  Organism: Streptococcus anginosus (07-28-23 @ 20:40)      Method Type: SVETA      -  Ceftriaxone: S <=0.25      -  Clindamycin: R >0.5      -  Erythromycin: R >0.5      -  Levofloxacin: S 0.5      -  Penicillin: S <=0.03      -  Vancomycin: S <=0.12  Organism: Blood Culture PCR (07-28-23 @ 20:40)      Method Type: PCR      -  Streptococcus sp. (Not Grp A, B or S pneumoniae): Detec    Culture - Blood (collected 07-26-23 @ 02:58)  Source: .Blood Blood-Peripheral  Gram Stain (07-27-23 @ 08:19):    Growth in anaerobic bottle: Gram Positive Cocci in Pairs and Chains    Growth in aerobic bottle: Gram Positive Cocci in Pairs and Chains  Final Report (07-28-23 @ 20:40):    Growth in aerobic and anaerobic bottles: Streptococcus anginosus    See previous culture [id number]        RADIOLOGY & ADDITIONAL TESTS:    Personally reviewed.     Consultant(s) Notes Reviewed:  [x] YES  [ ] NO

## 2023-07-29 NOTE — PROGRESS NOTE ADULT - ASSESSMENT
78y female with PMHx of HTN, HLD presented to the ED for the evaluation of fall out of chair.   Cardiology consulted for new onset atrial fibrillation with RVR.    New onset afib, rhabdo sp fall, acute cholecystitis   - No clear evidence of acute ischemia, trops mildly elevated but downtrended. No need to further trend.  No anginal complaints  - CK elevated 5502 --> 5621; doubt acute plaque rupture in setting of fall and rhabdomyolysis.  Now trending down   - Monitor for the development of anginal symptoms or clinical signs of ischemia and do EKG    New onset Afib with RVR  - EKG on admission showed atrial fibrillation with RVR, rate 143  - Tele overnight with periods of unsustained tachycardia at 140's, some with reactive component to pain.  Increase BB to 50 mg q6H with parameters  - If needed, can add Cardizem  mg daily  - Can switch FD Lovenox to DOAC if no surgical contraindication  - TTE: EF 45% with no significant valvular disease    - Chest showed evidence of pulmonary scarring which may be d/t pulmonary fibrosis - f/u pulmonary recs   - Now on NC with some SHEPPARD  - Initiate incentive spirometer    - Monitor and replete electrolytes. Keep K>4.0 and Mg>2.0.  - Will continue to follow    Gretta Torres DNP, NP-C, AGACNP-C  Cardiology   Call TEAMS

## 2023-07-29 NOTE — PROGRESS NOTE ADULT - SUBJECTIVE AND OBJECTIVE BOX
[facility] -- Neo Vega Pannella, Patel, Savella, Goodger  Office # [phone]    Follow Up:  Cardiac Optimization, Afib, HFrEF    Subjective/Observations: Sitting on the chair with NC at 3L/min.  Mildy confused.  Denies postop pain.  Noted to be dyspneic but denies SOB.  Denies CP or palpitations.  Tele events noted.      REVIEW OF SYSTEMS: All other review of systems is negative unless indicated above  PAST MEDICAL & SURGICAL HISTORY:  HTN (hypertension)  Hyperlipidemia  Tinnitus  right ear  Seasonal allergies  S/P knee replacement    MEDICATIONS  (STANDING):  enoxaparin Injectable 80 milliGRAM(s) SubCutaneous every 12 hours  lactated ringers. 1000 milliLiter(s) (60 mL/Hr) IV Continuous <Continuous>  metoprolol tartrate 50 milliGRAM(s) Oral every 6 hours  piperacillin/tazobactam IVPB.. 3.375 Gram(s) IV Intermittent every 8 hours    MEDICATIONS  (PRN):  acetaminophen     Tablet .. 650 milliGRAM(s) Oral every 6 hours PRN Mild Pain (1 - 3)  acetaminophen     Tablet .. 650 milliGRAM(s) Oral every 6 hours PRN Temp greater or equal to 38C (100.4F)  aluminum hydroxide/magnesium hydroxide/simethicone Suspension 30 milliLiter(s) Oral every 4 hours PRN Dyspepsia  melatonin 3 milliGRAM(s) Oral at bedtime PRN Insomnia  ondansetron Injectable 4 milliGRAM(s) IV Push every 8 hours PRN Nausea and/or Vomiting    Allergies    No Known Allergies    Intolerances    Vital Signs Last 24 Hrs  T(C): 36.6 (29 Jul 2023 08:02), Max: 36.8 (28 Jul 2023 12:45)  T(F): 97.8 (29 Jul 2023 08:02), Max: 98.3 (28 Jul 2023 12:45)  HR: 97 (29 Jul 2023 08:02) (97 - 132)  BP: 156/90 (29 Jul 2023 08:02) (146/83 - 180/102)  BP(mean): --  RR: 20 (28 Jul 2023 12:45) (20 - 20)  SpO2: 97% (29 Jul 2023 08:02) (93% - 97%)    Parameters below as of 29 Jul 2023 08:02  Patient On (Oxygen Delivery Method): nasal cannula  O2 Flow (L/min): 3    I&O's Summary    28 Jul 2023 07:01  -  29 Jul 2023 07:00  --------------------------------------------------------  IN: 1320 mL / OUT: 20 mL / NET: 1300 mL    PHYSICAL EXAM:  TELE: Afib with nonsustained tachy at 140's  Constitutional: NAD, awake and alert, obese  HEENT: Moist Mucous Membranes, Anicteric  Pulmonary: Non-labored, breath sounds are clear bilaterally, No wheezing, rales or rhonchi  Cardiovascular: IRRR, S1 and S2, No murmurs, rubs, gallops or clicks  Gastrointestinal: Bowel Sounds present, soft, nontender.   Rt enrique tube  Lymph: No peripheral edema. No lymphadenopathy.  Skin: No visible rashes or ulcers.  Psych:  Mood & affect appropriate    LABS: All Labs Reviewed:                        12.0   10.61 )-----------( 247      ( 29 Jul 2023 07:45 )             35.6                         12.6   11.96 )-----------( 221      ( 28 Jul 2023 08:40 )             38.4                         12.3   15.29 )-----------( 170      ( 27 Jul 2023 07:47 )             37.6     29 Jul 2023 07:45    140    |  110    |  15     ----------------------------<  133    3.7     |  22     |  0.72   28 Jul 2023 08:40    138    |  107    |  13     ----------------------------<  134    3.8     |  25     |  0.83   27 Jul 2023 20:44    x      |  x      |  x      ----------------------------<  x      3.9     |  x      |  x        Ca    8.7        29 Jul 2023 07:45  Ca    8.9        28 Jul 2023 08:40  Ca    8.4        27 Jul 2023 18:35  Phos  3.7       29 Jul 2023 07:45  Phos  1.6       28 Jul 2023 08:40  Phos  3.2       27 Jul 2023 07:47  Mg     2.2       29 Jul 2023 07:45  Mg     2.3       28 Jul 2023 08:40  Mg     2.4       27 Jul 2023 07:47    TPro  7.4    /  Alb  2.7    /  TBili  0.9    /  DBili  x      /  AST  74     /  ALT  67     /  AlkPhos  112    28 Jul 2023 08:40  TPro  6.7    /  Alb  2.5    /  TBili  0.9    /  DBili  x      /  AST  74     /  ALT  56     /  AlkPhos  108    27 Jul 2023 07:47    12 Lead ECG:   Ventricular Rate 95 BPM    QRS Duration 70 ms    Q-T Interval 388 ms    QTC Calculation(Bazett) 487 ms    R Axis 38 degrees    T Axis 96 degrees    Diagnosis Line Atrial fibrillation  Nonspecific T wave abnormality  Prolonged QT  Abnormal ECG  Whencompared with ECG of 25-JUL-2023 20:54, (Unconfirmed)  Vent. rate has decreased BY  48 BPM  Confirmed by Feli Louie ([id number]) on 7/26/2023 10:10:05 AM (07-26-23 @ 02:03)  TRANSTHORACIC ECHOCARDIOGRAM REPORT  ________________________________________________________________________________                                      ______   Pt. Name:       CHAY VELEZ Study Date:    7/26/2023  MRN:            [id number]         YOB: 1945  Accession #:    [id number]        Age:           78 years  Account#:       [id number]       Gender:        F  Heart Rate:                      Height:        62.99 in (160.00 cm)  Rhythm:                          Weight:        160.93 lb (73.00 kg)  Blood Pressure: 132/80 mmHg      BSA/BMI:       1.76 m² / 28.52 kg/m²  ________________________________________________________________________________________  Referring Physician:    Delta Castro  Interpreting Physician: Feli Louie MD  Primary Sonographer:    Glory Gu RD    CPT:               ECHO TTE WO CON COMP W DOPP - 19568.m  Indication(s):     Unspecified atrial fibrillation - I48.91  Procedure:         Transthoracic echocardiogram with 2-D, M-mode and complete                     spectral and color flow Doppler.  Ordering Location: [facility]  Study Information: Image quality for this study is technically difficult.  _______________________________________________________________________________________  CONCLUSIONS:      1. Technically difficult image quality.   2. The left ventricular endocardium is not well visualized. Overall the LV function appears to be mildly reduced with an ejection fraction of 45%. Cannot rule out segmental abnormalities.   3. Normal right ventricular cavity size, normal right ventricular wall thickness and normal right ventricular systolic function.   4. The aortic valve is not well visualized, appears mildly calcified with grossly normal opening.   5. Trace mitral regurgitation.   6. Trace tricuspid regurgitation.   7. The inferior vena cava is normal in size measuring 2.14 cm in diameter, (normal <2.1cm) with normal inspiratory collapse (normal >50%) consistent with normal right atrial pressure (~3, range 0-5mmHg).  _____________________________________________________________________________________  FINDINGS:     Left Ventricle:  The left ventricular systolic function is mildly decreased. The left ventricular endocardium is not well visualized. Overall the LV function appears to be mildly reduced with an ejection fraction of 45%. Cannot rule out segmental abnormalities.     Right Ventricle:  Normal right ventricular cavity size, normal wall thickness and normal right ventricular systolic function.     Left Atrium:  The left atrium is normal in size.     Right Atrium:  The right atrium is normal in size.     Aortic Valve:  The aortic valve is not well visualized, appears mildly calcified with grossly normal opening. There is noevidence of aortic regurgitation.     Mitral Valve:  There is trace mitral regurgitation.     Tricuspid Valve:  There is trace tricuspid regurgitation.     Pulmonic Valve:  Structurally normal pulmonic valve with normal leaflet excursion. There is trace pulmonic regurgitation.     Systemic Veins:  The inferior vena cava is normal in size measuring 2.14 cm in diameter, (normal <2.1cm) with normal inspiratory collapse (normal >50%) consistent with normal right atrial pressure (~3, range 0-5mmHg).  ____________________________________________________________________  Quantitative Data:  Left Ventricle Measurements: (Indexed to BSA)     IVSd (2D):   1.1 cm  LVPWd (2D):  0.9 cm  LVIDd (2D):  4.9 cm  LVIDs (2D):  3.6 cm  LV Mass:     177 g  100.7 g/m²     MV E Vmax:    1.00 m/s  e' lateral:   16.40 cm/s  e' medial:    12.30 cm/s  E/e' lateral: 6.07  E/e' medial:  8.10  E/e' Average: 7.23  MV DT:        118 msec    Aorta Measurements:     Ao Sinus: 3.1 cm    Left Atrium Measurements: (Indexedto BSA)  LA Diam 2D: 3.40 cm    LVOT / RVOT/ Qp/Qs Data: (Indexed to BSA)  Mitral Valve Measurements:     MV E Vmax: 1.0 m/s    Tricuspid Valve Measurements:     RA Pressure: 3 mmHg  ________________________________________________________________________________________  Electronically signed on 7/27/2023 at 4:22:35 PM by Feli Louie MD    *** Final ***

## 2023-07-30 LAB
-  AMIKACIN: SIGNIFICANT CHANGE UP
-  AMIKACIN: SIGNIFICANT CHANGE UP
-  AMOXICILLIN/CLAVULANIC ACID: SIGNIFICANT CHANGE UP
-  AMOXICILLIN/CLAVULANIC ACID: SIGNIFICANT CHANGE UP
-  AMPICILLIN/SULBACTAM: SIGNIFICANT CHANGE UP
-  AMPICILLIN/SULBACTAM: SIGNIFICANT CHANGE UP
-  AMPICILLIN: SIGNIFICANT CHANGE UP
-  AMPICILLIN: SIGNIFICANT CHANGE UP
-  AZTREONAM: SIGNIFICANT CHANGE UP
-  AZTREONAM: SIGNIFICANT CHANGE UP
-  CEFAZOLIN: SIGNIFICANT CHANGE UP
-  CEFAZOLIN: SIGNIFICANT CHANGE UP
-  CEFEPIME: SIGNIFICANT CHANGE UP
-  CEFEPIME: SIGNIFICANT CHANGE UP
-  CEFOXITIN: SIGNIFICANT CHANGE UP
-  CEFOXITIN: SIGNIFICANT CHANGE UP
-  CEFTRIAXONE: SIGNIFICANT CHANGE UP
-  CEFTRIAXONE: SIGNIFICANT CHANGE UP
-  CIPROFLOXACIN: SIGNIFICANT CHANGE UP
-  CIPROFLOXACIN: SIGNIFICANT CHANGE UP
-  ERTAPENEM: SIGNIFICANT CHANGE UP
-  ERTAPENEM: SIGNIFICANT CHANGE UP
-  GENTAMICIN: SIGNIFICANT CHANGE UP
-  GENTAMICIN: SIGNIFICANT CHANGE UP
-  IMIPENEM: SIGNIFICANT CHANGE UP
-  IMIPENEM: SIGNIFICANT CHANGE UP
-  LEVOFLOXACIN: SIGNIFICANT CHANGE UP
-  LEVOFLOXACIN: SIGNIFICANT CHANGE UP
-  MEROPENEM: SIGNIFICANT CHANGE UP
-  MEROPENEM: SIGNIFICANT CHANGE UP
-  PIPERACILLIN/TAZOBACTAM: SIGNIFICANT CHANGE UP
-  PIPERACILLIN/TAZOBACTAM: SIGNIFICANT CHANGE UP
-  TOBRAMYCIN: SIGNIFICANT CHANGE UP
-  TOBRAMYCIN: SIGNIFICANT CHANGE UP
-  TRIMETHOPRIM/SULFAMETHOXAZOLE: SIGNIFICANT CHANGE UP
-  TRIMETHOPRIM/SULFAMETHOXAZOLE: SIGNIFICANT CHANGE UP
ALBUMIN SERPL ELPH-MCNC: 2.6 G/DL — LOW (ref 3.3–5)
ALP SERPL-CCNC: 116 U/L — SIGNIFICANT CHANGE UP (ref 40–120)
ALT FLD-CCNC: 81 U/L — HIGH (ref 12–78)
ANION GAP SERPL CALC-SCNC: 9 MMOL/L — SIGNIFICANT CHANGE UP (ref 5–17)
AST SERPL-CCNC: 63 U/L — HIGH (ref 15–37)
BASOPHILS # BLD AUTO: 0.04 K/UL — SIGNIFICANT CHANGE UP (ref 0–0.2)
BASOPHILS NFR BLD AUTO: 0.5 % — SIGNIFICANT CHANGE UP (ref 0–2)
BILIRUB SERPL-MCNC: 0.7 MG/DL — SIGNIFICANT CHANGE UP (ref 0.2–1.2)
BUN SERPL-MCNC: 15 MG/DL — SIGNIFICANT CHANGE UP (ref 7–23)
CALCIUM SERPL-MCNC: 8.7 MG/DL — SIGNIFICANT CHANGE UP (ref 8.5–10.1)
CHLORIDE SERPL-SCNC: 110 MMOL/L — HIGH (ref 96–108)
CO2 SERPL-SCNC: 25 MMOL/L — SIGNIFICANT CHANGE UP (ref 22–31)
CREAT SERPL-MCNC: 0.76 MG/DL — SIGNIFICANT CHANGE UP (ref 0.5–1.3)
EGFR: 81 ML/MIN/1.73M2 — SIGNIFICANT CHANGE UP
EOSINOPHIL # BLD AUTO: 0.08 K/UL — SIGNIFICANT CHANGE UP (ref 0–0.5)
EOSINOPHIL NFR BLD AUTO: 1 % — SIGNIFICANT CHANGE UP (ref 0–6)
GLUCOSE SERPL-MCNC: 123 MG/DL — HIGH (ref 70–99)
HCT VFR BLD CALC: 36.7 % — SIGNIFICANT CHANGE UP (ref 34.5–45)
HGB BLD-MCNC: 12.2 G/DL — SIGNIFICANT CHANGE UP (ref 11.5–15.5)
IMM GRANULOCYTES NFR BLD AUTO: 1.3 % — HIGH (ref 0–0.9)
LYMPHOCYTES # BLD AUTO: 1.11 K/UL — SIGNIFICANT CHANGE UP (ref 1–3.3)
LYMPHOCYTES # BLD AUTO: 13.3 % — SIGNIFICANT CHANGE UP (ref 13–44)
MAGNESIUM SERPL-MCNC: 2.1 MG/DL — SIGNIFICANT CHANGE UP (ref 1.6–2.6)
MCHC RBC-ENTMCNC: 30.8 PG — SIGNIFICANT CHANGE UP (ref 27–34)
MCHC RBC-ENTMCNC: 33.2 GM/DL — SIGNIFICANT CHANGE UP (ref 32–36)
MCV RBC AUTO: 92.7 FL — SIGNIFICANT CHANGE UP (ref 80–100)
METHOD TYPE: SIGNIFICANT CHANGE UP
METHOD TYPE: SIGNIFICANT CHANGE UP
MONOCYTES # BLD AUTO: 0.92 K/UL — HIGH (ref 0–0.9)
MONOCYTES NFR BLD AUTO: 11.1 % — SIGNIFICANT CHANGE UP (ref 2–14)
NEUTROPHILS # BLD AUTO: 6.06 K/UL — SIGNIFICANT CHANGE UP (ref 1.8–7.4)
NEUTROPHILS NFR BLD AUTO: 72.8 % — SIGNIFICANT CHANGE UP (ref 43–77)
NRBC # BLD: 0 /100 WBCS — SIGNIFICANT CHANGE UP (ref 0–0)
PHOSPHATE SERPL-MCNC: 3.5 MG/DL — SIGNIFICANT CHANGE UP (ref 2.5–4.5)
PLATELET # BLD AUTO: 266 K/UL — SIGNIFICANT CHANGE UP (ref 150–400)
POTASSIUM SERPL-MCNC: 3.5 MMOL/L — SIGNIFICANT CHANGE UP (ref 3.5–5.3)
POTASSIUM SERPL-SCNC: 3.5 MMOL/L — SIGNIFICANT CHANGE UP (ref 3.5–5.3)
PROT SERPL-MCNC: 7.2 G/DL — SIGNIFICANT CHANGE UP (ref 6–8.3)
RBC # BLD: 3.96 M/UL — SIGNIFICANT CHANGE UP (ref 3.8–5.2)
RBC # FLD: 13.4 % — SIGNIFICANT CHANGE UP (ref 10.3–14.5)
SODIUM SERPL-SCNC: 144 MMOL/L — SIGNIFICANT CHANGE UP (ref 135–145)
WBC # BLD: 8.32 K/UL — SIGNIFICANT CHANGE UP (ref 3.8–10.5)
WBC # FLD AUTO: 8.32 K/UL — SIGNIFICANT CHANGE UP (ref 3.8–10.5)

## 2023-07-30 PROCEDURE — 99232 SBSQ HOSP IP/OBS MODERATE 35: CPT

## 2023-07-30 PROCEDURE — 99233 SBSQ HOSP IP/OBS HIGH 50: CPT | Mod: GC

## 2023-07-30 PROCEDURE — 99233 SBSQ HOSP IP/OBS HIGH 50: CPT

## 2023-07-30 RX ORDER — DILTIAZEM HCL 120 MG
120 CAPSULE, EXT RELEASE 24 HR ORAL DAILY
Refills: 0 | Status: DISCONTINUED | OUTPATIENT
Start: 2023-07-30 | End: 2023-08-01

## 2023-07-30 RX ORDER — DILTIAZEM HCL 120 MG
10 CAPSULE, EXT RELEASE 24 HR ORAL ONCE
Refills: 0 | Status: COMPLETED | OUTPATIENT
Start: 2023-07-30 | End: 2023-07-30

## 2023-07-30 RX ORDER — APIXABAN 2.5 MG/1
5 TABLET, FILM COATED ORAL EVERY 12 HOURS
Refills: 0 | Status: DISCONTINUED | OUTPATIENT
Start: 2023-07-30 | End: 2023-08-03

## 2023-07-30 RX ADMIN — APIXABAN 5 MILLIGRAM(S): 2.5 TABLET, FILM COATED ORAL at 17:22

## 2023-07-30 RX ADMIN — ENOXAPARIN SODIUM 80 MILLIGRAM(S): 100 INJECTION SUBCUTANEOUS at 06:20

## 2023-07-30 RX ADMIN — Medication 50 MILLIGRAM(S): at 11:21

## 2023-07-30 RX ADMIN — PIPERACILLIN AND TAZOBACTAM 25 GRAM(S): 4; .5 INJECTION, POWDER, LYOPHILIZED, FOR SOLUTION INTRAVENOUS at 13:04

## 2023-07-30 RX ADMIN — Medication 50 MILLIGRAM(S): at 06:20

## 2023-07-30 RX ADMIN — Medication 120 MILLIGRAM(S): at 10:20

## 2023-07-30 RX ADMIN — PIPERACILLIN AND TAZOBACTAM 25 GRAM(S): 4; .5 INJECTION, POWDER, LYOPHILIZED, FOR SOLUTION INTRAVENOUS at 23:28

## 2023-07-30 RX ADMIN — Medication 50 MILLIGRAM(S): at 17:22

## 2023-07-30 RX ADMIN — Medication 10 MILLIGRAM(S): at 01:09

## 2023-07-30 RX ADMIN — PIPERACILLIN AND TAZOBACTAM 25 GRAM(S): 4; .5 INJECTION, POWDER, LYOPHILIZED, FOR SOLUTION INTRAVENOUS at 06:20

## 2023-07-30 NOTE — PROGRESS NOTE ADULT - PROBLEM SELECTOR PLAN 8
Lives alone and walks unassisted at baseline  Fell off chair and was unable to get up  Fall precautions  PT evaluation - - -

## 2023-07-30 NOTE — PROGRESS NOTE ADULT - PROBLEM SELECTOR PLAN 1
-CT scan in the ER revealed findings consistent w/ acute enrique (Distended GB w/ jd-cholecystic fluid)  -RUQ ultrasound showed Cholelithiasis with a small amount of pericholecystic fluid and nonspecific gallbladder wall thickening  -HIDA scan positive, pt underwent perc enrique drainage with IR  post procedure day 3  - C/w  iv abx zosyn  - 1 bottle  BCx growing gram+ cocci in pairs in chain   Streptococcus anginosus+ in both aerobic & anaerobic samples  -Repeat BCx  neg    - low fat diet, Surgery following dr rousseau -CT scan in the ER revealed findings consistent w/ acute enrique (Distended GB w/ jd-cholecystic fluid)  -RUQ ultrasound showed Cholelithiasis with a small amount of pericholecystic fluid and nonspecific gallbladder wall thickening  -HIDA scan positive, pt underwent perc enrique drainage with IR  post procedure day 3  - C/w  iv abx zosyn  - 1 bottle  BCx growing gram+ cocci in pairs in chain   Streptococcus anginosus+ in both aerobic & anaerobic samples -Repeat BCx  neg    - low fat diet, Surgery following dr rousseau

## 2023-07-30 NOTE — PROGRESS NOTE ADULT - ASSESSMENT
78y female with PMHx of HTN, HLD presented to the ED for the evaluation of fall out of chair. States that she was sitting on the chair and fell backwards, hitting her head. Was on the floor for over an hour and was unable to get up.    pulm nodule - 1.8 cm  eval for acute enrique  HTN  HLD  OP  OA  Dyspnea  Ataxic gait  Hard of hearing  AF  Rhabdo    rapid AF  vs noted  labs reviewed  remains on o2 support  cardio f/u  on ZOSYN  on Lovenox full dose BID  tele monitoring in progress    serial renal indices  I and O  monitor VS and HD and Sat  trend LABS  on o2 support - monitor for dyspnea - Blood gas - CT imaging without acute pulm path -   Pulm nodule - 1.8 cm - will need f/u and repeat imaging and likely PET scan  Fall prec - PT assessment  AF management - eval for HF - on AC full dose -   TTE  trend TROPS  cardio eval  tele monitoring  proBNP noted

## 2023-07-30 NOTE — CASE MANAGEMENT PROGRESS NOTE - NSCMPROGRESSNOTE_GEN_ALL_CORE
CM met with patient and daughter Melissa at bedside, Patients daughter interested in home care and help at home. Homecare referral sent to [facility] and accepted.  Homecare provisions reviewed with family. Private hire list provided to family as patient lives alone. Patient will be available to transport home when medically cleared. CM met with patient and daughter Melissa at bedside, Patients daughter interested in home care and help at home. Patient family will be available and are willing to learn how to manage enrique drain.  Homecare referral sent to [facility] and accepted.  Homecare provisions reviewed with family. Private hire list provided to family as patient lives alone. Patient will be available to transport home when medically cleared.

## 2023-07-30 NOTE — CHART NOTE - NSCHARTNOTEFT_GEN_A_CORE
Called by RN for patient with HR sustaining 130s-140s afib on tele  - patient asx asleep in bed  - on lopressor 50mg q6h  - will give cardizem 10mg IV once stat  - will dc amlodipine in case primary team to start cardizem PO daily  - RN to call if changes

## 2023-07-30 NOTE — PROGRESS NOTE ADULT - PROBLEM SELECTOR PLAN 2
-Admitted to telemetry  -Given 20mg IV cardizem in the ER with improvement in rates  -increased lopressor to 50mg q6 for rate control /w hold parameters (was on BB in the past)  -[id number] 4: will start full dose LMWH for thromboprophylaxis  -Can consider change to DOAC (i.e. eliquis) in AM  - but hold for ir guided gb  drain - hida +  ordered 2D ECHO to assess cardiac structure and function, r/o valvular pathology will F/U  -Lovenox 80mg q12 started for AC as IR does not recommend starting Eliquis for at least 2-3 days s/p drainage  -Monitor and replete electrolytes for optimal arrhythmia control  -Phos w/in normal limit  -TSH w/in normal limits, T3 is low, 69  -LE doppler resulted - no DVT evidence b/l   -Cardiology consulted: Dr. Louie gp   -PT in Afib w/ RVR overnight again, given IV cardizem 10mg x1 , AC switched to Eliquis  -Norvasc 5mg daily for HTN discontinued, Started on Diltiazem 120mg daily  -7/30: Dc lovenox, Started Eliquis 5mg BID  -Metoprolol 50mg q6 -Admitted to telemetry  -Given 20mg IV cardizem in the ER with improvement in rates  -increased lopressor to 50mg q6 for rate control /w hold parameters (was on BB in the past)  -[id number] 4: will start full dose LMWH for thromboprophylaxis  -Can consider change to DOAC (i.e. eliquis) in AM  - but hold for ir guided gb  drain - hida +  ordered 2D ECHO to assess cardiac structure and function, r/o valvular pathology will F/U  -Lovenox 80mg q12 started for AC as IR does not recommend starting Eliquis for at least 2-3 days s/p drainage  -Monitor and replete electrolytes for optimal arrhythmia control  -Phos w/in normal limit  -TSH w/in normal limits, T3 is low, 69  -LE doppler resulted - no DVT evidence b/l   -Cardiology consulted: Dr. Louie gp   -PT in Afib w/ RVR overnight again, given IV cardizem 10mg x1 , AC switched to Eliquis  -Norvasc 5mg daily for HTN discontinued, Started on Diltiazem 120mg daily  -7/30: Dc Lovenox, Started Eliquis 5mg BID  -Metoprolol 50mg q6

## 2023-07-30 NOTE — PROGRESS NOTE ADULT - SUBJECTIVE AND OBJECTIVE BOX
Date/Time Patient Seen:  		  Referring MD:   Data Reviewed	       Patient is a 78y old  Female who presents with a chief complaint of fall, new onset afib RVR (29 Jul 2023 12:08)      Subjective/HPI     PAST MEDICAL & SURGICAL HISTORY:  HTN (hypertension)    Hyperlipidemia    Tinnitus  right ear    Seasonal allergies    No significant past surgical history    S/P knee replacement          Medication list         MEDICATIONS  (STANDING):  enoxaparin Injectable 80 milliGRAM(s) SubCutaneous every 12 hours  lactated ringers. 1000 milliLiter(s) (60 mL/Hr) IV Continuous <Continuous>  metoprolol tartrate 50 milliGRAM(s) Oral every 6 hours  piperacillin/tazobactam IVPB.. 3.375 Gram(s) IV Intermittent every 8 hours    MEDICATIONS  (PRN):  acetaminophen     Tablet .. 650 milliGRAM(s) Oral every 6 hours PRN Temp greater or equal to 38C (100.4F)  acetaminophen     Tablet .. 650 milliGRAM(s) Oral every 6 hours PRN Mild Pain (1 - 3)  aluminum hydroxide/magnesium hydroxide/simethicone Suspension 30 milliLiter(s) Oral every 4 hours PRN Dyspepsia  melatonin 3 milliGRAM(s) Oral at bedtime PRN Insomnia  ondansetron Injectable 4 milliGRAM(s) IV Push every 8 hours PRN Nausea and/or Vomiting         Vitals log        ICU Vital Signs Last 24 Hrs  T(C): 36.6 (30 Jul 2023 05:25), Max: 36.6 (29 Jul 2023 12:14)  T(F): 97.8 (30 Jul 2023 05:25), Max: 97.9 (29 Jul 2023 12:14)  HR: 109 (30 Jul 2023 05:25) (102 - 124)  BP: 159/91 (30 Jul 2023 05:25) (144/95 - 175/108)  BP(mean): --  ABP: --  ABP(mean): --  RR: 19 (30 Jul 2023 05:25) (18 - 19)  SpO2: 93% (30 Jul 2023 05:25) (93% - 96%)    O2 Parameters below as of 30 Jul 2023 05:25  Patient On (Oxygen Delivery Method): nasal cannula  O2 Flow (L/min): 3               Input and Output:  I&O's Detail    29 Jul 2023 07:01  -  30 Jul 2023 07:00  --------------------------------------------------------  IN:  Total IN: 0 mL    OUT:    Drain (mL): 25 mL  Total OUT: 25 mL    Total NET: -25 mL          Lab Data                        12.2   8.32  )-----------( 266      ( 30 Jul 2023 06:30 )             36.7     07-30    144  |  110<H>  |  15  ----------------------------<  123<H>  3.5   |  25  |  0.76    Ca    8.7      30 Jul 2023 06:30  Phos  3.5     07-30  Mg     2.1     07-30    TPro  7.2  /  Alb  2.6<L>  /  TBili  0.7  /  DBili  x   /  AST  63<H>  /  ALT  81<H>  /  AlkPhos  116  07-30            Review of Systems	      Objective     Physical Examination  heart s1s2  lung dc BS  head nc        Pertinent Lab findings & Imaging      Ceci:  NO   Adequate UO     I&O's Detail    29 Jul 2023 07:01  -  30 Jul 2023 07:00  --------------------------------------------------------  IN:  Total IN: 0 mL    OUT:    Drain (mL): 25 mL  Total OUT: 25 mL    Total NET: -25 mL               Discussed with:     Cultures:	        Radiology

## 2023-07-30 NOTE — PROGRESS NOTE ADULT - SUBJECTIVE AND OBJECTIVE BOX
[facility] -- Neo Vega Pannella, Patel, Savella, Goodger  Office # [phone]    Follow Up:  Cardiac Optimization, Afib, HFmodEF    Subjective/Observations: Sleeping but arousable to name-calling.  Per staff, patient was very restless overnight, HR went up to 150's, s/p Cardizem 10 mg IV x 1.  Non-orthopneic on RA    REVIEW OF SYSTEMS: All other review of systems is negative unless indicated above  PAST MEDICAL & SURGICAL HISTORY:  HTN (hypertension)  Hyperlipidemia  Tinnitus  right ear  Seasonal allergies  S/P knee replacement    MEDICATIONS  (STANDING):  enoxaparin Injectable 80 milliGRAM(s) SubCutaneous every 12 hours  lactated ringers. 1000 milliLiter(s) (60 mL/Hr) IV Continuous <Continuous>  metoprolol tartrate 50 milliGRAM(s) Oral every 6 hours  piperacillin/tazobactam IVPB.. 3.375 Gram(s) IV Intermittent every 8 hours    MEDICATIONS  (PRN):  acetaminophen     Tablet .. 650 milliGRAM(s) Oral every 6 hours PRN Mild Pain (1 - 3)  acetaminophen     Tablet .. 650 milliGRAM(s) Oral every 6 hours PRN Temp greater or equal to 38C (100.4F)  aluminum hydroxide/magnesium hydroxide/simethicone Suspension 30 milliLiter(s) Oral every 4 hours PRN Dyspepsia  melatonin 3 milliGRAM(s) Oral at bedtime PRN Insomnia  ondansetron Injectable 4 milliGRAM(s) IV Push every 8 hours PRN Nausea and/or Vomiting    Allergies    No Known Allergies    Intolerances    Vital Signs Last 24 Hrs  T(C): 36.6 (30 Jul 2023 05:25), Max: 36.6 (29 Jul 2023 12:14)  T(F): 97.8 (30 Jul 2023 05:25), Max: 97.9 (29 Jul 2023 12:14)  HR: 109 (30 Jul 2023 05:25) (102 - 124)  BP: 159/91 (30 Jul 2023 05:25) (144/95 - 175/108)  BP(mean): --  RR: 19 (30 Jul 2023 05:25) (18 - 19)  SpO2: 93% (30 Jul 2023 05:25) (93% - 96%)    Parameters below as of 30 Jul 2023 05:25  Patient On (Oxygen Delivery Method): nasal cannula  O2 Flow (L/min): 3    I&O's Summary    29 Jul 2023 07:01  -  30 Jul 2023 07:00  --------------------------------------------------------  IN: 0 mL / OUT: 25 mL / NET: -25 mL    PHYSICAL EXAM:  TELE: Afib with nonsustained tachy at 150's  Constitutional: NAD, asleep but arousable, obese  HEENT: Moist Mucous Membranes, Anicteric  Pulmonary: Non-labored, breath sounds are clear bilaterally, No wheezing, rales or rhonchi  Cardiovascular: IRRR, S1 and S2, No murmurs, rubs, gallops or clicks  Gastrointestinal: Bowel Sounds present, soft, nontender.   Rt enrique tube  Lymph: 1+ BLE edema. No lymphadenopathy.  Skin: No visible rashes or ulcers.  Psych:  Mood & affect: very restless overnight    LABS: All Labs Reviewed:                        12.2   8.32  )-----------( 266      ( 30 Jul 2023 06:30 )             36.7                         12.0   10.61 )-----------( 247      ( 29 Jul 2023 07:45 )             35.6                         12.6   11.96 )-----------( 221      ( 28 Jul 2023 08:40 )             38.4     30 Jul 2023 06:30    144    |  110    |  15     ----------------------------<  123    3.5     |  25     |  0.76   29 Jul 2023 07:45    140    |  110    |  15     ----------------------------<  133    3.7     |  22     |  0.72   28 Jul 2023 08:40    138    |  107    |  13     ----------------------------<  134    3.8     |  25     |  0.83     Ca    8.7        30 Jul 2023 06:30  Ca    8.7        29 Jul 2023 07:45  Ca    8.9        28 Jul 2023 08:40  Phos  3.5       30 Jul 2023 06:30  Phos  3.7       29 Jul 2023 07:45  Phos  1.6       28 Jul 2023 08:40  Mg     2.1       30 Jul 2023 06:30  Mg     2.2       29 Jul 2023 07:45  Mg     2.3       28 Jul 2023 08:40    TPro  7.2    /  Alb  2.6    /  TBili  0.7    /  DBili  x      /  AST  63     /  ALT  81     /  AlkPhos  116    30 Jul 2023 06:30  TPro  7.4    /  Alb  2.7    /  TBili  0.9    /  DBili  x      /  AST  74     /  ALT  67     /  AlkPhos  112    28 Jul 2023 08:40    12 Lead ECG:   Ventricular Rate 95 BPM    QRS Duration 70 ms    Q-T Interval 388 ms    QTC Calculation(Bazett) 487 ms    R Axis 38 degrees    T Axis 96 degrees    Diagnosis Line Atrial fibrillation  Nonspecific T wave abnormality  Prolonged QT  Abnormal ECG  Whencompared with ECG of 25-JUL-2023 20:54, (Unconfirmed)  Vent. rate has decreased BY  48 BPM  Confirmed by Feli Louie ([id number]) on 7/26/2023 10:10:05 AM (07-26-23 @ 02:03)  TRANSTHORACIC ECHOCARDIOGRAM REPORT  ________________________________________________________________________________                                      ______     Pt. Name:       CHAY VELEZ Study Date:    7/26/2023  MRN:            [id number]         YOB: 1945  Accession #:    [id number]        Age:           78 years  Account#:       [id number]       Gender:        F  Heart Rate:                      Height:        62.99 in (160.00 cm)  Rhythm:                          Weight:        160.93 lb (73.00 kg)  Blood Pressure: 132/80 mmHg      BSA/BMI:       1.76 m² / 28.52 kg/m²  ________________________________________________________________________________________  Referring Physician:    Delta Castro  Interpreting Physician: Feli Louie MD  Primary Sonographer:    Glory Gu [facility]    CPT:               ECHO TTE WO CON COMP W DOPP - 43633.m  Indication(s):     Unspecified atrial fibrillation - I48.91  Procedure:         Transthoracic echocardiogram with 2-D, M-mode and complete                     spectral and color flow Doppler.  Ordering Location: [facility]  Study Information: Image quality for this study is technically difficult.  _____________________________________________________________________________________  CONCLUSIONS:      1. Technically difficult image quality.   2. The left ventricular endocardium is not well visualized. Overall the LV function appears to be mildly reduced with an ejection fraction of 45%. Cannot rule out segmental abnormalities.   3. Normal right ventricular cavity size, normal right ventricular wall thickness and normal right ventricular systolic function.   4. The aortic valve is not well visualized, appears mildly calcified with grossly normal opening.   5. Trace mitral regurgitation.   6. Trace tricuspid regurgitation.   7. The inferior vena cava is normal in size measuring 2.14 cm in diameter, (normal <2.1cm) with normal inspiratory collapse (normal >50%) consistent with normal right atrial pressure (~3, range 0-5mmHg).  ______________________________________________________________________________________  FINDINGS:     Left Ventricle:  The left ventricular systolic function is mildly decreased. The left ventricular endocardium is not well visualized. Overall the LV function appears to be mildly reduced with an ejection fraction of 45%. Cannot rule out segmental abnormalities.     Right Ventricle:  Normal right ventricular cavity size, normal wall thickness and normal right ventricular systolic function.     Left Atrium:  The left atrium is normal in size.     Right Atrium:  The right atrium is normal in size.     Aortic Valve:  The aortic valve is not well visualized, appears mildly calcified with grossly normal opening. There is noevidence of aortic regurgitation.     Mitral Valve:  There is trace mitral regurgitation.     Tricuspid Valve:  There is trace tricuspid regurgitation.     Pulmonic Valve:  Structurally normal pulmonic valve with normal leaflet excursion. There is trace pulmonic regurgitation.     Systemic Veins:  The inferior vena cava is normal in size measuring 2.14 cm in diameter, (normal <2.1cm) with normal inspiratory collapse (normal >50%) consistent with normal right atrial pressure (~3, range 0-5mmHg).  ____________________________________________________________________  Quantitative Data:  Left Ventricle Measurements: (Indexed to BSA)     IVSd (2D):   1.1 cm  LVPWd (2D):  0.9 cm  LVIDd (2D):  4.9 cm  LVIDs (2D):  3.6 cm  LV Mass:     177 g  100.7 g/m²     MV E Vmax:    1.00 m/s  e' lateral:   16.40 cm/s  e' medial:    12.30 cm/s  E/e' lateral: 6.07  E/e' medial:  8.10  E/e' Average: 7.23  MV DT:        118 msec    Aorta Measurements:     Ao Sinus: 3.1 cm    Left Atrium Measurements: (Indexedto BSA)  LA Diam 2D: 3.40 cm    LVOT / RVOT/ Qp/Qs Data: (Indexed to BSA)  Mitral Valve Measurements:     MV E Vmax: 1.0 m/s    Tricuspid Valve Measurements:     RA Pressure: 3 mmHg    ________________________________________________________________________________________  Electronically signed on 7/27/2023 at 4:22:35 PM by Feli Louie MD         *** Final ***

## 2023-07-30 NOTE — PROGRESS NOTE ADULT - PROBLEM SELECTOR PLAN 5
WBC 18.46k on admission - possibly reactive, downtrending to 14.18 today  Did feel unwell with vomiting on 7/24 - now resolved, no other symptoms  -UA & UCx -  F/U results  -BCx + for gram + cocci in pairs/chains in both aerobic/anearobic sample  US doppler was unremarkable for DVT   -Pt on iv abx  Zosyn WBC 18.46k on admission - possibly reactive, downtrending to 14.18 today  Did feel unwell with vomiting on 7/24 - now resolved, no other symptoms  -UA & UCx - neg   -BCx + for gram + cocci in pairs/chains in both aerobic/anearobic sample  US doppler was unremarkable for DVT   -Pt on iv abx  Zosyn

## 2023-07-30 NOTE — PROGRESS NOTE ADULT - ASSESSMENT
78y female with PMHx of HTN, HLD presented to the ED for the evaluation of fall out of chair.   Cardiology consulted for new onset atrial fibrillation with RVR.    New onset afib, rhabdo sp fall, acute cholecystitis   - No clear evidence of acute ischemia, trops mildly elevated but downtrended. No need to further trend.  No anginal complaints  - CK elevated 5502 --> 5621; doubt acute plaque rupture in setting of fall and rhabdomyolysis; trended down  - Monitor for the development of anginal symptoms or clinical signs of ischemia and do EKG    New onset Afib with RVR  - EKG on admission showed atrial fibrillation with RVR, rate 143  - Tele overnight with period of unsustained tachycardia at 150's, likely, reactive to agitation/restlessness  - Continue Lopressor 50 mg q6H with parameters  - Start Cardizem  mg daily, first dose now  - Can switch FD Lovenox to DOAC if no surgical contraindication  - TTE: EF 45% with no significant valvular disease    - Chest showed evidence of pulmonary scarring which may be d/t pulmonary fibrosis - f/u pulmonary recs   - Compensated on HF standpoint.  Non-orthopneic on RA  - Initiate incentive spirometer    - Monitor and replete electrolytes. Keep K>4.0 and Mg>2.0.  - Will continue to follow    Gretta Torres DNP, NP-C, AGACNP-C  Cardiology   Call TEAMS

## 2023-07-30 NOTE — PROGRESS NOTE ADULT - SUBJECTIVE AND OBJECTIVE BOX
Patient is a 78y old  Female who presents with a chief complaint of fall, new onset afib RVR (30 Jul 2023 09:53)    Subjective:  INTERVAL HPI/OVERNIGHT EVENTS: Patient seen and examined at bedside. Overnight, called by RN for patient with HR sustaining 130s-140s afib on tele, given cardizem 10mg IV once stat, dc amlodipine, started on cardizem 120mg daily. Patient has no complaints at this time. Denies fevers, chills, headache, lightheadedness, chest pain, dyspnea, abdominal pain, n/v/d/c.    MEDICATIONS  (STANDING):  apixaban 5 milliGRAM(s) Oral every 12 hours  diltiazem    milliGRAM(s) Oral daily  lactated ringers. 1000 milliLiter(s) (60 mL/Hr) IV Continuous <Continuous>  metoprolol tartrate 50 milliGRAM(s) Oral every 6 hours  piperacillin/tazobactam IVPB.. 3.375 Gram(s) IV Intermittent every 8 hours    MEDICATIONS  (PRN):  acetaminophen     Tablet .. 650 milliGRAM(s) Oral every 6 hours PRN Temp greater or equal to 38C (100.4F)  acetaminophen     Tablet .. 650 milliGRAM(s) Oral every 6 hours PRN Mild Pain (1 - 3)  aluminum hydroxide/magnesium hydroxide/simethicone Suspension 30 milliLiter(s) Oral every 4 hours PRN Dyspepsia  melatonin 3 milliGRAM(s) Oral at bedtime PRN Insomnia  ondansetron Injectable 4 milliGRAM(s) IV Push every 8 hours PRN Nausea and/or Vomiting      Allergies    No Known Allergies    Intolerances      REVIEW OF SYSTEMS:  CONSTITUTIONAL: No fever or chills  HEENT:  No headache, no blurry vision  RESPIRATORY: no wheeze, no cough. No shortness of breath  CARDIOVASCULAR: No chest pain, palpitations  GASTROINTESTINAL: No abd pain, nausea, vomiting, constipation or diarrhea  GENITOURINARY: No dysuria, frequency, or hematuria  NEUROLOGICAL: + mild confusion. no focal weakness or dizziness  MUSCULOSKELETAL: no myalgias       Objective:  Vital Signs Last 24 Hrs  T(C): 37.4 (30 Jul 2023 11:40), Max: 37.4 (30 Jul 2023 11:40)  T(F): 99.3 (30 Jul 2023 11:40), Max: 99.3 (30 Jul 2023 11:40)  HR: 107 (30 Jul 2023 11:40) (103 - 124)  BP: 159/88 (30 Jul 2023 11:40) (155/97 - 175/108)  BP(mean): --  RR: 19 (30 Jul 2023 11:40) (19 - 19)  SpO2: 92% (30 Jul 2023 11:40) (92% - 94%)    Parameters below as of 30 Jul 2023 11:40  Patient On (Oxygen Delivery Method): room air      GENERAL: NAD, sitting in cardiac chair comfortably.   HEAD:  Atraumatic, Normocephalic  EYES: EOMI, PERRLA, conjunctiva and sclera clear  ENT: Moist mucous membranes  NECK: Supple, No JVD  CHEST/LUNG: chest  bilaterally , no rale , no wheez   HEART:  +irregularly regular. +S1, S2. tachy +   ABDOMEN: +perc enrique tube intact, clean and free of erythema. Bowel sounds present; Soft, Nontender, Nondistended.   EXTREMITIES:  +LE edema  NERVOUS SYSTEM:  Alert & Oriented X3, speech clear. No deficits       LABS:                        12.2   8.32  )-----------( 266      ( 30 Jul 2023 06:30 )             36.7     CBC Full  -  ( 30 Jul 2023 06:30 )  WBC Count : 8.32 K/uL  Hemoglobin : 12.2 g/dL  Hematocrit : 36.7 %  Platelet Count - Automated : 266 K/uL  Mean Cell Volume : 92.7 fl  Mean Cell Hemoglobin : 30.8 pg  Mean Cell Hemoglobin Concentration : 33.2 gm/dL  Auto Neutrophil # : 6.06 K/uL  Auto Lymphocyte # : 1.11 K/uL  Auto Monocyte # : 0.92 K/uL  Auto Eosinophil # : 0.08 K/uL  Auto Basophil # : 0.04 K/uL  Auto Neutrophil % : 72.8 %  Auto Lymphocyte % : 13.3 %  Auto Monocyte % : 11.1 %  Auto Eosinophil % : 1.0 %  Auto Basophil % : 0.5 %    30 Jul 2023 06:30    144    |  110    |  15     ----------------------------<  123    3.5     |  25     |  0.76     Ca    8.7        30 Jul 2023 06:30  Phos  3.5       30 Jul 2023 06:30  Mg     2.1       30 Jul 2023 06:30    TPro  7.2    /  Alb  2.6    /  TBili  0.7    /  DBili  x      /  AST  63     /  ALT  81     /  AlkPhos  116    30 Jul 2023 06:30      Urinalysis Basic - ( 30 Jul 2023 06:30 )    Color: x / Appearance: x / SG: x / pH: x  Gluc: 123 mg/dL / Ketone: x  / Bili: x / Urobili: x   Blood: x / Protein: x / Nitrite: x   Leuk Esterase: x / RBC: x / WBC x   Sq Epi: x / Non Sq Epi: x / Bacteria: x      CAPILLARY BLOOD GLUCOSE      Culture - Body Fluid with Gram Stain (collected 07-27-23 @ 16:20)  Source: Bile Bile Fluid  Gram Stain (07-28-23 @ 05:59):    No polymorphonuclear leukocytes seen    No organisms seen    by cytocentrifuge  Preliminary Report (07-29-23 @ 12:19):    Moderate Streptococcus anginosus    Few Escherichia coli  Organism: Escherichia coli (07-30-23 @ 09:59)  Organism: Escherichia coli (07-30-23 @ 09:59)      Method Type: SVETA      -  Amikacin: S <=16      -  Amoxicillin/Clavulanic Acid: S <=8/4      -  Ampicillin: S <=8 These ampicillin results predict results for amoxicillin      -  Ampicillin/Sulbactam: S <=4/2 Enterobacter, Klebsiella aerogenes, Citrobacter, and Serratia may develop resistance during prolonged therapy (3-4 days)      -  Aztreonam: S <=4      -  Cefazolin: S <=2 Enterobacter, Klebsiella aerogenes, Citrobacter, and Serratia may develop resistance during prolonged therapy (3-4 days)      -  Cefepime: S <=2      -  Cefoxitin: S <=8      -  Ceftriaxone: S <=1 Enterobacter, Klebsiella aerogenes, Citrobacter, and Serratia may develop resistance during prolonged therapy      -  Ciprofloxacin: S <=0.25      -  Ertapenem: S <=0.5      -  Gentamicin: S <=2      -  Imipenem: S <=1      -  Levofloxacin: S <=0.5      -  Meropenem: S <=1      -  Piperacillin/Tazobactam: S <=8      -  Tobramycin: S <=2      -  Trimethoprim/Sulfamethoxazole: S <=0.5/9.5    Culture - Urine (collected 07-27-23 @ 13:34)  Source: Clean Catch Clean Catch (Midstream)  Final Report (07-28-23 @ 21:31):    No growth    Culture - Blood (collected 07-27-23 @ 09:45)  Source: .Blood Blood-Peripheral  Preliminary Report (07-29-23 @ 15:08):    No growth at 48 Hours    Culture - Blood (collected 07-27-23 @ 09:40)  Source: .Blood Blood-Peripheral  Preliminary Report (07-29-23 @ 15:08):    No growth at 48 Hours    Culture - Blood (collected 07-26-23 @ 03:00)  Source: .Blood Blood-Peripheral  Gram Stain (07-27-23 @ 06:08):    Growth in anaerobic bottle: Gram Positive Cocci in Pairs and Chains    Growth in aerobic bottle: Gram Positive Cocci in Pairs and Chains  Final Report (07-28-23 @ 20:40):    Growth in aerobic and anaerobic bottles: Streptococcus anginosus    Direct identification is available within approximately 3-5    hours either by Blood Panel Multiplexed PCR or Direct    MALDI-TOF. Details: [email or web address]  Organism: Blood Culture PCR  Streptococcus anginosus (07-28-23 @ 20:40)  Organism: Streptococcus anginosus (07-28-23 @ 20:40)      Method Type: SVETA      -  Ceftriaxone: S <=0.25      -  Clindamycin: R >0.5      -  Erythromycin: R >0.5      -  Levofloxacin: S 0.5      -  Penicillin: S <=0.03      -  Vancomycin: S <=0.12  Organism: Blood Culture PCR (07-28-23 @ 20:40)      Method Type: PCR      -  Streptococcus sp. (Not Grp A, B or S pneumoniae): Detec    Culture - Blood (collected 07-26-23 @ 02:58)  Source: .Blood Blood-Peripheral  Gram Stain (07-27-23 @ 08:19):    Growth in anaerobic bottle: Gram Positive Cocci in Pairs and Chains    Growth in aerobic bottle: Gram Positive Cocci in Pairs and Chains  Final Report (07-28-23 @ 20:40):    Growth in aerobic and anaerobic bottles: Streptococcus anginosus    See previous culture [id number]    RADIOLOGY & ADDITIONAL TESTS:    Personally reviewed.     Consultant(s) Notes Reviewed:  [x] YES  [ ] NO Patient is a 78y old  Female who presents with a chief complaint of fall, new onset afib RVR (30 Jul 2023 09:53)    Subjective:  INTERVAL HPI/OVERNIGHT EVENTS: Patient seen and examined at bedside. Overnight, called by RN for patient with HR sustaining 130s-140s afib on tele, given cardizem 10mg IV once stat, dc amlodipine, started on cardizem 120mg daily. Patient has no complaints at this time. Denies fevers, chills, headache, lightheadedness, chest pain, dyspnea, abdominal pain.           REVIEW OF SYSTEMS:  CONSTITUTIONAL: No fever or chills  HEENT:  No headache, no blurry vision  RESPIRATORY: no wheeze, no cough. No shortness of breath  CARDIOVASCULAR: No chest pain, palpitations  GASTROINTESTINAL: No abd pain, nausea, vomiting, constipation or diarrhea  GENITOURINARY: No dysuria, frequency, or hematuria  NEUROLOGICAL: + mild confusion. no focal weakness or dizziness  MUSCULOSKELETAL: no myalgias       Objective:  Vital Signs Last 24 Hrs  T(C): 37.4 (30 Jul 2023 11:40), Max: 37.4 (30 Jul 2023 11:40)  T(F): 99.3 (30 Jul 2023 11:40), Max: 99.3 (30 Jul 2023 11:40)  HR: 107 (30 Jul 2023 11:40) (103 - 124)  BP: 159/88 (30 Jul 2023 11:40) (155/97 - 175/108)  BP(mean): --  RR: 19 (30 Jul 2023 11:40) (19 - 19)  SpO2: 92% (30 Jul 2023 11:40) (92% - 94%)    Parameters below as of 30 Jul 2023 11:40  Patient On (Oxygen Delivery Method): room air      GENERAL: NAD,   HEAD:  Atraumatic, Normocephalic  EYES: EOMI, PERRLA, conjunctiva and sclera clear  ENT: Moist mucous membranes  NECK: Supple, No JVD  CHEST/LUNG: chest  bilaterally , no rale , no wheez   HEART:  +irregularly regular. +S1, S2. tachy +   ABDOMEN: +perc enrique tube intact, clean and free of erythema. Bowel sounds present; Soft, Nontender, Nondistended.   EXTREMITIES:  +LE edema  NERVOUS SYSTEM:  Alert & Oriented X3,  sensory / motor intact      MEDICATIONS  (STANDING):    apixaban 5 milliGRAM(s) Oral every 12 hours  diltiazem    milliGRAM(s) Oral daily  lactated ringers. 1000 milliLiter(s) (60 mL/Hr) IV Continuous <Continuous>  metoprolol tartrate 50 milliGRAM(s) Oral every 6 hours  piperacillin/tazobactam IVPB.. 3.375 Gram(s) IV Intermittent every 8 hours    MEDICATIONS  (PRN):  acetaminophen     Tablet .. 650 milliGRAM(s) Oral every 6 hours PRN Temp greater or equal to 38C (100.4F)  acetaminophen     Tablet .. 650 milliGRAM(s) Oral every 6 hours PRN Mild Pain (1 - 3)  aluminum hydroxide/magnesium hydroxide/simethicone Suspension 30 milliLiter(s) Oral every 4 hours PRN Dyspepsia  melatonin 3 milliGRAM(s) Oral at bedtime PRN Insomnia  ondansetron Injectable 4 milliGRAM(s) IV Push every 8 hours PRN Nausea and/or Vomiting      Allergies    No Known Allergies    Intolerances    LABS:                        12.2   8.32  )-----------( 266      ( 30 Jul 2023 06:30 )             36.7     CBC Full  -  ( 30 Jul 2023 06:30 )  WBC Count : 8.32 K/uL  Hemoglobin : 12.2 g/dL  Hematocrit : 36.7 %  Platelet Count - Automated : 266 K/uL  Mean Cell Volume : 92.7 fl  Mean Cell Hemoglobin : 30.8 pg  Mean Cell Hemoglobin Concentration : 33.2 gm/dL  Auto Neutrophil # : 6.06 K/uL  Auto Lymphocyte # : 1.11 K/uL  Auto Monocyte # : 0.92 K/uL  Auto Eosinophil # : 0.08 K/uL  Auto Basophil # : 0.04 K/uL  Auto Neutrophil % : 72.8 %  Auto Lymphocyte % : 13.3 %  Auto Monocyte % : 11.1 %  Auto Eosinophil % : 1.0 %  Auto Basophil % : 0.5 %    30 Jul 2023 06:30    144    |  110    |  15     ----------------------------<  123    3.5     |  25     |  0.76     Ca    8.7        30 Jul 2023 06:30  Phos  3.5       30 Jul 2023 06:30  Mg     2.1       30 Jul 2023 06:30    TPro  7.2    /  Alb  2.6    /  TBili  0.7    /  DBili  x      /  AST  63     /  ALT  81     /  AlkPhos  116    30 Jul 2023 06:30      Urinalysis Basic - ( 30 Jul 2023 06:30 )    Color: x / Appearance: x / SG: x / pH: x  Gluc: 123 mg/dL / Ketone: x  / Bili: x / Urobili: x   Blood: x / Protein: x / Nitrite: x   Leuk Esterase: x / RBC: x / WBC x   Sq Epi: x / Non Sq Epi: x / Bacteria: x      CAPILLARY BLOOD GLUCOSE      Culture - Body Fluid with Gram Stain (collected 07-27-23 @ 16:20)  Source: Bile Bile Fluid  Gram Stain (07-28-23 @ 05:59):    No polymorphonuclear leukocytes seen    No organisms seen    by cytocentrifuge  Preliminary Report (07-29-23 @ 12:19):    Moderate Streptococcus anginosus    Few Escherichia coli  Organism: Escherichia coli (07-30-23 @ 09:59)  Organism: Escherichia coli (07-30-23 @ 09:59)      Method Type: SVETA      -  Amikacin: S <=16      -  Amoxicillin/Clavulanic Acid: S <=8/4      -  Ampicillin: S <=8 These ampicillin results predict results for amoxicillin      -  Ampicillin/Sulbactam: S <=4/2 Enterobacter, Klebsiella aerogenes, Citrobacter, and Serratia may develop resistance during prolonged therapy (3-4 days)      -  Aztreonam: S <=4      -  Cefazolin: S <=2 Enterobacter, Klebsiella aerogenes, Citrobacter, and Serratia may develop resistance during prolonged therapy (3-4 days)      -  Cefepime: S <=2      -  Cefoxitin: S <=8      -  Ceftriaxone: S <=1 Enterobacter, Klebsiella aerogenes, Citrobacter, and Serratia may develop resistance during prolonged therapy      -  Ciprofloxacin: S <=0.25      -  Ertapenem: S <=0.5      -  Gentamicin: S <=2      -  Imipenem: S <=1      -  Levofloxacin: S <=0.5      -  Meropenem: S <=1      -  Piperacillin/Tazobactam: S <=8      -  Tobramycin: S <=2      -  Trimethoprim/Sulfamethoxazole: S <=0.5/9.5    Culture - Urine (collected 07-27-23 @ 13:34)  Source: Clean Catch Clean Catch (Midstream)  Final Report (07-28-23 @ 21:31):    No growth    Culture - Blood (collected 07-27-23 @ 09:45)  Source: .Blood Blood-Peripheral  Preliminary Report (07-29-23 @ 15:08):    No growth at 48 Hours    Culture - Blood (collected 07-27-23 @ 09:40)  Source: .Blood Blood-Peripheral  Preliminary Report (07-29-23 @ 15:08):    No growth at 48 Hours    Culture - Blood (collected 07-26-23 @ 03:00)  Source: .Blood Blood-Peripheral  Gram Stain (07-27-23 @ 06:08):    Growth in anaerobic bottle: Gram Positive Cocci in Pairs and Chains    Growth in aerobic bottle: Gram Positive Cocci in Pairs and Chains  Final Report (07-28-23 @ 20:40):    Growth in aerobic and anaerobic bottles: Streptococcus anginosus    Direct identification is available within approximately 3-5    hours either by Blood Panel Multiplexed PCR or Direct    MALDI-TOF. Details: [email or web address]  Organism: Blood Culture PCR  Streptococcus anginosus (07-28-23 @ 20:40)  Organism: Streptococcus anginosus (07-28-23 @ 20:40)      Method Type: SVETA      -  Ceftriaxone: S <=0.25      -  Clindamycin: R >0.5      -  Erythromycin: R >0.5      -  Levofloxacin: S 0.5      -  Penicillin: S <=0.03      -  Vancomycin: S <=0.12  Organism: Blood Culture PCR (07-28-23 @ 20:40)      Method Type: PCR      -  Streptococcus sp. (Not Grp A, B or S pneumoniae): Detec    Culture - Blood (collected 07-26-23 @ 02:58)  Source: .Blood Blood-Peripheral  Gram Stain (07-27-23 @ 08:19):    Growth in anaerobic bottle: Gram Positive Cocci in Pairs and Chains    Growth in aerobic bottle: Gram Positive Cocci in Pairs and Chains  Final Report (07-28-23 @ 20:40):    Growth in aerobic and anaerobic bottles: Streptococcus anginosus    See previous culture [id number]    RADIOLOGY & ADDITIONAL TESTS:    Personally reviewed.     Consultant(s) Notes Reviewed:  [x] YES  [ ] NO

## 2023-07-30 NOTE — PROGRESS NOTE ADULT - SUBJECTIVE AND OBJECTIVE BOX
SURGERY PA NOTE ON BEHALF OF DR. Pascual:    S: Patient seen and examined at bedside.   Overnight events noted; patient HR increased to 130-140's showing AFib on tele.  At this time patient has no new complaints.  States abdominal pain is improved.  Denies fevers, chills, chest pain, SOB, palpitations, calf pain.      MEDICATIONS:  acetaminophen     Tablet .. 650 milliGRAM(s) Oral every 6 hours PRN  acetaminophen     Tablet .. 650 milliGRAM(s) Oral every 6 hours PRN  aluminum hydroxide/magnesium hydroxide/simethicone Suspension 30 milliLiter(s) Oral every 4 hours PRN  enoxaparin Injectable 80 milliGRAM(s) SubCutaneous every 12 hours  lactated ringers. 1000 milliLiter(s) IV Continuous <Continuous>  melatonin 3 milliGRAM(s) Oral at bedtime PRN  metoprolol tartrate 50 milliGRAM(s) Oral every 6 hours  ondansetron Injectable 4 milliGRAM(s) IV Push every 8 hours PRN  piperacillin/tazobactam IVPB.. 3.375 Gram(s) IV Intermittent every 8 hours      O:  Vital Signs Last 24 Hrs  T(C): 36.6 (30 Jul 2023 05:25), Max: 36.6 (29 Jul 2023 12:14)  T(F): 97.8 (30 Jul 2023 05:25), Max: 97.9 (29 Jul 2023 12:14)  HR: 109 (30 Jul 2023 05:25) (102 - 124)  BP: 159/91 (30 Jul 2023 05:25) (144/95 - 175/108)  BP(mean): --  RR: 19 (30 Jul 2023 05:25) (18 - 19)  SpO2: 93% (30 Jul 2023 05:25) (93% - 96%)    Parameters below as of 30 Jul 2023 05:25  Patient On (Oxygen Delivery Method): nasal cannula  O2 Flow (L/min): 3      I&O SUMMARY:    07-29-23 @ 07:01  -  07-30-23 @ 07:00  --------------------------------------------------------  IN: 0 mL / OUT: 25 mL / NET: -25 mL        PHYSICAL EXAM:  Lungs: CTA bilat without W/R/R  Card: S1S2  Abd: Soft, NT, ND.  +BS x 4.  No rebound/guarding.  +cholecystostomy tube in place.   Ext: Calves soft, NT, without edema bilat    LABS:                        12.2   8.32  )-----------( 266      ( 30 Jul 2023 06:30 )             36.7     07-30    144  |  110<H>  |  15  ----------------------------<  123<H>  3.5   |  25  |  0.76    Ca    8.7      30 Jul 2023 06:30  Phos  3.5     07-30  Mg     2.1     07-30    TPro  7.2  /  Alb  2.6<L>  /  TBili  0.7  /  DBili  x   /  AST  63<H>  /  ALT  81<H>  /  AlkPhos  116  07-30      Assessment:  79 y/o F presenting with asymptomatic cholecystitis found on imaging, now s/p Perc enrique on 7/27.  25cc of bilious output in bag over 24 hours      Plan:  - Continue IV Abx  - Monitor cholecystostomy output  - Continue low fat diet  - Encouraged IS use/OOB/Ambulation  - To discuss w/ Dr. Pascual

## 2023-07-31 LAB
ALBUMIN SERPL ELPH-MCNC: 2.3 G/DL — LOW (ref 3.3–5)
ALP SERPL-CCNC: 106 U/L — SIGNIFICANT CHANGE UP (ref 40–120)
ALT FLD-CCNC: 101 U/L — HIGH (ref 12–78)
ANION GAP SERPL CALC-SCNC: 5 MMOL/L — SIGNIFICANT CHANGE UP (ref 5–17)
AST SERPL-CCNC: 75 U/L — HIGH (ref 15–37)
BILIRUB SERPL-MCNC: 0.7 MG/DL — SIGNIFICANT CHANGE UP (ref 0.2–1.2)
BUN SERPL-MCNC: 15 MG/DL — SIGNIFICANT CHANGE UP (ref 7–23)
CALCIUM SERPL-MCNC: 8.7 MG/DL — SIGNIFICANT CHANGE UP (ref 8.5–10.1)
CHLORIDE SERPL-SCNC: 110 MMOL/L — HIGH (ref 96–108)
CO2 SERPL-SCNC: 27 MMOL/L — SIGNIFICANT CHANGE UP (ref 22–31)
CREAT SERPL-MCNC: 0.74 MG/DL — SIGNIFICANT CHANGE UP (ref 0.5–1.3)
EGFR: 83 ML/MIN/1.73M2 — SIGNIFICANT CHANGE UP
GLUCOSE SERPL-MCNC: 102 MG/DL — HIGH (ref 70–99)
HCT VFR BLD CALC: 35.9 % — SIGNIFICANT CHANGE UP (ref 34.5–45)
HGB BLD-MCNC: 11.9 G/DL — SIGNIFICANT CHANGE UP (ref 11.5–15.5)
MAGNESIUM SERPL-MCNC: 2.2 MG/DL — SIGNIFICANT CHANGE UP (ref 1.6–2.6)
MCHC RBC-ENTMCNC: 30.7 PG — SIGNIFICANT CHANGE UP (ref 27–34)
MCHC RBC-ENTMCNC: 33.1 GM/DL — SIGNIFICANT CHANGE UP (ref 32–36)
MCV RBC AUTO: 92.8 FL — SIGNIFICANT CHANGE UP (ref 80–100)
NRBC # BLD: 0 /100 WBCS — SIGNIFICANT CHANGE UP (ref 0–0)
PHOSPHATE SERPL-MCNC: 3.2 MG/DL — SIGNIFICANT CHANGE UP (ref 2.5–4.5)
PLATELET # BLD AUTO: 277 K/UL — SIGNIFICANT CHANGE UP (ref 150–400)
POTASSIUM SERPL-MCNC: 3.5 MMOL/L — SIGNIFICANT CHANGE UP (ref 3.5–5.3)
POTASSIUM SERPL-SCNC: 3.5 MMOL/L — SIGNIFICANT CHANGE UP (ref 3.5–5.3)
PROT SERPL-MCNC: 6.7 G/DL — SIGNIFICANT CHANGE UP (ref 6–8.3)
RBC # BLD: 3.87 M/UL — SIGNIFICANT CHANGE UP (ref 3.8–5.2)
RBC # FLD: 13.5 % — SIGNIFICANT CHANGE UP (ref 10.3–14.5)
SODIUM SERPL-SCNC: 142 MMOL/L — SIGNIFICANT CHANGE UP (ref 135–145)
WBC # BLD: 6.7 K/UL — SIGNIFICANT CHANGE UP (ref 3.8–10.5)
WBC # FLD AUTO: 6.7 K/UL — SIGNIFICANT CHANGE UP (ref 3.8–10.5)

## 2023-07-31 PROCEDURE — 70450 CT HEAD/BRAIN W/O DYE: CPT | Mod: 26

## 2023-07-31 PROCEDURE — 99232 SBSQ HOSP IP/OBS MODERATE 35: CPT

## 2023-07-31 PROCEDURE — 99221 1ST HOSP IP/OBS SF/LOW 40: CPT

## 2023-07-31 PROCEDURE — 99233 SBSQ HOSP IP/OBS HIGH 50: CPT | Mod: GC

## 2023-07-31 RX ORDER — CEFTRIAXONE 500 MG/1
2000 INJECTION, POWDER, FOR SOLUTION INTRAMUSCULAR; INTRAVENOUS EVERY 24 HOURS
Refills: 0 | Status: DISCONTINUED | OUTPATIENT
Start: 2023-07-31 | End: 2023-08-03

## 2023-07-31 RX ORDER — CEFTRIAXONE 500 MG/1
1000 INJECTION, POWDER, FOR SOLUTION INTRAMUSCULAR; INTRAVENOUS EVERY 24 HOURS
Refills: 0 | Status: DISCONTINUED | OUTPATIENT
Start: 2023-07-31 | End: 2023-07-31

## 2023-07-31 RX ORDER — SIMVASTATIN 20 MG/1
40 TABLET, FILM COATED ORAL AT BEDTIME
Refills: 0 | Status: DISCONTINUED | OUTPATIENT
Start: 2023-07-31 | End: 2023-08-02

## 2023-07-31 RX ORDER — METOPROLOL TARTRATE 50 MG
100 TABLET ORAL
Refills: 0 | Status: DISCONTINUED | OUTPATIENT
Start: 2023-08-01 | End: 2023-08-01

## 2023-07-31 RX ORDER — DILTIAZEM HCL 120 MG
1 CAPSULE, EXT RELEASE 24 HR ORAL
Qty: 0 | Refills: 0 | DISCHARGE
Start: 2023-07-31

## 2023-07-31 RX ORDER — APIXABAN 2.5 MG/1
1 TABLET, FILM COATED ORAL
Qty: 0 | Refills: 0 | DISCHARGE
Start: 2023-07-31

## 2023-07-31 RX ORDER — METRONIDAZOLE 500 MG
500 TABLET ORAL EVERY 8 HOURS
Refills: 0 | Status: DISCONTINUED | OUTPATIENT
Start: 2023-07-31 | End: 2023-08-03

## 2023-07-31 RX ADMIN — SODIUM CHLORIDE 60 MILLILITER(S): 9 INJECTION, SOLUTION INTRAVENOUS at 07:27

## 2023-07-31 RX ADMIN — Medication 50 MILLIGRAM(S): at 06:37

## 2023-07-31 RX ADMIN — Medication 120 MILLIGRAM(S): at 06:37

## 2023-07-31 RX ADMIN — Medication 50 MILLIGRAM(S): at 19:01

## 2023-07-31 RX ADMIN — Medication 50 MILLIGRAM(S): at 01:39

## 2023-07-31 RX ADMIN — Medication 500 MILLIGRAM(S): at 13:35

## 2023-07-31 RX ADMIN — Medication 500 MILLIGRAM(S): at 21:18

## 2023-07-31 RX ADMIN — Medication 50 MILLIGRAM(S): at 13:35

## 2023-07-31 RX ADMIN — PIPERACILLIN AND TAZOBACTAM 25 GRAM(S): 4; .5 INJECTION, POWDER, LYOPHILIZED, FOR SOLUTION INTRAVENOUS at 06:37

## 2023-07-31 RX ADMIN — APIXABAN 5 MILLIGRAM(S): 2.5 TABLET, FILM COATED ORAL at 19:01

## 2023-07-31 RX ADMIN — CEFTRIAXONE 100 MILLIGRAM(S): 500 INJECTION, POWDER, FOR SOLUTION INTRAMUSCULAR; INTRAVENOUS at 18:00

## 2023-07-31 RX ADMIN — SIMVASTATIN 40 MILLIGRAM(S): 20 TABLET, FILM COATED ORAL at 21:18

## 2023-07-31 RX ADMIN — APIXABAN 5 MILLIGRAM(S): 2.5 TABLET, FILM COATED ORAL at 06:37

## 2023-07-31 NOTE — CONSULT NOTE ADULT - ASSESSMENT
78y female with PMHx of HTN, HLD, who was admitted on 7/26 for evaluation of fall out of chair. Previously admitted  that she was feeling unwell on 7/24 and had an episode of vomiting then. Found to have strep anginosus bacteremia likely 2/2 acute cholecystitis.     S/p percutaneous cholecystostomy on 7/27. Biliary cultures grew strep anginosus, E coli and klebsiella oxytoca. Sensitivities reviewed. Otherwise no fevers or leukocytosis. Repeat blood cultures remain no growth.    -suggest ceftriaxone 2g IV q24h while here, when ready for discharge can switch to cefpodoxime 200mg PO BID until 8/10  -suggest Flagyl 500mg PO q8h until 8/3  -discontinue Zosyn  -follow cultures to completion  -monitor LFT's    Thank you for courtesy of this consult.     Will follow.  Discussed with Dr. Darryl Anthony MD  Division of Infectious Diseases   Cell [phone] between 8am and 6pm   After 6pm and weekends please call ID service at [phone].

## 2023-07-31 NOTE — PROGRESS NOTE ADULT - PROBLEM SELECTOR PLAN 4
-CT chest showed evidence of pulmonary scarring which may be d/t pulmonary fibrosis   Imaging also showed:   -1.8 cm pulmonary nodule right upper lobe.  -Several small subcentimeter right-sided pulmonary nodule.  --Findings compatible with lung cancer until proven otherwise.  Consider PET/CT scan for further evaluation.  -  dyspnea may be secondary to pulm fibrosis , no chf

## 2023-07-31 NOTE — PROGRESS NOTE ADULT - PROBLEM SELECTOR PLAN 6
Patient is without chest pain on admission, doubt ACS  EKG with afib RVR with likely rate related ischemic changes  Suspect demand ischemia in setting of rapid AF and rhabdomyolysis   trended  cardiac enzymes down   Follow up 2D ECHO  most recent EKG showed rate of 95

## 2023-07-31 NOTE — PROGRESS NOTE ADULT - PROBLEM SELECTOR PLAN 1
-CT scan in the ER revealed findings consistent w/ acute enrique (Distended GB w/ jd-cholecystic fluid)  -RUQ ultrasound showed Cholelithiasis with a small amount of pericholecystic fluid and nonspecific gallbladder wall thickening  -HIDA scan positive, pt underwent perc enrique drainage with IR  post procedure day 4  - C/w  iv abx zosyn  switch to  Rocephin   and iv flagyl   - 1 bottle  BCx growing gram+ cocci in pairs in chain   Streptococcus anginosus+ in both aerobic & anaerobic samples -Repeat BCx  neg    - low fat diet,  tolerating well  - Surgery following dr rousseau ok switch po abx / dionisio drain at dc

## 2023-07-31 NOTE — PROGRESS NOTE ADULT - ASSESSMENT
78y female with PMHx of HTN, HLD presented to the ED for the evaluation of fall out of chair.   Cardiology consulted for new onset atrial fibrillation with RVR.    New onset afib, rhabdo sp fall, acute cholecystitis   - No clear evidence of acute ischemia, trops mildly elevated but downtrended. No need to further trend.  No anginal complaints  - CK elevated 5502 --> 5621; doubt acute plaque rupture in setting of fall and rhabdomyolysis; trended down    New onset Afib with RVR  - EKG on admission showed atrial fibrillation with RVR, rate 143  - Tele overnight with rate-controlled  - Continue Lopressor 50 mg q6H with parameters.  Plan to switch to long-acting in am, 8/1  - Continue Cardizem  mg daily, first dose now  - Continue Eliquis (was switched from DOAC 7/30)  - TTE: EF 45% with no significant valvular disease    - Chest showed evidence of pulmonary scarring which may be d/t pulmonary fibrosis - f/u pulmonary recs   - Compensated on HF standpoint.  Non-orthopneic on RA.  Has edema but likely, from , hypoalbuminemia  - Initiate incentive spirometer    - Monitor and replete electrolytes. Keep K>4.0 and Mg>2.0.  - Will continue to follow    Gretta Torres DNP, NP-C, AGACNP-C  Cardiology   Call TEAMS

## 2023-07-31 NOTE — PROVIDER CONTACT NOTE (CHANGE IN STATUS NOTIFICATION) - BACKGROUND
Pt c/o of tingling in her right hand. Pt unable to eat breakfast, grab and hold soda can. No numbness or tingling in the feet.

## 2023-07-31 NOTE — PROGRESS NOTE ADULT - PROBLEM SELECTOR PLAN 8
Lives alone and walks unassisted at baseline  Fell off chair and was unable to get up  Fall precautions  PT evaluation / henri

## 2023-07-31 NOTE — PROVIDER CONTACT NOTE (OTHER) - ASSESSMENT
Pt lethargic on this shift. Pt responds to name/opens eyes spontaneously. Neuro assessment preformed on pt at this time with no deficits noted. Manual BP taken. Pt lethargic/asleep. Pt responds to name/opens eyes spontaneously. Neuro assessment preformed on pt at this time with no deficits noted. Manual BP taken.

## 2023-07-31 NOTE — PROGRESS NOTE ADULT - PROBLEM SELECTOR PLAN 3
Patient with elevated CK after fall and being on the floor   on admission does not appear volume overloaded despite elevated proBNP   Got 1L of NS in the ED ,  s/p  iv fluid    mag / phos wnl   Trended ck down

## 2023-07-31 NOTE — PROGRESS NOTE ADULT - PROBLEM SELECTOR PLAN 1
- CT scan in the ER revealed findings consistent w/ acute enrique (Distended GB w/ jd-cholecystic fluid)  - RUQ ultrasound showed Cholelithiasis with a small amount of pericholecystic fluid and nonspecific gallbladder wall thickening  - HIDA scan positive, pt underwent perc enrique drainage with IR  post procedure day 3  - 1 bottle  BCx growing gram+ cocci in pairs in chain   Streptococcus anginosus+ in both aerobic & anaerobic samples --> Repeat BCx  neg    - ID Dr. Anthony consulted, rec as follow   - D/c IV Zosyn --> switched to ceftriaxone 2g IV q24h while here, when ready for discharge can switch to cefpodoxime 200mg PO BID until 8/10  - Suggest Flagyl 500mg PO q8h until 8/3  - Surgery following (Dr. Pascual), recs as follow   - Monitor cholecystostomy output  - Continue low fat diet  - Encouraged incentive spirometry use/OOB/Ambulation  - Continue antibiotics  - Will need VNS for assistance with drain care at home upon discharge.

## 2023-07-31 NOTE — PROGRESS NOTE ADULT - PROBLEM SELECTOR PLAN 6
Patient is without chest pain on admission, doubt ACS  - EKG with afib RVR with likely rate related ischemic changes  - TTE: EF 45% with no significant valvular disease  - Suspect demand ischemia in setting of rapid AF and rhabdomyolysis  - Trop trended down (76.9 --> 76.1)  - Resume statin when CK improves  - Cardio consult (Dr. Simon) rec,   - No clear evidence of acute ischemia, trops mildly elevated but downtrended, No need to further trend    - CK was elevated 5502 -->1848 ; doubt acute plaque rupture in setting of fall and rhabdomyolysis; trended down

## 2023-07-31 NOTE — PROGRESS NOTE ADULT - SUBJECTIVE AND OBJECTIVE BOX
Patient is a 78y old  Female who presents with a chief complaint of fall, new onset afib RVR (31 Jul 2023 12:57)    pt seen and examine today  noticed to have slurred speech   during  physical Thearpy  also    unsteady gait  with   new rt hand numbness    .   INTERVAL HPI/OVERNIGHT EVENTS:     T(C): 36.6 (07-31-23 @ 12:49), Max: 36.9 (07-30-23 @ 21:38)  HR: 96 (07-31-23 @ 14:01) (83 - 96)  BP: 158/93 (07-31-23 @ 14:01) (149/82 - 177/102)  RR: 18 (07-31-23 @ 12:49) (18 - 19)  SpO2: 92% (07-31-23 @ 14:01) (92% - 95%)  Wt(kg): --  I&O's Summary    30 Jul 2023 07:01  -  31 Jul 2023 07:00  --------------------------------------------------------  IN: 0 mL / OUT: 580 mL / NET: -580 mL        REVIEW OF SYSTEMS:  CONSTITUTIONAL: No fever, weight loss, or fatigue  EYES: No eye pain, visual disturbances, or discharge  ENMT:  ; No sinus or throat pain  NECK: No pain or stiffness  BREASTS: No pain, no masses  RESPIRATORY: No cough, wheezing, chills or hemoptysis; No shortness of breath  CARDIOVASCULAR: No chest pain, palpitations, dizziness, or leg swelling  GASTROINTESTINAL: No abdominal or epigastric pain. No nausea, vomiting, or hematemesis; No diarrhea or constipation.  GENITOURINARY: No dysuria, frequency, hematuria, or incontinence  NEUROLOGICAL: No headaches, memory loss, loss of strength, rt hand numbness +, no  tremors  SKIN: No itching, burning, rashes, or lesions   MUSCULOSKELETAL: No joint pain or swelling; No muscle, back, or extremity pain    PHYSICAL EXAM:  GENERAL: NAD,  HEAD:  Atraumatic, Normocephalic  EYES: EOMI, PERRLA, conjunctiva and sclera clear  ENMT: Moist mucous membranes  NECK: Supple, No JVD  NERVOUS SYSTEM:  Alert & Oriented X3; Motor Strength 5/5 B/L upper and lower extremities; DTRs 2+ intact and symmetric  CHEST/LUNG:  percussion bilaterally; No rales, rhonchi, wheezing,   HEART: Regular rate and rhythm; No murmurs,  no tachy  ABDOMEN: Soft, Nontender, Nondistended; Bowel sounds present  EXTREMITIES:  2+ Peripheral Pulses, No clubbing, cyanosis, or edema  SKIN: No rashes or lesions    MEDICATIONS  (STANDING):  apixaban 5 milliGRAM(s) Oral every 12 hours  cefTRIAXone   IVPB 2000 milliGRAM(s) IV Intermittent every 24 hours  diltiazem    milliGRAM(s) Oral daily  metoprolol tartrate 50 milliGRAM(s) Oral every 6 hours  metroNIDAZOLE    Tablet 500 milliGRAM(s) Oral every 8 hours  simvastatin 40 milliGRAM(s) Oral at bedtime    MEDICATIONS  (PRN):  acetaminophen     Tablet .. 650 milliGRAM(s) Oral every 6 hours PRN Mild Pain (1 - 3)  acetaminophen     Tablet .. 650 milliGRAM(s) Oral every 6 hours PRN Temp greater or equal to 38C (100.4F)  aluminum hydroxide/magnesium hydroxide/simethicone Suspension 30 milliLiter(s) Oral every 4 hours PRN Dyspepsia  melatonin 3 milliGRAM(s) Oral at bedtime PRN Insomnia  ondansetron Injectable 4 milliGRAM(s) IV Push every 8 hours PRN Nausea and/or Vomiting      LABS:                        11.9   6.70  )-----------( 277      ( 31 Jul 2023 06:17 )             35.9     07-31    142  |  110<H>  |  15  ----------------------------<  102<H>  3.5   |  27  |  0.74    Ca    8.7      31 Jul 2023 06:17  Phos  3.2     07-31  Mg     2.2     07-31    TPro  6.7  /  Alb  2.3<L>  /  TBili  0.7  /  DBili  x   /  AST  75<H>  /  ALT  101<H>  /  AlkPhos  106  07-31      Urinalysis Basic - ( 31 Jul 2023 06:17 )    Color: x / Appearance: x / SG: x / pH: x  Gluc: 102 mg/dL / Ketone: x  / Bili: x / Urobili: x   Blood: x / Protein: x / Nitrite: x   Leuk Esterase: x / RBC: x / WBC x   Sq Epi: x / Non Sq Epi: x / Bacteria: x                  RADIOLOGY & ADDITIONAL TESTS:    Imaging Personally Reviewed:     mri/mra brain pending   Advance Directives:    full code  Palliative Care:  Appropriate

## 2023-07-31 NOTE — PROVIDER CONTACT NOTE (OTHER) - ACTION/TREATMENT ORDERED:
Continue to monitor pt as per MD. Resident made aware Continue to monitor pt as per MD. Resident made aware.

## 2023-07-31 NOTE — PROGRESS NOTE ADULT - PROBLEM SELECTOR PLAN 4
-CT chest showed evidence of pulmonary scarring which may be d/t pulmonary fibrosis   Imaging also showed:   -1.8 cm pulmonary nodule right upper lobe.  -Several small subcentimeter right-sided pulmonary nodule.  Pulm Consulted (Dr. Alaniz), recs   - Findings compatible with lung cancer until proven otherwise --> Consider PET/CT scan for further evaluation.  - Will continue IV fluids for now as etiology of dyspnea may be secondary to pulm fibrosis over CHF  - On o2 support --> monitor for dyspnea   - Order blood gas

## 2023-07-31 NOTE — PROGRESS NOTE ADULT - PROBLEM SELECTOR PLAN 5
- WBC 18.46k on admission - possibly reactive, downtrending to 6.70 today  - UA & UCx - neg   - BCx + for gram + cocci in pairs/chains in both aerobic/anearobic sample  - US doppler was unremarkable for DVT   - ID Consulted (Dr. Anthony), recs as follow  - D/c IV Zosyn --> switched to ceftriaxone 2g IV q24h while here, when ready for discharge can switch to cefpodoxime 200mg PO BID until 8/10  - Suggest Flagyl 500mg PO q8h until 8/3

## 2023-07-31 NOTE — PROGRESS NOTE ADULT - PROBLEM SELECTOR PLAN 5
WBC 18.46k on admission - possibly  acute cholecystitis  sepsis revolving  on iv abx resolved wbc wnl    -UA & UCx - neg   -BCx + for gram + cocci in pairs/chains in both aerobic/anearobic sample  , repeat blood cult neg.

## 2023-07-31 NOTE — PROGRESS NOTE ADULT - SUBJECTIVE AND OBJECTIVE BOX
Patient is a 78y old  Female who presents with a chief complaint of fall, new onset afib RVR (31 Jul 2023 12:57)      Subjective:  INTERVAL HPI/OVERNIGHT EVENTS: Patient seen and examined at bedside. Overnight patient had -140's. Amlodipine was d/c and patient was started on PO Cardizem 120mg daily. On exam, patient feeling well and resting comfortably in bed. Patient stated that she would like to get up and sit in the chair because she was feeling stiff. She mentioned some tingling in her hands b/l. Neuro exam was normal. CT head ordered and showed no acute hemorrhage mass or mass effect seen, no significant changes      Denies fevers, chills, headache, lightheadedness, chest pain, dyspnea, abdominal pain, n/v/d/c.    MEDICATIONS  (STANDING):  apixaban 5 milliGRAM(s) Oral every 12 hours  cefTRIAXone   IVPB 2000 milliGRAM(s) IV Intermittent every 24 hours  diltiazem    milliGRAM(s) Oral daily  metoprolol tartrate 50 milliGRAM(s) Oral every 6 hours  metroNIDAZOLE    Tablet 500 milliGRAM(s) Oral every 8 hours    MEDICATIONS  (PRN):  acetaminophen     Tablet .. 650 milliGRAM(s) Oral every 6 hours PRN Mild Pain (1 - 3)  acetaminophen     Tablet .. 650 milliGRAM(s) Oral every 6 hours PRN Temp greater or equal to 38C (100.4F)  aluminum hydroxide/magnesium hydroxide/simethicone Suspension 30 milliLiter(s) Oral every 4 hours PRN Dyspepsia  melatonin 3 milliGRAM(s) Oral at bedtime PRN Insomnia  ondansetron Injectable 4 milliGRAM(s) IV Push every 8 hours PRN Nausea and/or Vomiting      Allergies    No Known Allergies    Intolerances        REVIEW OF SYSTEMS:  CONSTITUTIONAL: No fever or chills  HEENT:  No headache, no sore throat  RESPIRATORY: No cough, wheezing, or shortness of breath  CARDIOVASCULAR: No chest pain, palpitations  GASTROINTESTINAL: No abd pain, nausea, vomiting, or diarrhea  GENITOURINARY: No dysuria, frequency, or hematuria  NEUROLOGICAL: Tingling in hand b/l  MUSCULOSKELETAL: stiff joints      Objective:  Vital Signs Last 24 Hrs  T(C): 36.6 (31 Jul 2023 12:49), Max: 36.9 (30 Jul 2023 21:38)  T(F): 97.8 (31 Jul 2023 12:49), Max: 98.4 (30 Jul 2023 21:38)  HR: 96 (31 Jul 2023 14:01) (83 - 96)  BP: 158/93 (31 Jul 2023 14:01) (149/82 - 177/102)  BP(mean): --  RR: 18 (31 Jul 2023 12:49) (18 - 19)  SpO2: 92% (31 Jul 2023 14:01) (92% - 95%)    Parameters below as of 31 Jul 2023 14:01  Patient On (Oxygen Delivery Method): room air        GENERAL: NAD, lying in bed comfortably  HEAD:  Atraumatic, Normocephalic  EYES: EOMI, PERRLA, conjunctiva and sclera clear  ENT: Moist mucous membranes  NECK: Supple, No JVD  CHEST/LUNG: Clear to auscultation bilaterally; No rales, rhonchi, wheezing, or rubs. Unlabored respirations  HEART:  +irregularly regular. +S1, S2. tachy +   ABDOMEN: +perc enrique tube intact, clean and free of erythema, Bowel sounds present; Soft, Nontender, Nondistended.   EXTREMITIES:  +LE edema b/l  NERVOUS SYSTEM:  Alert & Oriented X3, speech clear, no pronator drift   MSK: Normal ROM all 4 extremities, full and equal strength  SKIN: No rashes or lesions    LABS:                        11.9   6.70  )-----------( 277      ( 31 Jul 2023 06:17 )             35.9     CBC Full  -  ( 31 Jul 2023 06:17 )  WBC Count : 6.70 K/uL  Hemoglobin : 11.9 g/dL  Hematocrit : 35.9 %  Platelet Count - Automated : 277 K/uL  Mean Cell Volume : 92.8 fl  Mean Cell Hemoglobin : 30.7 pg  Mean Cell Hemoglobin Concentration : 33.1 gm/dL  Auto Neutrophil # : x  Auto Lymphocyte # : x  Auto Monocyte # : x  Auto Eosinophil # : x  Auto Basophil # : x  Auto Neutrophil % : x  Auto Lymphocyte % : x  Auto Monocyte % : x  Auto Eosinophil % : x  Auto Basophil % : x    31 Jul 2023 06:17    142    |  110    |  15     ----------------------------<  102    3.5     |  27     |  0.74     Ca    8.7        31 Jul 2023 06:17  Phos  3.2       31 Jul 2023 06:17  Mg     2.2       31 Jul 2023 06:17    TPro  6.7    /  Alb  2.3    /  TBili  0.7    /  DBili  x      /  AST  75     /  ALT  101    /  AlkPhos  106    31 Jul 2023 06:17      Urinalysis Basic - ( 31 Jul 2023 06:17 )    Color: x / Appearance: x / SG: x / pH: x  Gluc: 102 mg/dL / Ketone: x  / Bili: x / Urobili: x   Blood: x / Protein: x / Nitrite: x   Leuk Esterase: x / RBC: x / WBC x   Sq Epi: x / Non Sq Epi: x / Bacteria: x      CAPILLARY BLOOD GLUCOSE            Culture - Body Fluid with Gram Stain (collected 07-27-23 @ 16:20)  Source: Bile Bile Fluid  Gram Stain (07-28-23 @ 05:59):    No polymorphonuclear leukocytes seen    No organisms seen    by cytocentrifuge  Preliminary Report (07-30-23 @ 14:00):    Moderate Streptococcus anginosus    Few Escherichia coli    Rare Klebsiella oxytoca/Raoultella ornithinolytica Susceptibility to    follow.  Organism: Escherichia coli  Klebsiella oxytoca /Raoutella ornithinolytica (07-30-23 @ 14:30)  Organism: Klebsiella oxytoca /Raoutella ornithinolytica (07-30-23 @ 14:30)      -  Levofloxacin: S <=0.5      -  Tobramycin: S <=2      -  Aztreonam: S <=4      -  Gentamicin: S <=2      -  Cefazolin: S <=2 Enterobacter, Klebsiella aerogenes, Citrobacter, and Serratia may develop resistance during prolonged therapy (3-4 days)      -  Cefepime: S <=2      -  Piperacillin/Tazobactam: S <=8      -  Ciprofloxacin: S <=0.25      -  Imipenem: S <=1      -  Ceftriaxone: S <=1 Enterobacter, Klebsiella aerogenes, Citrobacter, and Serratia may develop resistance during prolonged therapy      -  Ampicillin: R <=8 These ampicillin results predict results for amoxicillin      Method Type: SVETA      -  Meropenem: S <=1      -  Ampicillin/Sulbactam: S <=4/2 Enterobacter, Klebsiella aerogenes, Citrobacter, and Serratia may develop resistance during prolonged therapy (3-4 days)      -  Cefoxitin: S <=8      -  Amikacin: S <=16      -  Amoxicillin/Clavulanic Acid: S <=8/4      -  Trimethoprim/Sulfamethoxazole: S <=0.5/9.5      -  Ertapenem: S <=0.5  Organism: Escherichia coli (07-30-23 @ 09:59)      -  Levofloxacin: S <=0.5      -  Tobramycin: S <=2      -  Aztreonam: S <=4      -  Gentamicin: S <=2      -  Cefazolin: S <=2 Enterobacter, Klebsiella aerogenes, Citrobacter, and Serratia may develop resistance during prolonged therapy (3-4 days)      -  Cefepime: S <=2      -  Piperacillin/Tazobactam: S <=8      -  Ciprofloxacin: S <=0.25      -  Imipenem: S <=1      -  Ceftriaxone: S <=1 Enterobacter, Klebsiella aerogenes, Citrobacter, and Serratia may develop resistance during prolonged therapy      -  Ampicillin: S <=8 These ampicillin results predict results for amoxicillin      Method Type: SVETA      -  Meropenem: S <=1      -  Ampicillin/Sulbactam: S <=4/2 Enterobacter, Klebsiella aerogenes, Citrobacter, and Serratia may develop resistance during prolonged therapy (3-4 days)      -  Cefoxitin: S <=8      -  Amikacin: S <=16      -  Amoxicillin/Clavulanic Acid: S <=8/4      -  Trimethoprim/Sulfamethoxazole: S <=0.5/9.5      -  Ertapenem: S <=0.5    Culture - Urine (collected 07-27-23 @ 13:34)  Source: Clean Catch Clean Catch (Midstream)  Final Report (07-28-23 @ 21:31):    No growth    Culture - Blood (collected 07-27-23 @ 09:45)  Source: .Blood Blood-Peripheral  Preliminary Report (07-31-23 @ 15:01):    No growth at 4 days    Culture - Blood (collected 07-27-23 @ 09:40)  Source: .Blood Blood-Peripheral  Preliminary Report (07-31-23 @ 15:01):    No growth at 4 days    Culture - Blood (collected 07-26-23 @ 03:00)  Source: .Blood Blood-Peripheral  Gram Stain (07-27-23 @ 06:08):    Growth in anaerobic bottle: Gram Positive Cocci in Pairs and Chains    Growth in aerobic bottle: Gram Positive Cocci in Pairs and Chains  Final Report (07-28-23 @ 20:40):    Growth in aerobic and anaerobic bottles: Streptococcus anginosus    Direct identification is available within approximately 3-5    hours either by Blood Panel Multiplexed PCR or Direct    MALDI-TOF. Details: [email or web address]  Organism: Blood Culture PCR  Streptococcus anginosus (07-28-23 @ 20:40)  Organism: Streptococcus anginosus (07-28-23 @ 20:40)      -  Levofloxacin: S 0.5      -  Clindamycin: R >0.5      -  Vancomycin: S <=0.12      -  Ceftriaxone: S <=0.25      Method Type: SVETA      -  Penicillin: S <=0.03      -  Erythromycin: R >0.5  Organism: Blood Culture PCR (07-28-23 @ 20:40)      Method Type: PCR      -  Streptococcus sp. (Not Grp A, B or S pneumoniae): Detec    Culture - Blood (collected 07-26-23 @ 02:58)  Source: .Blood Blood-Peripheral  Gram Stain (07-27-23 @ 08:19):    Growth in anaerobic bottle: Gram Positive Cocci in Pairs and Chains    Growth in aerobic bottle: Gram Positive Cocci in Pairs and Chains  Final Report (07-28-23 @ 20:40):    Growth in aerobic and anaerobic bottles: Streptococcus anginosus    See previous culture [id number]        RADIOLOGY & ADDITIONAL TESTS:    Personally reviewed.     Consultant(s) Notes Reviewed:  [x] YES  [ ] NO

## 2023-07-31 NOTE — PROVIDER CONTACT NOTE (OTHER) - BACKGROUND
78 y F admitted with new A-fib, pmh of htn, pulmonary fibrosis, acute cholecystitis, acute chf 78 y F admitted with new A-fib, pmh of htn, pulmonary fibrosis, acute cholecystitis, acute CHF

## 2023-07-31 NOTE — PROGRESS NOTE ADULT - SUBJECTIVE AND OBJECTIVE BOX
neuro cons dict  seen fall probably related to cva  ct head x2- negative for cva  brain mri  continue ac  carotid doppler  PT

## 2023-07-31 NOTE — CONSULT NOTE ADULT - SUBJECTIVE AND OBJECTIVE BOX
[facility]  INFECTIOUS DISEASES - [address], [place] [address]  Tel: [phone]     Fax: [phone]  =======================================================    [id number]  CHAY VELEZ     CC: Patient is a 78y old  Female who presents with a chief complaint of fall, new onset afib RVR (31 Jul 2023 09:47)    HPI:  78y female with PMHx of HTN, HLD, who was admitted on 7/26 for evaluation of fall out of chair. Previously admitted  that she was feeling unwell on 7/24 and had an episode of vomiting then. Found to have new onset Afib and acute cholecystitis, s/p Percutaneous Cholecystostomy on 7/27. Has been on Zosyn. Today denies any abdominal pain, nausea, vomiting, SOB or headache. Had been having difficulty holding fork with her right hand.    PAST MEDICAL & SURGICAL HISTORY:  HTN (hypertension)      Hyperlipidemia      Tinnitus  right ear      Seasonal allergies      S/P knee replacement          Social Hx:     FAMILY HISTORY:  Family history of myocardial infarction (Mother)        Allergies    No Known Allergies    Intolerances        Antibiotics:  MEDICATIONS  (STANDING):  apixaban 5 milliGRAM(s) Oral every 12 hours  cefTRIAXone   IVPB 1000 milliGRAM(s) IV Intermittent every 24 hours  diltiazem    milliGRAM(s) Oral daily  metoprolol tartrate 50 milliGRAM(s) Oral every 6 hours  metroNIDAZOLE    Tablet 500 milliGRAM(s) Oral every 8 hours    MEDICATIONS  (PRN):  acetaminophen     Tablet .. 650 milliGRAM(s) Oral every 6 hours PRN Mild Pain (1 - 3)  acetaminophen     Tablet .. 650 milliGRAM(s) Oral every 6 hours PRN Temp greater or equal to 38C (100.4F)  aluminum hydroxide/magnesium hydroxide/simethicone Suspension 30 milliLiter(s) Oral every 4 hours PRN Dyspepsia  melatonin 3 milliGRAM(s) Oral at bedtime PRN Insomnia  ondansetron Injectable 4 milliGRAM(s) IV Push every 8 hours PRN Nausea and/or Vomiting       REVIEW OF SYSTEMS:  CONSTITUTIONAL:  No Fever or chills  HEENT:  No sore throat or runny nose.  CARDIOVASCULAR:  No chest pain or SOB.  RESPIRATORY:  No cough, shortness of breath  GASTROINTESTINAL: see history  GENITOURINARY:  No dysuria  MUSCULOSKELETAL:  no back pain  NEUROLOGIC:  No headache    Physical Exam:  Vital Signs Last 24 Hrs  T(C): 36.6 (31 Jul 2023 12:49), Max: 36.9 (30 Jul 2023 21:38)  T(F): 97.8 (31 Jul 2023 12:49), Max: 98.4 (30 Jul 2023 21:38)  HR: 90 (31 Jul 2023 12:49) (83 - 107)  BP: 177/91 (31 Jul 2023 12:49) (149/82 - 177/102)  BP(mean): --  RR: 18 (31 Jul 2023 12:49) (18 - 19)  SpO2: 95% (31 Jul 2023 12:49) (92% - 95%)    Parameters below as of 31 Jul 2023 12:49  Patient On (Oxygen Delivery Method): room air        GEN: NAD  HEENT: normocephalic and atraumatic.   NECK: Supple.   LUNGS: Clear to auscultation.  HEART: Regular rate and rhythm   ABDOMEN: (+) perc enrique; Soft, nontender, and nondistended.    EXTREMITIES: No leg edema.  NEUROLOGIC: Answering simple questions    Labs:  07-31    142  |  110<H>  |  15  ----------------------------<  102<H>  3.5   |  27  |  0.74    Ca    8.7      31 Jul 2023 06:17  Phos  3.2     07-31  Mg     2.2     07-31    TPro  6.7  /  Alb  2.3<L>  /  TBili  0.7  /  DBili  x   /  AST  75<H>  /  ALT  101<H>  /  AlkPhos  106  07-31                          11.9   6.70  )-----------( 277      ( 31 Jul 2023 06:17 )             35.9       Urinalysis Basic - ( 31 Jul 2023 06:17 )    Color: x / Appearance: x / SG: x / pH: x  Gluc: 102 mg/dL / Ketone: x  / Bili: x / Urobili: x   Blood: x / Protein: x / Nitrite: x   Leuk Esterase: x / RBC: x / WBC x   Sq Epi: x / Non Sq Epi: x / Bacteria: x      LIVER FUNCTIONS - ( 31 Jul 2023 06:17 )  Alb: 2.3 g/dL / Pro: 6.7 g/dL / ALK PHOS: 106 U/L / ALT: 101 U/L / AST: 75 U/L / GGT: x                 Procalcitonin, Serum: 2.42 ng/mL (07-26-23 @ 03:00)        SARS-CoV-2: NotDetec (07-26-23 @ 01:40)      RECENT CULTURES:  07-27 @ 16:20 Bile Bile Fluid Escherichia coli  Klebsiella oxytoca /Raoutella ornithinolytica    Moderate Streptococcus anginosus  Few Escherichia coli  Rare Klebsiella oxytoca/Raoultella ornithinolytica Susceptibility to  follow.    No polymorphonuclear leukocytes seen  No organisms seen  by cytocentrifuge      07-27 @ 13:34 Clean Catch Clean Catch (Midstream)     No growth        07-27 @ 09:45 .Blood Blood-Peripheral     No growth at 72 Hours        07-27 @ 09:40 .Blood Blood-Peripheral     No growth at 72 Hours        07-26 @ 03:00 .Blood Blood-Peripheral Blood Culture PCR  Streptococcus anginosus    Growth in aerobic and anaerobic bottles: Streptococcus anginosus  Direct identification is available within approximately 3-5  hours either by Blood Panel Multiplexed PCR or Direct  MALDI-TOF. Details: [email or web address]    Growth in anaerobic bottle: Gram Positive Cocci in Pairs and Chains  Growth in aerobic bottle: Gram Positive Cocci in Pairs and Chains      07-26 @ 02:58 .Blood Blood-Peripheral     Growth in aerobic and anaerobic bottles: Streptococcus anginosus  See previous culture [id number]    Growth in anaerobic bottle: Gram Positive Cocci in Pairs and Chains  Growth in aerobic bottle: Gram Positive Cocci in Pairs and Chains      07-26 @ 01:40          [facility]        All imaging and other data have been reviewed.    < from: US Abdomen Upper Quadrant Right (07.26.23 @ 06:09) >  FINDINGS:  Liver: Recent echogenicity, likely fatty infiltration.  Bile ducts: Normal caliber. Common bile duct measures 4 mm.  Gallbladder: 2.0 cm gallstone and gallbladder sludge. Mild nonspecific   wall thickening. No tenderness over the gallbladder at the time the   study. Small amount of pericholecystic fluid.  Pancreas: Visualized portions are within normal limits.  Right kidney: 10.6 cm. No hydronephrosis.  Ascites: None.  IVC and aorta: Visualized portions normal in caliber.    IMPRESSION:  Cholelithiasis with a small amount of pericholecystic fluid and   nonspecific gallbladder wall thickening which can be secondary to   underlying gallbladder or liver disease; clinical correlation is   recommended for acute cholecystitis and a HIDA scan could be obtained for   further evaluation.    < end of copied text >  < from: NM Hepatobiliary Imaging (07.26.23 @ 12:30) >  FINDINGS: There is prompt, homogeneous uptake of radiopharmaceutical by   the hepatocytes. Activity is first seen in the bowel at about 25 minutes.   The gallbladder is not visualized at any time during the study, despite   morphine administration. There isgood clearance of activity from the   liver at the end of the study.    IMPRESSION: Abnormal morphine-augmented hepatobiliary scan.    Findings are compatible with acute cholecystitis.      < end of copied text >

## 2023-07-31 NOTE — PROGRESS NOTE ADULT - PROBLEM SELECTOR PLAN 2
-  started   lopressor to 50mg q6 for rate control /w hold parameters (was on BB in the past), add on Cardizem 120 mg  - hr stable   -[id number] 4: will start full dose LMWH   hold for ir guided gb  drain - hida + s/p gb drain s/p drainage  -   post  procedure switch to po  Eliquis tolerated well .  -TSH w/in normal limits,   -LE doppler resulted - no DVT evidence b/l   -Cardiology consulted: Dr. Louie gp

## 2023-07-31 NOTE — PROGRESS NOTE ADULT - PROBLEM SELECTOR PLAN 8
Lives alone and walks unassisted at baseline  Fell off chair and was unable to get up  Fall precautions  PT rec standing walker, no skilled PT needs

## 2023-07-31 NOTE — PROGRESS NOTE ADULT - SUBJECTIVE AND OBJECTIVE BOX
S: Patient seen and examined at bedside. No overnight events noted. At this time patient has no new complaints. States she has had no abdominal pain for the past couple of days and has had no complaints with percutaneous cholecystostomy drain. Able to ambulate, void, pass gas. Denies fevers, chills, chest pain, SOB, palpitations, calf pain, n/v/d/c..      MEDICATIONS:  acetaminophen     Tablet .. 650 milliGRAM(s) Oral every 6 hours PRN  acetaminophen     Tablet .. 650 milliGRAM(s) Oral every 6 hours PRN  aluminum hydroxide/magnesium hydroxide/simethicone Suspension 30 milliLiter(s) Oral every 4 hours PRN  enoxaparin Injectable 80 milliGRAM(s) SubCutaneous every 12 hours  lactated ringers. 1000 milliLiter(s) IV Continuous <Continuous>  melatonin 3 milliGRAM(s) Oral at bedtime PRN  metoprolol tartrate 50 milliGRAM(s) Oral every 6 hours  ondansetron Injectable 4 milliGRAM(s) IV Push every 8 hours PRN  piperacillin/tazobactam IVPB.. 3.375 Gram(s) IV Intermittent every 8 hours      O:  Vitals:  ============  T(F): 98.3 (31 Jul 2023 06:30), Max: 99.3 (30 Jul 2023 11:40)  HR: 83 (31 Jul 2023 06:30)  BP: 149/92 (31 Jul 2023 06:30)  RR: 18 (31 Jul 2023 06:30)  SpO2: 95% (31 Jul 2023 06:30) (92% - 95%)  temp max in last 48H T(F): , Max: 99.3 (07-30-23 @ 11:40)    =======================================================  Current Antibiotics:  piperacillin/tazobactam IVPB.. 3.375 Gram(s) IV Intermittent every 8 hours    Other medications:  apixaban 5 milliGRAM(s) Oral every 12 hours  diltiazem    milliGRAM(s) Oral daily  metoprolol tartrate 50 milliGRAM(s) Oral every 6 hours      =======================================================  Labs:                        11.9   6.70  )-----------( 277      ( 31 Jul 2023 06:17 )             35.9     07-31    142  |  110<H>  |  15  ----------------------------<  102<H>  3.5   |  27  |  0.74    Ca    8.7      31 Jul 2023 06:17  Phos  3.2     07-31  Mg     2.2     07-31    TPro  6.7  /  Alb  2.3<L>  /  TBili  0.7  /  DBili  x   /  AST  75<H>  /  ALT  101<H>  /  AlkPhos  106  07-31      Creatinine: 0.74 mg/dL (07-31-23 @ 06:17)  Creatinine: 0.76 mg/dL (07-30-23 @ 06:30)  Creatinine: 0.72 mg/dL (07-29-23 @ 07:45)  Creatinine: 0.83 mg/dL (07-28-23 @ 08:40)  Creatinine: 0.74 mg/dL (07-27-23 @ 18:35)  Creatinine: 0.89 mg/dL (07-27-23 @ 07:47)    Procalcitonin, Serum: 2.42 ng/mL (07-26-23 @ 03:00)    WBC Count: 6.70 K/uL (07-31-23 @ 06:17)  WBC Count: 8.32 K/uL (07-30-23 @ 06:30)  WBC Count: 10.61 K/uL (07-29-23 @ 07:45)  WBC Count: 11.96 K/uL (07-28-23 @ 08:40)  WBC Count: 15.29 K/uL (07-27-23 @ 07:47)      Alkaline Phosphatase: 106 U/L (07-31-23 @ 06:17)  Alkaline Phosphatase: 116 U/L (07-30-23 @ 06:30)  Alkaline Phosphatase: 112 U/L (07-28-23 @ 08:40)  Alanine Aminotransferase (ALT/SGPT): 101 U/L (07-31-23 @ 06:17)  Alanine Aminotransferase (ALT/SGPT): 81 U/L (07-30-23 @ 06:30)  Alanine Aminotransferase (ALT/SGPT): 67 U/L (07-28-23 @ 08:40)  Aspartate Aminotransferase (AST/SGOT): 75 U/L (07-31-23 @ 06:17)  Aspartate Aminotransferase (AST/SGOT): 63 U/L (07-30-23 @ 06:30)  Aspartate Aminotransferase (AST/SGOT): 74 U/L (07-28-23 @ 08:40)  Bilirubin Total: 0.7 mg/dL (07-31-23 @ 06:17)  Bilirubin Total: 0.7 mg/dL (07-30-23 @ 06:30)  Bilirubin Total: 0.9 mg/dL (07-28-23 @ 08:40)          I&O SUMMARY:    07-29-23 @ 07:01  -  07-30-23 @ 07:00  --------------------------------------------------------  IN: 0 mL / OUT: 25 mL / NET: -25 mL        PHYSICAL EXAM:  Lungs: CTA bilat without W/R/R  Card: S1S2, no m/r/g  Abd: Soft, NT, ND.  +BS x 4.  No rebound/guarding.  +cholecystostomy tube in place w/ drainage.  Ext: Calves soft, NT, without edema B/L. Pulses 2+ x4 extremities.        Assessment:  79 y/o F presenting with asymptomatic cholecystitis found on imaging, now s/p Perc enrique on 7/27.        Plan:  - Continue IV Abx  - Monitor cholecystostomy output  - Continue low fat diet  - Encouraged incentive spirometry use/OOB/Ambulation

## 2023-07-31 NOTE — PROGRESS NOTE ADULT - PROBLEM SELECTOR PLAN 10
Used to be on atorvastatin 40mg daily, no longer taking  Would resume statin drug when CK improves --> Started on Simvastatin 40mg   LDL 67

## 2023-07-31 NOTE — PROVIDER CONTACT NOTE (OTHER) - SITUATION
Pt lethargic with elevated BP Pt lethargic/ asleep since start of shift with elevated BP, electronically at this time was 177/102 on RUE, 173/116 on LUE & manually 168/84

## 2023-07-31 NOTE — PROGRESS NOTE ADULT - PROBLEM SELECTOR PLAN 7
- No prior history of kidney disease  - Cr downtrending --> Current Cr 0.74  - Possibly prerenal azotemia or secondary to rhabdo  - No baseline to compare it to so could be CKD  - Will continue gentle IV fluids and monitor renal indices

## 2023-07-31 NOTE — PROGRESS NOTE ADULT - SUBJECTIVE AND OBJECTIVE BOX
[facility] -- Neo Vega Pannella, Patel, Savella, Goodger  Office # [phone]    Follow Up:  Cardiac Optimization, Afib, HFmodEF    Subjective/Observations: Awake and alert.  Comfortable on RA.  Denies any form of respiratory or cardiac discomfort.  Denies N/V or abdominal pain    REVIEW OF SYSTEMS: All other review of systems is negative unless indicated above  PAST MEDICAL & SURGICAL HISTORY:  HTN (hypertension)  Hyperlipidemia  Tinnitus  right ear  Seasonal allergies  S/P knee replacement    MEDICATIONS  (STANDING):  apixaban 5 milliGRAM(s) Oral every 12 hours  diltiazem    milliGRAM(s) Oral daily  metoprolol tartrate 50 milliGRAM(s) Oral every 6 hours  piperacillin/tazobactam IVPB.. 3.375 Gram(s) IV Intermittent every 8 hours    MEDICATIONS  (PRN):  acetaminophen     Tablet .. 650 milliGRAM(s) Oral every 6 hours PRN Mild Pain (1 - 3)  acetaminophen     Tablet .. 650 milliGRAM(s) Oral every 6 hours PRN Temp greater or equal to 38C (100.4F)  aluminum hydroxide/magnesium hydroxide/simethicone Suspension 30 milliLiter(s) Oral every 4 hours PRN Dyspepsia  melatonin 3 milliGRAM(s) Oral at bedtime PRN Insomnia  ondansetron Injectable 4 milliGRAM(s) IV Push every 8 hours PRN Nausea and/or Vomiting    Allergies    No Known Allergies    Intolerances      Vital Signs Last 24 Hrs  T(C): 36.8 (31 Jul 2023 06:30), Max: 37.4 (30 Jul 2023 11:40)  T(F): 98.3 (31 Jul 2023 06:30), Max: 99.3 (30 Jul 2023 11:40)  HR: 83 (31 Jul 2023 06:30) (83 - 107)  BP: 149/92 (31 Jul 2023 06:30) (149/82 - 159/88)  BP(mean): --  RR: 18 (31 Jul 2023 06:30) (18 - 19)  SpO2: 95% (31 Jul 2023 06:30) (92% - 95%)    Parameters below as of 31 Jul 2023 06:30  Patient On (Oxygen Delivery Method): room air      I&O's Summary    30 Jul 2023 07:01  -  31 Jul 2023 07:00  --------------------------------------------------------  IN: 0 mL / OUT: 580 mL / NET: -580 mL      LABS: All Labs Reviewed:                        11.9   6.70  )-----------( 277      ( 31 Jul 2023 06:17 )             35.9                         12.2   8.32  )-----------( 266      ( 30 Jul 2023 06:30 )             36.7                         12.0   10.61 )-----------( 247      ( 29 Jul 2023 07:45 )             35.6     31 Jul 2023 06:17    142    |  110    |  15     ----------------------------<  102    3.5     |  27     |  0.74   30 Jul 2023 06:30    144    |  110    |  15     ----------------------------<  123    3.5     |  25     |  0.76   29 Jul 2023 07:45    140    |  110    |  15     ----------------------------<  133    3.7     |  22     |  0.72     Ca    8.7        31 Jul 2023 06:17  Ca    8.7        30 Jul 2023 06:30  Ca    8.7        29 Jul 2023 07:45  Phos  3.2       31 Jul 2023 06:17  Phos  3.5       30 Jul 2023 06:30  Phos  3.7       29 Jul 2023 07:45  Mg     2.2       31 Jul 2023 06:17  Mg     2.1       30 Jul 2023 06:30  Mg     2.2       29 Jul 2023 07:45    TPro  6.7    /  Alb  2.3    /  TBili  0.7    /  DBili  x      /  AST  75     /  ALT  101    /  AlkPhos  106    31 Jul 2023 06:17  TPro  7.2    /  Alb  2.6    /  TBili  0.7    /  DBili  x      /  AST  63     /  ALT  81     /  AlkPhos  116    30 Jul 2023 06:30    12 Lead ECG:   Ventricular Rate 95 BPM    QRS Duration 70 ms    Q-T Interval 388 ms    QTC Calculation(Bazett) 487 ms    R Axis 38 degrees    T Axis 96 degrees    Diagnosis Line Atrial fibrillation  Nonspecific T wave abnormality  Prolonged QT  Abnormal ECG  Whencompared with ECG of 25-JUL-2023 20:54, (Unconfirmed)  Vent. rate has decreased BY  48 BPM  Confirmed by Feli Louie ([id number]) on 7/26/2023 10:10:05 AM (07-26-23 @ 02:03)      TRANSTHORACIC ECHOCARDIOGRAM REPORT  ________________________________________________________________________________                                      _______       Pt. Name:       CHAY VELEZ Study Date:    7/26/2023  MRN:            [id number]         YOB: 1945  Accession #:    [id number]        Age:           78 years  Account#:       [id number]       Gender:        F  Heart Rate:                      Height:        62.99 in (160.00 cm)  Rhythm:                          Weight:        160.93 lb (73.00 kg)  Blood Pressure: 132/80 mmHg      BSA/BMI:       1.76 m² / 28.52 kg/m²  ________________________________________________________________________________________  Referring Physician:    Delta Castro  Interpreting Physician: Feli Louie MD  Primary Sonographer:    Glory Gu [facility]    CPT:               ECHO TTE WO CON COMP W DOPP - 93760.m  Indication(s):     Unspecified atrial fibrillation - I48.91  Procedure:         Transthoracic echocardiogram with 2-D, M-mode and complete                     spectral and color flow Doppler.  Ordering Location: [facility]  Study Information: Image quality for this study is technically difficult.  _______________________________________________________________________________________  CONCLUSIONS:      1. Technically difficult image quality.   2. The left ventricular endocardium is not well visualized. Overall the LV function appears to be mildly reduced with an ejection fraction of 45%. Cannot rule out segmental abnormalities.   3. Normal right ventricular cavity size, normal right ventricular wall thickness and normal right ventricular systolic function.   4. The aortic valve is not well visualized, appears mildly calcified with grossly normal opening.   5. Trace mitral regurgitation.   6. Trace tricuspid regurgitation.   7. The inferior vena cava is normal in size measuring 2.14 cm in diameter, (normal <2.1cm) with normal inspiratory collapse (normal >50%) consistent with normal right atrial pressure (~3, range 0-5mmHg).  ________________________________________________________________________________________  FINDINGS:     Left Ventricle:  The left ventricular systolic function is mildly decreased. The left ventricular endocardium is not well visualized. Overall the LV function appears to be mildly reduced with an ejection fraction of 45%. Cannot rule out segmental abnormalities.     Right Ventricle:  Normal right ventricular cavity size, normal wall thickness and normal right ventricular systolic function.     Left Atrium:  The left atrium is normal in size.     Right Atrium:  The right atrium is normal in size.     Aortic Valve:  The aortic valve is not well visualized, appears mildly calcified with grossly normal opening. There is noevidence of aortic regurgitation.     Mitral Valve:  There is trace mitral regurgitation.     Tricuspid Valve:  There is trace tricuspid regurgitation.     Pulmonic Valve:  Structurally normal pulmonic valve with normal leaflet excursion. There is trace pulmonic regurgitation.     Systemic Veins:  The inferior vena cava is normal in size measuring 2.14 cm in diameter, (normal <2.1cm) with normal inspiratory collapse (normal >50%) consistent with normal right atrial pressure (~3, range 0-5mmHg).  ____________________________________________________________________  Quantitative Data:  Left Ventricle Measurements: (Indexed to BSA)     IVSd (2D):   1.1 cm  LVPWd (2D):  0.9 cm  LVIDd (2D):  4.9 cm  LVIDs (2D):  3.6 cm  LV Mass:     177 g  100.7 g/m²     MV E Vmax:    1.00 m/s  e' lateral:   16.40 cm/s  e' medial:    12.30 cm/s  E/e' lateral: 6.07  E/e' medial:  8.10  E/e' Average: 7.23  MV DT:        118 msec    Aorta Measurements:     Ao Sinus: 3.1 cm    Left Atrium Measurements: (Indexedto BSA)  LA Diam 2D: 3.40 cm     LVOT / RVOT/ Qp/Qs Data: (Indexed to BSA)  Mitral Valve Measurements:     MV E Vmax: 1.0 m/s    Tricuspid Valve Measurements:     RA Pressure: 3 mmHg    ________________________________________________________________________________________  Electronically signed on 7/27/2023 at 4:22:35 PM by Feli Louie MD         *** Final ***      [facility] -- Neo Vega Pannella, Patel, Savella, Goodger  Office # [phone]    Follow Up:  Cardiac Optimization, Afib, HFmodEF    Subjective/Observations: Awake and alert.  Comfortable on RA.  Denies any form of respiratory or cardiac discomfort.  Denies N/V or abdominal pain    REVIEW OF SYSTEMS: All other review of systems is negative unless indicated above  PAST MEDICAL & SURGICAL HISTORY:  HTN (hypertension)  Hyperlipidemia  Tinnitus  right ear  Seasonal allergies  S/P knee replacement    MEDICATIONS  (STANDING):  apixaban 5 milliGRAM(s) Oral every 12 hours  diltiazem    milliGRAM(s) Oral daily  metoprolol tartrate 50 milliGRAM(s) Oral every 6 hours  piperacillin/tazobactam IVPB.. 3.375 Gram(s) IV Intermittent every 8 hours    MEDICATIONS  (PRN):  acetaminophen     Tablet .. 650 milliGRAM(s) Oral every 6 hours PRN Mild Pain (1 - 3)  acetaminophen     Tablet .. 650 milliGRAM(s) Oral every 6 hours PRN Temp greater or equal to 38C (100.4F)  aluminum hydroxide/magnesium hydroxide/simethicone Suspension 30 milliLiter(s) Oral every 4 hours PRN Dyspepsia  melatonin 3 milliGRAM(s) Oral at bedtime PRN Insomnia  ondansetron Injectable 4 milliGRAM(s) IV Push every 8 hours PRN Nausea and/or Vomiting    Allergies    No Known Allergies    Intolerances  Vital Signs Last 24 Hrs  T(C): 36.8 (31 Jul 2023 06:30), Max: 37.4 (30 Jul 2023 11:40)  T(F): 98.3 (31 Jul 2023 06:30), Max: 99.3 (30 Jul 2023 11:40)  HR: 83 (31 Jul 2023 06:30) (83 - 107)  BP: 149/92 (31 Jul 2023 06:30) (149/82 - 159/88)  BP(mean): --  RR: 18 (31 Jul 2023 06:30) (18 - 19)  SpO2: 95% (31 Jul 2023 06:30) (92% - 95%)    Parameters below as of 31 Jul 2023 06:30  Patient On (Oxygen Delivery Method): room air  I&O's Summary    30 Jul 2023 07:01  -  31 Jul 2023 07:00  --------------------------------------------------------  IN: 0 mL / OUT: 580 mL / NET: -580 mL    PHYSICAL EXAM:  TELE: Afib with nonsustained tachy at 150's  Constitutional: NAD, awake but lethargic, obese  HEENT: Moist Mucous Membranes, Anicteric  Pulmonary: Non-labored, breath sounds are clear bilaterally, No wheezing, rales or rhonchi  Cardiovascular: IRRR, S1 and S2, No murmurs, rubs, gallops or clicks  Gastrointestinal: Bowel Sounds present, soft, nontender.   Rt enrique tube  Lymph: 1+ BLE edema. No lymphadenopathy.  Skin: No visible rashes or ulcers.  Psych:  Mood & affect: Flat    LABS: All Labs Reviewed:                        11.9   6.70  )-----------( 277      ( 31 Jul 2023 06:17 )             35.9                         12.2   8.32  )-----------( 266      ( 30 Jul 2023 06:30 )             36.7                         12.0   10.61 )-----------( 247      ( 29 Jul 2023 07:45 )             35.6     31 Jul 2023 06:17    142    |  110    |  15     ----------------------------<  102    3.5     |  27     |  0.74   30 Jul 2023 06:30    144    |  110    |  15     ----------------------------<  123    3.5     |  25     |  0.76   29 Jul 2023 07:45    140    |  110    |  15     ----------------------------<  133    3.7     |  22     |  0.72     Ca    8.7        31 Jul 2023 06:17  Ca    8.7        30 Jul 2023 06:30  Ca    8.7        29 Jul 2023 07:45  Phos  3.2       31 Jul 2023 06:17  Phos  3.5       30 Jul 2023 06:30  Phos  3.7       29 Jul 2023 07:45  Mg     2.2       31 Jul 2023 06:17  Mg     2.1       30 Jul 2023 06:30  Mg     2.2       29 Jul 2023 07:45    TPro  6.7    /  Alb  2.3    /  TBili  0.7    /  DBili  x      /  AST  75     /  ALT  101    /  AlkPhos  106    31 Jul 2023 06:17  TPro  7.2    /  Alb  2.6    /  TBili  0.7    /  DBili  x      /  AST  63     /  ALT  81     /  AlkPhos  116    30 Jul 2023 06:30    12 Lead ECG:   Ventricular Rate 95 BPM    QRS Duration 70 ms    Q-T Interval 388 ms    QTC Calculation(Bazett) 487 ms    R Axis 38 degrees    T Axis 96 degrees    Diagnosis Line Atrial fibrillation  Nonspecific T wave abnormality  Prolonged QT  Abnormal ECG  Whencompared with ECG of 25-JUL-2023 20:54, (Unconfirmed)  Vent. rate has decreased BY  48 BPM  Confirmed by Feli Louie ([id number]) on 7/26/2023 10:10:05 AM (07-26-23 @ 02:03)      TRANSTHORACIC ECHOCARDIOGRAM REPORT  ________________________________________________________________________________                                      _______       Pt. Name:       CHAY VELEZ Study Date:    7/26/2023  MRN:            [id number]         YOB: 1945  Accession #:    [id number]        Age:           78 years  Account#:       [id number]       Gender:        F  Heart Rate:                      Height:        62.99 in (160.00 cm)  Rhythm:                          Weight:        160.93 lb (73.00 kg)  Blood Pressure: 132/80 mmHg      BSA/BMI:       1.76 m² / 28.52 kg/m²  ________________________________________________________________________________________  Referring Physician:    Delta Castro  Interpreting Physician: Feli Louie MD  Primary Sonographer:    Glory Gu RDCS    CPT:               ECHO TTE WO CON COMP W DOPP - 90305.m  Indication(s):     Unspecified atrial fibrillation - I48.91  Procedure:         Transthoracic echocardiogram with 2-D, M-mode and complete                     spectral and color flow Doppler.  Ordering Location: [facility]  Study Information: Image quality for this study is technically difficult.  _______________________________________________________________________________________  CONCLUSIONS:      1. Technically difficult image quality.   2. The left ventricular endocardium is not well visualized. Overall the LV function appears to be mildly reduced with an ejection fraction of 45%. Cannot rule out segmental abnormalities.   3. Normal right ventricular cavity size, normal right ventricular wall thickness and normal right ventricular systolic function.   4. The aortic valve is not well visualized, appears mildly calcified with grossly normal opening.   5. Trace mitral regurgitation.   6. Trace tricuspid regurgitation.   7. The inferior vena cava is normal in size measuring 2.14 cm in diameter, (normal <2.1cm) with normal inspiratory collapse (normal >50%) consistent with normal right atrial pressure (~3, range 0-5mmHg).  ________________________________________________________________________________________  FINDINGS:     Left Ventricle:  The left ventricular systolic function is mildly decreased. The left ventricular endocardium is not well visualized. Overall the LV function appears to be mildly reduced with an ejection fraction of 45%. Cannot rule out segmental abnormalities.     Right Ventricle:  Normal right ventricular cavity size, normal wall thickness and normal right ventricular systolic function.     Left Atrium:  The left atrium is normal in size.     Right Atrium:  The right atrium is normal in size.     Aortic Valve:  The aortic valve is not well visualized, appears mildly calcified with grossly normal opening. There is noevidence of aortic regurgitation.     Mitral Valve:  There is trace mitral regurgitation.     Tricuspid Valve:  There is trace tricuspid regurgitation.     Pulmonic Valve:  Structurally normal pulmonic valve with normal leaflet excursion. There is trace pulmonic regurgitation.     Systemic Veins:  The inferior vena cava is normal in size measuring 2.14 cm in diameter, (normal <2.1cm) with normal inspiratory collapse (normal >50%) consistent with normal right atrial pressure (~3, range 0-5mmHg).  ____________________________________________________________________  Quantitative Data:  Left Ventricle Measurements: (Indexed to BSA)     IVSd (2D):   1.1 cm  LVPWd (2D):  0.9 cm  LVIDd (2D):  4.9 cm  LVIDs (2D):  3.6 cm  LV Mass:     177 g  100.7 g/m²     MV E Vmax:    1.00 m/s  e' lateral:   16.40 cm/s  e' medial:    12.30 cm/s  E/e' lateral: 6.07  E/e' medial:  8.10  E/e' Average: 7.23  MV DT:        118 msec    Aorta Measurements:     Ao Sinus: 3.1 cm    Left Atrium Measurements: (Indexedto BSA)  LA Diam 2D: 3.40 cm     LVOT / RVOT/ Qp/Qs Data: (Indexed to BSA)  Mitral Valve Measurements:     MV E Vmax: 1.0 m/s    Tricuspid Valve Measurements:     RA Pressure: 3 mmHg    ________________________________________________________________________________________  Electronically signed on 7/27/2023 at 4:22:35 PM by Feli Louie MD         *** Final ***      [facility] -- Neo Vega, Mehrdad Orta Savella, Liban Thmopson  Office # [phone]    Follow Up:  Cardiac Optimization, Afib, HFmodEF    Subjective/Observations: Awake and alert.  Comfortable on RA.  Denies any form of respiratory or cardiac discomfort.  Denies N/V or abdominal pain    REVIEW OF SYSTEMS: All other review of systems is negative unless indicated above  PAST MEDICAL & SURGICAL HISTORY:  HTN (hypertension)  Hyperlipidemia  Tinnitus  right ear  Seasonal allergies  S/P knee replacement    MEDICATIONS  (STANDING):  apixaban 5 milliGRAM(s) Oral every 12 hours  diltiazem    milliGRAM(s) Oral daily  metoprolol tartrate 50 milliGRAM(s) Oral every 6 hours  piperacillin/tazobactam IVPB.. 3.375 Gram(s) IV Intermittent every 8 hours    MEDICATIONS  (PRN):  acetaminophen     Tablet .. 650 milliGRAM(s) Oral every 6 hours PRN Mild Pain (1 - 3)  acetaminophen     Tablet .. 650 milliGRAM(s) Oral every 6 hours PRN Temp greater or equal to 38C (100.4F)  aluminum hydroxide/magnesium hydroxide/simethicone Suspension 30 milliLiter(s) Oral every 4 hours PRN Dyspepsia  melatonin 3 milliGRAM(s) Oral at bedtime PRN Insomnia  ondansetron Injectable 4 milliGRAM(s) IV Push every 8 hours PRN Nausea and/or Vomiting    Allergies    No Known Allergies    Intolerances  Vital Signs Last 24 Hrs  T(C): 36.8 (31 Jul 2023 06:30), Max: 37.4 (30 Jul 2023 11:40)  T(F): 98.3 (31 Jul 2023 06:30), Max: 99.3 (30 Jul 2023 11:40)  HR: 83 (31 Jul 2023 06:30) (83 - 107)  BP: 149/92 (31 Jul 2023 06:30) (149/82 - 159/88)  BP(mean): --  RR: 18 (31 Jul 2023 06:30) (18 - 19)  SpO2: 95% (31 Jul 2023 06:30) (92% - 95%)    Parameters below as of 31 Jul 2023 06:30  Patient On (Oxygen Delivery Method): room air  I&O's Summary    30 Jul 2023 07:01  -  31 Jul 2023 07:00  --------------------------------------------------------  IN: 0 mL / OUT: 580 mL / NET: -580 mL    PHYSICAL EXAM:  TELE: Afib with nonsustained tachy at 150's  Constitutional: NAD, awake but lethargic, obese  HEENT: Moist Mucous Membranes, Anicteric  Pulmonary: Non-labored, breath sounds are clear bilaterally, No wheezing, rales or rhonchi  Cardiovascular: IRRR, S1 and S2, No murmurs, rubs, gallops or clicks  Gastrointestinal: Bowel Sounds present, soft, nontender.   Rt enrique tube  Lymph: 1+ BLE edema. No lymphadenopathy.  Skin: No visible rashes or ulcers.  Psych:  Mood & affect: Flat    LABS: All Labs Reviewed:                        11.9   6.70  )-----------( 277      ( 31 Jul 2023 06:17 )             35.9                         12.2   8.32  )-----------( 266      ( 30 Jul 2023 06:30 )             36.7                         12.0   10.61 )-----------( 247      ( 29 Jul 2023 07:45 )             35.6     31 Jul 2023 06:17    142    |  110    |  15     ----------------------------<  102    3.5     |  27     |  0.74   30 Jul 2023 06:30    144    |  110    |  15     ----------------------------<  123    3.5     |  25     |  0.76   29 Jul 2023 07:45    140    |  110    |  15     ----------------------------<  133    3.7     |  22     |  0.72     Ca    8.7        31 Jul 2023 06:17  Ca    8.7        30 Jul 2023 06:30  Ca    8.7        29 Jul 2023 07:45  Phos  3.2       31 Jul 2023 06:17  Phos  3.5       30 Jul 2023 06:30  Phos  3.7       29 Jul 2023 07:45  Mg     2.2       31 Jul 2023 06:17  Mg     2.1       30 Jul 2023 06:30  Mg     2.2       29 Jul 2023 07:45    TPro  6.7    /  Alb  2.3    /  TBili  0.7    /  DBili  x      /  AST  75     /  ALT  101    /  AlkPhos  106    31 Jul 2023 06:17  TPro  7.2    /  Alb  2.6    /  TBili  0.7    /  DBili  x      /  AST  63     /  ALT  81     /  AlkPhos  116    30 Jul 2023 06:30    12 Lead ECG:   Ventricular Rate 95 BPM    QRS Duration 70 ms    Q-T Interval 388 ms    QTC Calculation(Bazett) 487 ms    R Axis 38 degrees    T Axis 96 degrees    Diagnosis Line Atrial fibrillation  Nonspecific T wave abnormality  Prolonged QT  Abnormal ECG  Whencompared with ECG of 25-JUL-2023 20:54, (Unconfirmed)  Vent. rate has decreased BY  48 BPM  Confirmed by Feli Louie ([id number]) on 7/26/2023 10:10:05 AM (07-26-23 @ 02:03)      TRANSTHORACIC ECHOCARDIOGRAM REPORT  ________________________________________________________________________________                                      _______       Pt. Name:       CHAY VELEZ Study Date:    7/26/2023  MRN:            [id number]         YOB: 1945  Accession #:    [id number]        Age:           78 years  Account#:       [id number]       Gender:        F  Heart Rate:                      Height:        62.99 in (160.00 cm)  Rhythm:                          Weight:        160.93 lb (73.00 kg)  Blood Pressure: 132/80 mmHg      BSA/BMI:       1.76 m² / 28.52 kg/m²  ________________________________________________________________________________________  Referring Physician:    Delta Castro  Interpreting Physician: Feli Louie MD  Primary Sonographer:    Glory Gu RDCS    CPT:               ECHO TTE WO CON COMP W DOPP - 71422.m  Indication(s):     Unspecified atrial fibrillation - I48.91  Procedure:         Transthoracic echocardiogram with 2-D, M-mode and complete                     spectral and color flow Doppler.  Ordering Location: [facility]  Study Information: Image quality for this study is technically difficult.  _______________________________________________________________________________________  CONCLUSIONS:      1. Technically difficult image quality.   2. The left ventricular endocardium is not well visualized. Overall the LV function appears to be mildly reduced with an ejection fraction of 45%. Cannot rule out segmental abnormalities.   3. Normal right ventricular cavity size, normal right ventricular wall thickness and normal right ventricular systolic function.   4. The aortic valve is not well visualized, appears mildly calcified with grossly normal opening.   5. Trace mitral regurgitation.   6. Trace tricuspid regurgitation.   7. The inferior vena cava is normal in size measuring 2.14 cm in diameter, (normal <2.1cm) with normal inspiratory collapse (normal >50%) consistent with normal right atrial pressure (~3, range 0-5mmHg).  ________________________________________________________________________________________  FINDINGS:     Left Ventricle:  The left ventricular systolic function is mildly decreased. The left ventricular endocardium is not well visualized. Overall the LV function appears to be mildly reduced with an ejection fraction of 45%. Cannot rule out segmental abnormalities.     Right Ventricle:  Normal right ventricular cavity size, normal wall thickness and normal right ventricular systolic function.     Left Atrium:  The left atrium is normal in size.     Right Atrium:  The right atrium is normal in size.     Aortic Valve:  The aortic valve is not well visualized, appears mildly calcified with grossly normal opening. There is noevidence of aortic regurgitation.     Mitral Valve:  There is trace mitral regurgitation.     Tricuspid Valve:  There is trace tricuspid regurgitation.     Pulmonic Valve:  Structurally normal pulmonic valve with normal leaflet excursion. There is trace pulmonic regurgitation.     Systemic Veins:  The inferior vena cava is normal in size measuring 2.14 cm in diameter, (normal <2.1cm) with normal inspiratory collapse (normal >50%) consistent with normal right atrial pressure (~3, range 0-5mmHg).  ____________________________________________________________________  Quantitative Data:  Left Ventricle Measurements: (Indexed to BSA)     IVSd (2D):   1.1 cm  LVPWd (2D):  0.9 cm  LVIDd (2D):  4.9 cm  LVIDs (2D):  3.6 cm  LV Mass:     177 g  100.7 g/m²     MV E Vmax:    1.00 m/s  e' lateral:   16.40 cm/s  e' medial:    12.30 cm/s  E/e' lateral: 6.07  E/e' medial:  8.10  E/e' Average: 7.23  MV DT:        118 msec    Aorta Measurements:     Ao Sinus: 3.1 cm    Left Atrium Measurements: (Indexedto BSA)  LA Diam 2D: 3.40 cm     LVOT / RVOT/ Qp/Qs Data: (Indexed to BSA)  Mitral Valve Measurements:     MV E Vmax: 1.0 m/s    Tricuspid Valve Measurements:     RA Pressure: 3 mmHg    ________________________________________________________________________________________  Electronically signed on 7/27/2023 at 4:22:35 PM by Feli Louie MD         *** Final ***

## 2023-07-31 NOTE — PROGRESS NOTE ADULT - PROBLEM SELECTOR PLAN 9
History of HTN, no longer on medications  Used to be on ACE-inhibitor and BB-thiazide  Continue with lopressor 50mg q6

## 2023-07-31 NOTE — PROGRESS NOTE ADULT - PROBLEM SELECTOR PLAN 7
alley - No prior history of kidney disease  Possibly prerenal azotemia or secondary to rhabdo  s/p iv fluid  cr wnl

## 2023-07-31 NOTE — PROGRESS NOTE ADULT - SUBJECTIVE AND OBJECTIVE BOX
Date/Time Patient Seen:  		  Referring MD:   Data Reviewed	       Patient is a 78y old  Female who presents with a chief complaint of fall, new onset afib RVR (30 Jul 2023 12:15)      Subjective/HPI     PAST MEDICAL & SURGICAL HISTORY:  HTN (hypertension)    Hyperlipidemia    Tinnitus  right ear    Seasonal allergies    No significant past surgical history    S/P knee replacement          Medication list         MEDICATIONS  (STANDING):  apixaban 5 milliGRAM(s) Oral every 12 hours  diltiazem    milliGRAM(s) Oral daily  lactated ringers. 1000 milliLiter(s) (60 mL/Hr) IV Continuous <Continuous>  metoprolol tartrate 50 milliGRAM(s) Oral every 6 hours  piperacillin/tazobactam IVPB.. 3.375 Gram(s) IV Intermittent every 8 hours    MEDICATIONS  (PRN):  acetaminophen     Tablet .. 650 milliGRAM(s) Oral every 6 hours PRN Temp greater or equal to 38C (100.4F)  acetaminophen     Tablet .. 650 milliGRAM(s) Oral every 6 hours PRN Mild Pain (1 - 3)  aluminum hydroxide/magnesium hydroxide/simethicone Suspension 30 milliLiter(s) Oral every 4 hours PRN Dyspepsia  melatonin 3 milliGRAM(s) Oral at bedtime PRN Insomnia  ondansetron Injectable 4 milliGRAM(s) IV Push every 8 hours PRN Nausea and/or Vomiting         Vitals log        ICU Vital Signs Last 24 Hrs  T(C): 36.9 (30 Jul 2023 21:38), Max: 37.4 (30 Jul 2023 11:40)  T(F): 98.4 (30 Jul 2023 21:38), Max: 99.3 (30 Jul 2023 11:40)  HR: 84 (30 Jul 2023 21:38) (84 - 107)  BP: 149/82 (30 Jul 2023 21:38) (149/82 - 159/88)  BP(mean): --  ABP: --  ABP(mean): --  RR: 19 (30 Jul 2023 21:38) (18 - 19)  SpO2: 93% (30 Jul 2023 21:38) (92% - 93%)    O2 Parameters below as of 30 Jul 2023 21:38  Patient On (Oxygen Delivery Method): room air                 Input and Output:  I&O's Detail    29 Jul 2023 07:01  -  30 Jul 2023 07:00  --------------------------------------------------------  IN:  Total IN: 0 mL    OUT:    Drain (mL): 25 mL  Total OUT: 25 mL    Total NET: -25 mL      30 Jul 2023 07:01  -  31 Jul 2023 05:42  --------------------------------------------------------  IN:  Total IN: 0 mL    OUT:    Drain (mL): 30 mL  Total OUT: 30 mL    Total NET: -30 mL          Lab Data                        12.2   8.32  )-----------( 266      ( 30 Jul 2023 06:30 )             36.7     07-30    144  |  110<H>  |  15  ----------------------------<  123<H>  3.5   |  25  |  0.76    Ca    8.7      30 Jul 2023 06:30  Phos  3.5     07-30  Mg     2.1     07-30    TPro  7.2  /  Alb  2.6<L>  /  TBili  0.7  /  DBili  x   /  AST  63<H>  /  ALT  81<H>  /  AlkPhos  116  07-30            Review of Systems	      Objective     Physical Examination    heart s1s2  lung dc BS  head nc      Pertinent Lab findings & Imaging      Ceci:  NO   Adequate UO     I&O's Detail    29 Jul 2023 07:01  -  30 Jul 2023 07:00  --------------------------------------------------------  IN:  Total IN: 0 mL    OUT:    Drain (mL): 25 mL  Total OUT: 25 mL    Total NET: -25 mL      30 Jul 2023 07:01  -  31 Jul 2023 05:42  --------------------------------------------------------  IN:  Total IN: 0 mL    OUT:    Drain (mL): 30 mL  Total OUT: 30 mL    Total NET: -30 mL               Discussed with:     Cultures:	        Radiology

## 2023-07-31 NOTE — PROGRESS NOTE ADULT - PROBLEM SELECTOR PLAN 2
- Admitted to telemetry  - EKG on admission showed atrial fibrillation with RVR, rate 143  - TTE: EF 45% with no significant valvular disease  - Given 20mg IV cardizem in the ER with improvement in rates  - [id number] 4: will start full dose LMWH for thromboprophylaxis  - Monitor and replete electrolytes for optimal arrhythmia control  - Phos w/in normal limit  - TSH w/in normal limits, T3 is low, 69  - LE doppler resulted - no DVT evidence b/l   - Cardiology consulted (Dr. Simon), recs as follows  - Continue Lopressor 50 mg q6H with parameters --> Plan to switch to long-acting on 08/01  - Norvasc 5mg daily discontinued --> Start Cardizem  mg daily  - Continue Eliquis (was switched from Lovenox on 7/30)

## 2023-07-31 NOTE — PROGRESS NOTE ADULT - ASSESSMENT
78y female with PMHx of HTN, HLD presented to the ED for the evaluation of fall out of chair. States that she was sitting on the chair and fell backwards, hitting her head. Was on the floor for over an hour and was unable to get up.    pulm nodule - 1.8 cm  eval for acute enrique  HTN  HLD  OP  OA  Dyspnea  Ataxic gait  Hard of hearing  AF  Rhabdo    cardio f/u  on ZOSYN  on Eliquis  tele monitoring in progress    serial renal indices  I and O  monitor VS and HD and Sat  trend LABS  on o2 support - monitor for dyspnea - Blood gas - CT imaging without acute pulm path -   Pulm nodule - 1.8 cm - will need f/u and repeat imaging and likely PET scan  Fall prec - PT assessment  AF management - eval for HF - on AC full dose -   TTE  trend TROPS  cardio eval  tele monitoring  proBNP noted

## 2023-07-31 NOTE — PROGRESS NOTE ADULT - PROBLEM SELECTOR PLAN 3
- Patient with elevated CK after fall and being on the floor   - On admission does not appear volume overloaded despite elevated proBNP   - Got 1L of NS in the ED , continue iv fluid   - Cxray showed clear lungs, no acute cardiopulmonary abnormalities   - TTE: EF 45% with no significant valvular disease  Cardio (Dr. Simon) consulted, recs   - No clear evidence of acute ischemia, trops mildly elevated but downtrended  - CK elevated 5502 --> 5621 --> 1848; doubt acute plaque rupture in setting of fall and rhabdomyolysis; trended down

## 2023-08-01 DIAGNOSIS — R29.898 OTHER SYMPTOMS AND SIGNS INVOLVING THE MUSCULOSKELETAL SYSTEM: ICD-10-CM

## 2023-08-01 LAB
ALBUMIN SERPL ELPH-MCNC: 2.6 G/DL — LOW (ref 3.3–5)
ALP SERPL-CCNC: 112 U/L — SIGNIFICANT CHANGE UP (ref 40–120)
ALT FLD-CCNC: 113 U/L — HIGH (ref 12–78)
ANION GAP SERPL CALC-SCNC: 6 MMOL/L — SIGNIFICANT CHANGE UP (ref 5–17)
AST SERPL-CCNC: 70 U/L — HIGH (ref 15–37)
BASOPHILS # BLD AUTO: 0.04 K/UL — SIGNIFICANT CHANGE UP (ref 0–0.2)
BASOPHILS NFR BLD AUTO: 0.4 % — SIGNIFICANT CHANGE UP (ref 0–2)
BILIRUB SERPL-MCNC: 0.5 MG/DL — SIGNIFICANT CHANGE UP (ref 0.2–1.2)
BUN SERPL-MCNC: 10 MG/DL — SIGNIFICANT CHANGE UP (ref 7–23)
CALCIUM SERPL-MCNC: 8.9 MG/DL — SIGNIFICANT CHANGE UP (ref 8.5–10.1)
CHLORIDE SERPL-SCNC: 109 MMOL/L — HIGH (ref 96–108)
CO2 SERPL-SCNC: 26 MMOL/L — SIGNIFICANT CHANGE UP (ref 22–31)
CREAT SERPL-MCNC: 0.74 MG/DL — SIGNIFICANT CHANGE UP (ref 0.5–1.3)
CULTURE RESULTS: SIGNIFICANT CHANGE UP
CULTURE RESULTS: SIGNIFICANT CHANGE UP
EGFR: 83 ML/MIN/1.73M2 — SIGNIFICANT CHANGE UP
EOSINOPHIL # BLD AUTO: 0.2 K/UL — SIGNIFICANT CHANGE UP (ref 0–0.5)
EOSINOPHIL NFR BLD AUTO: 2.1 % — SIGNIFICANT CHANGE UP (ref 0–6)
GLUCOSE SERPL-MCNC: 106 MG/DL — HIGH (ref 70–99)
HCT VFR BLD CALC: 41.1 % — SIGNIFICANT CHANGE UP (ref 34.5–45)
HGB BLD-MCNC: 13.5 G/DL — SIGNIFICANT CHANGE UP (ref 11.5–15.5)
IMM GRANULOCYTES NFR BLD AUTO: 1.8 % — HIGH (ref 0–0.9)
LYMPHOCYTES # BLD AUTO: 1.4 K/UL — SIGNIFICANT CHANGE UP (ref 1–3.3)
LYMPHOCYTES # BLD AUTO: 14.5 % — SIGNIFICANT CHANGE UP (ref 13–44)
MAGNESIUM SERPL-MCNC: 2.3 MG/DL — SIGNIFICANT CHANGE UP (ref 1.6–2.6)
MCHC RBC-ENTMCNC: 30.8 PG — SIGNIFICANT CHANGE UP (ref 27–34)
MCHC RBC-ENTMCNC: 32.8 GM/DL — SIGNIFICANT CHANGE UP (ref 32–36)
MCV RBC AUTO: 93.6 FL — SIGNIFICANT CHANGE UP (ref 80–100)
MONOCYTES # BLD AUTO: 0.86 K/UL — SIGNIFICANT CHANGE UP (ref 0–0.9)
MONOCYTES NFR BLD AUTO: 8.9 % — SIGNIFICANT CHANGE UP (ref 2–14)
NEUTROPHILS # BLD AUTO: 7 K/UL — SIGNIFICANT CHANGE UP (ref 1.8–7.4)
NEUTROPHILS NFR BLD AUTO: 72.3 % — SIGNIFICANT CHANGE UP (ref 43–77)
NRBC # BLD: 0 /100 WBCS — SIGNIFICANT CHANGE UP (ref 0–0)
PHOSPHATE SERPL-MCNC: 3.1 MG/DL — SIGNIFICANT CHANGE UP (ref 2.5–4.5)
PLATELET # BLD AUTO: 385 K/UL — SIGNIFICANT CHANGE UP (ref 150–400)
POTASSIUM SERPL-MCNC: 3.7 MMOL/L — SIGNIFICANT CHANGE UP (ref 3.5–5.3)
POTASSIUM SERPL-SCNC: 3.7 MMOL/L — SIGNIFICANT CHANGE UP (ref 3.5–5.3)
PROT SERPL-MCNC: 7.3 G/DL — SIGNIFICANT CHANGE UP (ref 6–8.3)
RBC # BLD: 4.39 M/UL — SIGNIFICANT CHANGE UP (ref 3.8–5.2)
RBC # FLD: 13.3 % — SIGNIFICANT CHANGE UP (ref 10.3–14.5)
SODIUM SERPL-SCNC: 141 MMOL/L — SIGNIFICANT CHANGE UP (ref 135–145)
SPECIMEN SOURCE: SIGNIFICANT CHANGE UP
SPECIMEN SOURCE: SIGNIFICANT CHANGE UP
WBC # BLD: 9.67 K/UL — SIGNIFICANT CHANGE UP (ref 3.8–10.5)
WBC # FLD AUTO: 9.67 K/UL — SIGNIFICANT CHANGE UP (ref 3.8–10.5)

## 2023-08-01 PROCEDURE — 93880 EXTRACRANIAL BILAT STUDY: CPT | Mod: 26

## 2023-08-01 PROCEDURE — 70551 MRI BRAIN STEM W/O DYE: CPT | Mod: 26

## 2023-08-01 PROCEDURE — 70544 MR ANGIOGRAPHY HEAD W/O DYE: CPT | Mod: 26,XU

## 2023-08-01 PROCEDURE — 99232 SBSQ HOSP IP/OBS MODERATE 35: CPT

## 2023-08-01 PROCEDURE — 99233 SBSQ HOSP IP/OBS HIGH 50: CPT | Mod: GC

## 2023-08-01 RX ORDER — DILTIAZEM HCL 120 MG
120 CAPSULE, EXT RELEASE 24 HR ORAL DAILY
Refills: 0 | Status: DISCONTINUED | OUTPATIENT
Start: 2023-08-01 | End: 2023-08-03

## 2023-08-01 RX ORDER — METOPROLOL TARTRATE 50 MG
100 TABLET ORAL DAILY
Refills: 0 | Status: DISCONTINUED | OUTPATIENT
Start: 2023-08-01 | End: 2023-08-03

## 2023-08-01 RX ORDER — DILTIAZEM HCL 120 MG
180 CAPSULE, EXT RELEASE 24 HR ORAL DAILY
Refills: 0 | Status: DISCONTINUED | OUTPATIENT
Start: 2023-08-01 | End: 2023-08-01

## 2023-08-01 RX ADMIN — Medication 500 MILLIGRAM(S): at 22:48

## 2023-08-01 RX ADMIN — SIMVASTATIN 40 MILLIGRAM(S): 20 TABLET, FILM COATED ORAL at 22:48

## 2023-08-01 RX ADMIN — Medication 120 MILLIGRAM(S): at 05:06

## 2023-08-01 RX ADMIN — APIXABAN 5 MILLIGRAM(S): 2.5 TABLET, FILM COATED ORAL at 17:14

## 2023-08-01 RX ADMIN — Medication 500 MILLIGRAM(S): at 05:05

## 2023-08-01 RX ADMIN — Medication 100 MILLIGRAM(S): at 05:05

## 2023-08-01 RX ADMIN — APIXABAN 5 MILLIGRAM(S): 2.5 TABLET, FILM COATED ORAL at 05:05

## 2023-08-01 RX ADMIN — Medication 120 MILLIGRAM(S): at 17:13

## 2023-08-01 RX ADMIN — Medication 500 MILLIGRAM(S): at 13:16

## 2023-08-01 RX ADMIN — CEFTRIAXONE 100 MILLIGRAM(S): 500 INJECTION, POWDER, FOR SOLUTION INTRAMUSCULAR; INTRAVENOUS at 13:16

## 2023-08-01 NOTE — PROVIDER CONTACT NOTE (OTHER) - SITUATION
BP: 160/98, HR: 105. Telemetry notified Patient just had 9 beats of V-tach. Patient's BP meds due at 5:00 am.

## 2023-08-01 NOTE — PROGRESS NOTE ADULT - PROBLEM SELECTOR PLAN 7
Patient is without chest pain on admission, doubt ACS  EKG with afib RVR with likely rate related ischemic changes  Suspect demand ischemia in setting of rapid AF and rhabdomyolysis   trended  cardiac enzymes down   Follow up 2D ECHO  most recent EKG showed rate of 95 Patient is without chest pain on admission, doubt ACS  EKG with afib RVR with likely rate related ischemic changes  Suspect demand ischemia in setting of rapid AF and rhabdomyolysis   trended  cardiac enzymes down   Follow up 2D ECHO-echo  left ventricular endocardium is not well visualized. Overall the LV function appears to be mildly reduced with an ejection fraction of 45%     most recent EKG showed rate of 95

## 2023-08-01 NOTE — PROGRESS NOTE ADULT - SUBJECTIVE AND OBJECTIVE BOX
[facility]  INFECTIOUS DISEASES - [address], [place] [address]  Tel: [phone]     Fax: [phone]  =======================================================    CHAY VELEZ [id number]    Follow up: No fevers. Denies any pain, SOB, nausea or diarrhea. Denies any headache, back or abdominal pain.    Allergies:  No Known Allergies      Antibiotics:  acetaminophen     Tablet .. 650 milliGRAM(s) Oral every 6 hours PRN  acetaminophen     Tablet .. 650 milliGRAM(s) Oral every 6 hours PRN  aluminum hydroxide/magnesium hydroxide/simethicone Suspension 30 milliLiter(s) Oral every 4 hours PRN  apixaban 5 milliGRAM(s) Oral every 12 hours  cefTRIAXone   IVPB 2000 milliGRAM(s) IV Intermittent every 24 hours  diltiazem    milliGRAM(s) Oral daily  melatonin 3 milliGRAM(s) Oral at bedtime PRN  metoprolol succinate  milliGRAM(s) Oral daily  metroNIDAZOLE    Tablet 500 milliGRAM(s) Oral every 8 hours  ondansetron Injectable 4 milliGRAM(s) IV Push every 8 hours PRN  simvastatin 40 milliGRAM(s) Oral at bedtime       REVIEW OF SYSTEMS:  CONSTITUTIONAL:  No Fever or chills  HEENT:  No sore throat or runny nose.  CARDIOVASCULAR:  No chest pain or SOB.  RESPIRATORY:  No cough, shortness of breath  GASTROINTESTINAL: see history  GENITOURINARY:  No dysuria  MUSCULOSKELETAL:  no back pain  NEUROLOGIC:  No headache     Physical Exam:  ICU Vital Signs Last 24 Hrs  T(C): 36.4 (01 Aug 2023 11:37), Max: 37 (01 Aug 2023 04:50)  T(F): 97.5 (01 Aug 2023 11:37), Max: 98.6 (01 Aug 2023 04:50)  HR: 88 (01 Aug 2023 11:37) (88 - 107)  BP: 133/85 (01 Aug 2023 11:37) (133/85 - 160/98)  BP(mean): --  ABP: --  ABP(mean): --  RR: 16 (01 Aug 2023 11:37) (16 - 18)  SpO2: 96% (01 Aug 2023 11:37) (93% - 96%)    O2 Parameters below as of 01 Aug 2023 11:37  Patient On (Oxygen Delivery Method): room air      GEN: NAD  HEENT: normocephalic and atraumatic.   NECK: Supple.   LUNGS: Normal respiratory effort  HEART: Regular rate and rhythm   ABDOMEN: (+) perc enrique; Soft, nontender, and nondistended.    EXTREMITIES: No leg edema.  NEUROLOGIC: Answering simple questions appropriately      Labs:  08-01    141  |  109<H>  |  10  ----------------------------<  106<H>  3.7   |  26  |  0.74    Ca    8.9      01 Aug 2023 07:10  Phos  3.1     08-01  Mg     2.3     08-01    TPro  7.3  /  Alb  2.6<L>  /  TBili  0.5  /  DBili  x   /  AST  70<H>  /  ALT  113<H>  /  AlkPhos  112  08-01                          13.5   9.67  )-----------( 385      ( 01 Aug 2023 07:10 )             41.1       Urinalysis Basic - ( 01 Aug 2023 07:10 )    Color: x / Appearance: x / SG: x / pH: x  Gluc: 106 mg/dL / Ketone: x  / Bili: x / Urobili: x   Blood: x / Protein: x / Nitrite: x   Leuk Esterase: x / RBC: x / WBC x   Sq Epi: x / Non Sq Epi: x / Bacteria: x      LIVER FUNCTIONS - ( 01 Aug 2023 07:10 )  Alb: 2.6 g/dL / Pro: 7.3 g/dL / ALK PHOS: 112 U/L / ALT: 113 U/L / AST: 70 U/L / GGT: x             RECENT CULTURES:  07-27 @ 16:20 Bile Bile Fluid Escherichia coli  Klebsiella oxytoca /Raoutella ornithinolytica    Moderate Streptococcus anginosus  Few Escherichia coli  Rare Klebsiella oxytoca/Raoultella ornithinolytica    No polymorphonuclear leukocytes seen  No organisms seen  by cytocentrifuge      07-27 @ 13:34 Clean Catch Clean Catch (Midstream)     No growth        07-27 @ 09:45 .Blood Blood-Peripheral     No growth at 5 days        07-27 @ 09:40 .Blood Blood-Peripheral     No growth at 5 days        07-26 @ 03:00 .Blood Blood-Peripheral Blood Culture PCR  Streptococcus anginosus    Growth in aerobic and anaerobic bottles: Streptococcus anginosus  Direct identification is available within approximately 3-5  hours either by Blood Panel Multiplexed PCR or Direct  MALDI-TOF. Details: [email or web address]    Growth in anaerobic bottle: Gram Positive Cocci in Pairs and Chains  Growth in aerobic bottle: Gram Positive Cocci in Pairs and Chains      07-26 @ 02:58 .Blood Blood-Peripheral     Growth in aerobic and anaerobic bottles: Streptococcus anginosus  See previous culture [id number]    Growth in anaerobic bottle: Gram Positive Cocci in Pairs and Chains  Growth in aerobic bottle: Gram Positive Cocci in Pairs and Chains      07-26 @ 01:40          NotDete        All imaging and data are reviewed.

## 2023-08-01 NOTE — PROGRESS NOTE ADULT - PROBLEM SELECTOR PLAN 3
-[id number] 4: will start full dose LMWH   hold for ir guided gb  drain - hida + s/p gb drain s/p drainage  -TSH w/in normal limits,   - LE doppler resulted - no DVT evidence b/l   - Cardiology Dr. Rader consulted, recs as follows  - Switch Lopressor to Toprol XL  - In crease Cardizem CD to 180 mg daily with parameters  - Continue Eliquis (was switched from DOAC 7/30)  - TTE: EF 45% with no significant valvular disease.  No clear evidence of volume overload -[id number] 4: will start full dose LMWH   hold for ir guided gb  drain - hida + s/p gb drain s/p drainage  -TSH w/in normal limits,   - LE doppler resulted - no DVT evidence b/l   - Cardiology Dr. Rader consulted,  - Switch Lopressor to Toprol XL  -   Cardizem 120 mg  po daily  will  In crease Cardizem CD to 180 mg daily with parameters  after mri  and mra brain result   - Continue Eliquis (was switched from DOAC 7/30)  - TTE: EF 45% with no significant valvular disease.  No clear evidence of volume overload

## 2023-08-01 NOTE — PROGRESS NOTE ADULT - ASSESSMENT
78y female with PMHx of HTN, HLD presented to the ED for the evaluation of fall out of chair.   Cardiology consulted for new onset atrial fibrillation with RVR.    New onset afib, Elevated trops, rhabdo s/p fall, HFmodEF  - No clear evidence of acute ischemia, trops mildly elevated but downtrended. No need to further trend.  No anginal complaints  - CK elevated 5502 --> 5621; doubt acute plaque rupture in setting of fall and rhabdomyolysis; trended down    New onset Afib with RVR  - EKG on admission showed atrial fibrillation with RVR, rate 143  - Remains in Afib with 9 beats slow NSVT vs aberrancy.  Rates mostly controlled with some moderately rapid VR  - Continue Lopressor 50 mg q6H with parameters.  Plan to switch to long-acting in am, 8/1  - In crease Cardizem CD to 180 mg daily with parameters  - Continue Eliquis (was switched from DOAC 7/30)  - TTE: EF 45% with no significant valvular disease.  No clear evidence of volume overload    - Chest showed evidence of pulmonary scarring which may be d/t pulmonary fibrosis - f/u pulmonary recs   - Non-orthopneic on RA.  Has edema but likely, from, hypoalbuminemia  - Initiate incentive spirometer    - Monitor and replete electrolytes. Keep K>4.0 and Mg>2.0.  - Will continue to follow    Gretta Torres DNP, NP-C, AGACNP-C  Cardiology   Call TEAMS     78y female with PMHx of HTN, HLD presented to the ED for the evaluation of fall out of chair.   Cardiology consulted for new onset atrial fibrillation with RVR.    New onset afib, Elevated trops, rhabdo s/p fall, HFmodEF  - No clear evidence of acute ischemia, trops mildly elevated but downtrended. No need to further trend.  No anginal complaints  - CK elevated 5502 --> 5621; doubt acute plaque rupture in setting of fall and rhabdomyolysis; trended down    New onset Afib with RVR  - EKG on admission showed atrial fibrillation with RVR, rate 143  - Remains in Afib with 9 beats slow NSVT vs aberrancy.  Rates mostly controlled with some moderately rapid VR  - Switch Lopressor to Toprol XL (done)  - In crease Cardizem CD to 180 mg daily with parameters  - Continue Eliquis (was switched from DOAC 7/30)  - TTE: EF 45% with no significant valvular disease.  No clear evidence of volume overload    - Chest showed evidence of pulmonary scarring which may be d/t pulmonary fibrosis - f/u pulmonary recs   - Non-orthopneic on RA.  Has edema but likely, from, hypoalbuminemia  - Initiate incentive spirometer    - Monitor and replete electrolytes. Keep K>4.0 and Mg>2.0.  - Will continue to follow    Gretta Torres DNP, NP-C, AGACNP-C  Cardiology   Call TEAMS

## 2023-08-01 NOTE — PROGRESS NOTE ADULT - SUBJECTIVE AND OBJECTIVE BOX
Neurology Follow up note    CHAY VELEZ78yFemale    HPI:  78y female with PMHx of HTN, HLD presented to the ED for the evaluation of fall out of chair. States that she was sitting on the chair and fell backwards, hitting her head. Was on the floor for over an hour and was unable to get up. Eventually was able to flip over and crawl on her elbows and knees to call her daughter in law for assistance. She initially had a headache which has resolved. Only has some mild soreness in her elbows. States that she was feeling unwell on Monday 7/24 and had an episode of vomiting then. Now feels back to her baseline. No fevers or chills. No urinary symptoms. Denies chest pain, palpitations, dyspnea, current headache, dizziness, abdominal pain, n/v/d. She has no prior history of kidney issues.    In the ED, she was given IV cardizem 20mg IVP with improvement in her rates and 70mg of subcutaneous enoxaparin. (26 Jul 2023 01:00)      Interval History -no new wekness    Patient is seen, chart was reviewed and case was discussed with the treatment team.  Pt is not in any distress.   Lying on bed comfortably.       Vital Signs Last 24 Hrs  T(C): 36.4 (01 Aug 2023 11:37), Max: 37 (01 Aug 2023 04:50)  T(F): 97.5 (01 Aug 2023 11:37), Max: 98.6 (01 Aug 2023 04:50)  HR: 105 (01 Aug 2023 17:17) (88 - 107)  BP: 162/84 (01 Aug 2023 17:17) (133/85 - 162/84)  BP(mean): --  RR: 16 (01 Aug 2023 11:37) (16 - 18)  SpO2: 96% (01 Aug 2023 11:37) (93% - 96%)    Parameters below as of 01 Aug 2023 11:37  Patient On (Oxygen Delivery Method): room air            REVIEW OF SYSTEMS:    Constitutional: No fever, weight loss or fatigue  Eyes: No eye pain, visual disturbances, or discharge  ENT:  No difficulty hearing, tinnitus, vertigo; No sinus or throat pain  Neck: No pain or stiffness  Respiratory: No  wheezing, chills or hemoptysis  Cardiovascular: No chest pain, palpitations, shortness of breath, dizziness or leg swelling  Gastrointestinal: No abdominal or epigastric pain. No nausea, vomiting or hematemesis; N  Genitourinary: No dysuria, frequency, hematuria or incontinence  Neurological: No headaches, memory loss,  numbness or tremors  Psychiatric: No depression, anxiety, mood swings or difficulty sleeping  Musculoskeletal: No joint pain or swelling; No muscle, back or extremity pain  Skin: No itching, burning, rashes or lesions   Lymph Nodes: No enlarged glands  Endocrine: No heat or cold intolerance; No hair loss,  Allergy and Immunologic: No hives or eczema    On Neurological Examination:    Mental Status - Pt is alert, awake, oriented X3. Follows commands well and able to answer questions appropriately.Mood and affect  normal    Speech -  Pt has dysarthria.    Cranial Nerves - Pupils 3 mm equal and reactive to light, extraocular eye movements intact. Pt has no visual field deficit.  Pt has  right facial asymmetry. Facial sensation is intact.Tongue - is in midline.    Muscle tone - is ashish.      Motor Exam - right hemiparesis (4/5)  ,    Sensory Exam -  Pt withdraws all extremities equally on stimulation. No asymmetry seen. No complaints of tingling, numbness.    t.    coordination:    Finger to nose:dysmetria- right      Deep tendon Reflexes - 2 plus all over.      .    Neck Supple -  Yes.     MEDICATIONS    acetaminophen     Tablet .. 650 milliGRAM(s) Oral every 6 hours PRN  acetaminophen     Tablet .. 650 milliGRAM(s) Oral every 6 hours PRN  aluminum hydroxide/magnesium hydroxide/simethicone Suspension 30 milliLiter(s) Oral every 4 hours PRN  apixaban 5 milliGRAM(s) Oral every 12 hours  cefTRIAXone   IVPB 2000 milliGRAM(s) IV Intermittent every 24 hours  diltiazem    milliGRAM(s) Oral daily  melatonin 3 milliGRAM(s) Oral at bedtime PRN  metoprolol succinate  milliGRAM(s) Oral daily  metroNIDAZOLE    Tablet 500 milliGRAM(s) Oral every 8 hours  ondansetron Injectable 4 milliGRAM(s) IV Push every 8 hours PRN  simvastatin 40 milliGRAM(s) Oral at bedtime      Allergies    No Known Allergies    Intolerances        LABS:  CBC Full  -  ( 01 Aug 2023 07:10 )  WBC Count : 9.67 K/uL  RBC Count : 4.39 M/uL  Hemoglobin : 13.5 g/dL  Hematocrit : 41.1 %  Platelet Count - Automated : 385 K/uL  Mean Cell Volume : 93.6 fl  Mean Cell Hemoglobin : 30.8 pg  Mean Cell Hemoglobin Concentration : 32.8 gm/dL  Auto Neutrophil # : 7.00 K/uL  Auto Lymphocyte # : 1.40 K/uL  Auto Monocyte # : 0.86 K/uL  Auto Eosinophil # : 0.20 K/uL  Auto Basophil # : 0.04 K/uL  Auto Neutrophil % : 72.3 %  Auto Lymphocyte % : 14.5 %  Auto Monocyte % : 8.9 %  Auto Eosinophil % : 2.1 %  Auto Basophil % : 0.4 %    Urinalysis Basic - ( 01 Aug 2023 07:10 )    Color: x / Appearance: x / SG: x / pH: x  Gluc: 106 mg/dL / Ketone: x  / Bili: x / Urobili: x   Blood: x / Protein: x / Nitrite: x   Leuk Esterase: x / RBC: x / WBC x   Sq Epi: x / Non Sq Epi: x / Bacteria: x      08-01    141  |  109<H>  |  10  ----------------------------<  106<H>  3.7   |  26  |  0.74    Ca    8.9      01 Aug 2023 07:10  Phos  3.1     08-01  Mg     2.3     08-01    TPro  7.3  /  Alb  2.6<L>  /  TBili  0.5  /  DBili  x   /  AST  70<H>  /  ALT  113<H>  /  AlkPhos  112  08-01    Hemoglobin A1C:     Vitamin B12     RADIOLOGY    ASSESSMENT AND PLAN:      presented with fall  right sided weakness/incordination-  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  Would continue to follow.

## 2023-08-01 NOTE — PROGRESS NOTE ADULT - SUBJECTIVE AND OBJECTIVE BOX
Pt seen and examined at bedside, denies any overnight events however chart note reports 9 beats vtach - mildly tachycardic this morning.  Tolerating diet. Reports she has not been OOB much but would like to try to ambulate. Denies any abdominal pain, nausea, vomiting, chest pain, palpitations, shortness of breath.    T(C): 37 (08-01-23 @ 04:50), Max: 37 (08-01-23 @ 04:50)  HR: 105 (08-01-23 @ 04:50) (90 - 107)  BP: 160/98 (08-01-23 @ 04:50) (155/80 - 177/91)  RR: 18 (08-01-23 @ 04:50) (18 - 18)  SpO2: 93% (08-01-23 @ 04:50) (92% - 95%)    PHYSICAL EXAM:  General: no acute distress, appears comfortable  HEENT: normocephalic, anicteric  Pulm: non labored respirations  Cardio: RRR  Abdomen: soft, nontender, mildly distended, right abd perc enrique drain in place - drsg intact w/ scant dried drainage on gauze - collection bag has minimal bilious fluid  Extremities: no calf tenderness noted    I&O's Detail    31 Jul 2023 07:01  -  01 Aug 2023 07:00  --------------------------------------------------------  IN:  Total IN: 0 mL    OUT:    Drain (mL): 45 mL    Voided (mL): 700 mL  Total OUT: 745 mL    Total NET: -745 mL    LABS:                        13.5   9.67  )-----------( 385      ( 01 Aug 2023 07:10 )             41.1     08-01    141  |  109<H>  |  10  ----------------------------<  106<H>  3.7   |  26  |  0.74    Ca    8.9      01 Aug 2023 07:10  Phos  3.1     08-01  Mg     2.3     08-01    TPro  7.3  /  Alb  2.6<L>  /  TBili  0.5  /  DBili  x   /  AST  70<H>  /  ALT  113<H>  /  AlkPhos  112  08-01      Urinalysis Basic - ( 01 Aug 2023 07:10 )    Color: x / Appearance: x / SG: x / pH: x  Gluc: 106 mg/dL / Ketone: x  / Bili: x / Urobili: x   Blood: x / Protein: x / Nitrite: x   Leuk Esterase: x / RBC: x / WBC x   Sq Epi: x / Non Sq Epi: x / Bacteria: x    MEDICATIONS:  acetaminophen     Tablet .. 650 milliGRAM(s) Oral every 6 hours PRN  acetaminophen     Tablet .. 650 milliGRAM(s) Oral every 6 hours PRN  aluminum hydroxide/magnesium hydroxide/simethicone Suspension 30 milliLiter(s) Oral every 4 hours PRN  apixaban 5 milliGRAM(s) Oral every 12 hours  cefTRIAXone   IVPB 2000 milliGRAM(s) IV Intermittent every 24 hours  diltiazem    milliGRAM(s) Oral daily  melatonin 3 milliGRAM(s) Oral at bedtime PRN  metoprolol tartrate 100 milliGRAM(s) Oral two times a day  metroNIDAZOLE    Tablet 500 milliGRAM(s) Oral every 8 hours  ondansetron Injectable 4 milliGRAM(s) IV Push every 8 hours PRN  simvastatin 40 milliGRAM(s) Oral at bedtime

## 2023-08-01 NOTE — PROGRESS NOTE ADULT - SUBJECTIVE AND OBJECTIVE BOX
Patient is a 78y old  Female who presents with a chief complaint of fall, new onset afib RVR (01 Aug 2023 09:36)      Subjective:  INTERVAL HPI/OVERNIGHT EVENTS: Patient seen and examined at bedside. Overnight patients BP increased to 160/98 and she had 9 beats of Vtach on the monitor. Patient was ocmpletely asymptomatic. No interventions were done at that time. Patient received her normal dose of diltiazem and metoprolol this morning, and her BP now is 133/85. On exam, patient was sitting comfortably in the chair. She states she has some weakness in her hands b/l, but she is able to hold a pen, draw a [place], and there is no pronator drift. She also is speaking slowly. CT head done yesterday showed no significant changes. Her enrique bag is draining small amounts of bile colored fluid. Patient has no other complaints at this time. Denies fevers, chills, headache, lightheadedness, chest pain, dyspnea, abdominal pain, n/v/d/c.    MEDICATIONS  (STANDING):  apixaban 5 milliGRAM(s) Oral every 12 hours  cefTRIAXone   IVPB 2000 milliGRAM(s) IV Intermittent every 24 hours  diltiazem    milliGRAM(s) Oral daily  metoprolol succinate  milliGRAM(s) Oral daily  metroNIDAZOLE    Tablet 500 milliGRAM(s) Oral every 8 hours  simvastatin 40 milliGRAM(s) Oral at bedtime    MEDICATIONS  (PRN):  acetaminophen     Tablet .. 650 milliGRAM(s) Oral every 6 hours PRN Temp greater or equal to 38C (100.4F)  acetaminophen     Tablet .. 650 milliGRAM(s) Oral every 6 hours PRN Mild Pain (1 - 3)  aluminum hydroxide/magnesium hydroxide/simethicone Suspension 30 milliLiter(s) Oral every 4 hours PRN Dyspepsia  melatonin 3 milliGRAM(s) Oral at bedtime PRN Insomnia  ondansetron Injectable 4 milliGRAM(s) IV Push every 8 hours PRN Nausea and/or Vomiting      Allergies    No Known Allergies    Intolerances        REVIEW OF SYSTEMS:  CONSTITUTIONAL: No fever or chills  HEENT:  No headache, no sore throat  RESPIRATORY: No cough, wheezing, or shortness of breath  CARDIOVASCULAR: No chest pain, palpitations  GASTROINTESTINAL: No abd pain, nausea, vomiting, or diarrhea  GENITOURINARY: No dysuria, frequency, or hematuria  NEUROLOGICAL: b/l hand weakness  MUSCULOSKELETAL: no myalgias     Objective:  Vital Signs Last 24 Hrs  T(C): 36.4 (01 Aug 2023 11:37), Max: 37 (01 Aug 2023 04:50)  T(F): 97.5 (01 Aug 2023 11:37), Max: 98.6 (01 Aug 2023 04:50)  HR: 88 (01 Aug 2023 11:37) (88 - 107)  BP: 133/85 (01 Aug 2023 11:37) (133/85 - 160/98)  BP(mean): --  RR: 16 (01 Aug 2023 11:37) (16 - 18)  SpO2: 96% (01 Aug 2023 11:37) (92% - 96%)    Parameters below as of 01 Aug 2023 11:37  Patient On (Oxygen Delivery Method): room air        GENERAL: NAD, lying in bed comfortably  HEAD:  Atraumatic, Normocephalic  EYES: EOMI, PERRLA, conjunctiva and sclera clear  ENT: Moist mucous membranes  NECK: Supple, No JVD  CHEST/LUNG: Clear to auscultation bilaterally; No rales, rhonchi, wheezing, or rubs. Unlabored respirations  HEART: Regular rate and rhythm; No murmurs, rubs, or gallops  ABDOMEN: Bowel sounds present; Soft, Nontender, Nondistended, enrique drain site clean  EXTREMITIES:  B/L LE Edema, 2+ Peripheral Pulses, brisk capillary refill. No clubbing, cyanosis  NERVOUS SYSTEM:  A&Ox3, slow speech but clear, no pronator drift  MSK: Equal strength in UE b/l  SKIN: No rashes or lesions    LABS:                        13.5   9.67  )-----------( 385      ( 01 Aug 2023 07:10 )             41.1     CBC Full  -  ( 01 Aug 2023 07:10 )  WBC Count : 9.67 K/uL  Hemoglobin : 13.5 g/dL  Hematocrit : 41.1 %  Platelet Count - Automated : 385 K/uL  Mean Cell Volume : 93.6 fl  Mean Cell Hemoglobin : 30.8 pg  Mean Cell Hemoglobin Concentration : 32.8 gm/dL  Auto Neutrophil # : 7.00 K/uL  Auto Lymphocyte # : 1.40 K/uL  Auto Monocyte # : 0.86 K/uL  Auto Eosinophil # : 0.20 K/uL  Auto Basophil # : 0.04 K/uL  Auto Neutrophil % : 72.3 %  Auto Lymphocyte % : 14.5 %  Auto Monocyte % : 8.9 %  Auto Eosinophil % : 2.1 %  Auto Basophil % : 0.4 %    01 Aug 2023 07:10    141    |  109    |  10     ----------------------------<  106    3.7     |  26     |  0.74     Ca    8.9        01 Aug 2023 07:10  Phos  3.1       01 Aug 2023 07:10  Mg     2.3       01 Aug 2023 07:10    TPro  7.3    /  Alb  2.6    /  TBili  0.5    /  DBili  x      /  AST  70     /  ALT  113    /  AlkPhos  112    01 Aug 2023 07:10      Urinalysis Basic - ( 01 Aug 2023 07:10 )    Color: x / Appearance: x / SG: x / pH: x  Gluc: 106 mg/dL / Ketone: x  / Bili: x / Urobili: x   Blood: x / Protein: x / Nitrite: x   Leuk Esterase: x / RBC: x / WBC x   Sq Epi: x / Non Sq Epi: x / Bacteria: x      CAPILLARY BLOOD GLUCOSE            Culture - Body Fluid with Gram Stain (collected 07-27-23 @ 16:20)  Source: Bile Bile Fluid  Gram Stain (07-28-23 @ 05:59):    No polymorphonuclear leukocytes seen    No organisms seen    by cytocentrifuge  Preliminary Report (08-01-23 @ 10:18):    Moderate Streptococcus anginosus    Few Escherichia coli    Rare Klebsiella oxytoca/Raoultella ornithinolytica  Organism: Escherichia coli  Klebsiella oxytoca /Raoutella ornithinolytica (07-30-23 @ 14:30)  Organism: Klebsiella oxytoca /Raoutella ornithinolytica (07-30-23 @ 14:30)      -  Levofloxacin: S <=0.5      -  Tobramycin: S <=2      -  Aztreonam: S <=4      -  Gentamicin: S <=2      -  Cefazolin: S <=2 Enterobacter, Klebsiella aerogenes, Citrobacter, and Serratia may develop resistance during prolonged therapy (3-4 days)      -  Cefepime: S <=2      -  Piperacillin/Tazobactam: S <=8      -  Ciprofloxacin: S <=0.25      -  Imipenem: S <=1      -  Ceftriaxone: S <=1 Enterobacter, Klebsiella aerogenes, Citrobacter, and Serratia may develop resistance during prolonged therapy      -  Ampicillin: R <=8 These ampicillin results predict results for amoxicillin      Method Type: SVETA      -  Meropenem: S <=1      -  Ampicillin/Sulbactam: S <=4/2 Enterobacter, Klebsiella aerogenes, Citrobacter, and Serratia may develop resistance during prolonged therapy (3-4 days)      -  Cefoxitin: S <=8      -  Amikacin: S <=16      -  Amoxicillin/Clavulanic Acid: S <=8/4      -  Trimethoprim/Sulfamethoxazole: S <=0.5/9.5      -  Ertapenem: S <=0.5  Organism: Escherichia coli (07-30-23 @ 09:59)      -  Levofloxacin: S <=0.5      -  Tobramycin: S <=2      -  Aztreonam: S <=4      -  Gentamicin: S <=2      -  Cefazolin: S <=2 Enterobacter, Klebsiella aerogenes, Citrobacter, and Serratia may develop resistance during prolonged therapy (3-4 days)      -  Cefepime: S <=2      -  Piperacillin/Tazobactam: S <=8      -  Ciprofloxacin: S <=0.25      -  Imipenem: S <=1      -  Ceftriaxone: S <=1 Enterobacter, Klebsiella aerogenes, Citrobacter, and Serratia may develop resistance during prolonged therapy      -  Ampicillin: S <=8 These ampicillin results predict results for amoxicillin      Method Type: SVETA      -  Meropenem: S <=1      -  Ampicillin/Sulbactam: S <=4/2 Enterobacter, Klebsiella aerogenes, Citrobacter, and Serratia may develop resistance during prolonged therapy (3-4 days)      -  Cefoxitin: S <=8      -  Amikacin: S <=16      -  Amoxicillin/Clavulanic Acid: S <=8/4      -  Trimethoprim/Sulfamethoxazole: S <=0.5/9.5      -  Ertapenem: S <=0.5    Culture - Urine (collected 07-27-23 @ 13:34)  Source: Clean Catch Clean Catch (Midstream)  Final Report (07-28-23 @ 21:31):    No growth    Culture - Blood (collected 07-27-23 @ 09:45)  Source: .Blood Blood-Peripheral  Preliminary Report (07-31-23 @ 15:01):    No growth at 4 days    Culture - Blood (collected 07-27-23 @ 09:40)  Source: .Blood Blood-Peripheral  Preliminary Report (07-31-23 @ 15:01):    No growth at 4 days    Culture - Blood (collected 07-26-23 @ 03:00)  Source: .Blood Blood-Peripheral  Gram Stain (07-27-23 @ 06:08):    Growth in anaerobic bottle: Gram Positive Cocci in Pairs and Chains    Growth in aerobic bottle: Gram Positive Cocci in Pairs and Chains  Final Report (07-28-23 @ 20:40):    Growth in aerobic and anaerobic bottles: Streptococcus anginosus    Direct identification is available within approximately 3-5    hours either by Blood Panel Multiplexed PCR or Direct    MALDI-TOF. Details: [email or web address]  Organism: Blood Culture PCR  Streptococcus anginosus (07-28-23 @ 20:40)  Organism: Streptococcus anginosus (07-28-23 @ 20:40)      -  Levofloxacin: S 0.5      -  Clindamycin: R >0.5      -  Vancomycin: S <=0.12      -  Ceftriaxone: S <=0.25      Method Type: SVETA      -  Penicillin: S <=0.03      -  Erythromycin: R >0.5  Organism: Blood Culture PCR (07-28-23 @ 20:40)      Method Type: PCR      -  Streptococcus sp. (Not Grp A, B or S pneumoniae): Detec    Culture - Blood (collected 07-26-23 @ 02:58)  Source: .Blood Blood-Peripheral  Gram Stain (07-27-23 @ 08:19):    Growth in anaerobic bottle: Gram Positive Cocci in Pairs and Chains    Growth in aerobic bottle: Gram Positive Cocci in Pairs and Chains  Final Report (07-28-23 @ 20:40):    Growth in aerobic and anaerobic bottles: Streptococcus anginosus    See previous culture [id number]        RADIOLOGY & ADDITIONAL TESTS:    Personally reviewed.     Consultant(s) Notes Reviewed:  [x] YES  [ ] NO     Patient is a 78y old  Female who presents with a chief complaint of fall, new onset afib RVR (01 Aug 2023 09:36)      Subjective:  INTERVAL HPI/OVERNIGHT EVENTS: Patient seen and examined at bedside. Overnight patients BP increased to 160/98 and she had 9 beats of Vtach on the monitor. Patient was completely asymptomatic. No interventions were done at that time. Patient received her normal dose of diltiazem and metoprolol this morning, and her BP now is 133/85. On exam, patient was sitting comfortably in the chair. She states she has some weakness in her hands b/l, but she is able to hold a pen, draw a [place], and there is no pronator drift. She also is speaking slowly. CT head done yesterday showed no significant changes. Her enrique bag is draining small amounts of bile colored fluid. Patient has no other complaints at this time. Denies fevers, chills, headache, lightheadedness, chest pain, dyspnea, abdominal pain.          REVIEW OF SYSTEMS:  CONSTITUTIONAL: No fever or chills  HEENT:  No headache, no sore throat  RESPIRATORY: No cough, wheezing, or shortness of breath  CARDIOVASCULAR: No chest pain, palpitations  GASTROINTESTINAL: No abd pain, nausea, vomiting, or diarrhea  GENITOURINARY: No dysuria, frequency, or hematuria  NEUROLOGICAL: b/l hand weakness  MUSCULOSKELETAL: no myalgias     Objective:  Vital Signs Last 24 Hrs  T(C): 36.4 (01 Aug 2023 11:37), Max: 37 (01 Aug 2023 04:50)  T(F): 97.5 (01 Aug 2023 11:37), Max: 98.6 (01 Aug 2023 04:50)  HR: 88 (01 Aug 2023 11:37) (88 - 107)  BP: 133/85 (01 Aug 2023 11:37) (133/85 - 160/98)  BP(mean): --  RR: 16 (01 Aug 2023 11:37) (16 - 18)  SpO2: 96% (01 Aug 2023 11:37) (92% - 96%)    Parameters below as of 01 Aug 2023 11:37  Patient On (Oxygen Delivery Method): room air        GENERAL: NAD, lying in bed comfortably  HEAD:  Atraumatic, Normocephalic  EYES: EOMI, PERRLA, conjunctiva and sclera clear  ENT: Moist mucous membranes  NECK: Supple, No JVD  CHEST/LUNG: auscultation bilaterally; No rales, rhonchi, wheezing,   HEART: Regular rate and rhythm; No murmurs, no tachy   ABDOMEN: Bowel sounds present; Soft, Nontender, Nondistended, enrique drain site clean+  EXTREMITIES:  B/L LE Edema, 2+ Peripheral Pulses, No clubbing, cyanosis  NERVOUS SYSTEM:  A&Ox3, slow speech but clear, no pronator drift  MSK: Equal strength in UE /lower extremity   SKIN: No rashes or lesions      MEDICATIONS  (STANDING):  apixaban 5 milliGRAM(s) Oral every 12 hours  cefTRIAXone   IVPB 2000 milliGRAM(s) IV Intermittent every 24 hours  diltiazem    milliGRAM(s) Oral daily  metoprolol succinate  milliGRAM(s) Oral daily  metroNIDAZOLE    Tablet 500 milliGRAM(s) Oral every 8 hours  simvastatin 40 milliGRAM(s) Oral at bedtime    MEDICATIONS  (PRN):  acetaminophen     Tablet .. 650 milliGRAM(s) Oral every 6 hours PRN Temp greater or equal to 38C (100.4F)  acetaminophen     Tablet .. 650 milliGRAM(s) Oral every 6 hours PRN Mild Pain (1 - 3)  aluminum hydroxide/magnesium hydroxide/simethicone Suspension 30 milliLiter(s) Oral every 4 hours PRN Dyspepsia  melatonin 3 milliGRAM(s) Oral at bedtime PRN Insomnia  ondansetron Injectable 4 milliGRAM(s) IV Push every 8 hours PRN Nausea and/or Vomiting    Allergies    No Known Allergies    Intolerances      LABS:                        13.5   9.67  )-----------( 385      ( 01 Aug 2023 07:10 )             41.1     CBC Full  -  ( 01 Aug 2023 07:10 )  WBC Count : 9.67 K/uL  Hemoglobin : 13.5 g/dL  Hematocrit : 41.1 %  Platelet Count - Automated : 385 K/uL  Mean Cell Volume : 93.6 fl  Mean Cell Hemoglobin : 30.8 pg  Mean Cell Hemoglobin Concentration : 32.8 gm/dL  Auto Neutrophil # : 7.00 K/uL  Auto Lymphocyte # : 1.40 K/uL  Auto Monocyte # : 0.86 K/uL  Auto Eosinophil # : 0.20 K/uL  Auto Basophil # : 0.04 K/uL  Auto Neutrophil % : 72.3 %  Auto Lymphocyte % : 14.5 %  Auto Monocyte % : 8.9 %  Auto Eosinophil % : 2.1 %  Auto Basophil % : 0.4 %    01 Aug 2023 07:10    141    |  109    |  10     ----------------------------<  106    3.7     |  26     |  0.74     Ca    8.9        01 Aug 2023 07:10  Phos  3.1       01 Aug 2023 07:10  Mg     2.3       01 Aug 2023 07:10    TPro  7.3    /  Alb  2.6    /  TBili  0.5    /  DBili  x      /  AST  70     /  ALT  113    /  AlkPhos  112    01 Aug 2023 07:10      Urinalysis Basic - ( 01 Aug 2023 07:10 )    Color: x / Appearance: x / SG: x / pH: x  Gluc: 106 mg/dL / Ketone: x  / Bili: x / Urobili: x   Blood: x / Protein: x / Nitrite: x   Leuk Esterase: x / RBC: x / WBC x   Sq Epi: x / Non Sq Epi: x / Bacteria: x      CAPILLARY BLOOD GLUCOSE            Culture - Body Fluid with Gram Stain (collected 07-27-23 @ 16:20)  Source: Bile Bile Fluid  Gram Stain (07-28-23 @ 05:59):    No polymorphonuclear leukocytes seen    No organisms seen    by cytocentrifuge  Preliminary Report (08-01-23 @ 10:18):    Moderate Streptococcus anginosus    Few Escherichia coli    Rare Klebsiella oxytoca/Raoultella ornithinolytica  Organism: Escherichia coli  Klebsiella oxytoca /Raoutella ornithinolytica (07-30-23 @ 14:30)  Organism: Klebsiella oxytoca /Raoutella ornithinolytica (07-30-23 @ 14:30)      -  Levofloxacin: S <=0.5      -  Tobramycin: S <=2      -  Aztreonam: S <=4      -  Gentamicin: S <=2      -  Cefazolin: S <=2 Enterobacter, Klebsiella aerogenes, Citrobacter, and Serratia may develop resistance during prolonged therapy (3-4 days)      -  Cefepime: S <=2      -  Piperacillin/Tazobactam: S <=8      -  Ciprofloxacin: S <=0.25      -  Imipenem: S <=1      -  Ceftriaxone: S <=1 Enterobacter, Klebsiella aerogenes, Citrobacter, and Serratia may develop resistance during prolonged therapy      -  Ampicillin: R <=8 These ampicillin results predict results for amoxicillin      Method Type: SVETA      -  Meropenem: S <=1      -  Ampicillin/Sulbactam: S <=4/2 Enterobacter, Klebsiella aerogenes, Citrobacter, and Serratia may develop resistance during prolonged therapy (3-4 days)      -  Cefoxitin: S <=8      -  Amikacin: S <=16      -  Amoxicillin/Clavulanic Acid: S <=8/4      -  Trimethoprim/Sulfamethoxazole: S <=0.5/9.5      -  Ertapenem: S <=0.5  Organism: Escherichia coli (07-30-23 @ 09:59)      -  Levofloxacin: S <=0.5      -  Tobramycin: S <=2      -  Aztreonam: S <=4      -  Gentamicin: S <=2      -  Cefazolin: S <=2 Enterobacter, Klebsiella aerogenes, Citrobacter, and Serratia may develop resistance during prolonged therapy (3-4 days)      -  Cefepime: S <=2      -  Piperacillin/Tazobactam: S <=8      -  Ciprofloxacin: S <=0.25      -  Imipenem: S <=1      -  Ceftriaxone: S <=1 Enterobacter, Klebsiella aerogenes, Citrobacter, and Serratia may develop resistance during prolonged therapy      -  Ampicillin: S <=8 These ampicillin results predict results for amoxicillin      Method Type: SVETA      -  Meropenem: S <=1      -  Ampicillin/Sulbactam: S <=4/2 Enterobacter, Klebsiella aerogenes, Citrobacter, and Serratia may develop resistance during prolonged therapy (3-4 days)      -  Cefoxitin: S <=8      -  Amikacin: S <=16      -  Amoxicillin/Clavulanic Acid: S <=8/4      -  Trimethoprim/Sulfamethoxazole: S <=0.5/9.5      -  Ertapenem: S <=0.5    Culture - Urine (collected 07-27-23 @ 13:34)  Source: Clean Catch Clean Catch (Midstream)  Final Report (07-28-23 @ 21:31):    No growth    Culture - Blood (collected 07-27-23 @ 09:45)  Source: .Blood Blood-Peripheral  Preliminary Report (07-31-23 @ 15:01):    No growth at 4 days    Culture - Blood (collected 07-27-23 @ 09:40)  Source: .Blood Blood-Peripheral  Preliminary Report (07-31-23 @ 15:01):    No growth at 4 days    Culture - Blood (collected 07-26-23 @ 03:00)  Source: .Blood Blood-Peripheral  Gram Stain (07-27-23 @ 06:08):    Growth in anaerobic bottle: Gram Positive Cocci in Pairs and Chains    Growth in aerobic bottle: Gram Positive Cocci in Pairs and Chains  Final Report (07-28-23 @ 20:40):    Growth in aerobic and anaerobic bottles: Streptococcus anginosus    Direct identification is available within approximately 3-5    hours either by Blood Panel Multiplexed PCR or Direct    MALDI-TOF. Details: [email or web address]  Organism: Blood Culture PCR  Streptococcus anginosus (07-28-23 @ 20:40)  Organism: Streptococcus anginosus (07-28-23 @ 20:40)      -  Levofloxacin: S 0.5      -  Clindamycin: R >0.5      -  Vancomycin: S <=0.12      -  Ceftriaxone: S <=0.25      Method Type: SVETA      -  Penicillin: S <=0.03      -  Erythromycin: R >0.5  Organism: Blood Culture PCR (07-28-23 @ 20:40)      Method Type: PCR      -  Streptococcus sp. (Not Grp A, B or S pneumoniae): Detec    Culture - Blood (collected 07-26-23 @ 02:58)  Source: .Blood Blood-Peripheral  Gram Stain (07-27-23 @ 08:19):    Growth in anaerobic bottle: Gram Positive Cocci in Pairs and Chains    Growth in aerobic bottle: Gram Positive Cocci in Pairs and Chains  Final Report (07-28-23 @ 20:40):    Growth in aerobic and anaerobic bottles: Streptococcus anginosus    See previous culture [id number]        RADIOLOGY & ADDITIONAL TESTS:    Personally reviewed.     Consultant(s) Notes Reviewed:  [x] YES  [ ] NO

## 2023-08-01 NOTE — PROVIDER CONTACT NOTE (OTHER) - DATE AND TIME:
01-Aug-2023 04:50
26-Jul-2023 21:14
27-Jul-2023 22:49
28-Jul-2023 09:50
31-Jul-2023 02:00
30-Jul-2023 01:02

## 2023-08-01 NOTE — PROGRESS NOTE ADULT - ASSESSMENT
78y female with PMHx of HTN, HLD, who was admitted on 7/26 for evaluation of fall out of chair. Previously admitted  that she was feeling unwell on 7/24 and had an episode of vomiting then. Found to have strep anginosus bacteremia likely 2/2 acute cholecystitis.     S/p percutaneous cholecystostomy on 7/27. Biliary cultures grew strep anginosus, E coli and klebsiella oxytoca. Otherwise no fevers or leukocytosis. Repeat blood cultures no growth. TTE reviewed. Hospital course c/b concern for stroke, MRI pending.    -continue ceftriaxone 2g IV q24h while here, when ready for discharge can switch to cefpodoxime 200mg PO BID until 8/10  -continue Flagyl 500mg PO q8h until 8/3  -follow up MRI brain  -discussed with Dr. Darryl Anthony MD  Division of Infectious Diseases   Cell [phone] between 8am and 6pm   After 6pm and weekends please call ID service at [phone].    78y female with PMHx of HTN, HLD, who was admitted on 7/26 for evaluation of fall out of chair. Previously admitted  that she was feeling unwell on 7/24 and had an episode of vomiting then. Found to have strep anginosus bacteremia likely 2/2 acute cholecystitis.     S/p percutaneous cholecystostomy on 7/27. Biliary cultures grew strep anginosus, E coli and klebsiella oxytoca. Otherwise no fevers or leukocytosis. Repeat blood cultures no growth. TTE reviewed. Hospital course c/b concern for stroke, MRI pending.    -continue ceftriaxone 2g IV q24h while here, when ready for discharge can switch to levofloxacin 750mg PO daily until 8/10  -continue Flagyl 500mg PO q8h until 8/3  -follow up MRI brain  -repeat ECG prior to starting levofloxacin  -discussed with Dr. Darryl Anthony MD  Division of Infectious Diseases   Cell [phone] between 8am and 6pm   After 6pm and weekends please call ID service at [phone].

## 2023-08-01 NOTE — CASE MANAGEMENT PROGRESS NOTE - NSCMPROGRESSNOTE_GEN_ALL_CORE
Patient was recommended for [facility] by PT yesterday. [facility] choice list was provided to the patient and her daughter Melissa [phone]. Per Melissa, the [facility] choices are as follows: 1) [facility] 2) [facility] 3) [facility] 4) [facility]. SW to follow for transition planning to [facility].

## 2023-08-01 NOTE — PROGRESS NOTE ADULT - PROBLEM SELECTOR PLAN 10
History of HTN, no longer on medications  Used to be on ACE-inhibitor and BB-thiazide History of HTN, no longer on medications  Used to be on ACE-inhibitor and BB-thiazide  at dc will start ARB AS EF 45 -  cr remain stable

## 2023-08-01 NOTE — PROVIDER CONTACT NOTE (OTHER) - RECOMMENDATIONS
Notify Dr. Woodward.
Notify Dr. Garcia.
see order for cardizem
medication?
Give PO metoprolol as per order, reassess pt/continue to monitor

## 2023-08-01 NOTE — PROGRESS NOTE ADULT - PROBLEM SELECTOR PLAN 1
-CT scan in the ER revealed findings consistent w/ acute enrique (Distended GB w/ jd-cholecystic fluid)  - RUQ ultrasound showed Cholelithiasis with a small amount of pericholecystic fluid and nonspecific gallbladder wall thickening  - HIDA scan positive, pt underwent perc enrique drainage with IR  post procedure day 4  - IV Zosyn discontinued   - Continue with IV Rocephin and iv flagyl   - 1 bottle  BCx growing gram+ cocci in pairs in chain   Streptococcus anginosus+ in both aerobic & anaerobic samples --> Repeat BCx  neg    - Low fat diet,  tolerating well  - Surgery following dr rousseau ok switch po abx / dionisio drain at dc -CT scan in the ER revealed findings consistent w/ acute enrique (Distended GB w/ jd-cholecystic fluid)  - RUQ ultrasound showed Cholelithiasis with a small amount of pericholecystic fluid and nonspecific gallbladder wall thickening  - HIDA scan positive, pt underwent perc enrique drainage with IR  post procedure day 4  - IV Zosyn  switch to  Continue with IV Rocephin and iv flagyl   - 1 bottle  BCx growing gram+ cocci in pairs in chain   Streptococcus anginosus+ in both aerobic & anaerobic samples --> Repeat BCx  neg    - Low fat diet,  tolerating well  - Surgery following dr rousseau ok switch po abx / dionisio drain at dc

## 2023-08-01 NOTE — PROGRESS NOTE ADULT - ASSESSMENT
79 y/o female presented with asymptomatic cholecystitis found on imaging, now s/p Perc enrique drain placement on 7/27 with IR.  45 cc drainage recorded this morning.    PLAN:  - Care per primary team, low fat diet  - Monitor and record drain output; continue drain care  - Discharge planning - will need VNS for drain care, continue Eliquis  - Discussed with Dr. Pascual    Surgical Team  Spectralink: [id number]

## 2023-08-01 NOTE — PROVIDER CONTACT NOTE (OTHER) - REASON
-150'S
HR Increasing to 130's
Patient tachycardic to 120s-140s
BP: 160/98, HR: 105
HRL:148
Pt lethargic , BP elevated

## 2023-08-01 NOTE — PROGRESS NOTE ADULT - SUBJECTIVE AND OBJECTIVE BOX
[facility] -- Neo Vega, Marivel, Aurora Cronin, , Liban Thompson  Office # [phone]    Follow Up:    Cardiac Optimization, Afib, HFmodEF    Subjective/Observations: Awake and alert, tolerating RA.  Denies any form of respiratory or cardiac discomfort.  Denies postop pain.    REVIEW OF SYSTEMS: All other review of systems is negative unless indicated above  PAST MEDICAL & SURGICAL HISTORY:  HTN (hypertension)  Hyperlipidemia  Tinnitus  right ear  Seasonal allergies  S/P knee replacement    MEDICATIONS  (STANDING):  apixaban 5 milliGRAM(s) Oral every 12 hours  cefTRIAXone   IVPB 2000 milliGRAM(s) IV Intermittent every 24 hours  diltiazem    milliGRAM(s) Oral daily  metoprolol tartrate 100 milliGRAM(s) Oral two times a day  metroNIDAZOLE    Tablet 500 milliGRAM(s) Oral every 8 hours  simvastatin 40 milliGRAM(s) Oral at bedtime    MEDICATIONS  (PRN):  acetaminophen     Tablet .. 650 milliGRAM(s) Oral every 6 hours PRN Mild Pain (1 - 3)  acetaminophen     Tablet .. 650 milliGRAM(s) Oral every 6 hours PRN Temp greater or equal to 38C (100.4F)  aluminum hydroxide/magnesium hydroxide/simethicone Suspension 30 milliLiter(s) Oral every 4 hours PRN Dyspepsia  melatonin 3 milliGRAM(s) Oral at bedtime PRN Insomnia  ondansetron Injectable 4 milliGRAM(s) IV Push every 8 hours PRN Nausea and/or Vomiting    Allergies    No Known Allergies    Intolerances    Vital Signs Last 24 Hrs  T(C): 37 (01 Aug 2023 04:50), Max: 37 (01 Aug 2023 04:50)  T(F): 98.6 (01 Aug 2023 04:50), Max: 98.6 (01 Aug 2023 04:50)  HR: 105 (01 Aug 2023 04:50) (90 - 107)  BP: 160/98 (01 Aug 2023 04:50) (155/80 - 177/91)  BP(mean): --  RR: 18 (01 Aug 2023 04:50) (18 - 18)  SpO2: 93% (01 Aug 2023 04:50) (92% - 95%)    Parameters below as of 01 Aug 2023 04:50  Patient On (Oxygen Delivery Method): room air    I&O's Summary    31 Jul 2023 07:01  -  01 Aug 2023 07:00  --------------------------------------------------------  IN: 0 mL / OUT: 745 mL / NET: -745 mL    PHYSICAL EXAM:  TELE: Afib with 9 beats of slow NSVT vs aberrancy  Constitutional: NAD, awake and alert, obese  HEENT: Moist Mucous Membranes, Anicteric  Pulmonary: Non-labored, breath sounds are clear but diminished bilaterally, No wheezing, rales or rhonchi  Cardiovascular: IRRR, S1 and S2, No murmurs, rubs, gallops or clicks  Gastrointestinal: Bowel Sounds present, soft, nontender.   Lymph: No peripheral edema. No lymphadenopathy.  Skin: No visible rashes or ulcers.  Psych:  Mood & affect: Flat  LABS: All Labs Reviewed:                        13.5   9.67  )-----------( 385      ( 01 Aug 2023 07:10 )             41.1                         11.9   6.70  )-----------( 277      ( 31 Jul 2023 06:17 )             35.9                         12.2   8.32  )-----------( 266      ( 30 Jul 2023 06:30 )             36.7     01 Aug 2023 07:10    141    |  109    |  10     ----------------------------<  106    3.7     |  26     |  0.74   31 Jul 2023 06:17    142    |  110    |  15     ----------------------------<  102    3.5     |  27     |  0.74   30 Jul 2023 06:30    144    |  110    |  15     ----------------------------<  123    3.5     |  25     |  0.76     Ca    8.9        01 Aug 2023 07:10  Ca    8.7        31 Jul 2023 06:17  Ca    8.7        30 Jul 2023 06:30  Phos  3.1       01 Aug 2023 07:10  Phos  3.2       31 Jul 2023 06:17  Phos  3.5       30 Jul 2023 06:30  Mg     2.3       01 Aug 2023 07:10  Mg     2.2       31 Jul 2023 06:17  Mg     2.1       30 Jul 2023 06:30    TPro  7.3    /  Alb  2.6    /  TBili  0.5    /  DBili  x      /  AST  70     /  ALT  113    /  AlkPhos  112    01 Aug 2023 07:10  TPro  6.7    /  Alb  2.3    /  TBili  0.7    /  DBili  x      /  AST  75     /  ALT  101    /  AlkPhos  106    31 Jul 2023 06:17  TPro  7.2    /  Alb  2.6    /  TBili  0.7    /  DBili  x      /  AST  63     /  ALT  81     /  AlkPhos  116    30 Jul 2023 06:30    12 Lead ECG:   Ventricular Rate 95 BPM    QRS Duration 70 ms    Q-T Interval 388 ms    QTC Calculation(Bazett) 487 ms    R Axis 38 degrees    T Axis 96 degrees    Diagnosis Line Atrial fibrillation  Nonspecific T wave abnormality  Prolonged QT  Abnormal ECG  Whencompared with ECG of 25-JUL-2023 20:54, (Unconfirmed)  Vent. rate has decreased BY  48 BPM  Confirmed by Feli Louie ([id number]) on 7/26/2023 10:10:05 AM (07-26-23 @ 02:03)    TRANSTHORACIC ECHOCARDIOGRAM REPORT  ________________________________________________________________________________                                      _______  Pt. Name:       CHAY VELEZ Study Date:    7/26/2023  MRN:            [id number]         YOB: 1945  Accession #:    [id number]        Age:           78 years  Account#:       [id number]       Gender:        F  Heart Rate:                      Height:        62.99 in (160.00 cm)  Rhythm:                          Weight:        160.93 lb (73.00 kg)  Blood Pressure: 132/80 mmHg      BSA/BMI:       1.76 m² / 28.52 kg/m²  ________________________________________________________________________________________  Referring Physician:    Delta Castro  Interpreting Physician: Feli Louie MD  Primary Sonographer:    Glory Gu [facility]    CPT:               ECHO TTE WO CON COMP W DOPP - 70909.m  Indication(s):     Unspecified atrial fibrillation - I48.91  Procedure:         Transthoracic echocardiogram with 2-D, M-mode and complete                     spectral and color flow Doppler.  Ordering Location: [facility]  Study Information: Image quality for this study is technically difficult.    _______________________________________________________________________________________  CONCLUSIONS:      1. Technically difficult image quality.   2. The left ventricular endocardium is not well visualized. Overall the LV function appears to be mildly reduced with an ejection fraction of 45%. Cannot rule out segmental abnormalities.   3. Normal right ventricular cavity size, normal right ventricular wall thickness and normal right ventricular systolic function.   4. The aortic valve is not well visualized, appears mildly calcified with grossly normal opening.   5. Trace mitral regurgitation.   6. Trace tricuspid regurgitation.   7. The inferior vena cava is normal in size measuring 2.14 cm in diameter, (normal <2.1cm) with normal inspiratory collapse (normal >50%) consistent with normal right atrial pressure (~3, range 0-5mmHg).  ________________________________________________________________________________________  FINDINGS:     Left Ventricle:  The left ventricular systolic function is mildly decreased. The left ventricular endocardium is not well visualized. Overall the LV function appears to be mildly reduced with an ejection fraction of 45%. Cannot rule out segmental abnormalities.     Right Ventricle:  Normal right ventricular cavity size, normal wall thickness and normal right ventricular systolic function.     Left Atrium:  The left atrium is normal in size.     Right Atrium:  The right atrium is normal in size.     Aortic Valve:  The aortic valve is not well visualized, appears mildly calcified with grossly normal opening. There is noevidence of aortic regurgitation.     Mitral Valve:  There is trace mitral regurgitation.     Tricuspid Valve:  There is trace tricuspid regurgitation.     Pulmonic Valve:  Structurally normal pulmonic valve with normal leaflet excursion. There is trace pulmonic regurgitation.     Systemic Veins:  The inferior vena cava is normal in size measuring 2.14 cm in diameter, (normal <2.1cm) with normal inspiratory collapse (normal >50%) consistent with normal right atrial pressure (~3, range 0-5mmHg).  ____________________________________________________________________  Quantitative Data:  Left Ventricle Measurements: (Indexed to BSA)     IVSd (2D):   1.1 cm  LVPWd (2D):  0.9 cm  LVIDd (2D):  4.9 cm  LVIDs (2D):  3.6 cm  LV Mass:     177 g  100.7 g/m²     MV E Vmax:    1.00 m/s  e' lateral:   16.40 cm/s  e' medial:    12.30 cm/s  E/e' lateral: 6.07  E/e' medial:  8.10  E/e' Average: 7.23  MV DT:        118 msec    Aorta Measurements:     Ao Sinus: 3.1 cm    Left Atrium Measurements: (Indexedto BSA)  LA Diam 2D: 3.40 cm    LVOT / RVOT/ Qp/Qs Data: (Indexed to BSA)  Mitral Valve Measurements:     MV E Vmax: 1.0 m/s    Tricuspid Valve Measurements:     RA Pressure: 3 mmHg    ________________________________________________________________________________________  Electronically signed on 7/27/2023 at 4:22:35 PM by Feli Louie MD         *** Final ***

## 2023-08-01 NOTE — PROGRESS NOTE ADULT - PROBLEM SELECTOR PLAN 2
Patient complaining of B/L hand weakness  - CT head done 7/31 showed no significant changes from a prior CT done in 2020  - MRI/MRA no contrast ordered due to persistence of hand weakness and slow speech --> fu results  - Carotid doppler showed no significant hemodynamic stenosis of either carotid artery  - Start Simvastatin 40mg  - Continue Eliquis daily Patient complaining of B/L hand weakness/ unsteady gait   - CT head done 7/31 showed no significant changes from a prior CT done in 2020  - MRI/MRA no contrast ordered due to persistence of hand weakness and slow speech --> fu results  - Carotid doppler showed no significant hemodynamic stenosis of either carotid artery  - Start Simvastatin 40mg  - Continue Eliquis daily

## 2023-08-01 NOTE — CHART NOTE - NSCHARTNOTEFT_GEN_A_CORE
RN called due to BP of 160/98 and 9 beats of vtach.  Pt is due for BP meds this AM.  Will order mag and phos with AM labs.  Pt in bed resting, no acute complaints at this time.  RN to call if change.

## 2023-08-01 NOTE — PROGRESS NOTE ADULT - SUBJECTIVE AND OBJECTIVE BOX
Date/Time Patient Seen:  		  Referring MD:   Data Reviewed	       Patient is a 78y old  Female who presents with a chief complaint of fall, new onset afib RVR (31 Jul 2023 16:57)      Subjective/HPI     PAST MEDICAL & SURGICAL HISTORY:  HTN (hypertension)    Hyperlipidemia    Tinnitus  right ear    Seasonal allergies    No significant past surgical history    S/P knee replacement          Medication list         MEDICATIONS  (STANDING):  apixaban 5 milliGRAM(s) Oral every 12 hours  cefTRIAXone   IVPB 2000 milliGRAM(s) IV Intermittent every 24 hours  diltiazem    milliGRAM(s) Oral daily  metoprolol tartrate 100 milliGRAM(s) Oral two times a day  metroNIDAZOLE    Tablet 500 milliGRAM(s) Oral every 8 hours  simvastatin 40 milliGRAM(s) Oral at bedtime    MEDICATIONS  (PRN):  acetaminophen     Tablet .. 650 milliGRAM(s) Oral every 6 hours PRN Temp greater or equal to 38C (100.4F)  acetaminophen     Tablet .. 650 milliGRAM(s) Oral every 6 hours PRN Mild Pain (1 - 3)  aluminum hydroxide/magnesium hydroxide/simethicone Suspension 30 milliLiter(s) Oral every 4 hours PRN Dyspepsia  melatonin 3 milliGRAM(s) Oral at bedtime PRN Insomnia  ondansetron Injectable 4 milliGRAM(s) IV Push every 8 hours PRN Nausea and/or Vomiting         Vitals log        ICU Vital Signs Last 24 Hrs  T(C): 37 (01 Aug 2023 04:50), Max: 37 (01 Aug 2023 04:50)  T(F): 98.6 (01 Aug 2023 04:50), Max: 98.6 (01 Aug 2023 04:50)  HR: 105 (01 Aug 2023 04:50) (83 - 107)  BP: 160/98 (01 Aug 2023 04:50) (149/92 - 177/91)  BP(mean): --  ABP: --  ABP(mean): --  RR: 18 (01 Aug 2023 04:50) (18 - 18)  SpO2: 93% (01 Aug 2023 04:50) (92% - 95%)    O2 Parameters below as of 01 Aug 2023 04:50  Patient On (Oxygen Delivery Method): room air                 Input and Output:  I&O's Detail    30 Jul 2023 07:01  -  31 Jul 2023 07:00  --------------------------------------------------------  IN:  Total IN: 0 mL    OUT:    Drain (mL): 30 mL    Voided (mL): 550 mL  Total OUT: 580 mL    Total NET: -580 mL      31 Jul 2023 07:01  -  01 Aug 2023 06:04  --------------------------------------------------------  IN:  Total IN: 0 mL    OUT:    Drain (mL): 45 mL    Voided (mL): 700 mL  Total OUT: 745 mL    Total NET: -745 mL          Lab Data                        11.9   6.70  )-----------( 277      ( 31 Jul 2023 06:17 )             35.9     07-31    142  |  110<H>  |  15  ----------------------------<  102<H>  3.5   |  27  |  0.74    Ca    8.7      31 Jul 2023 06:17  Phos  3.2     07-31  Mg     2.2     07-31    TPro  6.7  /  Alb  2.3<L>  /  TBili  0.7  /  DBili  x   /  AST  75<H>  /  ALT  101<H>  /  AlkPhos  106  07-31            Review of Systems	      Objective     Physical Examination    heart s1s2  lung dc BS  head nc      Pertinent Lab findings & Imaging      Ceci:  NO   Adequate UO     I&O's Detail    30 Jul 2023 07:01  -  31 Jul 2023 07:00  --------------------------------------------------------  IN:  Total IN: 0 mL    OUT:    Drain (mL): 30 mL    Voided (mL): 550 mL  Total OUT: 580 mL    Total NET: -580 mL      31 Jul 2023 07:01  -  01 Aug 2023 06:04  --------------------------------------------------------  IN:  Total IN: 0 mL    OUT:    Drain (mL): 45 mL    Voided (mL): 700 mL  Total OUT: 745 mL    Total NET: -745 mL               Discussed with:     Cultures:	        Radiology

## 2023-08-01 NOTE — PROGRESS NOTE ADULT - TIME BILLING
pt seen and examine today see above plan - acute cholecystitis  with  now   evolving sepsis  with fever leucocytosis / bacteremia Streptococcus anginosus - -CT scan + w/ acute enrique (Distended GB w/ jd-cholecystic fluid)-RUQ ultrasound showed Cholelithiasis with a small amount of pericholecystic fluid and nonspecific gallbladder wall thickening  -HIDA scan positive -  scheduling percutaneous cholecystectomy  by IR    medically optimize  for emergent procedure with evolving sepsis this time     post GB DRAIN DAY3     BILE CULT  ecoli /Streptococcus anginosus  , repeat  blood cult neg to date     -  iv  abx Zosyn 3.375 mg ivpb q8hr switch to 2gm ivpb rocephin and flagyl 500 mg po q8hr   . unsteady gait and rt hand numbness - ct IMPRESSION:   Parenchymal volume loss and chronic microvascular ischemic changes again   seen and unchanged. There is no acute hemorrhage mass or mass effect seen, mri  and mra brain  without contrast  as per dr mo r/o cva . daughter bedside aware with plan .
pt seen and examine today see above plan - acute cholecystitis  with  now   evolving sepsis  with fever leucocytosis / bacteremia Streptococcus anginosus - -CT scan + w/ acute enrique (Distended GB w/ jd-cholecystic fluid)-RUQ ultrasound showed Cholelithiasis with a small amount of pericholecystic fluid and nonspecific gallbladder wall thickening  -HIDA scan positive -  scheduling percutaneous cholecystectomy  by IR    medically optimize  for emergent procedure with evolving sepsis this time     post GB DRAIN DAY3     BILE CULT  ecoli /Streptococcus anginosus  , repeat  blood cult neg to date     -  iv  abx Zosyn 3.375 mg ivpb q8hr .
pt seen and examine today see above plan - acute cholecystitis  with  now   evolving sepsis  with fever leucocytosis / bacteremia Streptococcus anginosus - -CT scan + w/ acute enrique (Distended GB w/ jd-cholecystic fluid)-RUQ ultrasound showed Cholelithiasis with a small amount of pericholecystic fluid and nonspecific gallbladder wall thickening  -HIDA scan positive -  scheduling percutaneous cholecystectomy  by IR    medically optimize  for emergent procedure with evolving sepsis this time     post GB DRAIN DAY2    BILE CULT  ecoli /Streptococcus anginosus  , repeat  blood cult neg to date   , hypokalemia  replaced  - k wnl   -  iv  abx Zosyn 3.375 mg ivpb q8hr     .  son bedside aware with plan.
pt seen and examine today see above plan - acute cholecystitis  with  now   evolving sepsis poa  with fever leucocytosis / bacteremia Streptococcus anginosus - -CT scan + w/ acute enrique (Distended GB w/ jd-cholecystic fluid)-RUQ ultrasound showed Cholelithiasis with a small amount of pericholecystic fluid and nonspecific gallbladder wall thickening  -HIDA scan positive -  scheduling percutaneous cholecystectomy  by IR    medically optimize  for emergent procedure with evolving sepsis this time     post GB DRAIN DAY4     BILE CULT  ecoli /Streptococcus anginosus  , repeat  blood cult neg to date     -  iv  abx Zosyn 3.375 mg ivpb q8hr switch to 2gm ivpb Rocephin and flagyl 500 mg po q8hr   . unsteady gait and s/p rt hand numbness    resolved - ct IMPRESSION: Parenchymal volume loss and chronic microvascular ischemic changes again seen and unchanged. There is no acute hemorrhage mass or mass effect seen, mri  and mra brain  without contrast   pending as per dr mo r/o cva  , echo  left ventricular endocardium is not well visualized. Overall the LV function appears to be mildly reduced with an ejection fraction of 45%     .  . daughter bedside aware with plan . pt evaluation /henri.
pt seen and examine today see above plan - acute cholecystitis  with  now   evolving sepsis  with fever leucocytosis / bacteremia strep arug - -CT scan + w/ acute enrique (Distended GB w/ jd-cholecystic fluid)-RUQ ultrasound showed Cholelithiasis with a small amount of pericholecystic fluid and nonspecific gallbladder wall thickening  -HIDA scan positive -  scheduling percutaneous cholecystectomy  by IR  today   medically optimize  for emergent procedure with evolving sepsis this time     post GB DRAIN DAY1   BILE CULT  neg  , repeat  blood cult neg to date   , hypokalemia  replaced  - k wnl   -  iv fluid lr 60   iv abx Zosyn 3.375 mg ivpb q8hr .
pt seen and examine today see above plan - acute cholecystitis  with  now   evolving sepsis  with fever leucocytosis / bacteremia - -CT scan + w/ acute enrique (Distended GB w/ jd-cholecystic fluid)-RUQ ultrasound showed Cholelithiasis with a small amount of pericholecystic fluid and nonspecific gallbladder wall thickening  -HIDA scan positive -  scheduling percutaneous cholecystectomy  by IR  today   medically optimize  for emergent procedure with evolving sepsis this time   , hypokalemia  3 krider 10 meq  / 1hr   -  iv fluid lr 60   iv abx Zosyn 3.375 mg ivpb q8hr , npo mid night  hold full ac .
pt seen and examine today see above plan - acute cholecystitis - -CT scan + w/ acute enrique (Distended GB w/ jd-cholecystic fluid)-RUQ ultrasound showed Cholelithiasis with a small amount of pericholecystic fluid and nonspecific gallbladder wall thickening  -HIDA scan positive -  scheduling percutaneous choley  by IR in am -  iv fluid lr 60   iv abx Zosyn 3.375 mg ivpb q8hr , on clear liquid diet ,npo mid night.

## 2023-08-01 NOTE — PROGRESS NOTE ADULT - ASSESSMENT
78y female with PMHx of HTN, HLD presented to the ED for the evaluation of fall out of chair. States that she was sitting on the chair and fell backwards, hitting her head. Was on the floor for over an hour and was unable to get up.    pulm nodule - 1.8 cm  eval for acute enrique  HTN  HLD  OP  OA  Dyspnea  Ataxic gait  Hard of hearing  AF  Rhabdo    CT head - no acute path  on RA  vs noted    serial renal indices  I and O  monitor VS and HD and Sat  trend LABS  Blood gas - CT imaging without acute pulm path -   Pulm nodule - 1.8 cm - will need f/u and repeat imaging and likely PET scan  Fall prec - PT assessment  AF management - eval for HF - on AC full dose -   TTE  trend TROPS  cardio eval  tele monitoring  proBNP noted

## 2023-08-02 LAB
ALBUMIN SERPL ELPH-MCNC: 2.4 G/DL — LOW (ref 3.3–5)
ALP SERPL-CCNC: 93 U/L — SIGNIFICANT CHANGE UP (ref 40–120)
ALT FLD-CCNC: 100 U/L — HIGH (ref 12–78)
ANION GAP SERPL CALC-SCNC: 7 MMOL/L — SIGNIFICANT CHANGE UP (ref 5–17)
AST SERPL-CCNC: 58 U/L — HIGH (ref 15–37)
BILIRUB SERPL-MCNC: 0.4 MG/DL — SIGNIFICANT CHANGE UP (ref 0.2–1.2)
BUN SERPL-MCNC: 13 MG/DL — SIGNIFICANT CHANGE UP (ref 7–23)
CALCIUM SERPL-MCNC: 8.5 MG/DL — SIGNIFICANT CHANGE UP (ref 8.5–10.1)
CHLORIDE SERPL-SCNC: 108 MMOL/L — SIGNIFICANT CHANGE UP (ref 96–108)
CO2 SERPL-SCNC: 26 MMOL/L — SIGNIFICANT CHANGE UP (ref 22–31)
CREAT SERPL-MCNC: 0.77 MG/DL — SIGNIFICANT CHANGE UP (ref 0.5–1.3)
CULTURE RESULTS: SIGNIFICANT CHANGE UP
EGFR: 79 ML/MIN/1.73M2 — SIGNIFICANT CHANGE UP
GLUCOSE SERPL-MCNC: 108 MG/DL — HIGH (ref 70–99)
HCT VFR BLD CALC: 39.2 % — SIGNIFICANT CHANGE UP (ref 34.5–45)
HGB BLD-MCNC: 12.8 G/DL — SIGNIFICANT CHANGE UP (ref 11.5–15.5)
MCHC RBC-ENTMCNC: 30.5 PG — SIGNIFICANT CHANGE UP (ref 27–34)
MCHC RBC-ENTMCNC: 32.7 GM/DL — SIGNIFICANT CHANGE UP (ref 32–36)
MCV RBC AUTO: 93.6 FL — SIGNIFICANT CHANGE UP (ref 80–100)
NRBC # BLD: 0 /100 WBCS — SIGNIFICANT CHANGE UP (ref 0–0)
ORGANISM # SPEC MICROSCOPIC CNT: SIGNIFICANT CHANGE UP
PLATELET # BLD AUTO: 355 K/UL — SIGNIFICANT CHANGE UP (ref 150–400)
POTASSIUM SERPL-MCNC: 3.6 MMOL/L — SIGNIFICANT CHANGE UP (ref 3.5–5.3)
POTASSIUM SERPL-SCNC: 3.6 MMOL/L — SIGNIFICANT CHANGE UP (ref 3.5–5.3)
PROT SERPL-MCNC: 6.7 G/DL — SIGNIFICANT CHANGE UP (ref 6–8.3)
RBC # BLD: 4.19 M/UL — SIGNIFICANT CHANGE UP (ref 3.8–5.2)
RBC # FLD: 13.6 % — SIGNIFICANT CHANGE UP (ref 10.3–14.5)
SODIUM SERPL-SCNC: 141 MMOL/L — SIGNIFICANT CHANGE UP (ref 135–145)
SPECIMEN SOURCE: SIGNIFICANT CHANGE UP
WBC # BLD: 10.45 K/UL — SIGNIFICANT CHANGE UP (ref 3.8–10.5)
WBC # FLD AUTO: 10.45 K/UL — SIGNIFICANT CHANGE UP (ref 3.8–10.5)

## 2023-08-02 PROCEDURE — 99232 SBSQ HOSP IP/OBS MODERATE 35: CPT

## 2023-08-02 PROCEDURE — 99232 SBSQ HOSP IP/OBS MODERATE 35: CPT | Mod: GC

## 2023-08-02 RX ORDER — ATORVASTATIN CALCIUM 80 MG/1
80 TABLET, FILM COATED ORAL AT BEDTIME
Refills: 0 | Status: DISCONTINUED | OUTPATIENT
Start: 2023-08-02 | End: 2023-08-03

## 2023-08-02 RX ADMIN — Medication 100 MILLIGRAM(S): at 05:54

## 2023-08-02 RX ADMIN — APIXABAN 5 MILLIGRAM(S): 2.5 TABLET, FILM COATED ORAL at 05:54

## 2023-08-02 RX ADMIN — APIXABAN 5 MILLIGRAM(S): 2.5 TABLET, FILM COATED ORAL at 17:37

## 2023-08-02 RX ADMIN — Medication 500 MILLIGRAM(S): at 21:22

## 2023-08-02 RX ADMIN — Medication 120 MILLIGRAM(S): at 05:54

## 2023-08-02 RX ADMIN — CEFTRIAXONE 100 MILLIGRAM(S): 500 INJECTION, POWDER, FOR SOLUTION INTRAMUSCULAR; INTRAVENOUS at 13:18

## 2023-08-02 RX ADMIN — Medication 500 MILLIGRAM(S): at 13:17

## 2023-08-02 RX ADMIN — Medication 500 MILLIGRAM(S): at 05:54

## 2023-08-02 RX ADMIN — ATORVASTATIN CALCIUM 80 MILLIGRAM(S): 80 TABLET, FILM COATED ORAL at 21:22

## 2023-08-02 NOTE — OCCUPATIONAL THERAPY INITIAL EVALUATION ADULT - ADL RETRAINING, OT EVAL
Patient will dress upper body with minimal assistance in 3-5 sessions. Patient will perform grooming tasks with supervision and set-up in 3-5 sessions.

## 2023-08-02 NOTE — DIETITIAN INITIAL EVALUATION ADULT - PERTINENT LABORATORY DATA
08-02    141  |  108  |  13  ----------------------------<  108<H>  3.6   |  26  |  0.77    Ca    8.5      02 Aug 2023 06:47  Phos  3.1     08-01  Mg     2.3     08-01    TPro  6.7  /  Alb  2.4<L>  /  TBili  0.4  /  DBili  x   /  AST  58<H>  /  ALT  100<H>  /  AlkPhos  93  08-02  A1C with Estimated Average Glucose Result: 5.6 % (07-26-23 @ 03:00)

## 2023-08-02 NOTE — PROGRESS NOTE ADULT - PROBLEM SELECTOR PLAN 10
History of HTN, no longer on medications  Used to be on ACE-inhibitor and BB-thiazide  - at dc will start ARB AS EF 45 -  cr remain stable History of HTN, no longer on medications  - start ARB at discharge, Cr stable

## 2023-08-02 NOTE — OCCUPATIONAL THERAPY INITIAL EVALUATION ADULT - NEUROMUSCULAR RE-EDUCATION, PT EVAL
Patient will tolerate neuro re-ed techniques right UE to enable patient to utilize right UE as an active assist during ADLs in 3-5 sessions.

## 2023-08-02 NOTE — DIETITIAN INITIAL EVALUATION ADULT - ORAL INTAKE PTA/DIET HISTORY
B: cold cereal with milk or pancakes , fresh fruit  L:  Slim Fast, water and fresh fruit. Pt reports she does not take Slim Fast for diet purposes but because it is quick easy and nutritious  D: salad or beef/chicken/fish with a vegetable   uses sugar substitute

## 2023-08-02 NOTE — SOCIAL WORK PROGRESS NOTE - NSSWPROGRESSNOTE_GEN_ALL_CORE
Per PT, dc recommendations are for Acute rehab at this time. YASIR spoke with pt apolonia Queen to discuss acute rehab choices. Apolonia Queen requested the acute list to be sent to her via email to review choices; SW sent acute rehab list. SW will continue to follow to asst with appropriate dc to acute rehab.

## 2023-08-02 NOTE — PROGRESS NOTE ADULT - PROBLEM SELECTOR PLAN 9
Lives alone and walks unassisted at baseline  Fell off chair and was unable to get up  - Fall precautions    PT evaluation: recommending [facility]

## 2023-08-02 NOTE — DIETITIAN INITIAL EVALUATION ADULT - PROBLEM SELECTOR PLAN 4
Patient is without chest pain on admission, doubt ACS  EKG with afib RVR with likely rate related ischemic changes  Suspect demand ischemia in setting of rapid AF and rhabdomyolysis  Will trend cardiac enzymes  Follow up 2D ECHO  Repeat EKG when rate is controlled  Continue ASA 81mg daily  Resume statin when CK improves

## 2023-08-02 NOTE — PROGRESS NOTE ADULT - PROBLEM SELECTOR PLAN 5
-CT chest showed evidence of pulmonary scarring which may be d/t pulmonary fibrosis   Imaging also showed:   -1.8 cm pulmonary nodule right upper lobe.  -Several small subcentimeter right-sided pulmonary nodule.  --Findings compatible with lung cancer until proven otherwise.  Consider PET/CT scan for further evaluation.  -  dyspnea may be secondary to pulm fibrosis , no chf -CT chest showed evidence of pulmonary scarring which may be d/t pulmonary fibrosis   Imaging also showed:   - 1.8 cm pulmonary nodule right upper lobe.  - Several small subcentimeter right-sided pulmonary nodule.  - Findings compatible with lung cancer until proven otherwise.  Consider PET/CT scan for further evaluation.  -  dyspnea may be secondary to pulm fibrosis , no chf

## 2023-08-02 NOTE — PROGRESS NOTE ADULT - PROBLEM SELECTOR PLAN 6
WBC 18.46k on admission - possibly acute cholecystitis sepsis: now RESOLVED   -UA & UCx - neg   -BCx + for gram + cocci in pairs/chains in both aerobic/anaerobic sample, repeat blood cult neg

## 2023-08-02 NOTE — PROGRESS NOTE ADULT - SUBJECTIVE AND OBJECTIVE BOX
Patient is a 78y old  Female who presents with a chief complaint of fall, new onset afib RVR (02 Aug 2023 10:19)    INTERVAL HPI/OVERNIGHT EVENTS: MRI & MR Angio Head w/non contrast read was completed yesterday, with findings of acute R cerebellar infarct. Pt was seen and examined at bedside, having breakfast comfortably. Pt states that she slept well overnight. Denies any symptoms of headache, dizziness, lightheadedness, or weakness at rest but admits to needing assistance when out of bed, ambulating. Patient denies visual changes or focal neurologic deficits. Denies any fever, chills, body aches, CP, SOB, palpitations, and abdominal pain.      MEDICATIONS  (STANDING):  apixaban 5 milliGRAM(s) Oral every 12 hours  atorvastatin 80 milliGRAM(s) Oral at bedtime  cefTRIAXone   IVPB 2000 milliGRAM(s) IV Intermittent every 24 hours  diltiazem    milliGRAM(s) Oral daily  metoprolol succinate  milliGRAM(s) Oral daily  metroNIDAZOLE    Tablet 500 milliGRAM(s) Oral every 8 hours    MEDICATIONS  (PRN):  acetaminophen     Tablet .. 650 milliGRAM(s) Oral every 6 hours PRN Mild Pain (1 - 3)  acetaminophen     Tablet .. 650 milliGRAM(s) Oral every 6 hours PRN Temp greater or equal to 38C (100.4F)  aluminum hydroxide/magnesium hydroxide/simethicone Suspension 30 milliLiter(s) Oral every 4 hours PRN Dyspepsia  melatonin 3 milliGRAM(s) Oral at bedtime PRN Insomnia  ondansetron Injectable 4 milliGRAM(s) IV Push every 8 hours PRN Nausea and/or Vomiting      Allergies    No Known Allergies    Intolerances    REVIEW OF SYSTEMS:  CONSTITUTIONAL: No fever or chills  HEENT:  No headache, no sore throat  RESPIRATORY: No cough, wheezing, or shortness of breath  CARDIOVASCULAR: No chest pain, palpitations  GASTROINTESTINAL: No abd pain, nausea, vomiting, or diarrhea  GENITOURINARY: No dysuria, frequency, or hematuria  NEUROLOGICAL: no focal weakness or dizziness  MUSCULOSKELETAL: no myalgias     Vital Signs Last 24 Hrs  T(C): 37 (02 Aug 2023 04:47), Max: 37 (02 Aug 2023 04:47)  T(F): 98.6 (02 Aug 2023 04:47), Max: 98.6 (02 Aug 2023 04:47)  HR: 116 (02 Aug 2023 04:47) (102 - 116)  BP: 165/90 (02 Aug 2023 04:47) (155/90 - 165/90)  BP(mean): --  RR: 18 (02 Aug 2023 04:47) (18 - 18)  SpO2: 96% (02 Aug 2023 04:47) (95% - 96%)    Parameters below as of 02 Aug 2023 04:47  Patient On (Oxygen Delivery Method): room air      PHYSICAL EXAM:  GENERAL: NAD  HEAD: NCAT  EYES: EOMI, PERRLA  CHEST/LUNG:  CTA b/l, no rales, wheezes, or rhonchi, no use of accessory muscles   HEART:  RRR, S1, S2  ABDOMEN:  BS+, soft, nontender, nondistended  EXTREMITIES: no edema, cyanosis, or calf tenderness   NERVOUS SYSTEM: AOx3, cerebellar function tests negative, answers questions and follows commands appropriately, no focal deficits     LABS:                        12.8   10.45 )-----------( 355      ( 02 Aug 2023 06:47 )             39.2     CBC Full  -  ( 02 Aug 2023 06:47 )  WBC Count : 10.45 K/uL  Hemoglobin : 12.8 g/dL  Hematocrit : 39.2 %  Platelet Count - Automated : 355 K/uL  Mean Cell Volume : 93.6 fl  Mean Cell Hemoglobin : 30.5 pg  Mean Cell Hemoglobin Concentration : 32.7 gm/dL  Auto Neutrophil # : x  Auto Lymphocyte # : x  Auto Monocyte # : x  Auto Eosinophil # : x  Auto Basophil # : x  Auto Neutrophil % : x  Auto Lymphocyte % : x  Auto Monocyte % : x  Auto Eosinophil % : x  Auto Basophil % : x    02 Aug 2023 06:47    141    |  108    |  13     ----------------------------<  108    3.6     |  26     |  0.77     Ca    8.5        02 Aug 2023 06:47    TPro  6.7    /  Alb  2.4    /  TBili  0.4    /  DBili  x      /  AST  58     /  ALT  100    /  AlkPhos  93     02 Aug 2023 06:47      Urinalysis Basic - ( 02 Aug 2023 06:47 )    Color: x / Appearance: x / SG: x / pH: x  Gluc: 108 mg/dL / Ketone: x  / Bili: x / Urobili: x   Blood: x / Protein: x / Nitrite: x   Leuk Esterase: x / RBC: x / WBC x   Sq Epi: x / Non Sq Epi: x / Bacteria: x      CAPILLARY BLOOD GLUCOSE            Culture - Body Fluid with Gram Stain (collected 07-27-23 @ 16:20)  Source: Bile Bile Fluid  Gram Stain (07-28-23 @ 05:59):    No polymorphonuclear leukocytes seen    No organisms seen    by cytocentrifuge  Final Report (08-02-23 @ 08:20):    Moderate Streptococcus anginosus "Susceptibilities not performed"    Few Escherichia coli    Rare Klebsiella oxytoca/Raoultella ornithinolytica  Organism: Escherichia coli  Klebsiella oxytoca /Raoutella ornithinolytica (08-02-23 @ 08:20)  Organism: Klebsiella oxytoca /Raoutella ornithinolytica (08-02-23 @ 08:20)      Method Type: SVETA      -  Amoxicillin/Clavulanic Acid: S <=8/4      -  Amikacin: S <=16      -  Ampicillin: R <=8 These ampicillin results predict results for amoxicillin      -  Ampicillin/Sulbactam: S <=4/2 Enterobacter, Klebsiella aerogenes, Citrobacter, and Serratia may develop resistance during prolonged therapy (3-4 days)      -  Aztreonam: S <=4      -  Cefazolin: S <=2 Enterobacter, Klebsiella aerogenes, Citrobacter, and Serratia may develop resistance during prolonged therapy (3-4 days)      -  Cefepime: S <=2      -  Cefoxitin: S <=8      -  Ceftriaxone: S <=1 Enterobacter, Klebsiella aerogenes, Citrobacter, and Serratia may develop resistance during prolonged therapy      -  Ciprofloxacin: S <=0.25      -  Ertapenem: S <=0.5      -  Gentamicin: S <=2      -  Imipenem: S <=1      -  Levofloxacin: S <=0.5      -  Meropenem: S <=1      -  Piperacillin/Tazobactam: S <=8      -  Tobramycin: S <=2      -  Trimethoprim/Sulfamethoxazole: S <=0.5/9.5  Organism: Escherichia coli (08-02-23 @ 08:20)      Method Type: SVETA      -  Amikacin: S <=16      -  Amoxicillin/Clavulanic Acid: S <=8/4      -  Ampicillin: S <=8 These ampicillin results predict results for amoxicillin      -  Ampicillin/Sulbactam: S <=4/2 Enterobacter, Klebsiella aerogenes, Citrobacter, and Serratia may develop resistance during prolonged therapy (3-4 days)      -  Aztreonam: S <=4      -  Cefazolin: S <=2 Enterobacter, Klebsiella aerogenes, Citrobacter, and Serratia may develop resistance during prolonged therapy (3-4 days)      -  Cefepime: S <=2      -  Cefoxitin: S <=8      -  Ceftriaxone: S <=1 Enterobacter, Klebsiella aerogenes, Citrobacter, and Serratia may develop resistance during prolonged therapy      -  Ciprofloxacin: S <=0.25      -  Ertapenem: S <=0.5      -  Gentamicin: S <=2      -  Imipenem: S <=1      -  Levofloxacin: S <=0.5      -  Meropenem: S <=1      -  Piperacillin/Tazobactam: S <=8      -  Tobramycin: S <=2      -  Trimethoprim/Sulfamethoxazole: S <=0.5/9.5    Culture - Urine (collected 07-27-23 @ 13:34)  Source: Clean Catch Clean Catch (Midstream)  Final Report (07-28-23 @ 21:31):    No growth    Culture - Blood (collected 07-27-23 @ 09:45)  Source: .Blood Blood-Peripheral  Final Report (08-01-23 @ 15:00):    No growth at 5 days    Culture - Blood (collected 07-27-23 @ 09:40)  Source: .Blood Blood-Peripheral  Final Report (08-01-23 @ 15:00):    No growth at 5 days        RADIOLOGY & ADDITIONAL TESTS: < from: MR Angio Head No Cont (08.01.23 @ 19:28) >  PROCEDURE DATE:  08/01/2023          INTERPRETATION:  Indication: Clinical Information  Comparison: 7/31/2023    Technique: Multiplanar MR imaging of the brain was obtained without   contrast. Time of flight MRA of the [place] of [place] was also obtained.    Findings:      Brain MRI:    There is an acute right cerebellar infarct. There are no foci of   susceptibility artifact to suggest hemorrhage. Scattered foci of T2/FLAIR   hyperintensity in the bilateral hemispheric white matter are nonspecific   but likely related to chronic white matter microvascular ischemic   disease. The ventricles are normal in size for patient's age. No abnormal   intracranial enhancement.    Flow voids of the major intracranial vessels at the skull base follow   expected course and contour.    The paranasal sinuses demonstrate no signal abnormality. The mastoids   demonstrate no signal abnormality. Bilateral orbits are within normal   limits. Partial empty sella.    Brain MRA:    There is flow-related signal in the bilateral intradural internal   carotid, anterior cerebral and middle cerebral arteries.    Fetal origin of the left posterior cerebral artery.There is flow-related   signal in the bilateral posterior cerebral, basilar, and intradural   vertebral arteries.    No evidence of aneurysm. Tiny aneurysms can be beyond the resolution of   MRA technique. No evidence of arteriovenous malformation.    IMPRESSION:    Acute right cerebellar infarct. There are no foci of susceptibility   artifact to suggest hemorrhage.    No hemodynamically significant stenosis in the head or intracranial   aneurysm.    Personally reviewed.     Consultant(s) Notes Reviewed:  [x] YES  [ ] NO

## 2023-08-02 NOTE — PROGRESS NOTE ADULT - PROBLEM SELECTOR PLAN 7
Patient is without chest pain on admission, doubt ACS  EKG with afib RVR with likely rate related ischemic changes  - Suspect demand ischemia in setting of rapid AF and rhabdomyolysis  - Trended cardiac enzymes down   Follow up 2D ECHO-echo  left ventricular endocardium is not well visualized.   - Overall the LV function appears to be mildly reduced with an ejection fraction of 45%

## 2023-08-02 NOTE — PROGRESS NOTE ADULT - PROBLEM SELECTOR PLAN 4
Patient with elevated CK after fall and being on the floor   on admission does not appear volume overloaded despite elevated proBNP   - Got 1L of NS in the ED ,  s/p  iv fluid   - mag / phos wnl   - Trended ck down Patient with elevated CK after fall and being on the floor   on admission does not appear volume overloaded despite elevated proBNP   - S/p IV fluids   - Mag/Phos within normal limits, CK trended down

## 2023-08-02 NOTE — PROGRESS NOTE ADULT - ATTENDING COMMENTS
Patient seen and examined this morning.  Resting comfortably in no acute distress.  Out of bed to chair.  Daughter present at the bedside.  Patient is postoperative day 1 status post percutaneous cholecystostomy for acute on chronic cholecystitis and cholelithiasis.  WBC trending down.  Patient was initially bacteremic but otherwise remains afebrile.  Cultures from cholecystostomy placement remain pending.  Infectious disease services are following.    Patient was admitted status post fall presenting with leukocytosis CT scan of the chest incidentally found acute on chronic cholecystitis with cholelithiasis.  The patient however was in new onset rapid atrial fibrillation and hence percutaneous drainage was opted for she was not medically stable to undergo urgent surgical intervention.    Cholecystostomy care has been reviewed with the patient and her daughter.  All questions have been answered.  Hospitalist also present at the time of our discussions.  Plan of care reviewed and all parties satisfied.    Plan is for patient to continue on IV antibiotics in the hospital throughout the weekend at which time continued management of her atrial fibrillation and conversion from IV anticoagulation to oral anticoagulation for anticipated discharge early next week.
Pt seen and examined.  MRI completed today for neural status changes.  Found to have acute right cerebellar infarct.  Neurology recommendations appreciated ("not a candidate for Thrombolytic or neurovascular intervention"  A fib better controlled.  Cardiology following and recommending continued Toprol XL, Cardizem and Eliquis.  Cholecystostomy remains to gravity drain.  Thin garvin bilious drainage.  No RUQ pain.    Maintain Cholecystostomy drain until medically stable to consider outpatient cholecystectomy.  As per ID, continue ceftriaxone 2g IV q24h while here, when ready for discharge can switch to levofloxacin 750mg PO daily until 8/10  -continue Flagyl 500mg PO q8h until 8/3
9
78 female HTN, HLD present for fall , down on the floor , found with  rhabdo and  new onset atrial fibrillation and acute right Cerebeller infarct    Dyarthria and weakness unsteadiness of hand  necessitating brain MRI/MRA -  Acute right cerebellar infarct. No hemorrhage. No hemodynamically significant stenosis in the head or intracranial aneurysm.  Neurology dr Carlson- High intensity statin .  Apixaban    Incidental finding of gall bladder stone and cholecystitis status post cholecystostomy drain tube placement. IV Antibiotics     Afib - Cardizem,  Apixaban     Disp- PT OT dc planning

## 2023-08-02 NOTE — DIETITIAN INITIAL EVALUATION ADULT - PROBLEM SELECTOR PLAN 3
WBC 18.46k on admission - possibly reactive  Did feel unwell with vomiting on 7/24 - now resolved, no other symptoms  Will check UA, blood/urine cultures  Follow up CT CAP and US dopplers  Monitor off antibiotics for now  Trend TLC and monitor for fevers

## 2023-08-02 NOTE — PROGRESS NOTE ADULT - PROBLEM SELECTOR PROBLEM 11
Need for prophylactic measure
HLD (hyperlipidemia)
Need for prophylactic measure
Need for prophylactic measure
HLD (hyperlipidemia)
Need for prophylactic measure

## 2023-08-02 NOTE — OCCUPATIONAL THERAPY INITIAL EVALUATION ADULT - GENERAL OBSERVATIONS, REHAB EVAL
Patient received semifowler in bed, +O2 via NC, +IV line, +dtr bedside, NAD. Patient received seated in bedside chair in no apparent distress with +telemonitor, +external catheter and +gallbladder drain, on room air.

## 2023-08-02 NOTE — PROGRESS NOTE ADULT - PROBLEM SELECTOR PLAN 8
JANICE - No prior history of kidney disease: now RESOLVED   Possibly prerenal azotemia or secondary to rhabdo  - s/p iv fluid, Cr wnl

## 2023-08-02 NOTE — DIETITIAN INITIAL EVALUATION ADULT - ENERGY INTAKE
78y female with PMHx of HTN, HLD admitted with new onset atrial fibrillation with rapid ventricular response and rhabdomyolysis s/p fall at home.       Problem/Plan - 1:  ·  Problem: Acute cholecystitis.   ·  Plan: -CT scan in the ER revealed findings consistent w/ acute enrique (Distended GB w/ jd-cholecystic fluid)  - RUQ ultrasound showed Cholelithiasis with a small amount of pericholecystic fluid and nonspecific gallbladder wall thickening  - HIDA scan positive, pt underwent perc enrique drainage with IR  post procedure day 4  - IV Zosyn  switch to  Continue with IV Rocephin and iv flagyl   - 1 bottle  BCx growing gram+ cocci in pairs in chain   Streptococcus anginosus+ in both aerobic & anaerobic samples --> Repeat BCx  neg    - Low fat diet,  tolerating well  - Surgery following dr rousseau ok switch po abx / dionisio drain at dc.     Problem/Plan - 2:  ·  Problem: Upper extremity weakness.   ·  Plan: Patient complaining of B/L hand weakness/ unsteady gait   - CT head done 7/31 showed no significant changes from a prior CT done in 2020  - MRI/MRA no contrast ordered due to persistence of hand weakness and slow speech --> fu results  - Carotid doppler showed no significant hemodynamic stenosis of either carotid artery  - Start Simvastatin 40mg  - Continue Eliquis daily.     Problem/Plan - 3:  ·  Problem: New onset atrial fibrillation.   ·  Plan: -[id number] 4: will start full dose LMWH   hold for ir guided gb  drain - hida + s/p gb drain s/p drainage  -TSH w/in normal limits,   - LE doppler resulted - no DVT evidence b/l   - Cardiology Dr. Rader consulted,  - Switch Lopressor to Toprol XL  -   Cardizem 120 mg  po daily  will  In crease Cardizem CD to 180 mg daily with parameters  after mri  and mra brain result   - Continue Eliquis (was switched from DOAC 7/30)  - TTE: EF 45% with no significant valvular disease.  No clear evidence of volume overload.     Problem/Plan - 4:  ·  Problem: Rhabdomyolysis.   ·  Plan: Patient with elevated CK after fall and being on the floor   on admission does not appear volume overloaded despite elevated proBNP   Got 1L of NS in the ED ,  s/p  iv fluid    mag / phos wnl   Trended ck down.     Problem/Plan - 5:  ·  Problem: Pulmonary fibrosis.   ·  Plan: -CT chest showed evidence of pulmonary scarring which may be d/t pulmonary fibrosis   Imaging also showed:   -1.8 cm pulmonary nodule right upper lobe.  -Several small subcentimeter right-sided pulmonary nodule.  --Findings compatible with lung cancer until proven otherwise.  Consider PET/CT scan for further evaluation.  -  dyspnea may be secondary to pulm fibrosis , no chf.     Problem/Plan - 6:  ·  Problem: Leukocytosis.   ·  Plan: WBC 18.46k on admission - possibly  acute cholecystitis  sepsis revolving  on iv abx resolved wbc wnl    -UA & UCx - neg   -BCx + for gram + cocci in pairs/chains in both aerobic/anearobic sample  , repeat blood cult neg.     Problem/Plan - 7:  ·  Problem: Elevated troponin.   ·  Plan: Patient is without chest pain on admission, doubt ACS  EKG with afib RVR with likely rate related ischemic changes  Suspect demand ischemia in setting of rapid AF and rhabdomyolysis   trended  cardiac enzymes down   Follow up 2D ECHO-echo  left ventricular endocardium is not well visualized. Overall the LV function appears to be mildly reduced with an ejection fraction of 45%     most recent EKG showed rate of 95.     Problem/Plan - 8:  ·  Problem: Elevated serum creatinine.   ·  Plan: [place] - No prior history of kidney disease  Possibly prerenal azotemia or secondary to rhabdo  s/p iv fluid  cr wnl.     Problem/Plan - 9:  ·  Problem: Fall at home.   ·  Plan: Lives alone and walks unassisted at baseline  Fell off chair and was unable to get up  Fall precautions  PT evaluation / henri.     Problem/Plan - 10:  ·  Problem: HTN (hypertension).   ·  Plan; History of HTN, no longer on medications  Used to be on ACE-inhibitor and BB-thiazide  at [place] will start ARB AS EF 45 -  cr remain stable.     Problem/Plan - 11:  ·  Problem: HLD (hyperlipidemia).   ·  Plan: Used to be on atorvastatin 40mg daily, no longer taking  - Start Simvastatin 40mg daily    Adequate (%)

## 2023-08-02 NOTE — OCCUPATIONAL THERAPY INITIAL EVALUATION ADULT - PERTINENT HX OF CURRENT PROBLEM, REHAB EVAL
79 y/o female with PMH of HTN, HLD presented to the ED for the evaluation of fall out of chair. States that she was sitting on the chair and fell backwards, hitting her head. Was on the floor for over an hour and was unable to get up. Eventually was able to flip over and crawl on her elbows and knees to call her daughter in law for assistance. She initially had a headache which has resolved. Only has some mild soreness in her elbows. States that she was feeling unwell on Monday 7/24 and had an episode of vomiting then. Now feels back to her baseline. No fevers or chills. No urinary symptoms. Denies chest pain, palpitations, dyspnea, current headache, dizziness, abdominal pain, n/v/d. She has no prior history of kidney issues. Pt presented with asymptomatic cholecystitis found on imaging, now s/p Perc enrique drain placement on 7/27 with IR. 79 y/o female with PMH of HTN, HLD presented to the ED for the evaluation of fall out of chair. States that she was sitting on the chair and fell backwards, hitting her head. Was on the floor for over an hour and was unable to get up. Eventually was able to flip over and crawl on her elbows and knees to call her daughter in law for assistance. She initially had a headache which has resolved. Only has some mild soreness in her elbows. States that she was feeling unwell on Monday 7/24 and had an episode of vomiting then. Now feels back to her baseline. No fevers or chills. No urinary symptoms. Denies chest pain, palpitations, dyspnea, current headache, dizziness, abdominal pain, n/v/d. She has no prior history of kidney issues. Pt presented with asymptomatic cholecystitis found on imaging, now s/p Perc enrique drain placement on 7/27 with IR. As per EMR on 8/1 patient complaining of B/L hand weakness/ unsteady gait. CT head done 7/31 showed no significant changes from a prior CT done in 2020. MR brain 8/1/23: +acute right cerebellar infarct. Pt admitted 7/26/23 with new onset atrial fibrillation with RVR.

## 2023-08-02 NOTE — PROGRESS NOTE ADULT - SUBJECTIVE AND OBJECTIVE BOX
[facility] -- Neo Vega, Mehrdad Orta Savella, , Liban Thompson  Office # [phone]    Follow Up:  Cardiac Optimization, Afib, HFmodEF    Subjective/Observations: Awake and alert.  Comfortable on RA.  Denies N/V/abdominal pain.  Denies any form of respiratory or cardiac discomfort.  No tele events    REVIEW OF SYSTEMS: All other review of systems is negative unless indicated above  PAST MEDICAL & SURGICAL HISTORY:  HTN (hypertension)  Hyperlipidemia  Tinnitus  right ear  Seasonal allergies  S/P knee replacement    MEDICATIONS  (STANDING):  apixaban 5 milliGRAM(s) Oral every 12 hours  atorvastatin 80 milliGRAM(s) Oral at bedtime  cefTRIAXone   IVPB 2000 milliGRAM(s) IV Intermittent every 24 hours  diltiazem    milliGRAM(s) Oral daily  metoprolol succinate  milliGRAM(s) Oral daily  metroNIDAZOLE    Tablet 500 milliGRAM(s) Oral every 8 hours    MEDICATIONS  (PRN):  acetaminophen     Tablet .. 650 milliGRAM(s) Oral every 6 hours PRN Mild Pain (1 - 3)  acetaminophen     Tablet .. 650 milliGRAM(s) Oral every 6 hours PRN Temp greater or equal to 38C (100.4F)  aluminum hydroxide/magnesium hydroxide/simethicone Suspension 30 milliLiter(s) Oral every 4 hours PRN Dyspepsia  melatonin 3 milliGRAM(s) Oral at bedtime PRN Insomnia  ondansetron Injectable 4 milliGRAM(s) IV Push every 8 hours PRN Nausea and/or Vomiting    Allergies    No Known Allergies    Intolerances    Vital Signs Last 24 Hrs  T(C): 37 (02 Aug 2023 04:47), Max: 37 (02 Aug 2023 04:47)  T(F): 98.6 (02 Aug 2023 04:47), Max: 98.6 (02 Aug 2023 04:47)  HR: 116 (02 Aug 2023 04:47) (88 - 116)  BP: 165/90 (02 Aug 2023 04:47) (133/85 - 165/90)  BP(mean): --  RR: 18 (02 Aug 2023 04:47) (16 - 18)  SpO2: 96% (02 Aug 2023 04:47) (95% - 96%)    Parameters below as of 02 Aug 2023 04:47  Patient On (Oxygen Delivery Method): room air    I&O's Summary    01 Aug 2023 07:01  -  02 Aug 2023 07:00  --------------------------------------------------------  IN: 0 mL / OUT: 400 mL / NET: -400 mL    PHYSICAL EXAM:  TELE: Afib   Constitutional: NAD, awake and alert, obese  HEENT: Moist Mucous Membranes, Anicteric  Pulmonary: Non-labored, breath sounds are clear but diminished bilaterally, No wheezing, rales or rhonchi  Cardiovascular: IRRR, S1 and S2, No murmurs, rubs, gallops or clicks  Gastrointestinal: Bowel Sounds present, soft, nontender.   Lymph: No peripheral edema. No lymphadenopathy.  Skin: No visible rashes or ulcers.  Psych:  Mood & affect: Flat    LABS: All Labs Reviewed:                        12.8   10.45 )-----------( 355      ( 02 Aug 2023 06:47 )             39.2                         13.5   9.67  )-----------( 385      ( 01 Aug 2023 07:10 )             41.1                         11.9   6.70  )-----------( 277      ( 31 Jul 2023 06:17 )             35.9     02 Aug 2023 06:47    141    |  108    |  13     ----------------------------<  108    3.6     |  26     |  0.77   01 Aug 2023 07:10    141    |  109    |  10     ----------------------------<  106    3.7     |  26     |  0.74   31 Jul 2023 06:17    142    |  110    |  15     ----------------------------<  102    3.5     |  27     |  0.74     Ca    8.5        02 Aug 2023 06:47  Ca    8.9        01 Aug 2023 07:10  Ca    8.7        31 Jul 2023 06:17  Phos  3.1       01 Aug 2023 07:10  Phos  3.2       31 Jul 2023 06:17  Mg     2.3       01 Aug 2023 07:10  Mg     2.2       31 Jul 2023 06:17    TPro  6.7    /  Alb  2.4    /  TBili  0.4    /  DBili  x      /  AST  58     /  ALT  100    /  AlkPhos  93     02 Aug 2023 06:47  TPro  7.3    /  Alb  2.6    /  TBili  0.5    /  DBili  x      /  AST  70     /  ALT  113    /  AlkPhos  112    01 Aug 2023 07:10  TPro  6.7    /  Alb  2.3    /  TBili  0.7    /  DBili  x      /  AST  75     /  ALT  101    /  AlkPhos  106    31 Jul 2023 06:17    12 Lead ECG:   Ventricular Rate 95 BPM    QRS Duration 70 ms    Q-T Interval 388 ms    QTC Calculation(Bazett) 487 ms    R Axis 38 degrees    T Axis 96 degrees    Diagnosis Line Atrial fibrillation  Nonspecific T wave abnormality  Prolonged QT  Abnormal ECG  Whencompared with ECG of 25-JUL-2023 20:54, (Unconfirmed)  Vent. rate has decreased BY  48 BPM  Confirmed by Feli Louie ([id number]) on 7/26/2023 10:10:05 AM (07-26-23 @ 02:03)    TRANSTHORACIC ECHOCARDIOGRAM REPORT  ________________________________________________________________________________                                      _______  Pt. Name:       CHAY VELEZ Study Date:    7/26/2023  MRN:            [id number]         YOB: 1945  Accession #:    [id number]        Age:           78 years  Account#:       [id number]       Gender:        F  Heart Rate:                      Height:        62.99 in (160.00 cm)  Rhythm:                          Weight:        160.93 lb (73.00 kg)  Blood Pressure: 132/80 mmHg      BSA/BMI:       1.76 m² / 28.52 kg/m²  ________________________________________________________________________________________  Referring Physician:    Delta Castro  Interpreting Physician: Feli Louie MD  Primary Sonographer:    Glory Gu RDWARREN    CPT:               ECHO TTE WO CON COMP W DOPP - 65212.m  Indication(s):     Unspecified atrial fibrillation - I48.91  Procedure:         Transthoracic echocardiogram with 2-D, M-mode and complete                     spectral and color flow Doppler.  Ordering Location: [facility]  Study Information: Image quality for this study is technically difficult.  _______________________________________________________________________________________  CONCLUSIONS:      1. Technically difficult image quality.   2. The left ventricular endocardium is not well visualized. Overall the LV function appears to be mildly reduced with an ejection fraction of 45%. Cannot rule out segmental abnormalities.   3. Normal right ventricular cavity size, normal right ventricular wall thickness and normal right ventricular systolic function.   4. The aortic valve is not well visualized, appears mildly calcified with grossly normal opening.   5. Trace mitral regurgitation.   6. Trace tricuspid regurgitation.   7. The inferior vena cava is normal in size measuring 2.14 cm in diameter, (normal <2.1cm) with normal inspiratory collapse (normal >50%) consistent with normal right atrial pressure (~3, range 0-5mmHg).  ________________________________________________________________________________________  FINDINGS:     Left Ventricle:  The left ventricular systolic function is mildly decreased. The left ventricular endocardium is not well visualized. Overall the LV function appears to be mildly reduced with an ejection fraction of 45%. Cannot rule out segmental abnormalities.     Right Ventricle:  Normal right ventricular cavity size, normal wall thickness and normal right ventricular systolic function.     Left Atrium:  The left atrium is normal in size.     Right Atrium:  The right atrium is normal in size.     Aortic Valve:  The aortic valve is not well visualized, appears mildly calcified with grossly normal opening. There is noevidence of aortic regurgitation.     Mitral Valve:  There is trace mitral regurgitation.     Tricuspid Valve:  There is trace tricuspid regurgitation.     Pulmonic Valve:  Structurally normal pulmonic valve with normal leaflet excursion. There is trace pulmonic regurgitation.     Systemic Veins:  The inferior vena cava is normal in size measuring 2.14 cm in diameter, (normal <2.1cm) with normal inspiratory collapse (normal >50%) consistent with normal right atrial pressure (~3, range 0-5mmHg).  ____________________________________________________________________  Quantitative Data:  Left Ventricle Measurements: (Indexed to BSA)     IVSd (2D):   1.1 cm  LVPWd (2D):  0.9 cm  LVIDd (2D):  4.9 cm  LVIDs (2D):  3.6 cm  LV Mass:     177 g  100.7 g/m²     MV E Vmax:    1.00 m/s  e' lateral:   16.40 cm/s  e' medial:    12.30 cm/s  E/e' lateral: 6.07  E/e' medial:  8.10  E/e' Average: 7.23  MV DT:        118 msec    Aorta Measurements:     Ao Sinus: 3.1 cm    Left Atrium Measurements: (Indexedto BSA)  LA Diam 2D: 3.40 cm    LVOT / RVOT/ Qp/Qs Data: (Indexed to BSA)  Mitral Valve Measurements:     MV E Vmax: 1.0 m/s    Tricuspid Valve Measurements:     RA Pressure: 3 mmHg________________________________________________________________________________________  Electronically signed on 7/27/2023 at 4:22:35 PM by Feli Louie MD    *** Final ***

## 2023-08-02 NOTE — PROGRESS NOTE ADULT - ASSESSMENT
79 y/o female presented with asymptomatic cholecystitis found on imaging, now s/p Perc enrique drain placement on 7/27 with IR.      PLAN:  - oral abx for pos blood cx  - Care per primary team, continue low fat diet  - Monitor and record drain output; continue drain care  - Discharge planning as per primary team/case management, likely TRICIA  - Probably elective cholecystectomy to be scheduled at later time

## 2023-08-02 NOTE — OCCUPATIONAL THERAPY INITIAL EVALUATION ADULT - ADDITIONAL COMMENTS
Pt lives in a house with no stairs. Pt does not drive. Pt reports that her grandson does her grocery shopping. Pt reports difficulty donning socks prior to recent fall. Pt performed sit to stand and ambulated 8' using RW with mod assist x 1 +1. Pt is right hand dominant. Pt exhibits unsteady gait, dysmetria and incoordination right UE.

## 2023-08-02 NOTE — PROGRESS NOTE ADULT - ASSESSMENT
78y female with PMHx of HTN, HLD presented to the ED for the evaluation of fall out of chair. States that she was sitting on the chair and fell backwards, hitting her head. Was on the floor for over an hour and was unable to get up.    pulm nodule - 1.8 cm  eval for acute enrique  HTN  HLD  OP  OA  Dyspnea  Ataxic gait  Hard of hearing  AF  Rhabdo    CT head - no acute path  on RA  vs noted  on ABX     serial renal indices  I and O  monitor VS and HD and Sat  trend LABS  Blood gas - CT imaging without acute pulm path -   Pulm nodule - 1.8 cm - will need f/u and repeat imaging and likely PET scan  Fall prec - PT assessment  AF management - eval for HF - on AC full dose -   TTE  trend TROPS  cardio eval  tele monitoring  proBNP noted

## 2023-08-02 NOTE — PROGRESS NOTE ADULT - PROBLEM SELECTOR PLAN 2
- CT scan in the ER revealed findings consistent w/ acute enrique (Distended GB w/ jd-cholecystic fluid)  - RUQ ultrasound showed Cholelithiasis with a small amount of pericholecystic fluid and nonspecific gallbladder wall thickening  - HIDA scan positive, pt underwent perc enrique drainage with IR  post procedure day 4  - initial IV Zosyn switched to IV Rocephin and iv flagyl   - now d/c IV, switch to PO rocephin and PO flagyl   - 1 bottle BCx growing gram+ cocci in pairs in chain  Streptococcus anginosus+ in both aerobic & anaerobic samples --> Repeat BCx  neg    - Low fat diet,  tolerating well  - Surgery following dr rousseau, switched to PO abx, wendy drain at discharge - CT scan in the ER revealed findings consistent w/ acute enrique (Distended GB w/ jd-cholecystic fluid)  - RUQ ultrasound showed Cholelithiasis with a small amount of pericholecystic fluid and nonspecific gallbladder wall thickening  - HIDA scan positive, pt underwent perc enrique drainage with IR  post procedure day 4  - 1 bottle BCx growing gram+ cocci in pairs in chain  Streptococcus anginosus+ in both aerobic & anaerobic samples --> Repeat BCx  neg    - IV Abx d/c, switch to PO rocephin and PO flagyl   - Low fat diet,  tolerating well  - Surgery following dr rousseau, switched to PO abx, wendy drain at discharge - CT scan in the ER revealed findings consistent w/ acute enrique (Distended GB w/ jd-cholecystic fluid)  - RUQ ultrasound showed Cholelithiasis with a small amount of pericholecystic fluid and nonspecific gallbladder wall thickening  - HIDA scan positive, pt underwent perc enrique drainage with IR  post procedure day 4  - 1 bottle BCx growing gram+ cocci in pairs in chain  Streptococcus anginosus+ in both aerobic & anaerobic samples --> Repeat BCx  neg    - On IV rocephin and PO flagyl, can start levofloxacin PO on d/c, can stop PO flagyl 8/3  - Low fat diet,  tolerating well  - Surgery following dr rousseau, can wendy drain at discharge

## 2023-08-02 NOTE — PROGRESS NOTE ADULT - SUBJECTIVE AND OBJECTIVE BOX
Neurology Follow up note    CHAY VEELZ78yFemale    HPI:  78y female with PMHx of HTN, HLD presented to the ED for the evaluation of fall out of chair. States that she was sitting on the chair and fell backwards, hitting her head. Was on the floor for over an hour and was unable to get up. Eventually was able to flip over and crawl on her elbows and knees to call her daughter in law for assistance. She initially had a headache which has resolved. Only has some mild soreness in her elbows. States that she was feeling unwell on Monday 7/24 and had an episode of vomiting then. Now feels back to her baseline. No fevers or chills. No urinary symptoms. Denies chest pain, palpitations, dyspnea, current headache, dizziness, abdominal pain, n/v/d. She has no prior history of kidney issues.    In the ED, she was given IV cardizem 20mg IVP with improvement in her rates and 70mg of subcutaneous enoxaparin. (26 Jul 2023 01:00)      Interval History -no complaints    Patient is seen, chart was reviewed and case was discussed with the treatment team.  Pt is not in any distress.   Lying on bed comfortably.       Vital Signs Last 24 Hrs  T(C): 37 (02 Aug 2023 04:47), Max: 37 (02 Aug 2023 04:47)  T(F): 98.6 (02 Aug 2023 04:47), Max: 98.6 (02 Aug 2023 04:47)  HR: 116 (02 Aug 2023 04:47) (102 - 116)  BP: 165/90 (02 Aug 2023 04:47) (155/90 - 165/90)  BP(mean): --  RR: 18 (02 Aug 2023 04:47) (18 - 18)  SpO2: 96% (02 Aug 2023 04:47) (95% - 96%)    Parameters below as of 02 Aug 2023 04:47  Patient On (Oxygen Delivery Method): room air                REVIEW OF SYSTEMS:    Constitutional: No fever, weight loss or fatigue  Eyes: No eye pain, visual disturbances, or discharge  ENT:  No difficulty hearing, tinnitus, vertigo; No sinus or throat pain  Neck: No pain or stiffness  Respiratory: No  wheezing, chills or hemoptysis  Cardiovascular: No chest pain, palpitations, shortness of breath, dizziness or leg swelling  Gastrointestinal: No abdominal or epigastric pain. No nausea, vomiting or hematemesis; N  Genitourinary: No dysuria, frequency, hematuria or incontinence  Neurological: No headaches, memory loss,  numbness or tremors  Psychiatric: No depression, anxiety, mood swings or difficulty sleeping  Musculoskeletal: No joint pain or swelling; No muscle, back or extremity pain  Skin: No itching, burning, rashes or lesions   Lymph Nodes: No enlarged glands  Endocrine: No heat or cold intolerance; No hair loss,  Allergy and Immunologic: No hives or eczema    On Neurological Examination:    Mental Status - Pt is alert, awake, oriented X3. Follows commands well and able to answer questions appropriately.Mood and affect  normal    Speech -  Pt has dysarthria.    Cranial Nerves - Pupils 3 mm equal and reactive to light, extraocular eye movements intact. Pt has no visual field deficit.  Pt has  right facial asymmetry. Facial sensation is intact.Tongue - is in midline.    Muscle tone - is ashish.      Motor Exam - right hemiparesis (4/5)  mild right PD  ,    Sensory Exam -  Pt withdraws all extremities equally on stimulation. No asymmetry seen. No complaints of tingling, numbness.    t.    coordination:    Finger to nose :dysmetria- right      Deep tendon Reflexes - 2 plus all over.      .    Neck Supple -  Yes.     MEDICATIONS  (STANDING):  apixaban 5 milliGRAM(s) Oral every 12 hours  atorvastatin 80 milliGRAM(s) Oral at bedtime  cefTRIAXone   IVPB 2000 milliGRAM(s) IV Intermittent every 24 hours  diltiazem    milliGRAM(s) Oral daily  metoprolol succinate  milliGRAM(s) Oral daily  metroNIDAZOLE    Tablet 500 milliGRAM(s) Oral every 8 hours    MEDICATIONS  (PRN):  acetaminophen     Tablet .. 650 milliGRAM(s) Oral every 6 hours PRN Mild Pain (1 - 3)  acetaminophen     Tablet .. 650 milliGRAM(s) Oral every 6 hours PRN Temp greater or equal to 38C (100.4F)  aluminum hydroxide/magnesium hydroxide/simethicone Suspension 30 milliLiter(s) Oral every 4 hours PRN Dyspepsia  melatonin 3 milliGRAM(s) Oral at bedtime PRN Insomnia  ondansetron Injectable 4 milliGRAM(s) IV Push every 8 hours PRN Nausea and/or Vomiting      Allergies    No Known Allergies    Intolerances                          12.8   10.45 )-----------( 355      ( 02 Aug 2023 06:47 )             39.2   08-02    141  |  108  |  13  ----------------------------<  108<H>  3.6   |  26  |  0.77    Ca    8.5      02 Aug 2023 06:47  Phos  3.1     08-01  Mg     2.3     08-01    TPro  6.7  /  Alb  2.4<L>  /  TBili  0.4  /  DBili  x   /  AST  58<H>  /  ALT  100<H>  /  AlkPhos  93  08-02      Hemoglobin A1C:     Vitamin B12     RADIOLOGY    ASSESSMENT AND PLAN:      presented with fall  right sided weakness/incordination-  related to  subacute cva involving right cerebellum  NIHSS 4  Not a candidate for thrombolytic or neuro vascular intervention as time of last well known is unclear.      continue  ac  agree with increasing statin to 80 mg  no need for permissive HTN now  Physical therapy evaluation  OT  Pain is accessed and addressed..  Would continue to follow.

## 2023-08-02 NOTE — PROGRESS NOTE ADULT - PROBLEM SELECTOR PLAN 3
-[id number] 4: will start full dose LMWH   hold for ir guided gb  drain - hida + s/p gb drain s/p drainage  -TSH w/in normal limits,   - LE doppler resulted - no DVT evidence b/l   - Cardiology Dr. Rader consulted,   - Switch Lopressor to Toprol XL  - Cardizem 120 mg  po daily for now, start additional low dose ARB when appropriate; pending neuro recs regarding MRI/MRA   - Continue Eliquis (was switched from DOAC 7/30)  - TTE: EF 45% with no significant valvular disease.  No clear evidence of volume overload

## 2023-08-02 NOTE — PROGRESS NOTE ADULT - ASSESSMENT
78y female with PMHx of HTN, HLD presented to the ED for the evaluation of fall out of chair.   Cardiology consulted for new onset atrial fibrillation with RVR.    New onset afib, Elevated trops, rhabdo s/p fall, HFmodEF  - No clear evidence of acute ischemia, trops mildly elevated but downtrended. No need to further trend.  No anginal complaints  - CK elevated 5502 --> 5621; doubt acute plaque rupture in setting of fall and rhabdomyolysis; trended down    New onset Afib with RVR  - EKG on admission showed atrial fibrillation with RVR, rate 143  - Rates mostly controlled.  No further WCT or aberrancies  - Continue Toprol XL (done)  - Continue Cardizem CD to 120 mg daily with parameters pending MRI.    - BP still on the high side of systolic 150-160.  Would start low dose ARB when appropriate.  Follow Neuro recs  - Continue Eliquis (was switched from DOAC 7/30)  - TTE: EF 45% with no significant valvular disease.  No clear evidence of volume overload    - Chest showed evidence of pulmonary scarring which may be d/t pulmonary fibrosis - f/u pulmonary recs   - Non-orthopneic on RA.  Has edema but likely, from, hypoalbuminemia.  Improving  - Encouarage incentive spirometer    - Monitor and replete electrolytes. Keep K>4.0 and Mg>2.0.  - Will continue to follow    Gretta Torres DNP, NP-C, AGACNP-C  Cardiology   Call TEAMS

## 2023-08-02 NOTE — PROGRESS NOTE ADULT - SUBJECTIVE AND OBJECTIVE BOX
Date/Time Patient Seen:  		  Referring MD:   Data Reviewed	       Patient is a 78y old  Female who presents with a chief complaint of fall, new onset afib RVR (01 Aug 2023 17:37)      Subjective/HPI     PAST MEDICAL & SURGICAL HISTORY:  HTN (hypertension)    Hyperlipidemia    Tinnitus  right ear    Seasonal allergies    No significant past surgical history    S/P knee replacement          Medication list         MEDICATIONS  (STANDING):  apixaban 5 milliGRAM(s) Oral every 12 hours  cefTRIAXone   IVPB 2000 milliGRAM(s) IV Intermittent every 24 hours  diltiazem    milliGRAM(s) Oral daily  metoprolol succinate  milliGRAM(s) Oral daily  metroNIDAZOLE    Tablet 500 milliGRAM(s) Oral every 8 hours  simvastatin 40 milliGRAM(s) Oral at bedtime    MEDICATIONS  (PRN):  acetaminophen     Tablet .. 650 milliGRAM(s) Oral every 6 hours PRN Mild Pain (1 - 3)  acetaminophen     Tablet .. 650 milliGRAM(s) Oral every 6 hours PRN Temp greater or equal to 38C (100.4F)  aluminum hydroxide/magnesium hydroxide/simethicone Suspension 30 milliLiter(s) Oral every 4 hours PRN Dyspepsia  melatonin 3 milliGRAM(s) Oral at bedtime PRN Insomnia  ondansetron Injectable 4 milliGRAM(s) IV Push every 8 hours PRN Nausea and/or Vomiting         Vitals log        ICU Vital Signs Last 24 Hrs  T(C): 37 (02 Aug 2023 04:47), Max: 37 (02 Aug 2023 04:47)  T(F): 98.6 (02 Aug 2023 04:47), Max: 98.6 (02 Aug 2023 04:47)  HR: 116 (02 Aug 2023 04:47) (88 - 116)  BP: 165/90 (02 Aug 2023 04:47) (133/85 - 165/90)  BP(mean): --  ABP: --  ABP(mean): --  RR: 18 (02 Aug 2023 04:47) (16 - 18)  SpO2: 96% (02 Aug 2023 04:47) (95% - 96%)    O2 Parameters below as of 02 Aug 2023 04:47  Patient On (Oxygen Delivery Method): room air                 Input and Output:  I&O's Detail    31 Jul 2023 07:01  -  01 Aug 2023 07:00  --------------------------------------------------------  IN:  Total IN: 0 mL    OUT:    Drain (mL): 45 mL    Voided (mL): 700 mL  Total OUT: 745 mL    Total NET: -745 mL      01 Aug 2023 07:01  -  02 Aug 2023 05:44  --------------------------------------------------------  IN:  Total IN: 0 mL    OUT:    Voided (mL): 400 mL  Total OUT: 400 mL    Total NET: -400 mL          Lab Data                        13.5   9.67  )-----------( 385      ( 01 Aug 2023 07:10 )             41.1     08-01    141  |  109<H>  |  10  ----------------------------<  106<H>  3.7   |  26  |  0.74    Ca    8.9      01 Aug 2023 07:10  Phos  3.1     08-01  Mg     2.3     08-01    TPro  7.3  /  Alb  2.6<L>  /  TBili  0.5  /  DBili  x   /  AST  70<H>  /  ALT  113<H>  /  AlkPhos  112  08-01            Review of Systems	      Objective     Physical Examination    heart s1s2  lung dc BS  head nc      Pertinent Lab findings & Imaging      Ceci:  NO   Adequate UO     I&O's Detail    31 Jul 2023 07:01  -  01 Aug 2023 07:00  --------------------------------------------------------  IN:  Total IN: 0 mL    OUT:    Drain (mL): 45 mL    Voided (mL): 700 mL  Total OUT: 745 mL    Total NET: -745 mL      01 Aug 2023 07:01  -  02 Aug 2023 05:44  --------------------------------------------------------  IN:  Total IN: 0 mL    OUT:    Voided (mL): 400 mL  Total OUT: 400 mL    Total NET: -400 mL               Discussed with:     Cultures:	        Radiology

## 2023-08-02 NOTE — DIETITIAN INITIAL EVALUATION ADULT - OTHER INFO
"78y female with PMHx of HTN, HLD pulmonary fibrosis admitted with new onset atrial fibrillation with rapid ventricular response and rhabdomyolysis s/p fall at home. Pt also resented with asymptomatic cholecystitis found on imaging, now s/p Perc enrique drain placement on 7/27 with IR. The plan is for TRICIA and probably elective cholecystectomy to be scheduled at later time. Pt with several small subcentimeter right-sided pulmonary nodule. Findings compatible with lung cancer until proven otherwise. Consider PET/CT scan for further evaluation" .    Pt tells RD during bedside visit to room that she does not feel constipated but does not recall last BM, understands rationale for low fat diet and pretty much follows one already, lives alone with daughters near by who help her out with food shopping , she prepares most of her own meals, denies any recent wt change.

## 2023-08-02 NOTE — PROGRESS NOTE ADULT - SUBJECTIVE AND OBJECTIVE BOX
Pt seen and examined at bedside, denies any overnight events. Tolerating diet. States she had her drain collection bag emptied last night without any issues and has noticed some drainage in the bag today. States she has tried to get out of bed a few times, but uses the bed to balance. Denies any abdominal pain, nausea, vomiting, chest pain, palpitations, shortness of breath, chills, fever, cough, headaches.    ROS: negative except as noted in HPI above.    Vitals:  ============  T(F): 98.6 (02 Aug 2023 04:47), Max: 98.6 (02 Aug 2023 04:47)  HR: 116 (02 Aug 2023 04:47)  BP: 165/90 (02 Aug 2023 04:47)  RR: 18 (02 Aug 2023 04:47)  SpO2: 96% (02 Aug 2023 04:47) (95% - 96%)  temp max in last 48H T(F): , Max: 98.6 (08-01-23 @ 04:50)    =======================================================  Current Antibiotics:  cefTRIAXone   IVPB 2000 milliGRAM(s) IV Intermittent every 24 hours  metroNIDAZOLE    Tablet 500 milliGRAM(s) Oral every 8 hours    Other medications:  apixaban 5 milliGRAM(s) Oral every 12 hours  atorvastatin 80 milliGRAM(s) Oral at bedtime  diltiazem    milliGRAM(s) Oral daily  metoprolol succinate  milliGRAM(s) Oral daily      =======================================================  Labs:                        12.8   10.45 )-----------( 355      ( 02 Aug 2023 06:47 )             39.2     08-02    141  |  108  |  13  ----------------------------<  108<H>  3.6   |  26  |  0.77    Ca    8.5      02 Aug 2023 06:47  Phos  3.1     08-01  Mg     2.3     08-01    TPro  6.7  /  Alb  2.4<L>  /  TBili  0.4  /  DBili  x   /  AST  58<H>  /  ALT  100<H>  /  AlkPhos  93  08-02      Creatinine: 0.77 mg/dL (08-02-23 @ 06:47)  Creatinine: 0.74 mg/dL (08-01-23 @ 07:10)  Creatinine: 0.74 mg/dL (07-31-23 @ 06:17)  Creatinine: 0.76 mg/dL (07-30-23 @ 06:30)  Creatinine: 0.72 mg/dL (07-29-23 @ 07:45)    Procalcitonin, Serum: 2.42 ng/mL (07-26-23 @ 03:00)    WBC Count: 10.45 K/uL (08-02-23 @ 06:47)  WBC Count: 9.67 K/uL (08-01-23 @ 07:10)  WBC Count: 6.70 K/uL (07-31-23 @ 06:17)  WBC Count: 8.32 K/uL (07-30-23 @ 06:30)  WBC Count: 10.61 K/uL (07-29-23 @ 07:45)    SARS-CoV-2: NotDetec (07-26-23 @ 01:40)      Alkaline Phosphatase: 93 U/L (08-02-23 @ 06:47)  Alkaline Phosphatase: 112 U/L (08-01-23 @ 07:10)  Alkaline Phosphatase: 106 U/L (07-31-23 @ 06:17)  Alkaline Phosphatase: 116 U/L (07-30-23 @ 06:30)  Alanine Aminotransferase (ALT/SGPT): 100 U/L (08-02-23 @ 06:47)  Alanine Aminotransferase (ALT/SGPT): 113 U/L (08-01-23 @ 07:10)  Alanine Aminotransferase (ALT/SGPT): 101 U/L (07-31-23 @ 06:17)  Alanine Aminotransferase (ALT/SGPT): 81 U/L (07-30-23 @ 06:30)  Aspartate Aminotransferase (AST/SGOT): 58 U/L (08-02-23 @ 06:47)  Aspartate Aminotransferase (AST/SGOT): 70 U/L (08-01-23 @ 07:10)  Aspartate Aminotransferase (AST/SGOT): 75 U/L (07-31-23 @ 06:17)  Aspartate Aminotransferase (AST/SGOT): 63 U/L (07-30-23 @ 06:30)    Bilirubin Total: 0.4 mg/dL (08-02-23 @ 06:47)  Bilirubin Total: 0.5 mg/dL (08-01-23 @ 07:10)  Bilirubin Total: 0.7 mg/dL (07-31-23 @ 06:17)  Bilirubin Total: 0.7 mg/dL (07-30-23 @ 06:30)      PHYSICAL EXAM:  General: no acute distress, appears comfortable  HEENT: normocephalic, anicteric  Pulm: CTA B/L, no w/r/r  Cardio: RRR, no m/r/g  Abdomen: soft, nontender, obese abdomen, right abd perc enrique drain in place w/ dried bilious drainage on surrounding gauze likely related to emptying in night prior. collection bag currently has  minimal bilious fluid  Extremities: no calf tenderness noted, pulses 2+ x4 extremities        MEDICATIONS:  acetaminophen     Tablet .. 650 milliGRAM(s) Oral every 6 hours PRN  acetaminophen     Tablet .. 650 milliGRAM(s) Oral every 6 hours PRN  aluminum hydroxide/magnesium hydroxide/simethicone Suspension 30 milliLiter(s) Oral every 4 hours PRN  apixaban 5 milliGRAM(s) Oral every 12 hours  cefTRIAXone   IVPB 2000 milliGRAM(s) IV Intermittent every 24 hours  diltiazem    milliGRAM(s) Oral daily  melatonin 3 milliGRAM(s) Oral at bedtime PRN  metoprolol tartrate 100 milliGRAM(s) Oral two times a day  metroNIDAZOLE    Tablet 500 milliGRAM(s) Oral every 8 hours  ondansetron Injectable 4 milliGRAM(s) IV Push every 8 hours PRN  simvastatin 40 milliGRAM(s) Oral at bedtime

## 2023-08-02 NOTE — DIETITIAN INITIAL EVALUATION ADULT - NUTRITIONGOAL OUTCOME1
deter wt loss  ( in view of suspicion of lung ca and possible treatment in future, wt loss is not a goal at this time)

## 2023-08-02 NOTE — DIETITIAN INITIAL EVALUATION ADULT - PERTINENT MEDS FT
MEDICATIONS  (STANDING):  apixaban 5 milliGRAM(s) Oral every 12 hours  cefTRIAXone   IVPB 2000 milliGRAM(s) IV Intermittent every 24 hours  diltiazem    milliGRAM(s) Oral daily  metoprolol succinate  milliGRAM(s) Oral daily  metroNIDAZOLE    Tablet 500 milliGRAM(s) Oral every 8 hours  simvastatin 40 milliGRAM(s) Oral at bedtime    MEDICATIONS  (PRN):  acetaminophen     Tablet .. 650 milliGRAM(s) Oral every 6 hours PRN Mild Pain (1 - 3)  acetaminophen     Tablet .. 650 milliGRAM(s) Oral every 6 hours PRN Temp greater or equal to 38C (100.4F)  aluminum hydroxide/magnesium hydroxide/simethicone Suspension 30 milliLiter(s) Oral every 4 hours PRN Dyspepsia  melatonin 3 milliGRAM(s) Oral at bedtime PRN Insomnia  ondansetron Injectable 4 milliGRAM(s) IV Push every 8 hours PRN Nausea and/or Vomiting

## 2023-08-02 NOTE — DIETITIAN INITIAL EVALUATION ADULT - PROBLEM SELECTOR PLAN 1
Admit to telemetry  Given 20mg IV cardizem in the ER with improvement in rates  Will start lopressor 25mg BID for rate control (was on BB in the past)  [id number] 4: will start full dose LMWH for thromboprophylaxis  Can consider change to DOAC (i.e. eliquis) in AM  Check 2D ECHO to assess cardiac structure and function, r/o valvular pathology  Monitor and replete electrolytes for optimal arrhythmia control  Check TSH and free thyroxine in AM  Will check LE dopplers to rule out DVT given left calf tenderness  Cardiology consulted: Dr. Louie

## 2023-08-02 NOTE — OCCUPATIONAL THERAPY INITIAL EVALUATION ADULT - RANGE OF MOTION EXAMINATION, UPPER EXTREMITY
Muscle strength left UE: WFL's, right UE grossly 4-/5 t/o with dysmetric noted t/o Fine motor skills: WFL's left UE, moderately/severely impaired right UE./bilateral UE Active ROM was WFL  (within functional limits)

## 2023-08-02 NOTE — PROGRESS NOTE ADULT - PROBLEM SELECTOR PLAN 1
Patient complaining of B/L hand weakness/ unsteady gait   - CT head done 7/31 showed no significant changes from a prior CT done in 2020  - MRI/MRA no contrast ordered due to persistence of hand weakness and slow speech --> Acute right cerebellar infarct. There are no foci of susceptibility artifact to suggest hemorrhage. No hemodynamically significant stenosis in the head or intracranial aneurysm.  - f/u neuro recs regarding MRI/MRA results   - Carotid doppler showed no significant hemodynamic stenosis of either carotid artery  - d/c simvastatin 40mg, start lipitor 80mg   - Continue Eliquis daily

## 2023-08-03 ENCOUNTER — TRANSCRIPTION ENCOUNTER (OUTPATIENT)
Age: 78
End: 2023-08-03

## 2023-08-03 ENCOUNTER — INPATIENT (INPATIENT)
Facility: HOSPITAL | Age: 78
LOS: 24 days | Discharge: HOME HEALTH | DRG: 57 | End: 2023-08-28
Attending: PHYSICAL MEDICINE & REHABILITATION | Admitting: PHYSICAL MEDICINE & REHABILITATION
Payer: MEDICARE

## 2023-08-03 VITALS
SYSTOLIC BLOOD PRESSURE: 123 MMHG | TEMPERATURE: 98 F | DIASTOLIC BLOOD PRESSURE: 56 MMHG | OXYGEN SATURATION: 94 % | HEART RATE: 84 BPM | RESPIRATION RATE: 18 BRPM

## 2023-08-03 VITALS — WEIGHT: 184.97 LBS | HEIGHT: 62 IN

## 2023-08-03 DIAGNOSIS — Z96.659 PRESENCE OF UNSPECIFIED ARTIFICIAL KNEE JOINT: Chronic | ICD-10-CM

## 2023-08-03 DIAGNOSIS — I63.9 CEREBRAL INFARCTION, UNSPECIFIED: ICD-10-CM

## 2023-08-03 LAB
ALBUMIN SERPL ELPH-MCNC: 2.4 G/DL — LOW (ref 3.3–5)
ALP SERPL-CCNC: 89 U/L — SIGNIFICANT CHANGE UP (ref 40–120)
ALT FLD-CCNC: 95 U/L — HIGH (ref 12–78)
ANION GAP SERPL CALC-SCNC: 5 MMOL/L — SIGNIFICANT CHANGE UP (ref 5–17)
AST SERPL-CCNC: 64 U/L — HIGH (ref 15–37)
BILIRUB SERPL-MCNC: 0.5 MG/DL — SIGNIFICANT CHANGE UP (ref 0.2–1.2)
BUN SERPL-MCNC: 13 MG/DL — SIGNIFICANT CHANGE UP (ref 7–23)
CALCIUM SERPL-MCNC: 8.3 MG/DL — LOW (ref 8.5–10.1)
CHLORIDE SERPL-SCNC: 109 MMOL/L — HIGH (ref 96–108)
CO2 SERPL-SCNC: 26 MMOL/L — SIGNIFICANT CHANGE UP (ref 22–31)
CREAT SERPL-MCNC: 0.67 MG/DL — SIGNIFICANT CHANGE UP (ref 0.5–1.3)
EGFR: 90 ML/MIN/1.73M2 — SIGNIFICANT CHANGE UP
GLUCOSE SERPL-MCNC: 101 MG/DL — HIGH (ref 70–99)
HCT VFR BLD CALC: 39.4 % — SIGNIFICANT CHANGE UP (ref 34.5–45)
HGB BLD-MCNC: 12.8 G/DL — SIGNIFICANT CHANGE UP (ref 11.5–15.5)
MAGNESIUM SERPL-MCNC: 2.3 MG/DL — SIGNIFICANT CHANGE UP (ref 1.6–2.6)
MCHC RBC-ENTMCNC: 30.3 PG — SIGNIFICANT CHANGE UP (ref 27–34)
MCHC RBC-ENTMCNC: 32.5 GM/DL — SIGNIFICANT CHANGE UP (ref 32–36)
MCV RBC AUTO: 93.1 FL — SIGNIFICANT CHANGE UP (ref 80–100)
NRBC # BLD: 0 /100 WBCS — SIGNIFICANT CHANGE UP (ref 0–0)
PHOSPHATE SERPL-MCNC: 2.7 MG/DL — SIGNIFICANT CHANGE UP (ref 2.5–4.5)
PLATELET # BLD AUTO: 369 K/UL — SIGNIFICANT CHANGE UP (ref 150–400)
POTASSIUM SERPL-MCNC: 3.6 MMOL/L — SIGNIFICANT CHANGE UP (ref 3.5–5.3)
POTASSIUM SERPL-SCNC: 3.6 MMOL/L — SIGNIFICANT CHANGE UP (ref 3.5–5.3)
PROT SERPL-MCNC: 6.9 G/DL — SIGNIFICANT CHANGE UP (ref 6–8.3)
RBC # BLD: 4.23 M/UL — SIGNIFICANT CHANGE UP (ref 3.8–5.2)
RBC # FLD: 13.5 % — SIGNIFICANT CHANGE UP (ref 10.3–14.5)
SARS-COV-2 RNA SPEC QL NAA+PROBE: SIGNIFICANT CHANGE UP
SODIUM SERPL-SCNC: 140 MMOL/L — SIGNIFICANT CHANGE UP (ref 135–145)
WBC # BLD: 10.36 K/UL — SIGNIFICANT CHANGE UP (ref 3.8–10.5)
WBC # FLD AUTO: 10.36 K/UL — SIGNIFICANT CHANGE UP (ref 3.8–10.5)

## 2023-08-03 PROCEDURE — 84145 PROCALCITONIN (PCT): CPT

## 2023-08-03 PROCEDURE — 83605 ASSAY OF LACTIC ACID: CPT

## 2023-08-03 PROCEDURE — 97535 SELF CARE MNGMENT TRAINING: CPT

## 2023-08-03 PROCEDURE — 96361 HYDRATE IV INFUSION ADD-ON: CPT

## 2023-08-03 PROCEDURE — 97110 THERAPEUTIC EXERCISES: CPT

## 2023-08-03 PROCEDURE — 93306 TTE W/DOPPLER COMPLETE: CPT

## 2023-08-03 PROCEDURE — 82550 ASSAY OF CK (CPK): CPT

## 2023-08-03 PROCEDURE — 86901 BLOOD TYPING SEROLOGIC RH(D): CPT

## 2023-08-03 PROCEDURE — 80048 BASIC METABOLIC PNL TOTAL CA: CPT

## 2023-08-03 PROCEDURE — 70450 CT HEAD/BRAIN W/O DYE: CPT | Mod: MA

## 2023-08-03 PROCEDURE — 97161 PT EVAL LOW COMPLEX 20 MIN: CPT

## 2023-08-03 PROCEDURE — 97116 GAIT TRAINING THERAPY: CPT

## 2023-08-03 PROCEDURE — 71045 X-RAY EXAM CHEST 1 VIEW: CPT

## 2023-08-03 PROCEDURE — 90471 IMMUNIZATION ADMIN: CPT

## 2023-08-03 PROCEDURE — 85610 PROTHROMBIN TIME: CPT

## 2023-08-03 PROCEDURE — 87205 SMEAR GRAM STAIN: CPT

## 2023-08-03 PROCEDURE — 84480 ASSAY TRIIODOTHYRONINE (T3): CPT

## 2023-08-03 PROCEDURE — 87086 URINE CULTURE/COLONY COUNT: CPT

## 2023-08-03 PROCEDURE — 87075 CULTR BACTERIA EXCEPT BLOOD: CPT

## 2023-08-03 PROCEDURE — 80061 LIPID PANEL: CPT

## 2023-08-03 PROCEDURE — 73080 X-RAY EXAM OF ELBOW: CPT

## 2023-08-03 PROCEDURE — 97166 OT EVAL MOD COMPLEX 45 MIN: CPT

## 2023-08-03 PROCEDURE — 93970 EXTREMITY STUDY: CPT

## 2023-08-03 PROCEDURE — 83735 ASSAY OF MAGNESIUM: CPT

## 2023-08-03 PROCEDURE — 83880 ASSAY OF NATRIURETIC PEPTIDE: CPT

## 2023-08-03 PROCEDURE — 86900 BLOOD TYPING SEROLOGIC ABO: CPT

## 2023-08-03 PROCEDURE — 84132 ASSAY OF SERUM POTASSIUM: CPT

## 2023-08-03 PROCEDURE — 86803 HEPATITIS C AB TEST: CPT

## 2023-08-03 PROCEDURE — 84439 ASSAY OF FREE THYROXINE: CPT

## 2023-08-03 PROCEDURE — 70544 MR ANGIOGRAPHY HEAD W/O DYE: CPT

## 2023-08-03 PROCEDURE — 87186 SC STD MICRODIL/AGAR DIL: CPT

## 2023-08-03 PROCEDURE — 87077 CULTURE AEROBIC IDENTIFY: CPT

## 2023-08-03 PROCEDURE — 76000 FLUOROSCOPY <1 HR PHYS/QHP: CPT

## 2023-08-03 PROCEDURE — 81001 URINALYSIS AUTO W/SCOPE: CPT

## 2023-08-03 PROCEDURE — 87150 DNA/RNA AMPLIFIED PROBE: CPT

## 2023-08-03 PROCEDURE — C1729: CPT

## 2023-08-03 PROCEDURE — 82553 CREATINE MB FRACTION: CPT

## 2023-08-03 PROCEDURE — 84100 ASSAY OF PHOSPHORUS: CPT

## 2023-08-03 PROCEDURE — A9537: CPT

## 2023-08-03 PROCEDURE — 87040 BLOOD CULTURE FOR BACTERIA: CPT

## 2023-08-03 PROCEDURE — 85730 THROMBOPLASTIN TIME PARTIAL: CPT

## 2023-08-03 PROCEDURE — 90715 TDAP VACCINE 7 YRS/> IM: CPT

## 2023-08-03 PROCEDURE — 99285 EMERGENCY DEPT VISIT HI MDM: CPT | Mod: 25

## 2023-08-03 PROCEDURE — 93880 EXTRACRANIAL BILAT STUDY: CPT

## 2023-08-03 PROCEDURE — 85025 COMPLETE CBC W/AUTO DIFF WBC: CPT

## 2023-08-03 PROCEDURE — 47490 INCISION OF GALLBLADDER: CPT

## 2023-08-03 PROCEDURE — 86850 RBC ANTIBODY SCREEN: CPT

## 2023-08-03 PROCEDURE — 76937 US GUIDE VASCULAR ACCESS: CPT

## 2023-08-03 PROCEDURE — 93010 ELECTROCARDIOGRAM REPORT: CPT

## 2023-08-03 PROCEDURE — 76705 ECHO EXAM OF ABDOMEN: CPT

## 2023-08-03 PROCEDURE — 72125 CT NECK SPINE W/O DYE: CPT | Mod: MA

## 2023-08-03 PROCEDURE — 87070 CULTURE OTHR SPECIMN AEROBIC: CPT

## 2023-08-03 PROCEDURE — 85027 COMPLETE CBC AUTOMATED: CPT

## 2023-08-03 PROCEDURE — 97530 THERAPEUTIC ACTIVITIES: CPT

## 2023-08-03 PROCEDURE — 70551 MRI BRAIN STEM W/O DYE: CPT

## 2023-08-03 PROCEDURE — 99232 SBSQ HOSP IP/OBS MODERATE 35: CPT

## 2023-08-03 PROCEDURE — 78226 HEPATOBILIARY SYSTEM IMAGING: CPT

## 2023-08-03 PROCEDURE — C1894: CPT

## 2023-08-03 PROCEDURE — 99239 HOSP IP/OBS DSCHRG MGMT >30: CPT

## 2023-08-03 PROCEDURE — 36415 COLL VENOUS BLD VENIPUNCTURE: CPT

## 2023-08-03 PROCEDURE — 0225U NFCT DS DNA&RNA 21 SARSCOV2: CPT

## 2023-08-03 PROCEDURE — C1769: CPT

## 2023-08-03 PROCEDURE — 83036 HEMOGLOBIN GLYCOSYLATED A1C: CPT

## 2023-08-03 PROCEDURE — 96374 THER/PROPH/DIAG INJ IV PUSH: CPT

## 2023-08-03 PROCEDURE — 80053 COMPREHEN METABOLIC PANEL: CPT

## 2023-08-03 PROCEDURE — 93005 ELECTROCARDIOGRAM TRACING: CPT

## 2023-08-03 PROCEDURE — 84443 ASSAY THYROID STIM HORMONE: CPT

## 2023-08-03 PROCEDURE — 84484 ASSAY OF TROPONIN QUANT: CPT

## 2023-08-03 PROCEDURE — 71250 CT THORAX DX C-: CPT | Mod: MA

## 2023-08-03 RX ORDER — ATORVASTATIN CALCIUM 80 MG/1
80 TABLET, FILM COATED ORAL AT BEDTIME
Refills: 0 | Status: DISCONTINUED | OUTPATIENT
Start: 2023-08-03 | End: 2023-08-28

## 2023-08-03 RX ORDER — METOPROLOL TARTRATE 50 MG
100 TABLET ORAL DAILY
Refills: 0 | Status: DISCONTINUED | OUTPATIENT
Start: 2023-08-03 | End: 2023-08-28

## 2023-08-03 RX ORDER — SENNA PLUS 8.6 MG/1
2 TABLET ORAL AT BEDTIME
Refills: 0 | Status: DISCONTINUED | OUTPATIENT
Start: 2023-08-03 | End: 2023-08-16

## 2023-08-03 RX ORDER — APIXABAN 2.5 MG/1
5 TABLET, FILM COATED ORAL EVERY 12 HOURS
Refills: 0 | Status: DISCONTINUED | OUTPATIENT
Start: 2023-08-04 | End: 2023-08-09

## 2023-08-03 RX ORDER — LANOLIN ALCOHOL/MO/W.PET/CERES
3 CREAM (GRAM) TOPICAL AT BEDTIME
Refills: 0 | Status: DISCONTINUED | OUTPATIENT
Start: 2023-08-03 | End: 2023-08-09

## 2023-08-03 RX ORDER — ACETAMINOPHEN 500 MG
650 TABLET ORAL EVERY 6 HOURS
Refills: 0 | Status: DISCONTINUED | OUTPATIENT
Start: 2023-08-03 | End: 2023-08-28

## 2023-08-03 RX ORDER — METOPROLOL TARTRATE 50 MG
1 TABLET ORAL
Qty: 0 | Refills: 0 | DISCHARGE
Start: 2023-08-03

## 2023-08-03 RX ORDER — DILTIAZEM HCL 120 MG
120 CAPSULE, EXT RELEASE 24 HR ORAL DAILY
Refills: 0 | Status: DISCONTINUED | OUTPATIENT
Start: 2023-08-03 | End: 2023-08-28

## 2023-08-03 RX ORDER — POLYETHYLENE GLYCOL 3350 17 G/17G
17 POWDER, FOR SOLUTION ORAL DAILY
Refills: 0 | Status: DISCONTINUED | OUTPATIENT
Start: 2023-08-03 | End: 2023-08-14

## 2023-08-03 RX ORDER — ATORVASTATIN CALCIUM 80 MG/1
1 TABLET, FILM COATED ORAL
Qty: 0 | Refills: 0 | DISCHARGE
Start: 2023-08-03

## 2023-08-03 RX ADMIN — Medication 100 MILLIGRAM(S): at 05:24

## 2023-08-03 RX ADMIN — Medication 120 MILLIGRAM(S): at 05:25

## 2023-08-03 RX ADMIN — ATORVASTATIN CALCIUM 80 MILLIGRAM(S): 80 TABLET, FILM COATED ORAL at 21:51

## 2023-08-03 RX ADMIN — Medication 500 MILLIGRAM(S): at 05:25

## 2023-08-03 RX ADMIN — APIXABAN 5 MILLIGRAM(S): 2.5 TABLET, FILM COATED ORAL at 17:31

## 2023-08-03 RX ADMIN — APIXABAN 5 MILLIGRAM(S): 2.5 TABLET, FILM COATED ORAL at 05:25

## 2023-08-03 RX ADMIN — Medication 500 MILLIGRAM(S): at 15:11

## 2023-08-03 RX ADMIN — SENNA PLUS 2 TABLET(S): 8.6 TABLET ORAL at 21:51

## 2023-08-03 RX ADMIN — CEFTRIAXONE 100 MILLIGRAM(S): 500 INJECTION, POWDER, FOR SOLUTION INTRAMUSCULAR; INTRAVENOUS at 15:11

## 2023-08-03 NOTE — PATIENT PROFILE ADULT - FALL HARM RISK - HARM RISK INTERVENTIONS
Assistance with ambulation/Assistance OOB with selected safe patient handling equipment/Communicate Risk of Fall with Harm to all staff/Discuss with provider need for PT consult/Monitor gait and stability/Reinforce activity limits and safety measures with patient and family/Tailored Fall Risk Interventions/Visual Cue: Yellow wristband and red socks/Bed in lowest position, wheels locked, appropriate side rails in place/Call bell, personal items and telephone in reach/Instruct patient to call for assistance before getting out of bed or chair/Non-slip footwear when patient is out of bed/[place] to call system/Physically safe environment - no spills, clutter or unnecessary equipment/Purposeful Proactive Rounding/Room/bathroom lighting operational, light cord in reach

## 2023-08-03 NOTE — PROGRESS NOTE ADULT - NS_MD_PANP_GEN_ALL_CORE
Attending and PA/NP shared services statement (NON-critical care):
Tranexamic Acid Pregnancy And Lactation Text: It is unknown if this medication is safe during pregnancy or breast feeding.

## 2023-08-03 NOTE — PROGRESS NOTE ADULT - PROVIDER SPECIALTY LIST ADULT
Cardiology
Neurology
Surgery
Cardiology
Cardiology
Infectious Disease
Neurology
Surgery
Surgery
Cardiology
Hospitalist
Neurology
Neurology
Pulmonology
Surgery
Cardiology
Pulmonology
Pulmonology
Hospitalist

## 2023-08-03 NOTE — DISCHARGE NOTE NURSING/CASE MANAGEMENT/SOCIAL WORK - PATIENT PORTAL LINK FT
You can access the FollowMyHealth Patient Portal offered by [facility] by registering at the following website: [email or web address]. By joining [facility]’s FollowMyHealth portal, you will also be able to view your health information using other applications (apps) compatible with our system.

## 2023-08-03 NOTE — H&P ADULT - ASSESSMENT
Assessment/Plan:  CHAY VELEZ is a 78y with a history of *** who presented to (name of hospital) on (date of acute admission) with (presenting sx) found to have ****.  Hospital Course complicated by ***. Patient now admitted for a multidisciplinary rehab program. 08-03-23 @ 15:40    -------------    Rehab Management/MEDICAL MANAGEMENT     # Functional deficits s/p CVA  - Gait Instability, ADL impairments and Functional impairments: start Comprehensive Rehab Program of PT/OT/SLP  - 3 hours a day, 5 days a week  - P&O as needed   - MRI head significant for R cerebellar infarct   - Outpatient F/U   - ASA and Lipitor    #HTN  - Metoprolol     #New AFib RVR  - Metoprolol  - Diltiazem   - Eliquis     #Sleep  - melatonin PRN     #Skin  - Skin on admission  - Pressure injury/Skin: OOB to Chair, PT/OT     #Pain Mgmt   - Tylenol PRN    #GI/Bowel Mgmt   # Cholecystitis   - Continent c/w Senna, Miralax   -Levofloxacin daily - last dose 8/10  - s/p percutaneous cholecystectomy 2/2 cholelithiasis     #Lung nodule found on CT chest (7/25)  - F/U PCP outpatient  -Repeat CT     #/Bladder Mgmt   -  PVR q8h, CIC>400cc    #FEN   - Diet - Regular + Thins  [DASH/TLC]    - Dysphagia  SLP - evaluation and treatment    #Precautions / PROPHYLAXIS:   - Falls,   - ortho: Weight bearing status: WBAT   - Lungs: Aspiration, Incentive Spirometer   - DVT: Eliquis     ---------------  Outpatient/Follow-Up:    Alli Pascual  Surgery  [address] [place] [address]  Phone: [phone]  Fax: [phone]  Follow Up Time: 1 week    Alexis Simon  Cardiovascular Disease  [address] [place], [place] [address]  Phone: [phone]  Fax: [phone]  Follow Up Time: 1 [place] [address]  Phone: ()-  Fax: ()-    MEDICAL PROGNOSIS: GOOD                                   REHAB POTENTIAL: GOOD  ESTIMATED DISPOSITION: HOME                             ELOS: 10-14 Days   EXPECTED THERAPY:     P.T. 1hr/day       O.T. 1hr/day      S.L.P. 1hr/day      EXP FREQUENCY: 5 days per 7 day period     PRESCREEN COMPARISON: I have reviewed the prescreen information and I have found no relevant changes between the preadmission screening and my post admission evaluation     RATIONALE FOR INPATIENT ADMISSION - Patient demonstrates the following: (check all that apply)  [X] Medically appropriate for rehabilitation admission  [X] Has attainable rehab goals with an appropriate initial discharge plan  [X] Has rehabilitation potential (expected to make a significant improvement within a reasonable period of time)   [X] Requires close medical managment by a rehab physician, rehab nursing care, Hospitalist and comprehensive interdisciplinary team (including PT, OT, & or SLP, Prosthetics and Orthotics)     Assessment/Plan:  CHAY VELEZ is a 78 year old female with PMH of HTN, and HLD; who presented to [facility] ED on 7/26 s/p fall from chair with + head strike, found to have new AFIB with RVR (s/p Metoprolol, Diltiazem and Eliquis) and Rhabdomyolysis (s/p IVF). Her hospital course was complicated by acute cholecystitis and cholelithiasis (s/p percutaneous enrique drain via IR on 7/7), Bacteremia (s/p IV antibiotics), neurological changes/AMS with C cerebellar infarct confirmed on MRI. Patient now admitted for a multidisciplinary rehab program. 08-03-23 @ 15:40  -------------  Rehab Management/MEDICAL MANAGEMENT     # Functional deficits s/p CVA  #HLD  - Gait Instability, ADL impairments and Functional impairments: start Comprehensive Rehab Program of PT/OT/SLP  - 3 hours a day, 5 days a week  - P&O as needed   - MRI head significant for R cerebellar infarct   - Outpatient F/U   - ASA and Atorvastatin    #New AFib RVR  #HTN  - Metoprolol  - Diltiazem   - Eliquis     #Rhabdomyolysis-resolved  - S/p IVF    #Sleep  - Melatonin PRN     #Skin  - Skin on admission:   - Pressure injury/Skin: OOB to Chair, PT/OT     #Pain Mgmt   - Tylenol PRN    #GI/Bowel Mgmt   # Cholecystitis   - Continent c/w Senna, Miralax   - Levofloxacin daily - last dose 8/10  - s/p percutaneous cholecystectomy 2/2 cholelithiasis via IR on 7/7   - Outpatient follow up for eventual cholecystectomy     #RUL Lung nodule found on CT chest (7/25)  - Incidental finding   - F/U PCP outpatient  -Repeat CT     #/Bladder Mgmt   -  PVR q8h, CIC>400cc    #FEN   - Diet - Regular + Thins  [DASH/TLC]    - Dysphagia  SLP - evaluation and treatment    #Precautions / PROPHYLAXIS:   - Falls,   - ortho: Weight bearing status: WBAT   - Lungs: Aspiration, Incentive Spirometer   - DVT: Eliquis   ---------------  Outpatient/Follow-Up:    Alli Pascual  Surgery  [address] [place] [address]  Phone: [phone]  Fax: [phone]  Follow Up Time: 1 week    ChavasherAlexis  Cardiovascular Disease  [address] [place], [place] [address]  Phone: [phone]  Fax: [phone]  Follow Up Time: 1 week    BRIAN ALBA  [address] [place] [address]  Phone: ()-  Fax: ()-  ---------------  MEDICAL PROGNOSIS: GOOD                                   REHAB POTENTIAL: GOOD  ESTIMATED DISPOSITION: HOME                             ELOS: 10-14 Days   EXPECTED THERAPY:     P.T. 1hr/day       O.T. 1hr/day      S.L.P. 1hr/day      EXP FREQUENCY: 5 days per 7 day period     PRESCREEN COMPARISON: I have reviewed the prescreen information and I have found no relevant changes between the preadmission screening and my post admission evaluation     RATIONALE FOR INPATIENT ADMISSION - Patient demonstrates the following: (check all that apply)  [X] Medically appropriate for rehabilitation admission  [X] Has attainable rehab goals with an appropriate initial discharge plan  [X] Has rehabilitation potential (expected to make a significant improvement within a reasonable period of time)   [X] Requires close medical managment by a rehab physician, rehab nursing care, Hospitalist and comprehensive interdisciplinary team (including PT, OT, & or SLP, Prosthetics and Orthotics)     CHAY VELEZ is a 78 year old female with PMH HTN, and HLD who presented to [facility] ED 7/26/23 s/p fall from chair with + head strike. She was found to have new onset AFIB with RVR and Rhabdomyolysis. Her hospital course was complicated by acute cholecystitis and cholelithiasis requiring percutaneous enrique drain via IR on 7/7, Bacteremia, and cerebellar infarct confirmed on MRI. Patient now admitted for a multidisciplinary rehab program. 08-03-23 @ 15:40    # right cerebellar CVA with incoordination, imbalance, cognitive impairment  - MRI 8/1: There is an acute right cerebellar infarct. There are no foci of susceptibility artifact to suggest hemorrhage. Scattered foci of T2/FLAIR hyperintensity in the bilateral hemispheric white matter are nonspecific but likely related to chronic white matter microvascular ischemic disease. The ventricles are normal in size for patient's age  - ASA and Atorvastatin  - Gait Instability, ADL impairments and Functional impairments: start Comprehensive Rehab Program of PT/OT/SLP  - 3 hours a day, 5 days a week  - Precautions: cardiac, AC, fall, AMS, enrique drain    # New AFib RVR  # HTN  - Metoprolol  - Diltiazem   - Eliquis   - monitor vitals, hospitalist consult    # Rhabdomyolysis-resolved  - S/p IVF  - Creatine kinase: 5502 7/25--> 5621--> 1848 7/27  - encourage po fluids, repeat CK in AM 8/4    #  Cholecystitis   - Levofloxacin daily - last dose 8/10  - s/p percutaneous cholecystectomy 2/2 cholelithiasis via IR on 7/7   - monitor output q shift  -  Outpatient follow up for eventual cholecystectomy     # RUL Lung nodule found on CT chest (7/25)  - Incidental finding   - F/U PCP outpatient for repeat CT    # /Bladder Mgmt   -  PVR q8h, CIC>400cc    # GI  -  Senna, Miralax     # Sleep  - Melatonin PRN     # Skin  - Skin on admission:   - Pressure injury/Skin: OOB to Chair, PT/OT     # Pain Mgmt   - Tylenol PRN    # FEN   - Diet - Regular + Thins  [DASH/TLC]      #  DVT PPX:  - Eliquis     # LABs  CBC BMP CK 8/4    ---------------  Outpatient/Follow-Up:    Alli Pascual  Surgery  [address] [place] [address]  Phone: [phone]  Fax: [phone]  Follow Up Time: 1 week    Alexis Simon  Cardiovascular Disease  [address] [place], [place] [id number]  Phone: [phone]  Fax: [phone]  Follow Up Time: 1 week    BRIAN ALBA  [address] [place] [address]  Phone: ()-  Fax: ()-  ---------------  MEDICAL PROGNOSIS: GOOD                                   REHAB POTENTIAL: GOOD  ESTIMATED DISPOSITION: HOME                             ELOS: 14 Days   EXPECTED THERAPY:     P.T. 1hr/day       O.T. 1hr/day      S.L.P. 1hr/day      EXP FREQUENCY: 5 days per 7 day period     PRESCREEN COMPARISON: I have reviewed the prescreen information and I have found no relevant changes between the preadmission screening and my post admission evaluation     RATIONALE FOR INPATIENT ADMISSION - Patient demonstrates the following: (check all that apply)  [X] Medically appropriate for rehabilitation admission  [X] Has attainable rehab goals with an appropriate initial discharge plan  [X] Has rehabilitation potential (expected to make a significant improvement within a reasonable period of time)   [X] Requires close medical managment by a rehab physician, rehab nursing care, Hospitalist and comprehensive interdisciplinary team (including PT, OT, & or SLP, Prosthetics and Orthotics)     CHAY VELEZ is a 78 year old female with PMH HTN, and HLD who presented to [facility] 7/26/23 s/p fall from chair with + head strike. She was found to have new onset AFIB with RVR and Rhabdomyolysis. Her hospital course was complicated by acute cholecystitis and cholelithiasis requiring percutaneous enrique drain via IR on 7/7, Bacteremia, and cerebellar infarct confirmed on MRI. Patient now admitted for a multidisciplinary rehab program. 08-03-23 @ 15:40    # right cerebellar CVA with incoordination, imbalance, cognitive impairment  - MRI 8/1: There is an acute right cerebellar infarct. There are no foci of susceptibility artifact to suggest hemorrhage. Scattered foci of T2/FLAIR hyperintensity in the bilateral hemispheric white matter are nonspecific but likely related to chronic white matter microvascular ischemic disease. The ventricles are normal in size for patient's age  - ASA and Atorvastatin  - Gait Instability, ADL impairments and Functional impairments: start Comprehensive Rehab Program of PT/OT/SLP  - 3 hours a day, 5 days a week  - Precautions: cardiac, AC, fall, AMS, enrique drain    # New AFib RVR  # HTN  - Metoprolol  - Diltiazem   - Eliquis   - monitor vitals, hospitalist consult    # Rhabdomyolysis-resolved  - S/p IVF  - Creatine kinase: 5502 7/25--> 5621--> 1848 7/27  - encourage po fluids, repeat CK in AM 8/4    #  Cholecystitis   - Levofloxacin daily - last dose 8/10  - s/p percutaneous cholecystectomy 2/2 cholelithiasis via IR on 7/7   - monitor output q shift  -  Outpatient follow up for eventual cholecystectomy     # RUL Lung nodule found on CT chest (7/25)  - Incidental finding   - F/U PCP outpatient for repeat CT    # /Bladder Mgmt   -  PVR q8h, CIC>400cc    # GI  -  Senna, Miralax     # Sleep  - Melatonin PRN     # Skin  - Skin on admission: scattered abrasions and ecchymosis  - Pressure injury/Skin: OOB to Chair, PT/OT     # Pain Mgmt   - Tylenol PRN    # FEN   - Diet - Regular + Thins  [DASH/TLC]      #  DVT PPX:  - Eliquis     # LABs  CBC BMP CK 8/4    ---------------  Outpatient/Follow-Up:    Alli Pascual  Surgery  [address] [place] [address]  Phone: [phone]  Fax: [phone]  Follow Up Time: 1 week    Alexis Simon  Cardiovascular Disease  [address] [place], [place] [address]  Phone: [phone]  Fax: [phone]  Follow Up Time: 1 week    BRIAN ALBA  [address] [place] [address]  Phone: ([phone]  Fax: ()-  ---------------  MEDICAL PROGNOSIS: GOOD                                   REHAB POTENTIAL: GOOD  ESTIMATED DISPOSITION: HOME                             ELOS: 14 Days   EXPECTED THERAPY:     P.T. 1hr/day       O.T. 1hr/day      S.L.P. 1hr/day      EXP FREQUENCY: 5 days per 7 day period     PRESCREEN COMPARISON: I have reviewed the prescreen information and I have found no relevant changes between the preadmission screening and my post admission evaluation     RATIONALE FOR INPATIENT ADMISSION - Patient demonstrates the following: (check all that apply)  [X] Medically appropriate for rehabilitation admission  [X] Has attainable rehab goals with an appropriate initial discharge plan  [X] Has rehabilitation potential (expected to make a significant improvement within a reasonable period of time)   [X] Requires close medical managment by a rehab physician, rehab nursing care, Hospitalist and comprehensive interdisciplinary team (including PT, OT, & or SLP, Prosthetics and Orthotics)     CHAY VELEZ is a 78 year old female with PMH HTN, and HLD who presented to [facility] ED 7/26/23 s/p fall from chair with + head strike. She was found to have new onset AFIB with RVR and Rhabdomyolysis. Her hospital course was complicated by acute cholecystitis and cholelithiasis requiring percutaneous enrique drain via IR on 7/7, Bacteremia, and cerebellar infarct confirmed on MRI. Patient now admitted for a multidisciplinary rehab program. 08-03-23 @ 15:40    # right cerebellar CVA with incoordination, imbalance, cognitive impairment  - MRI 8/1: There is an acute right cerebellar infarct. There are no foci of susceptibility artifact to suggest hemorrhage. Scattered foci of T2/FLAIR hyperintensity in the bilateral hemispheric white matter are nonspecific but likely related to chronic white matter microvascular ischemic disease. The ventricles are normal in size for patient's age  - ASA and Atorvastatin  - Gait Instability, ADL impairments and Functional impairments: start Comprehensive Rehab Program of PT/OT/SLP  - 3 hours a day, 5 days a week  - Precautions: cardiac, AC, fall, AMS, enrique drain    # New AFib RVR  # HTN  - Metoprolol XL 100mg daily  - Diltiazem CD 120mg daily  - Eliquis 5 q12hrs  - monitor vitals, hospitalist consult  - HR 84-98 /56 - 180/92) 8/4. BP not controlled, however isolated  otherwise -160. May be related to urinary retention,  issues addressed as below. If persist will need adjsutment meds, hospitalist following    # Rhabdomyolysis-resolved  - S/p IVF  - Creatine kinase: 5502 7/25--> 5621--> 1848 7/27  - encourage po fluids. Repeat  8/4    #  Cholecystitis   - Levofloxacin daily - last dose 8/10  - s/p percutaneous cholecystectomy 2/2 cholelithiasis via IR on 7/7   - monitor output q shift. If low volume will f/u IR re: when to dc drain  -  Outpatient follow up for eventual cholecystectomy     # RUL Lung nodule found on CT chest (7/25)  - Incidental finding   - F/U PCP outpatient for repeat CT    # /Bladder Mgmt   -  PVR q8h, CIC>400cc  - patient with significant retention 8/4 including PVR 1200 ml. Will send UA, WBC 11.97. Afebrile. Bladder scan increased to q 6 hours and SC for PVR >350 ml. Address constipation, mobilize, may need thomas if no spontaneous urine output    # GI  -  Senna, Miralax     # podiatry  - consult for nail debridement    # Sleep  - Melatonin PRN     # Skin  - Skin on admission: scattered abrasions and ecchymosis  - Pressure injury/Skin: OOB to Chair, PT/OT     # Pain Mgmt   - Tylenol PRN    # FEN   - Diet - Regular + Thins  [DASH/TLC]      #  DVT PPX:  - Eliquis     # LABs  CBC BMP CK 8/4: noted as above  UA  CBC 8/5  podiatry consult  monitor BP  CBC BMP 8/7  ---------------  Outpatient/Follow-Up:    Alli Pascual  Surgery  [address] [place] [address]  Phone: [phone]  Fax: [phone]  Follow Up Time: 1 week    Alexis Simon  Cardiovascular Disease  [address] [place], [place] [address]  Phone: [phone]  Fax: [phone]  Follow Up Time: 1 week    BRIAN ALBA  [address] [place] [address]  Phone: ()-  Fax: ()-  ---------------  MEDICAL PROGNOSIS: GOOD                                   REHAB POTENTIAL: GOOD  ESTIMATED DISPOSITION: HOME                             ELOS: 14 Days   EXPECTED THERAPY:     P.T. 1hr/day       O.T. 1hr/day      S.L.P. 1hr/day      EXP FREQUENCY: 5 days per 7 day period     PRESCREEN COMPARISON: I have reviewed the prescreen information and I have found no relevant changes between the preadmission screening and my post admission evaluation     RATIONALE FOR INPATIENT ADMISSION - Patient demonstrates the following: (check all that apply)  [X] Medically appropriate for rehabilitation admission  [X] Has attainable rehab goals with an appropriate initial discharge plan  [X] Has rehabilitation potential (expected to make a significant improvement within a reasonable period of time)   [X] Requires close medical managment by a rehab physician, rehab nursing care, Hospitalist and comprehensive interdisciplinary team (including PT, OT, & or SLP, Prosthetics and Orthotics)

## 2023-08-03 NOTE — PROGRESS NOTE ADULT - ASSESSMENT
77 y/o female presented with asymptomatic cholecystitis found on imaging, now s/p Perc enrique drain placement on 7/27 with IR.      Plan:  - oral abx for pos blood cx  - Care per primary team, continue low fat diet  - Monitor and record drain output; continue drain care  - Discharge planning as per primary team/case management, likely [facility]  - Probably elective cholecystectomy to be scheduled at later time

## 2023-08-03 NOTE — DISCHARGE NOTE NURSING/CASE MANAGEMENT/SOCIAL WORK - NSDCVIVACCINE_GEN_ALL_CORE_FT
Tdap; 26-Jul-2023 00:18; Sara Chand (CARI); [employer]; [id number] (Exp. Date: 26-Jul-2023); IntraMuscular; Deltoid Right.; 0.5 milliLiter(s); VIS (VIS Published: 09-May-2013, VIS Presented: 26-Jul-2023);

## 2023-08-03 NOTE — H&P ADULT - NSHPREVIEWOFSYSTEMS_GEN_ALL_CORE
REVIEW OF SYSTEMS  Constitutional: + fatigue  HEENT: +recent cataracts surgery R eye, no new visual changes  Pulm: Occ cough,  No shortness of breath  Cardio: No chest pain, No palpitations  GI:  No abdominal pain, No nausea, No vomiting, No diarrhea, No constipation +Last BM today  : No dysuria, No frequency, No hematuria  Neuro: No headaches, No memory loss, +R sided weakness  Skin: Abrasions from fall   MSK: No joint pain, No joint swelling, No muscle pain, No Neck or back pain REVIEW OF SYSTEMS  Constitutional: + fatigue "I had a stroke " "I have poison in my gallbladder" "my heart" [when asked why she was in the hospital]  HEENT: +recent cataracts surgery R eye, no new visual changes  Pulm: Occ cough,  No shortness of breath  Cardio: No chest pain, No palpitations  GI:  No abdominal pain, No nausea, No vomiting, No diarrhea, No constipation +Last BM today 8/3  : No dysuria, No frequency, No hematuria  Neuro: No headaches, No memory loss, +R sided weakness  Skin: Abrasions from fall   MSK: No joint pain, No joint swelling, No muscle pain, No Neck or back pain

## 2023-08-03 NOTE — PROGRESS NOTE ADULT - SUBJECTIVE AND OBJECTIVE BOX
r/o acute enrique, s/p perc drain    SUBJECTIVE:  Patient seen and examined at bedside this morning, denies any acute complaints, AVSS. Reports that she had a bowel movement but has not been able to get out of bed. Denies any abdominal pain, CP, SOB, fever or chills.    MEDICATIONS  (STANDING):  apixaban 5 milliGRAM(s) Oral every 12 hours  atorvastatin 80 milliGRAM(s) Oral at bedtime  cefTRIAXone   IVPB 2000 milliGRAM(s) IV Intermittent every 24 hours  diltiazem    milliGRAM(s) Oral daily  metoprolol succinate  milliGRAM(s) Oral daily  metroNIDAZOLE    Tablet 500 milliGRAM(s) Oral every 8 hours    MEDICATIONS  (PRN):  acetaminophen     Tablet .. 650 milliGRAM(s) Oral every 6 hours PRN Temp greater or equal to 38C (100.4F)  acetaminophen     Tablet .. 650 milliGRAM(s) Oral every 6 hours PRN Mild Pain (1 - 3)  aluminum hydroxide/magnesium hydroxide/simethicone Suspension 30 milliLiter(s) Oral every 4 hours PRN Dyspepsia  melatonin 3 milliGRAM(s) Oral at bedtime PRN Insomnia  ondansetron Injectable 4 milliGRAM(s) IV Push every 8 hours PRN Nausea and/or Vomiting      Vital Signs Last 24 Hrs  T(C): 36.8 (03 Aug 2023 05:03), Max: 36.8 (02 Aug 2023 20:36)  T(F): 98.3 (03 Aug 2023 05:03), Max: 98.3 (02 Aug 2023 20:36)  HR: 86 (03 Aug 2023 05:03) (86 - 93)  BP: 159/95 (03 Aug 2023 05:03) (140/82 - 159/95)  BP(mean): --  RR: 18 (03 Aug 2023 05:03) (18 - 18)  SpO2: 98% (03 Aug 2023 05:03) (96% - 98%)    Parameters below as of 03 Aug 2023 05:03  Patient On (Oxygen Delivery Method): room air        PHYSICAL EXAM:  Constitutional: AAOx3, no acute distress  HEENT: NCAT, airway patent  Cardiovascular: RRR, pulses present bilaterally  Respiratory: nonlabored breathing  Gastrointestinal: abdomen soft, nontender, non distended, no rebound or guarding, perc in place with bilious drainage   Neuro: no focal deficits  Extremities: non edematous, no calf pain bilaterally     I&O's Detail      LABS:                        12.8   10.36 )-----------( 369      ( 03 Aug 2023 06:12 )             39.4     08-03    140  |  109<H>  |  13  ----------------------------<  101<H>  3.6   |  26  |  0.67    Ca    8.3<L>      03 Aug 2023 06:12  Phos  2.7     08-03  Mg     2.3     08-03    TPro  6.9  /  Alb  2.4<L>  /  TBili  0.5  /  DBili  x   /  AST  64<H>  /  ALT  95<H>  /  AlkPhos  89  08-03

## 2023-08-03 NOTE — H&P ADULT - ATTENDING COMMENTS
Progress note amended to include my discussions with patient, resident, hospitalist, RN, SW, PT and my findings. Events overnight significant for severe urinary retention, with no noted spontaneous output. Afebrile    Patient is RH dominant and seen in PT. Sittingi n wheelchair, alert, cooperative. Follows 1-2 step commands, but has some difficulty with 2 step requiring repetition. +processing delay and reduced simple and sustained attention. she is O x 4 and aware of her multiple hospital diagnoses, however more complex reasoning re: health issues seems impaired. Mood appears stable, NAD. Mild reduced speech intelligbility, but word content appropriate conversational speech    BUE normal tone and ROM. Has healing abrasions and eschar from fall, no guarding with ROM. right shoulder 4+/5 elbow flexion 4/5 extension 4+/5 gross grasp 4+/5. left shoulder 5/5 elbow flexion 5/5 gross grasp 5/5. right HF 5-/5 quad 5/5 ham 5-/5 ankle PF 5/5. left HF 5-/5 quad 5/5 ankle PF and DF 5/5. no calf swelling or TTP, katie rythema or warmth.lungs clear bilaterally cor: rate controlled, irreg HR 84 manually. Abdomen soft, no R/g +enrique tube in place    - BP not controlled based on vitals over last 24 hours, however isolated significant spike to  which may be related to retention. UA sent, WBC 11.97 which is elevated from previous. Will continue to trend, CBC in AM. Bladder scan frequency increased to q6 with SC for PVr >350 ml, may need thomas if retention persists despite addressing constipation, mobility    comprehensive rehab program in progress    RECENT LABS    Vital Signs Last 24 Hrs  T(C): 36.5 (04 Aug 2023 07:27), Max: 36.6 (03 Aug 2023 19:48)  T(F): 97.7 (04 Aug 2023 07:27), Max: 97.8 (03 Aug 2023 19:48)  HR: 98 (04 Aug 2023 07:27) (84 - 98)  BP: 164/92 (04 Aug 2023 07:27) (123/56 - 180/92)  BP(mean): --  RR: 16 (04 Aug 2023 07:27) (16 - 18)  SpO2: 97% (04 Aug 2023 07:27) (94% - 98%)                              14.0   11.97 )-----------( 361      ( 04 Aug 2023 05:43 )             42.5     08-04    141  |  106  |  14  ----------------------------<  97  4.0   |  27  |  0.82    Ca    8.7      04 Aug 2023 05:43  Phos  2.7     08-03  Mg     2.3     08-03    TPro  7.4  /  Alb  2.5<L>  /  TBili  0.5  /  DBili  x   /  AST  52<H>  /  ALT  77<H>  /  AlkPhos  89  08-04      Urinalysis Basic - ( 04 Aug 2023 05:43 )    Color: x / Appearance: x / SG: x / pH: x  Gluc: 97 mg/dL / Ketone: x  / Bili: x / Urobili: x   Blood: x / Protein: x / Nitrite: x   Leuk Esterase: x / RBC: x / WBC x   Sq Epi: x / Non Sq Epi: x / Bacteria: x      CAPILLARY BLOOD GLUCOSE

## 2023-08-03 NOTE — H&P ADULT - NSHPSOCIALHISTORY_GEN_ALL_CORE
SOCIAL HISTORY  Smoking - Denied  EtOH - Denied   Drugs - Denied  FUNCTIONAL HISTORY  history obtained from patient and dtr bedside. Patient lives alone in private house with no stairs to negotiate. Patient performs all functional mobility independently. patient reports her children can assist at home of needed. Patient thinks she owns rolling walker if needed.      CURRENT FUNCTIONAL STATUS  - Bed Mobility:  - Transfers:   - Gait:  - ADLs: SOCIAL HISTORY  Smoking - Denied  EtOH - Denied   Drugs - Denied    FUNCTIONAL HISTORY  history obtained from patient and dtr bedside. Patient lives alone in private house with no stairs to negotiate. Patient performs all functional mobility independently. patient reports her children can assist at home of needed. Patient thinks she owns rolling walker if needed.    CURRENT FUNCTIONAL STATUS  - Bed Mobility: Min A  - Transfers: CG   - Gait: Supervision 250 ft with RW.   - ADLs: Min A -> dependent SOCIAL HISTORY  Smoking - Denied  EtOH - Denied   Drugs - Denied    FUNCTIONAL HISTORY  history obtained from patient and dtr bedside. Patient lives alone in private house with no stairs to negotiate. Patient performs all functional mobility independently. patient reports her children can assist at home of needed. Patient thinks she owns rolling walker if needed.  she denies any limitations due to reduced endurance, fatigue, palpitations, CP, SOB, dizziness in past. No known cardiac symptoms previously    CURRENT FUNCTIONAL STATUS  - Bed Mobility: Min A  - Transfers: CG   - Gait: Supervision 250 ft with RW.   - ADLs: Min A -> dependent

## 2023-08-03 NOTE — PROGRESS NOTE ADULT - ASSESSMENT
78y female with PMHx of HTN, HLD presented to the ED for the evaluation of fall out of chair. Cardiology consulted for new onset atrial fibrillation with RVR.    New onset afib, Elevated trops, rhabdo s/p fall, HFmodEF, HTN, HLD  - EKG on admission showed atrial fibrillation with RVR, rate 143  - Tele with A fib 80-90s, no events   - Continue Toprol XL   - Continue Cardizem CD to 120 mg daily  - Continue Eliquis     - No clear evidence of acute ischemia  - Trops mildly elevated but downtrended. No need to further trend.  No anginal complaints  - CK elevated 5502 --> 5621; doubt acute plaque rupture in setting of fall and rhabdomyolysis; trended down    - BP stable and controlled with -150s   - Would start low dose ARB    - TTE: EF 45% with no significant valvular disease.    - No clear evidence of volume overload    - MRI/MRA no contrast showed acute right cerebellar infarct  - Continue statin   - Neuro following     - Chest showed evidence of pulmonary scarring which may be d/t pulmonary fibrosis - f/u pulmonary recs     - CT scan in the ER revealed findings consistent w/ acute enrique, s/p perc enrique drainage with IR. GI following   - Continue antibiotics. Surgery following   - Possible outpatient cholecystectomy    - Monitor and replete lytes, keep K>4, Mg>2.  - Will continue to follow.    Boubacar Ornelas, MS FNP, AGAP  Nurse Practitioner- Cardiology   Call teams (preferred)  Spectra #[phone] /[phone]

## 2023-08-03 NOTE — PROGRESS NOTE ADULT - SUBJECTIVE AND OBJECTIVE BOX
Neurology Follow up note    CHAY VELEZ78yFemale    HPI:  78y female with PMHx of HTN, HLD presented to the ED for the evaluation of fall out of chair. States that she was sitting on the chair and fell backwards, hitting her head. Was on the floor for over an hour and was unable to get up. Eventually was able to flip over and crawl on her elbows and knees to call her daughter in law for assistance. She initially had a headache which has resolved. Only has some mild soreness in her elbows. States that she was feeling unwell on Monday 7/24 and had an episode of vomiting then. Now feels back to her baseline. No fevers or chills. No urinary symptoms. Denies chest pain, palpitations, dyspnea, current headache, dizziness, abdominal pain, n/v/d. She has no prior history of kidney issues.    In the ED, she was given IV cardizem 20mg IVP with improvement in her rates and 70mg of subcutaneous enoxaparin. (26 Jul 2023 01:00)      Interval History -no new weakness    Patient is seen, chart was reviewed and case was discussed with the treatment team.  Pt is not in any distress.   Lying on bed comfortably.       Vital Signs Last 24 Hrs  T(C): 36.4 (03 Aug 2023 14:30), Max: 36.8 (02 Aug 2023 20:36)  T(F): 97.5 (03 Aug 2023 14:30), Max: 98.3 (02 Aug 2023 20:36)  HR: 87 (03 Aug 2023 14:30) (86 - 93)  BP: 128/80 (03 Aug 2023 14:30) (128/80 - 159/95)  BP(mean): --  RR: 18 (03 Aug 2023 14:30) (18 - 18)  SpO2: 97% (03 Aug 2023 14:30) (96% - 98%)    Parameters below as of 03 Aug 2023 14:30  Patient On (Oxygen Delivery Method): room air                    REVIEW OF SYSTEMS:    Constitutional: No fever, weight loss or fatigue  Eyes: No eye pain, visual disturbances, or discharge  ENT:  No difficulty hearing, tinnitus, vertigo; No sinus or throat pain  Neck: No pain or stiffness  Respiratory: No  wheezing, chills or hemoptysis  Cardiovascular: No chest pain, palpitations, shortness of breath, dizziness or leg swelling  Gastrointestinal: No abdominal or epigastric pain. No nausea, vomiting or hematemesis; N  Genitourinary: No dysuria, frequency, hematuria or incontinence  Neurological: No headaches, memory loss,  numbness or tremors  Psychiatric: No depression, anxiety, mood swings or difficulty sleeping  Musculoskeletal: No joint pain or swelling; No muscle, back or extremity pain  Skin: No itching, burning, rashes or lesions   Lymph Nodes: No enlarged glands  Endocrine: No heat or cold intolerance; No hair loss,  Allergy and Immunologic: No hives or eczema    On Neurological Examination:    Mental Status - Pt is alert, awake, oriented X3. Follows commands well and able to answer questions appropriately.Mood and affect  normal    Speech -  Pt has dysarthria.    Cranial Nerves - Pupils 3 mm equal and reactive to light, extraocular eye movements intact. Pt has no visual field deficit.  Pt has  right facial asymmetry. Facial sensation is intact.Tongue - is in midline.    Muscle tone - is ashish.      Motor Exam - right hemiparesis (4/5)  mild right PD  ,    Sensory Exam -  Pt withdraws all extremities equally on stimulation. No asymmetry seen. No complaints of tingling, numbness.      coordination:    Finger to nose :dysmetria- right      Deep tendon Reflexes - 2 plus all over.      .    Neck Supple -  Yes.     MEDICATIONS  (STANDING):  apixaban 5 milliGRAM(s) Oral every 12 hours  atorvastatin 80 milliGRAM(s) Oral at bedtime  cefTRIAXone   IVPB 2000 milliGRAM(s) IV Intermittent every 24 hours  diltiazem    milliGRAM(s) Oral daily  metoprolol succinate  milliGRAM(s) Oral daily  metroNIDAZOLE    Tablet 500 milliGRAM(s) Oral every 8 hours    MEDICATIONS  (PRN):  acetaminophen     Tablet .. 650 milliGRAM(s) Oral every 6 hours PRN Temp greater or equal to 38C (100.4F)  acetaminophen     Tablet .. 650 milliGRAM(s) Oral every 6 hours PRN Mild Pain (1 - 3)  aluminum hydroxide/magnesium hydroxide/simethicone Suspension 30 milliLiter(s) Oral every 4 hours PRN Dyspepsia  melatonin 3 milliGRAM(s) Oral at bedtime PRN Insomnia  ondansetron Injectable 4 milliGRAM(s) IV Push every 8 hours PRN Nausea and/or Vomiting      Allergies    No Known Allergies    Intolerances           08-03    140  |  109<H>  |  13  ----------------------------<  101<H>  3.6   |  26  |  0.67    Ca    8.3<L>      03 Aug 2023 06:12  Phos  2.7     08-03  Mg     2.3     08-03    TPro  6.9  /  Alb  2.4<L>  /  TBili  0.5  /  DBili  x   /  AST  64<H>  /  ALT  95<H>  /  AlkPhos  89  08-03      Hemoglobin A1C:     Vitamin B12     RADIOLOGY    ASSESSMENT AND PLAN:      presented with fall  right sided weakness/incordination-  related to  subacute cva involving right cerebellum  NIHSS 4  Not a candidate for thrombolytic or neuro vascular intervention as time of last well known is unclear.      continue  ac  agree with increasing statin to 80 mg  no need for permissive HTN now  Physical therapy evaluation  OT  Pain is accessed and addressed..  Would continue to follow.

## 2023-08-03 NOTE — SOCIAL WORK PROGRESS NOTE - NSSWPROGRESSNOTE_GEN_ALL_CORE
YASIR spoke with pt apolonia Torres regarding acute rehab choices. Pt apolonia Torres wishes referral to be sent to 1) [facility] 2)[facility]. YASIR sent referrals to selected facilities and will continue to follow.

## 2023-08-03 NOTE — PROGRESS NOTE ADULT - REASON FOR ADMISSION
fall, new onset afib RVR

## 2023-08-03 NOTE — PROGRESS NOTE ADULT - ASSESSMENT
78y female with PMHx of HTN, HLD presented to the ED for the evaluation of fall out of chair. States that she was sitting on the chair and fell backwards, hitting her head. Was on the floor for over an hour and was unable to get up.    pulm nodule - 1.8 cm  eval for acute enrique  HTN  HLD  OP  OA  Dyspnea  Ataxic gait  Hard of hearing  AF  Rhabdo    CT head - no acute path  on RA  vs noted  on ABX   plan for TRICIA - sw f/u    serial renal indices  I and O  monitor VS and HD and Sat  trend LABS  Blood gas - CT imaging without acute pulm path -   Pulm nodule - 1.8 cm - will need f/u and repeat imaging and likely PET scan  Fall prec - PT assessment  AF management - eval for HF - on AC full dose -   TTE  trend TROPS  cardio eval  tele monitoring  proBNP noted

## 2023-08-03 NOTE — PROGRESS NOTE ADULT - SUBJECTIVE AND OBJECTIVE BOX
Date/Time Patient Seen:  		  Referring MD:   Data Reviewed	       Patient is a 78y old  Female who presents with a chief complaint of fall, new onset afib RVR (02 Aug 2023 17:14)      Subjective/HPI     PAST MEDICAL & SURGICAL HISTORY:  HTN (hypertension)    Hyperlipidemia    Tinnitus  right ear    Seasonal allergies    No significant past surgical history    S/P knee replacement          Medication list         MEDICATIONS  (STANDING):  apixaban 5 milliGRAM(s) Oral every 12 hours  atorvastatin 80 milliGRAM(s) Oral at bedtime  cefTRIAXone   IVPB 2000 milliGRAM(s) IV Intermittent every 24 hours  diltiazem    milliGRAM(s) Oral daily  metoprolol succinate  milliGRAM(s) Oral daily  metroNIDAZOLE    Tablet 500 milliGRAM(s) Oral every 8 hours    MEDICATIONS  (PRN):  acetaminophen     Tablet .. 650 milliGRAM(s) Oral every 6 hours PRN Temp greater or equal to 38C (100.4F)  acetaminophen     Tablet .. 650 milliGRAM(s) Oral every 6 hours PRN Mild Pain (1 - 3)  aluminum hydroxide/magnesium hydroxide/simethicone Suspension 30 milliLiter(s) Oral every 4 hours PRN Dyspepsia  melatonin 3 milliGRAM(s) Oral at bedtime PRN Insomnia  ondansetron Injectable 4 milliGRAM(s) IV Push every 8 hours PRN Nausea and/or Vomiting         Vitals log        ICU Vital Signs Last 24 Hrs  T(C): 36.8 (03 Aug 2023 05:03), Max: 36.8 (02 Aug 2023 20:36)  T(F): 98.3 (03 Aug 2023 05:03), Max: 98.3 (02 Aug 2023 20:36)  HR: 86 (03 Aug 2023 05:03) (86 - 93)  BP: 159/95 (03 Aug 2023 05:03) (140/82 - 159/95)  BP(mean): --  ABP: --  ABP(mean): --  RR: 18 (03 Aug 2023 05:03) (18 - 18)  SpO2: 98% (03 Aug 2023 05:03) (96% - 98%)    O2 Parameters below as of 03 Aug 2023 05:03  Patient On (Oxygen Delivery Method): room air                 Input and Output:  I&O's Detail    01 Aug 2023 07:01  -  02 Aug 2023 07:00  --------------------------------------------------------  IN:  Total IN: 0 mL    OUT:    Voided (mL): 400 mL  Total OUT: 400 mL    Total NET: -400 mL          Lab Data                        12.8   10.45 )-----------( 355      ( 02 Aug 2023 06:47 )             39.2     08-02    141  |  108  |  13  ----------------------------<  108<H>  3.6   |  26  |  0.77    Ca    8.5      02 Aug 2023 06:47  Phos  3.1     08-01  Mg     2.3     08-01    TPro  6.7  /  Alb  2.4<L>  /  TBili  0.4  /  DBili  x   /  AST  58<H>  /  ALT  100<H>  /  AlkPhos  93  08-02            Review of Systems	      Objective     Physical Examination    heart s1s2  lung dc BS  head nc      Pertinent Lab findings & Imaging      Ceci:  NO   Adequate UO     I&O's Detail    01 Aug 2023 07:01  -  02 Aug 2023 07:00  --------------------------------------------------------  IN:  Total IN: 0 mL    OUT:    Voided (mL): 400 mL  Total OUT: 400 mL    Total NET: -400 mL               Discussed with:     Cultures:	        Radiology

## 2023-08-03 NOTE — PROGRESS NOTE ADULT - SUBJECTIVE AND OBJECTIVE BOX
[facility] -- Neo Vega, Mehrdad Orta Savella, [place]: Office # [phone]    Follow Up:  Cardiac Optimization, Afib, HFmodEF    Subjective/Observations: Patient seen and examined. Pt awake and alert, resting in bed. No complaints of chest pain, dyspnea, palpitations or dizziness. No signs of orthopnea or PND. Comfortable on RA. Perc enrique drain intact.     REVIEW OF SYSTEMS: All other review of systems are negative unless indicated above    PAST MEDICAL & SURGICAL HISTORY:  HTN (hypertension)    Hyperlipidemia    Tinnitus  right ear    Seasonal allergies    S/P knee replacement    MEDICATIONS  (STANDING):  apixaban 5 milliGRAM(s) Oral every 12 hours  atorvastatin 80 milliGRAM(s) Oral at bedtime  cefTRIAXone   IVPB 2000 milliGRAM(s) IV Intermittent every 24 hours  diltiazem    milliGRAM(s) Oral daily  metoprolol succinate  milliGRAM(s) Oral daily  metroNIDAZOLE    Tablet 500 milliGRAM(s) Oral every 8 hours    MEDICATIONS  (PRN):  acetaminophen     Tablet .. 650 milliGRAM(s) Oral every 6 hours PRN Temp greater or equal to 38C (100.4F)  acetaminophen     Tablet .. 650 milliGRAM(s) Oral every 6 hours PRN Mild Pain (1 - 3)  aluminum hydroxide/magnesium hydroxide/simethicone Suspension 30 milliLiter(s) Oral every 4 hours PRN Dyspepsia  melatonin 3 milliGRAM(s) Oral at bedtime PRN Insomnia  ondansetron Injectable 4 milliGRAM(s) IV Push every 8 hours PRN Nausea and/or Vomiting    Allergies  No Known Allergies    Vital Signs Last 24 Hrs  T(C): 36.8 (03 Aug 2023 05:03), Max: 36.8 (02 Aug 2023 20:36)  T(F): 98.3 (03 Aug 2023 05:03), Max: 98.3 (02 Aug 2023 20:36)  HR: 86 (03 Aug 2023 05:03) (86 - 93)  BP: 159/95 (03 Aug 2023 05:03) (140/82 - 159/95)  BP(mean): --  RR: 18 (03 Aug 2023 05:03) (18 - 18)  SpO2: 98% (03 Aug 2023 05:03) (96% - 98%)    Parameters below as of 03 Aug 2023 05:03  Patient On (Oxygen Delivery Method): room air      I&O's Summary        TELE: A fib 80-90s   PHYSICAL EXAM:  Constitutional: NAD, awake and alert  HEENT: Moist Mucous Membranes, Anicteric  Pulmonary: Non-labored, breath sounds are clear bilaterally, Diminished at bases No wheezing, rales or rhonchi  Cardiovascular: Irregular, S1 and S2, No murmurs, No rubs, gallops or clicks  Gastrointestinal:  soft, nontender, nondistended + perc enrique drain   Lymph: No peripheral edema. No lymphadenopathy.   Skin: No visible rashes or ulcers.  Psych:  Mood & affect appropriate      LABS: All Labs Reviewed:                        12.8   10.36 )-----------( 369      ( 03 Aug 2023 06:12 )             39.4                         12.8   10.45 )-----------( 355      ( 02 Aug 2023 06:47 )             39.2                         13.5   9.67  )-----------( 385      ( 01 Aug 2023 07:10 )             41.1     03 Aug 2023 06:12    140    |  109    |  13     ----------------------------<  101    3.6     |  26     |  0.67   02 Aug 2023 06:47    141    |  108    |  13     ----------------------------<  108    3.6     |  26     |  0.77   01 Aug 2023 07:10    141    |  109    |  10     ----------------------------<  106    3.7     |  26     |  0.74     Ca    8.3        03 Aug 2023 06:12  Ca    8.5        02 Aug 2023 06:47  Ca    8.9        01 Aug 2023 07:10  Phos  2.7       03 Aug 2023 06:12  Phos  3.1       01 Aug 2023 07:10  Mg     2.3       03 Aug 2023 06:12  Mg     2.3       01 Aug 2023 07:10    TPro  6.9    /  Alb  2.4    /  TBili  0.5    /  DBili  x      /  AST  64     /  ALT  95     /  AlkPhos  89     03 Aug 2023 06:12  TPro  6.7    /  Alb  2.4    /  TBili  0.4    /  DBili  x      /  AST  58     /  ALT  100    /  AlkPhos  93     02 Aug 2023 06:47  TPro  7.3    /  Alb  2.6    /  TBili  0.5    /  DBili  x      /  AST  70     /  ALT  113    /  AlkPhos  112    01 Aug 2023 07:10   LIVER FUNCTIONS - ( 03 Aug 2023 06:12 )  Alb: 2.4 g/dL / Pro: 6.9 g/dL / ALK PHOS: 89 U/L / ALT: 95 U/L / AST: 64 U/L / GGT: x           Creatine Kinase, Serum: 1848 U/L (07-27-23 @ 07:47)  Creatine Kinase, Serum: 5621 U/L (07-26-23 @ 03:00)  Troponin I, High Sensitivity Result: 76.1 ng/L (07-26-23 @ 03:00)  Creatine Kinase, Serum: 5502 U/L (07-25-23 @ 22:37)  Troponin I, High Sensitivity Result: 76.9 ng/L (07-25-23 @ 22:37)  Triiodothyronine, Total (T3 Total): 69 ng/dL (07-26-23 @ 03:00)  Cholesterol: 130 mg/dL (07-26-23 @ 03:00)  HDL Cholesterol: 48 mg/dL (07-26-23 @ 03:00)  Triglycerides, Serum: 74 mg/dL (07-26-23 @ 03:00)    12 Lead ECG:   Ventricular Rate 95 BPM    QRS Duration 70 ms    Q-T Interval 388 ms    QTC Calculation(Bazett) 487 ms    R Axis 38 degrees    T Axis 96 degrees    Diagnosis Line Atrial fibrillation  Nonspecific T wave abnormality  Prolonged QT  Abnormal ECG  Whencompared with ECG of 25-JUL-2023 20:54, (Unconfirmed)  Vent. rate has decreased BY  48 BPM  Confirmed by Feli Louie ([id number]) on 7/26/2023 10:10:05 AM (07-26-23 @ 02:03)      TRANSTHORACIC ECHOCARDIOGRAM REPORT  ________________________________________________________________________________                                      _______       Pt. Name:       CHAY VELEZ Study Date:    7/26/2023  MRN:            [id number]         YOB: 1945  Accession #:    [id number]        Age:           78 years  Account#:       [id number]       Gender:        F  Heart Rate:                      Height:        62.99 in (160.00 cm)  Rhythm:                          Weight:        160.93 lb (73.00 kg)  Blood Pressure: 132/80 mmHg      BSA/BMI:       1.76 m² / 28.52 kg/m²  ________________________________________________________________________________________  Referring Physician:    Delta Castro  Interpreting Physician: Feli Louie MD  Primary Sonographer:    Glory Gu WARREN    CPT:               ECHO TTE WO CON COMP W DOPP - 83852.m  Indication(s):     Unspecified atrial fibrillation - I48.91  Procedure:         Transthoracic echocardiogram with 2-D, M-mode and complete                     spectral and color flow Doppler.  Ordering Location: [facility]  Study Information: Image quality for this study is technically difficult.    _______________________________________________________________________________________  CONCLUSIONS:      1. Technically difficult image quality.   2. The left ventricular endocardium is not well visualized. Overall the LV function appears to be mildly reduced with an ejection fraction of 45%. Cannot rule out segmental abnormalities.   3. Normal right ventricular cavity size, normal right ventricular wall thickness and normal right ventricular systolic function.   4. The aortic valve is not well visualized, appears mildly calcified with grossly normal opening.   5. Trace mitral regurgitation.   6. Trace tricuspid regurgitation.   7. The inferior vena cava is normal in size measuring 2.14 cm in diameter, (normal <2.1cm) with normal inspiratory collapse (normal >50%) consistent with normal right atrial pressure (~3, range 0-5mmHg).    ________________________________________________________________________________________  FINDINGS:     Left Ventricle:  The left ventricular systolic function is mildly decreased. The left ventricular endocardium is not well visualized. Overall the LV function appears to be mildly reduced with an ejection fraction of 45%. Cannot rule out segmental abnormalities.     Right Ventricle:  Normal right ventricular cavity size, normal wall thickness and normal right ventricular systolic function.     Left Atrium:  The left atrium is normal in size.     Right Atrium:  The right atrium is normal in size.     Aortic Valve:  The aortic valve is not well visualized, appears mildly calcified with grossly normal opening. There is noevidence of aortic regurgitation.     Mitral Valve:  There is trace mitral regurgitation.     Tricuspid Valve:  There is trace tricuspid regurgitation.     Pulmonic Valve:  Structurally normal pulmonic valve with normal leaflet excursion. There is trace pulmonic regurgitation.     Systemic Veins:  The inferior vena cava is normal in size measuring 2.14 cm in diameter, (normal <2.1cm) with normal inspiratory collapse (normal >50%) consistent with normal right atrial pressure (~3, range 0-5mmHg).  ____________________________________________________________________  Quantitative Data:  Left Ventricle Measurements: (Indexed to BSA)     IVSd (2D):   1.1 cm  LVPWd (2D):  0.9 cm  LVIDd (2D):  4.9 cm  LVIDs (2D):  3.6 cm  LV Mass:     177 g  100.7 g/m²     MV E Vmax:    1.00 m/s  e' lateral:   16.40 cm/s  e' medial:    12.30 cm/s  E/e' lateral: 6.07  E/e' medial:  8.10  E/e' Average: 7.23  MV DT:        118 msec    Aorta Measurements:     Ao Sinus: 3.1 cm       Left Atrium Measurements: (Indexedto BSA)  LA Diam 2D: 3.40 cm       LVOT / RVOT/ Qp/Qs Data: (Indexed to BSA)  Mitral Valve Measurements:     MV E Vmax: 1.0 m/s       Tricuspid Valve Measurements:     RA Pressure: 3 mmHg    ________________________________________________________________________________________  Electronically signed on 7/27/2023 at 4:22:35 PM by Feli Louie MD  *** Final ***

## 2023-08-03 NOTE — H&P ADULT - NSHPLABSRESULTS_GEN_ALL_CORE
LABS:                        12.8   10.36 )-----------( [id number]      ( 03 Aug 2023 06:12 )             39.4     08-03    140  |  109<H>  |  13  ----------------------------<  101<H>  3.6   |  26  |  0.67    Ca    8.3<L>      03 Aug 2023 06:12  Phos  2.7     08-03  Mg     2.3     08-03    TPro  6.9  /  Alb  2.4<L>  /  TBili  0.5  /  DBili  x   /  AST  64<H>  /  ALT  95<H>  /  AlkPhos  89  08-03      Urinalysis Basic - ( 03 Aug 2023 06:12 )    Color: x / Appearance: x / SG: x / pH: x  Gluc: 101 mg/dL / Ketone: x  / Bili: x / Urobili: x   Blood: x / Protein: x / Nitrite: x   Leuk Esterase: x / RBC: x / WBC x   Sq Epi: x / Non Sq Epi: x / Bacteria: x    Imaging/Radiology:    MRI Head No Cont (8/1) - Acute right cerebellar infarct. There are no foci of susceptibility   artifact to suggest hemorrhage. No hemodynamically significant stenosis in the head or intracranial   aneurysm.    US Abdomen RUQ (7/26) - Cholelithiasis with a small amount of pericholecystic fluid and   nonspecific gallbladder wall thickening which can be secondary to   underlying gallbladder or liver disease; clinical correlation is   recommended for acute cholecystitis and a HIDA scan could be obtained for   further evaluation.    CT Head No Cont s/p fall (7/25) - Motion degraded exam. No gross acute intracranial hemorrhage, mass   effect, or acute osseous fracture. Moderate chronic ischemic changes in the frontoparietal white matte    CT Chest No Cont (7/25) - 1.8 cm pulmonary nodule right upper lobe. Several small subcentimeter right-sided pulmonary nodule. Findings compatible with lung cancer until proven otherwise. Consider PET/CT scan for further evaluation. Cholelithiasis with distended thick-walled gallbladder. Correlate for acute cholecystitis. LABS:                        12.8   10.36 )-----------( 369      ( 03 Aug 2023 06:12 )             39.4     08-03    140  |  109<H>  |  13  ----------------------------<  101<H>  3.6   |  26  |  0.67    Ca    8.3<L>      03 Aug 2023 06:12  Phos  2.7     08-03  Mg     2.3     08-03    TPro  6.9  /  Alb  2.4<L>  /  TBili  0.5  /  DBili  x   /  AST  64<H>  /  ALT  95<H>  /  AlkPhos  89  08-03      Urinalysis Basic - ( 03 Aug 2023 06:12 )    Color: x / Appearance: x / SG: x / pH: x  Gluc: 101 mg/dL / Ketone: x  / Bili: x / Urobili: x   Blood: x / Protein: x / Nitrite: x   Leuk Esterase: x / RBC: x / WBC x   Sq Epi: x / Non Sq Epi: x / Bacteria: x    Imaging/Radiology:    MRI Head No Cont (8/1) - Acute right cerebellar infarct. There are no foci of susceptibility   artifact to suggest hemorrhage. No hemodynamically significant stenosis in the head or intracranial   aneurysm.    US Abdomen RUQ (7/26) - Cholelithiasis with a small amount of pericholecystic fluid and   nonspecific gallbladder wall thickening which can be secondary to   underlying gallbladder or liver disease; clinical correlation is   recommended for acute cholecystitis and a HIDA scan could be obtained for   further evaluation.    CT Head No Cont s/p fall (7/25) - Motion degraded exam. No gross acute intracranial hemorrhage, mass   effect, or acute osseous fracture. Moderate chronic ischemic changes in the frontoparietal white matte    CT Chest No Cont (7/25) - 1.8 cm pulmonary nodule right upper lobe. Several small subcentimeter right-sided pulmonary nodule. Findings compatible with lung cancer until proven otherwise. Consider PET/CT scan for further evaluation. Cholelithiasis with distended thick-walled gallbladder. Correlate for acute cholecystitis.    CARDIOLOGY:  TTE (7/26)   CONCLUSIONS:   1. Technically difficult image quality.   2. The left ventricular endocardium is not well visualized. Overall the LV function appears to be mildly reduced with an ejection fraction of 45%. Cannot rule out segmental abnormalities.   3. Normal right ventricular cavity size, normal right ventricular wall thickness and normal right ventricular systolic function.   4. The aortic valve is not well visualized, appears mildly calcified with grossly normal opening.   5. Trace mitral regurgitation.   6. Trace tricuspid regurgitation.   7. The inferior vena cava is normal in size measuring 2.14 cm in diameter, (normal <2.1cm) with normal inspiratory collapse (normal >50%) consistent with normal right atrial pressure (~3, range 0-5mmHg).

## 2023-08-03 NOTE — H&P ADULT - HISTORY OF PRESENT ILLNESS
Karen Carlos is a 79 y/o female with a PMHx of HTN and HLD presenting to [facility] from [facility] s/p CVA (right cerebellum), cholelithiasis, rhabdomyolysis, and new onset A-fib during hospital course. The patient initially presented to the ED on 7/26 after unwitnessed fall and received CT on admission which incidentally revealed acute cholecystitis. Other imaging findings included RUQ US revealing cholelithiasis with a small amount of pericholecystic fluid and nonspecific gallbladder wall thickening s/p cholecystostomy with IR by surgery (8/27). Addressing w levofloxacin 750mg PO daily til 8/10, recommended that she F/U with Surgery within 1 week of discharge to discuss elective cholecystectomy in the future. Upon observed neurological changes during hospital course, patient was assessed by Neurology with +MRI imaging (8/1) for suspected right cerebellar infarct, thus started on Lipitor 80 mg daily. New onset A-fib (8/26) diagnosed by Cardiology - currently treating with daily metoprolol and diltiazem. Rhabdomyolysis addressed with IVF and trended CK. Karen Carlos is a 78 year old female with PMH of HTN, and HLD; who presented to [place] ED on 7/26 s/p fall from chair with + head strike. She was on the floor for over an hour after being able to call her daughter in law for assistance. Her ER workup was significant AFIB with RVR and rhabdomyolysis. In the ED she was given Cardizem IVP and her heart rate improved to the 70s,and IV fluids. She was admitted for further workup. A CT abdomen pelvis and RUQ US were significant for acute cholecystitis and cholelithiatisis with pericholecystic fluid and gallbladder wall thickening, respectively. Her HIDA scan resulted positive and a percutaneous cholecystectomy tube was placed by IR on 7/7.  Her blood cultures resulted positive for Strept Anginosus and was started on IV antibiotics. Cardiology was following for new onset AFIB and she was started on Metoprolol and Diltiazem for rate control and was anticoagulated with Eliquis.  IV fluids were administered for rhabdomyolysis. Her hospital course was complicaed by neurological changes/altered mental status and a A CT head was performed which resulted negative. An MRI of the head was significant for a R cerebellar infarct. PM&R was consulted and deemed her an appropriate candidate for IRF. She was medically stabilized and cleared for discharge to [facility].  Karen Carlos is a 78 year old female with PMH of HTN, and HLD; who presented to [place] ED on 7/26 s/p fall from chair with + head strike. She was on the floor for over an hour after being able to call her daughter in law for assistance. Her ER workup was significant AFIB with RVR and rhabdomyolysis. In the ED she was given Cardizem IVP and her heart rate improved to the 70s,and IV fluids. She was admitted for further workup. A RUQ US was significant for acute cholecystitis and cholelithiasis with pericholecystic fluid and gallbladder wall thickening. Her HIDA scan resulted positive and a percutaneous cholecystectomy tube was placed by IR on 7/7.  Her blood cultures resulted positive for Strept Anginosus and was started on IV antibiotics. Cardiology was following for new onset AFIB and she was started on Metoprolol and Diltiazem for rate control and was anticoagulated with Eliquis.  IV fluids were administered for rhabdomyolysis. Her hospital course was complicated by neurological changes/altered mental status and a A CT head was performed which resulted negative. An MRI of the head was significant for a R cerebellar infarct. PM&R was consulted and deemed her an appropriate candidate for IRF. She was medically stabilized and cleared for discharge to [facility].  Karen Carlos is a 78 year old female with PMH of HTN, and HLD; who presented to [place] ED on 7/26/23 s/p fall from chair with + head strike, on the floor for over an hour. ER workup was significant AFIB with RVR and rhabdomyolysis. She was given Cardizem IVP and IVF with improvement HR to the 70s. She was diagnosed with new onset AFIB, seen by cardiology and started on Metoprolol and Diltiazem for rate control and was anticoagulated with Eliquis.  IV fluids were administered for rhabdomyolysis.     RUQ US was significant for acute cholecystitis and cholelithiasis with pericholecystic fluid and gallbladder wall thickening. Her HIDA scan resulted positive and a percutaneous cholecystectomy tube was placed by IR on 7/7.  Her blood cultures resulted positive for Strept Anginosus for which she was started on IV antibiotics.     Her hospital course was complicated by neurological changes/altered mental status. CT head was performed which resulted negative; MRI of the head was significant for a R cerebellar infarct.    PM&R was consulted and deemed her an appropriate candidate for IRF. She was medically stabilized and cleared for discharge to [facility].  Karen Carlos is a 78 year old female RH dominant with PMH of HTN, and HLD; who presented to [place] ED on 7/26/23 s/p fall from chair with + head strike, on the floor for over an hour. ER workup was significant AFIB with RVR and rhabdomyolysis. She was given Cardizem IVP and IVF with improvement HR to the 70s. She was diagnosed with new onset AFIB, seen by cardiology and started on Metoprolol and Diltiazem for rate control and was anticoagulated with Eliquis.  IV fluids were administered for rhabdomyolysis.     RUQ US was significant for acute cholecystitis and cholelithiasis with pericholecystic fluid and gallbladder wall thickening. Her HIDA scan resulted positive and a percutaneous cholecystectomy tube was placed by IR on 7/7.  Her blood cultures resulted positive for Strept Anginosus for which she was started on IV antibiotics.     Her hospital course was complicated by neurological changes/altered mental status. CT head was performed which resulted negative; MRI of the head was significant for a R cerebellar infarct.    PM&R was consulted and deemed her an appropriate candidate for IRF. She was medically stabilized and cleared for discharge to [facility].

## 2023-08-03 NOTE — PROGRESS NOTE ADULT - NS ATTEND AMEND GEN_ALL_CORE FT
Once again this patient is status post cholecystostomy which appears to be draining nicely.  No abdominal pains or symptoms.  Cholecystostomy site remains clean with light bilious drainage to leg bag.    Having discussed the patient's case with the hospitalist it appears she has developed some weakness in the upper extremities and is being seen by physical therapy.  This is being worked up by neurology and thus far without any specific findings CT scan of the head has been performed and MRI remains pending.    Plan is for discharge to rehab once neurologic work-up is complete.    The patient should continue on oral antibiotics and treatment of her acute cholecystitis upon discharge.
now w HIDA +. AF is controlled. trops neg.   at this point will need emergent perc enrique by IR. Replete K. will require no further cardiac testing prior to emergent procedure.  routine hemodynamic monitoring suggested.   Further cardiac workup will depend on clinical course.
I have personally seen and examined the patient in detail.  I have spoken to the provider regarding the assessment and plan of care.  I have made changes to the note accordingly.
Now rate controlled and anticoagulated for a fib.  S/p Cholecystostomy which is draining well.  Plan for discharge as early as tomorrow once converted to PO anticoagulation.  Will need VNS for assistance with drain care at home upon discharge.
Optimize BB for HR control.  change Lovenox to DOAC.  To follow while admitted.  NO evidence of volume overload or uncontrolled arrhythmia.
Patient remains with periods of rapid AF overnight.  Would add low dose diltiazem and change to Eliquis when ok with surgery.  To follow while admitted.  At risk for abrupt decompensation.
Stable CV POV.  Continue management as above.  To follow while admitted.
s/p Perc Cholecystostomy for acute/chronic cholecystitis and cholelithiasis.  A fib seems better controlled.  Continues on Heparin which is to be transitioned to Oral Anticoagulation.  Cholecystostomy site intact.  No leaking.  Thin light brown/gold bile draining nicely to leg bag.  VNS to be arranged for assistance upon discharge.  Anticipate discharge Monday  F/u with me as outpatient to discuss elective cholecystectomy in approx 6-8 weeks once cleared by Cardiology.
AF now better rate controlled on BB and CCB. Started on ac yesterday. To follow while admitted.  At risk for abrupt decompensation
No evidence of vol OL or acute ischemia.  Continue management as above.  To follow while admitted.
Patient is having periods of AF with RVR.  Optimizing bb as above for HR control.  On Lovenox for AC.  To change to DOAC when able.  To follow while admitted. At risk for abrupt decompensation.

## 2023-08-04 LAB
ALBUMIN SERPL ELPH-MCNC: 2.5 G/DL — LOW (ref 3.3–5)
ALP SERPL-CCNC: 89 U/L — SIGNIFICANT CHANGE UP (ref 40–120)
ALT FLD-CCNC: 77 U/L — HIGH (ref 10–45)
ANION GAP SERPL CALC-SCNC: 8 MMOL/L — SIGNIFICANT CHANGE UP (ref 5–17)
APPEARANCE UR: CLEAR — SIGNIFICANT CHANGE UP
AST SERPL-CCNC: 52 U/L — HIGH (ref 10–40)
BASOPHILS # BLD AUTO: 0.06 K/UL — SIGNIFICANT CHANGE UP (ref 0–0.2)
BASOPHILS NFR BLD AUTO: 0.5 % — SIGNIFICANT CHANGE UP (ref 0–2)
BILIRUB SERPL-MCNC: 0.5 MG/DL — SIGNIFICANT CHANGE UP (ref 0.2–1.2)
BILIRUB UR-MCNC: NEGATIVE — SIGNIFICANT CHANGE UP
BUN SERPL-MCNC: 14 MG/DL — SIGNIFICANT CHANGE UP (ref 7–23)
CALCIUM SERPL-MCNC: 8.7 MG/DL — SIGNIFICANT CHANGE UP (ref 8.4–10.5)
CHLORIDE SERPL-SCNC: 106 MMOL/L — SIGNIFICANT CHANGE UP (ref 96–108)
CK SERPL-CCNC: 109 U/L — SIGNIFICANT CHANGE UP (ref 25–170)
CO2 SERPL-SCNC: 27 MMOL/L — SIGNIFICANT CHANGE UP (ref 22–31)
COLOR SPEC: YELLOW — SIGNIFICANT CHANGE UP
CREAT SERPL-MCNC: 0.82 MG/DL — SIGNIFICANT CHANGE UP (ref 0.5–1.3)
DIFF PNL FLD: NEGATIVE — SIGNIFICANT CHANGE UP
EGFR: 74 ML/MIN/1.73M2 — SIGNIFICANT CHANGE UP
EOSINOPHIL # BLD AUTO: 0.16 K/UL — SIGNIFICANT CHANGE UP (ref 0–0.5)
EOSINOPHIL NFR BLD AUTO: 1.3 % — SIGNIFICANT CHANGE UP (ref 0–6)
GLUCOSE SERPL-MCNC: 97 MG/DL — SIGNIFICANT CHANGE UP (ref 70–99)
GLUCOSE UR QL: NEGATIVE MG/DL — SIGNIFICANT CHANGE UP
HCT VFR BLD CALC: 42.5 % — SIGNIFICANT CHANGE UP (ref 34.5–45)
HGB BLD-MCNC: 14 G/DL — SIGNIFICANT CHANGE UP (ref 11.5–15.5)
IMM GRANULOCYTES NFR BLD AUTO: 0.8 % — SIGNIFICANT CHANGE UP (ref 0–0.9)
KETONES UR-MCNC: NEGATIVE MG/DL — SIGNIFICANT CHANGE UP
LEUKOCYTE ESTERASE UR-ACNC: NEGATIVE — SIGNIFICANT CHANGE UP
LYMPHOCYTES # BLD AUTO: 1.28 K/UL — SIGNIFICANT CHANGE UP (ref 1–3.3)
LYMPHOCYTES # BLD AUTO: 10.7 % — LOW (ref 13–44)
MCHC RBC-ENTMCNC: 31 PG — SIGNIFICANT CHANGE UP (ref 27–34)
MCHC RBC-ENTMCNC: 32.9 GM/DL — SIGNIFICANT CHANGE UP (ref 32–36)
MCV RBC AUTO: 94 FL — SIGNIFICANT CHANGE UP (ref 80–100)
MONOCYTES # BLD AUTO: 0.74 K/UL — SIGNIFICANT CHANGE UP (ref 0–0.9)
MONOCYTES NFR BLD AUTO: 6.2 % — SIGNIFICANT CHANGE UP (ref 2–14)
NEUTROPHILS # BLD AUTO: 9.63 K/UL — HIGH (ref 1.8–7.4)
NEUTROPHILS NFR BLD AUTO: 80.5 % — HIGH (ref 43–77)
NITRITE UR-MCNC: NEGATIVE — SIGNIFICANT CHANGE UP
NRBC # BLD: 0 /100 WBCS — SIGNIFICANT CHANGE UP (ref 0–0)
PH UR: 6.5 — SIGNIFICANT CHANGE UP (ref 5–8)
PLATELET # BLD AUTO: 361 K/UL — SIGNIFICANT CHANGE UP (ref 150–400)
POTASSIUM SERPL-MCNC: 4 MMOL/L — SIGNIFICANT CHANGE UP (ref 3.5–5.3)
POTASSIUM SERPL-SCNC: 4 MMOL/L — SIGNIFICANT CHANGE UP (ref 3.5–5.3)
PROT SERPL-MCNC: 7.4 G/DL — SIGNIFICANT CHANGE UP (ref 6–8.3)
PROT UR-MCNC: NEGATIVE MG/DL — SIGNIFICANT CHANGE UP
RBC # BLD: 4.52 M/UL — SIGNIFICANT CHANGE UP (ref 3.8–5.2)
RBC # FLD: 13.3 % — SIGNIFICANT CHANGE UP (ref 10.3–14.5)
RBC CASTS # UR COMP ASSIST: 0 /HPF — SIGNIFICANT CHANGE UP (ref 0–4)
SODIUM SERPL-SCNC: 141 MMOL/L — SIGNIFICANT CHANGE UP (ref 135–145)
SP GR SPEC: 1.01 — SIGNIFICANT CHANGE UP (ref 1–1.03)
UROBILINOGEN FLD QL: 0.2 MG/DL — SIGNIFICANT CHANGE UP (ref 0.2–1)
WBC # BLD: 11.97 K/UL — HIGH (ref 3.8–10.5)
WBC # FLD AUTO: 11.97 K/UL — HIGH (ref 3.8–10.5)
WBC UR QL: 0 /HPF — SIGNIFICANT CHANGE UP (ref 0–5)

## 2023-08-04 PROCEDURE — 99223 1ST HOSP IP/OBS HIGH 75: CPT

## 2023-08-04 RX ORDER — LEVOFLOXACIN 5 MG/ML
1 INJECTION, SOLUTION INTRAVENOUS
Qty: 8 | Refills: 0
Start: 2023-08-04

## 2023-08-04 RX ADMIN — APIXABAN 5 MILLIGRAM(S): 2.5 TABLET, FILM COATED ORAL at 17:36

## 2023-08-04 RX ADMIN — SENNA PLUS 2 TABLET(S): 8.6 TABLET ORAL at 21:02

## 2023-08-04 RX ADMIN — POLYETHYLENE GLYCOL 3350 17 GRAM(S): 17 POWDER, FOR SOLUTION ORAL at 11:47

## 2023-08-04 RX ADMIN — Medication 100 MILLIGRAM(S): at 05:55

## 2023-08-04 RX ADMIN — ATORVASTATIN CALCIUM 80 MILLIGRAM(S): 80 TABLET, FILM COATED ORAL at 21:02

## 2023-08-04 RX ADMIN — APIXABAN 5 MILLIGRAM(S): 2.5 TABLET, FILM COATED ORAL at 05:55

## 2023-08-04 NOTE — DIETITIAN INITIAL EVALUATION ADULT - PERTINENT MEDS FT
MEDICATIONS  (STANDING):  apixaban 5 milliGRAM(s) Oral every 12 hours  atorvastatin 80 milliGRAM(s) Oral at bedtime  diltiazem    milliGRAM(s) Oral daily  levoFLOXacin  Tablet 750 milliGRAM(s) Oral every 24 hours  metoprolol succinate  milliGRAM(s) Oral daily  polyethylene glycol 3350 17 Gram(s) Oral daily  senna 2 Tablet(s) Oral at bedtime    MEDICATIONS  (PRN):  acetaminophen     Tablet .. 650 milliGRAM(s) Oral every 6 hours PRN Temp greater or equal to 38C (100.4F)  acetaminophen     Tablet .. 650 milliGRAM(s) Oral every 6 hours PRN Mild Pain (1 - 3)  aluminum hydroxide/magnesium hydroxide/simethicone Suspension 30 milliLiter(s) Oral every 4 hours PRN Dyspepsia  bisacodyl Suppository 10 milliGRAM(s) Rectal daily PRN Constipation  melatonin 3 milliGRAM(s) Oral at bedtime PRN Insomnia

## 2023-08-04 NOTE — CONSULT NOTE ADULT - ASSESSMENT
CHAY VELEZ is a 78 year old female with PMH HTN, and HLD who presented to [place] ED 7/26/23 s/p fall from chair with + head strike. She was found to have new onset AFIB with RVR and Rhabdomyolysis. Her hospital course was complicated by acute cholecystitis and cholelithiasis requiring percutaneous enrique drain via IR on 7/7, Bacteremia, and cerebellar infarct confirmed on MRI. Patient now admitted for a multidisciplinary rehab program. 08-03-23 @ 15:40    #H/o right cerebellar CVA  - MRI 8/1: There is an acute right cerebellar infarct. There are no foci of susceptibility artifact to suggest hemorrhage. Scattered foci of T2/FLAIR hyperintensity in the bilateral hemispheric white matter are nonspecific but likely related to chronic white matter microvascular ischemic disease. The ventricles are normal in size for patient's age  - ASA and Atorvastatin    # New AFib RVR  # HTN  - Metoprolol 100mg daily  - Diltiazem CD 120mg daily  - Eliquis 5 q12hrs    # Rhabdomyolysis-resolved  - S/p IVF  - Creatine kinase: 5502 7/25--> 5621--> 1848 7/27  - encourage po fluids, repeat CK in AM 8/4    #  Cholecystitis   - Levofloxacin daily - last dose 8/10  - s/p percutaneous cholecystectomy 2/2 cholelithiasis via IR on 7/7   - monitor output q shift  -  Outpatient follow up for eventual cholecystectomy     # RUL Lung nodule found on CT chest (7/25)  - Incidental finding   - F/U PCP outpatient for repeat CT    DVT PPX: Eliquis    CHAY VELEZ is a 78 year old female with PMH HTN, and HLD who presented to [place] ED 7/26/23 s/p fall from chair with + head strike. She was found to have new onset AFIB with RVR and Rhabdomyolysis. Her hospital course was complicated by acute cholecystitis and cholelithiasis requiring percutaneous enrique drain via IR on 7/7, Bacteremia, and cerebellar infarct confirmed on MRI. Patient now admitted for a multidisciplinary rehab program. 08-03-23 @ 15:40    #H/o right cerebellar CVA  - MRI 8/1: There is an acute right cerebellar infarct. There are no foci of susceptibility artifact to suggest hemorrhage. Scattered foci of T2/FLAIR hyperintensity in the bilateral hemispheric white matter are nonspecific but likely related to chronic white matter microvascular ischemic disease. The ventricles are normal in size for patient's age  - ASA and Atorvastatin    # New AFib RVR, improved  # HTN  - Metoprolol XL 100mg daily  - Diltiazem CD 120mg daily  - Eliquis 5 q12hrs    # Rhabdomyolysis-resolved  - S/p IVF  - Creatine kinase: 5502 7/25--> 5621--> 1848 7/27  - encourage po fluids, repeat CK in AM 8/4    #  Cholecystitis   - Levofloxacin daily - last dose 8/10  - s/p percutaneous cholecystectomy 2/2 cholelithiasis via IR on 7/7   - monitor output q shift  -  Outpatient follow up for eventual cholecystectomy     # RUL Lung nodule found on CT chest (7/25)  - Incidental finding   - F/U PCP outpatient for repeat CT    DVT PPX: Eliquis

## 2023-08-04 NOTE — DIETITIAN INITIAL EVALUATION ADULT - ORAL INTAKE PTA/DIET HISTORY
Pt endorses good appetite PTA. NKFA. No chewing/swallowing issues. Dislikes eggs. Lives alone with daughters nearby- grandson helps with grocery shopping. Diet recall (per pt):   B: cold cereal with milk or pancakes , fresh fruit  L:  Slim Fast, water and fresh fruit. Pt reports she does not take Slim Fast for diet purposes but because it is quick easy and nutritious  D: salad or beef/chicken/fish with a vegetable

## 2023-08-04 NOTE — CONSULT NOTE ADULT - SUBJECTIVE AND OBJECTIVE BOX
Karen Carlos is a 78 year old female with PMH of HTN, and HLD; who presented to [facility] ED on 7/26/23 s/p fall from chair with + head strike, on the floor for over an hour. ER workup was significant AFIB with RVR and rhabdomyolysis. She was given Cardizem IVP and IVF with improvement HR to the 70s. She was diagnosed with new onset AFIB, seen by cardiology and started on Metoprolol and Diltiazem for rate control and was anticoagulated with Eliquis.  IV fluids were administered for rhabdomyolysis.     RUQ US was significant for acute cholecystitis and cholelithiasis with pericholecystic fluid and gallbladder wall thickening. Her HIDA scan resulted positive and a percutaneous cholecystectomy tube was placed by IR on 7/7.  Her blood cultures resulted positive for Strept Anginosus for which she was started on IV antibiotics.     Her hospital course was complicated by neurological changes/altered mental status. CT head was performed which resulted negative; MRI of the head was significant for a R cerebellar infarct.    PM&R was consulted and deemed her an appropriate candidate for IRF. She was medically stabilized and cleared for discharge to [facility].        PAST MEDICAL & SURGICAL HISTORY:  HTN (hypertension)  Hyperlipidemia  Tinnitus  right ear  Seasonal allergies  S/P knee replacement    FAMILY HISTORY  Father: - at age - with history of   Mother: - at age - with history of     SOCIAL HISTORY  Substance Use (street drugs): (  ) never used  (  ) other:  Tobacco Usage:  (   ) never smoked   (   ) former smoker   (   ) current smoker  (     ) pack year  Alcohol Usage:  Sexual History:   Recent Travel:    Allergies  No Known Allergies  Intolerances    REVIEW OF SYSTEMS:  CONSTITUTIONAL: No fever, weight loss, or fatigue  EYES: No eye pain, visual disturbances, or discharge  ENMT:  No difficulty hearing, tinnitus, vertigo; No sinus or throat pain  NECK: No pain or stiffness  BREASTS: No pain, masses, or nipple discharge  RESPIRATORY: No cough, wheezing, chills or hemoptysis; No shortness of breath  CARDIOVASCULAR: No chest pain, palpitations, dizziness, or leg swelling  GASTROINTESTINAL: No abdominal or epigastric pain. No nausea, vomiting, or hematemesis; No diarrhea or constipation. No melena or hematochezia.  GENITOURINARY: No dysuria, frequency, hematuria, or incontinence  NEUROLOGICAL: No headaches, memory loss, loss of strength, numbness, or tremors  SKIN: No itching, burning, rashes, or lesions   LYMPH NODES: No enlarged glands  ENDOCRINE: No heat or cold intolerance; No hair loss  MUSCULOSKELETAL: No joint pain or swelling; No muscle, back, or extremity pain  PSYCHIATRIC: No depression, anxiety, mood swings, or difficulty sleeping  HEME/LYMPH: No easy bruising, or bleeding gums  ALLERY AND IMMUNOLOGIC: No hives or eczema    ALL ROS REVIEWED AND NORMAL EXCEPT AS STATED ABOVE    T(C): 36.4 (08-03-23 @ 20:34), Max: 36.6 (08-03-23 @ 19:48)  HR: 97 (08-04-23 @ 05:52) (84 - 97)  BP: 180/92 (08-04-23 @ 05:52) (123/56 - 180/92)  RR: 16 (08-03-23 @ 20:34) (16 - 18)  SpO2: 98% (08-03-23 @ 20:34) (94% - 98%)  Wt(kg): --Vital Signs Last 24 Hrs  T(C): 36.4 (03 Aug 2023 20:34), Max: 36.6 (03 Aug 2023 19:48)  T(F): 97.6 (03 Aug 2023 20:34), Max: 97.8 (03 Aug 2023 19:48)  HR: 97 (04 Aug 2023 05:52) (84 - 97)  BP: 180/92 (04 Aug 2023 05:52) (123/56 - 180/92)  BP(mean): --  RR: 16 (03 Aug 2023 20:34) (16 - 18)  SpO2: 98% (03 Aug 2023 20:34) (94% - 98%)    PHYSICAL EXAM:  GENERAL: NAD, well-groomed, well-developed  HEAD:  Atraumatic, Normocephalic  EYES: EOMI, PERRLA, conjunctiva and sclera clear  ENMT: No tonsillar erythema, exudates, or enlargement; Moist mucous membranes, Good dentition, No lesions  NECK: Supple, No JVD, Normal thyroid  NERVOUS SYSTEM:  Alert & Oriented X3, Good concentration; Motor Strength 5/5 B/L upper and lower extremities; DTRs 2+ intact and symmetric  CHEST/LUNG: Clear to percussion bilaterally; No rales, rhonchi, wheezing, or rubs  HEART: Regular rate and rhythm; No murmurs, rubs, or gallops  ABDOMEN: Soft, Nontender, Nondistended; Bowel sounds present  EXTREMITIES:  2+ Peripheral Pulses, No clubbing, cyanosis, or edema  LYMPH: No lymphadenopathy noted  SKIN: No rashes or lesions    LABS:                        14.0   11.97 )-----------( 361      ( 04 Aug 2023 05:43 )             42.5     08-04    141  |  106  |  14  ----------------------------<  97  4.0   |  27  |  0.82    Ca    8.7      04 Aug 2023 05:43  Phos  2.7     08-03  Mg     2.3     08-03    TPro  7.4  /  Alb  2.5<L>  /  TBili  0.5  /  DBili  x   /  AST  52<H>  /  ALT  77<H>  /  AlkPhos  89  08-04      Urinalysis Basic - ( 04 Aug 2023 05:43 )    Color: x / Appearance: x / SG: x / pH: x  Gluc: 97 mg/dL / Ketone: x  / Bili: x / Urobili: x   Blood: x / Protein: x / Nitrite: x   Leuk Esterase: x / RBC: x / WBC x   Sq Epi: x / Non Sq Epi: x / Bacteria: x       CAPILLARY BLOOD GLUCOSE    Urinalysis Basic - ( 04 Aug 2023 05:43 )    Color: x / Appearance: x / SG: x / pH: x  Gluc: 97 mg/dL / Ketone: x  / Bili: x / Urobili: x   Blood: x / Protein: x / Nitrite: x   Leuk Esterase: x / RBC: x / WBC x   Sq Epi: x / Non Sq Epi: x / Bacteria: x    RADIOLOGY & ADDITIONAL TESTS:  MR Angio Head No Cont (08.01.23 @ 19:28) >  IMPRESSION:    Acute right cerebellar infarct. There are no foci of susceptibility   artifact to suggest hemorrhage.    No hemodynamically significant stenosis in the head or intracranial   aneurysm.    Consultant(s) Notes Reviewed:  [x ] YES  [ ] NO  Care Discussed with Consultants/Other Providers [ x] YES  [ ] NO  Imaging Personally Reviewed:  [ ] YES  [ x] NO Karen Carlos is a 78 year old female with PMH of HTN, and HLD; who presented to [facility] ED on 7/26/23 s/p fall from chair with + head strike, on the floor for over an hour. ER workup was significant AFIB with RVR and rhabdomyolysis. She was given Cardizem IVP and IVF with improvement HR to the 70s. She was diagnosed with new onset AFIB, seen by cardiology and started on Metoprolol and Diltiazem for rate control and was anticoagulated with Eliquis.  IV fluids were administered for rhabdomyolysis.     RUQ US was significant for acute cholecystitis and cholelithiasis with pericholecystic fluid and gallbladder wall thickening. Her HIDA scan resulted positive and a percutaneous cholecystectomy tube was placed by IR on 7/7.  Her blood cultures resulted positive for Strept Anginosus for which she was started on IV antibiotics.     Her hospital course was complicated by neurological changes/altered mental status. CT head was performed which resulted negative; MRI of the head was significant for a R cerebellar infarct.    PM&R was consulted and deemed her an appropriate candidate for IRF. She was medically stabilized and cleared for discharge to [facility].      Noted that patient had urinary retention overnight; 1000ml and 1200ml  BM yesterday  Denies chest pain, SOB      PAST MEDICAL & SURGICAL HISTORY:  HTN (hypertension)  Hyperlipidemia  Tinnitus  right ear  Seasonal allergies  S/P knee replacement    FAMILY HISTORY  mother passed away from a heart attack at age 80  Father passed away from unknown causes in his 70's    SOCIAL HISTORY  Substance Use (street drugs): (  x) never used  (  ) other:  Tobacco Usage:  ( x  ) never smoked   (   ) former smoker   (   ) current smoker  (     ) pack year  Alcohol Usage:Denies  Sexual History: Denies  Recent Travel:Denies    Allergies  No Known Allergies  Intolerances    REVIEW OF SYSTEMS:  CONSTITUTIONAL: No fever, weight loss, or fatigue  EYES: No eye pain, visual disturbances, or discharge  ENMT:  No difficulty hearing, tinnitus, vertigo; No sinus or throat pain  NECK: No pain or stiffness  BREASTS: No pain, masses, or nipple discharge  RESPIRATORY: No cough, wheezing, chills or hemoptysis; No shortness of breath  CARDIOVASCULAR: No chest pain, palpitations, dizziness, or leg swelling  GASTROINTESTINAL: No abdominal or epigastric pain. No nausea, vomiting, or hematemesis; No diarrhea or constipation. No melena or hematochezia.  GENITOURINARY: No dysuria, frequency, hematuria, or incontinence  NEUROLOGICAL: No headaches, memory loss, loss of strength, numbness, or tremors  SKIN: No itching, burning, rashes, or lesions   LYMPH NODES: No enlarged glands  ENDOCRINE: No heat or cold intolerance; No hair loss  MUSCULOSKELETAL: No joint pain or swelling; No muscle, back, or extremity pain  PSYCHIATRIC: No depression, anxiety, mood swings, or difficulty sleeping  HEME/LYMPH: No easy bruising, or bleeding gums  ALLERY AND IMMUNOLOGIC: No hives or eczema    ALL ROS REVIEWED AND NORMAL EXCEPT AS STATED ABOVE    T(C): 36.4 (08-03-23 @ 20:34), Max: 36.6 (08-03-23 @ 19:48)  HR: 97 (08-04-23 @ 05:52) (84 - 97)  BP: 180/92 (08-04-23 @ 05:52) (123/56 - 180/92)  RR: 16 (08-03-23 @ 20:34) (16 - 18)  SpO2: 98% (08-03-23 @ 20:34) (94% - 98%)  Wt(kg): --Vital Signs Last 24 Hrs  T(C): 36.4 (03 Aug 2023 20:34), Max: 36.6 (03 Aug 2023 19:48)  T(F): 97.6 (03 Aug 2023 20:34), Max: 97.8 (03 Aug 2023 19:48)  HR: 97 (04 Aug 2023 05:52) (84 - 97)  BP: 180/92 (04 Aug 2023 05:52) (123/56 - 180/92)  BP(mean): --  RR: 16 (03 Aug 2023 20:34) (16 - 18)  SpO2: 98% (03 Aug 2023 20:34) (94% - 98%)    PHYSICAL EXAM:  GENERAL: NAD, well-groomed, well-developed  HEAD:  Atraumatic, Normocephalic  EYES: EOMI, PERRLA, conjunctiva and sclera clear  ENMT: No tonsillar erythema, exudates, or enlargement; Moist mucous membranes, Good dentition, No lesions  NECK: Supple, No JVD, Normal thyroid  NERVOUS SYSTEM:  Alert & Oriented  CHEST/LUNG: Clear to percussion bilaterally; No rales, rhonchi, wheezing, or rubs  HEART: Regular rate and rhythm; No murmurs, rubs, or gallops  ABDOMEN: Soft, Nontender, Nondistended; Bowel sounds present  EXTREMITIES:  2+ Peripheral Pulses, No clubbing, cyanosis, or edema  LYMPH: No lymphadenopathy noted  SKIN: No rashes or lesions    LABS:                        14.0   11.97 )-----------( 361      ( 04 Aug 2023 05:43 )             42.5     08-04    141  |  106  |  14  ----------------------------<  97  4.0   |  27  |  0.82    Ca    8.7      04 Aug 2023 05:43  Phos  2.7     08-03  Mg     2.3     08-03    TPro  7.4  /  Alb  2.5<L>  /  TBili  0.5  /  DBili  x   /  AST  52<H>  /  ALT  77<H>  /  AlkPhos  89  08-04      Urinalysis Basic - ( 04 Aug 2023 05:43 )    Color: x / Appearance: x / SG: x / pH: x  Gluc: 97 mg/dL / Ketone: x  / Bili: x / Urobili: x   Blood: x / Protein: x / Nitrite: x   Leuk Esterase: x / RBC: x / WBC x   Sq Epi: x / Non Sq Epi: x / Bacteria: x    CAPILLARY BLOOD GLUCOSE  Urinalysis Basic - ( 04 Aug 2023 05:43 )  Color: x / Appearance: x / SG: x / pH: x  Gluc: 97 mg/dL / Ketone: x  / Bili: x / Urobili: x   Blood: x / Protein: x / Nitrite: x   Leuk Esterase: x / RBC: x / WBC x   Sq Epi: x / Non Sq Epi: x / Bacteria: x    RADIOLOGY & ADDITIONAL TESTS:  MR Angio Head No Cont (08.01.23 @ 19:28) >  IMPRESSION:    Acute right cerebellar infarct. There are no foci of susceptibility   artifact to suggest hemorrhage.    No hemodynamically significant stenosis in the head or intracranial   aneurysm.    Consultant(s) Notes Reviewed:  [x ] YES  [ ] NO  Care Discussed with Consultants/Other Providers [ x] YES  [ ] NO  Imaging Personally Reviewed:  [ ] YES  [ x] NO

## 2023-08-04 NOTE — DIETITIAN INITIAL EVALUATION ADULT - OTHER INFO
Pt is a 78 year old female with PMH HTN, and HLD who presented to [facility] ED 7/26/23 s/p fall from chair with + head strike. She was found to have new onset AFIB with RVR and Rhabdomyolysis. Her hospital course was complicated by acute cholecystitis and cholelithiasis requiring percutaneous enrique drain via IR on 7/7, Bacteremia, and cerebellar infarct confirmed on MRI.    Pt tolerating diet with fair appetite, observed with po intake <50% at lunch. Able to self feed, requires assistance with tray set up. Pt unsure of UBW, denies any recent weight changes. Pt receptive to adding Ensure in setting of decreased appetite. +urinary rention. Small BM on 8/3. Pt denies N/V/D, constipation. Change diet to DASH/TLC given hx HTN/HLD, new onset Afib.

## 2023-08-05 LAB
HCT VFR BLD CALC: 42.9 % — SIGNIFICANT CHANGE UP (ref 34.5–45)
HGB BLD-MCNC: 13.8 G/DL — SIGNIFICANT CHANGE UP (ref 11.5–15.5)
MCHC RBC-ENTMCNC: 30.5 PG — SIGNIFICANT CHANGE UP (ref 27–34)
MCHC RBC-ENTMCNC: 32.2 GM/DL — SIGNIFICANT CHANGE UP (ref 32–36)
MCV RBC AUTO: 94.7 FL — SIGNIFICANT CHANGE UP (ref 80–100)
NRBC # BLD: 0 /100 WBCS — SIGNIFICANT CHANGE UP (ref 0–0)
PLATELET # BLD AUTO: 362 K/UL — SIGNIFICANT CHANGE UP (ref 150–400)
RBC # BLD: 4.53 M/UL — SIGNIFICANT CHANGE UP (ref 3.8–5.2)
RBC # FLD: 13.3 % — SIGNIFICANT CHANGE UP (ref 10.3–14.5)
WBC # BLD: 10.8 K/UL — HIGH (ref 3.8–10.5)
WBC # FLD AUTO: 10.8 K/UL — HIGH (ref 3.8–10.5)

## 2023-08-05 PROCEDURE — 99232 SBSQ HOSP IP/OBS MODERATE 35: CPT | Mod: GC

## 2023-08-05 RX ADMIN — APIXABAN 5 MILLIGRAM(S): 2.5 TABLET, FILM COATED ORAL at 05:43

## 2023-08-05 RX ADMIN — Medication 3 MILLIGRAM(S): at 23:03

## 2023-08-05 RX ADMIN — APIXABAN 5 MILLIGRAM(S): 2.5 TABLET, FILM COATED ORAL at 18:12

## 2023-08-05 RX ADMIN — Medication 120 MILLIGRAM(S): at 05:43

## 2023-08-05 RX ADMIN — SENNA PLUS 2 TABLET(S): 8.6 TABLET ORAL at 21:37

## 2023-08-05 RX ADMIN — ATORVASTATIN CALCIUM 80 MILLIGRAM(S): 80 TABLET, FILM COATED ORAL at 21:36

## 2023-08-05 RX ADMIN — Medication 100 MILLIGRAM(S): at 05:43

## 2023-08-05 NOTE — PROGRESS NOTE ADULT - SUBJECTIVE AND OBJECTIVE BOX
Cc: Gait dysfunction    HPI: Patient with no new medical issues today.  as per nursing minimal drainage enrique bag  + urinary retention  PVR- 650 at night, 330 in am   Pain controlled, no chest pain, no N/V, no Fevers/Chills. No other new ROS  Has been tolerating rehabilitation program.    acetaminophen     Tablet .. 650 milliGRAM(s) Oral every 6 hours PRN  acetaminophen     Tablet .. 650 milliGRAM(s) Oral every 6 hours PRN  aluminum hydroxide/magnesium hydroxide/simethicone Suspension 30 milliLiter(s) Oral every 4 hours PRN  apixaban 5 milliGRAM(s) Oral every 12 hours  atorvastatin 80 milliGRAM(s) Oral at bedtime  bisacodyl Suppository 10 milliGRAM(s) Rectal daily PRN  diltiazem    milliGRAM(s) Oral daily  levoFLOXacin  Tablet 750 milliGRAM(s) Oral every 24 hours  melatonin 3 milliGRAM(s) Oral at bedtime PRN  metoprolol succinate  milliGRAM(s) Oral daily  polyethylene glycol 3350 17 Gram(s) Oral daily  senna 2 Tablet(s) Oral at bedtime      T(C): 36.3 (08-05-23 @ 08:29), Max: 36.7 (08-04-23 @ 19:16)  HR: 97 (08-05-23 @ 08:29) (90 - 97)  BP: 166/85 (08-05-23 @ 08:29) (133/98 - 166/85)  RR: 16 (08-05-23 @ 08:29) (16 - 16)  SpO2: 99% (08-05-23 @ 08:29) (98% - 99%)    In NAD  HEENT- EOMI  Heart- RRR, S1S2  Lungs- CTA bl.  Abd- + BS, NT  Ext- No calf pain  Neuro- Exam unchanged          Imp: Patient with diagnosis of CVA  admitted for comprehensive acute rehabilitation.    Plan:  - Continue PT/OT/SLP  - DVT prophylaxis  - Skin- Turn q2h, check skin daily  - Continue current medications; patient medically stable.   -Active issues-   1. HTN - continue to monitor, may need to add another agent  2. Urinary retention - continue checking bladder scans, IC   - Patient is stable to continue current rehabilitation program.

## 2023-08-06 PROCEDURE — 99232 SBSQ HOSP IP/OBS MODERATE 35: CPT | Mod: GC

## 2023-08-06 RX ADMIN — Medication 120 MILLIGRAM(S): at 05:29

## 2023-08-06 RX ADMIN — SENNA PLUS 2 TABLET(S): 8.6 TABLET ORAL at 21:18

## 2023-08-06 RX ADMIN — APIXABAN 5 MILLIGRAM(S): 2.5 TABLET, FILM COATED ORAL at 05:29

## 2023-08-06 RX ADMIN — ATORVASTATIN CALCIUM 80 MILLIGRAM(S): 80 TABLET, FILM COATED ORAL at 21:17

## 2023-08-06 RX ADMIN — Medication 100 MILLIGRAM(S): at 05:29

## 2023-08-06 RX ADMIN — POLYETHYLENE GLYCOL 3350 17 GRAM(S): 17 POWDER, FOR SOLUTION ORAL at 11:21

## 2023-08-06 RX ADMIN — APIXABAN 5 MILLIGRAM(S): 2.5 TABLET, FILM COATED ORAL at 17:10

## 2023-08-06 NOTE — PROGRESS NOTE ADULT - SUBJECTIVE AND OBJECTIVE BOX
Cc: Gait dysfunction    HPI: Patient with no new medical issues today.  as per nursing minimal drainage enrique bag  + urinary retention  PVR- 850 this am but voiding on own   Pain controlled, no chest pain, no N/V, no Fevers/Chills. No other new ROS  Has been tolerating rehabilitation program.    acetaminophen     Tablet .. 650 milliGRAM(s) Oral every 6 hours PRN  acetaminophen     Tablet .. 650 milliGRAM(s) Oral every 6 hours PRN  aluminum hydroxide/magnesium hydroxide/simethicone Suspension 30 milliLiter(s) Oral every 4 hours PRN  apixaban 5 milliGRAM(s) Oral every 12 hours  atorvastatin 80 milliGRAM(s) Oral at bedtime  bisacodyl Suppository 10 milliGRAM(s) Rectal daily PRN  diltiazem    milliGRAM(s) Oral daily  levoFLOXacin  Tablet 750 milliGRAM(s) Oral every 24 hours  melatonin 3 milliGRAM(s) Oral at bedtime PRN  metoprolol succinate  milliGRAM(s) Oral daily  polyethylene glycol 3350 17 Gram(s) Oral daily  senna 2 Tablet(s) Oral at bedtime      Vital Signs Last 24 Hrs  T(C): 36.9 (06 Aug 2023 08:15), Max: 36.9 (06 Aug 2023 08:15)  T(F): 98.4 (06 Aug 2023 08:15), Max: 98.4 (06 Aug 2023 08:15)  HR: 99 (06 Aug 2023 08:15) (96 - 99)  BP: 137/87 (06 Aug 2023 08:15) (133/85 - 144/86)  BP(mean): --  RR: 16 (06 Aug 2023 08:15) (16 - 16)  SpO2: 96% (06 Aug 2023 08:15) (96% - 97%)    Parameters below as of 06 Aug 2023 08:15  Patient On (Oxygen Delivery Method): room air        In NAD  HEENT- EOMI  Heart- RRR, S1S2  Lungs- CTA bl.  Abd- + BS, NT  Ext- No calf pain  Neuro- Exam unchanged          Imp: Patient with diagnosis of CVA  admitted for comprehensive acute rehabilitation.    Plan:  - Continue PT/OT/SLP  - DVT prophylaxis  - Skin- Turn q2h, check skin daily  - Continue current medications; patient medically stable.   -Active issues-   1. HTN - better controlled   2. Urinary retention - continue checking bladder scans, IC   - Patient is stable to continue current rehabilitation program.

## 2023-08-07 ENCOUNTER — TRANSCRIPTION ENCOUNTER (OUTPATIENT)
Age: 78
End: 2023-08-07

## 2023-08-07 DIAGNOSIS — K57.32 DIVERTICULITIS OF LARGE INTESTINE WITHOUT PERFORATION OR ABSCESS WITHOUT BLEEDING: ICD-10-CM

## 2023-08-07 DIAGNOSIS — T85.528A DISPLACEMENT OF OTHER GASTROINTESTINAL PROSTHETIC DEVICES, IMPLANTS AND GRAFTS, INITIAL ENCOUNTER: ICD-10-CM

## 2023-08-07 DIAGNOSIS — K83.8 OTHER SPECIFIED DISEASES OF BILIARY TRACT: ICD-10-CM

## 2023-08-07 LAB
ALBUMIN SERPL ELPH-MCNC: 2.4 G/DL — LOW (ref 3.3–5)
ALP SERPL-CCNC: 80 U/L — SIGNIFICANT CHANGE UP (ref 40–120)
ALT FLD-CCNC: 44 U/L — SIGNIFICANT CHANGE UP (ref 10–45)
ANION GAP SERPL CALC-SCNC: 9 MMOL/L — SIGNIFICANT CHANGE UP (ref 5–17)
AST SERPL-CCNC: 33 U/L — SIGNIFICANT CHANGE UP (ref 10–40)
BASOPHILS # BLD AUTO: 0.04 K/UL — SIGNIFICANT CHANGE UP (ref 0–0.2)
BASOPHILS NFR BLD AUTO: 0.5 % — SIGNIFICANT CHANGE UP (ref 0–2)
BILIRUB SERPL-MCNC: 0.5 MG/DL — SIGNIFICANT CHANGE UP (ref 0.2–1.2)
BUN SERPL-MCNC: 11 MG/DL — SIGNIFICANT CHANGE UP (ref 7–23)
CALCIUM SERPL-MCNC: 9 MG/DL — SIGNIFICANT CHANGE UP (ref 8.4–10.5)
CHLORIDE SERPL-SCNC: 103 MMOL/L — SIGNIFICANT CHANGE UP (ref 96–108)
CO2 SERPL-SCNC: 25 MMOL/L — SIGNIFICANT CHANGE UP (ref 22–31)
CREAT SERPL-MCNC: 0.74 MG/DL — SIGNIFICANT CHANGE UP (ref 0.5–1.3)
EGFR: 83 ML/MIN/1.73M2 — SIGNIFICANT CHANGE UP
EOSINOPHIL # BLD AUTO: 0.12 K/UL — SIGNIFICANT CHANGE UP (ref 0–0.5)
EOSINOPHIL NFR BLD AUTO: 1.4 % — SIGNIFICANT CHANGE UP (ref 0–6)
GLUCOSE SERPL-MCNC: 91 MG/DL — SIGNIFICANT CHANGE UP (ref 70–99)
HCT VFR BLD CALC: 41.2 % — SIGNIFICANT CHANGE UP (ref 34.5–45)
HGB BLD-MCNC: 13.5 G/DL — SIGNIFICANT CHANGE UP (ref 11.5–15.5)
IMM GRANULOCYTES NFR BLD AUTO: 0.3 % — SIGNIFICANT CHANGE UP (ref 0–0.9)
LYMPHOCYTES # BLD AUTO: 1.36 K/UL — SIGNIFICANT CHANGE UP (ref 1–3.3)
LYMPHOCYTES # BLD AUTO: 15.3 % — SIGNIFICANT CHANGE UP (ref 13–44)
MCHC RBC-ENTMCNC: 30.8 PG — SIGNIFICANT CHANGE UP (ref 27–34)
MCHC RBC-ENTMCNC: 32.8 GM/DL — SIGNIFICANT CHANGE UP (ref 32–36)
MCV RBC AUTO: 93.8 FL — SIGNIFICANT CHANGE UP (ref 80–100)
MONOCYTES # BLD AUTO: 0.62 K/UL — SIGNIFICANT CHANGE UP (ref 0–0.9)
MONOCYTES NFR BLD AUTO: 7 % — SIGNIFICANT CHANGE UP (ref 2–14)
NEUTROPHILS # BLD AUTO: 6.71 K/UL — SIGNIFICANT CHANGE UP (ref 1.8–7.4)
NEUTROPHILS NFR BLD AUTO: 75.5 % — SIGNIFICANT CHANGE UP (ref 43–77)
NRBC # BLD: 0 /100 WBCS — SIGNIFICANT CHANGE UP (ref 0–0)
PLATELET # BLD AUTO: 295 K/UL — SIGNIFICANT CHANGE UP (ref 150–400)
POTASSIUM SERPL-MCNC: 3.8 MMOL/L — SIGNIFICANT CHANGE UP (ref 3.5–5.3)
POTASSIUM SERPL-SCNC: 3.8 MMOL/L — SIGNIFICANT CHANGE UP (ref 3.5–5.3)
PROT SERPL-MCNC: 7.3 G/DL — SIGNIFICANT CHANGE UP (ref 6–8.3)
RBC # BLD: 4.39 M/UL — SIGNIFICANT CHANGE UP (ref 3.8–5.2)
RBC # FLD: 13.5 % — SIGNIFICANT CHANGE UP (ref 10.3–14.5)
SODIUM SERPL-SCNC: 137 MMOL/L — SIGNIFICANT CHANGE UP (ref 135–145)
WBC # BLD: 8.88 K/UL — SIGNIFICANT CHANGE UP (ref 3.8–10.5)
WBC # FLD AUTO: 8.88 K/UL — SIGNIFICANT CHANGE UP (ref 3.8–10.5)

## 2023-08-07 PROCEDURE — 74176 CT ABD & PELVIS W/O CONTRAST: CPT | Mod: 26

## 2023-08-07 PROCEDURE — 99233 SBSQ HOSP IP/OBS HIGH 50: CPT

## 2023-08-07 PROCEDURE — 99223 1ST HOSP IP/OBS HIGH 75: CPT

## 2023-08-07 PROCEDURE — 99222 1ST HOSP IP/OBS MODERATE 55: CPT

## 2023-08-07 RX ORDER — LANOLIN ALCOHOL/MO/W.PET/CERES
1 CREAM (GRAM) TOPICAL
Qty: 0 | Refills: 0 | DISCHARGE
Start: 2023-08-07

## 2023-08-07 RX ORDER — ACETAMINOPHEN 500 MG
2 TABLET ORAL
Qty: 0 | Refills: 0 | DISCHARGE
Start: 2023-08-07

## 2023-08-07 RX ORDER — SENNA PLUS 8.6 MG/1
2 TABLET ORAL
Qty: 0 | Refills: 0 | DISCHARGE
Start: 2023-08-07

## 2023-08-07 RX ORDER — LEVOFLOXACIN 5 MG/ML
1 INJECTION, SOLUTION INTRAVENOUS
Qty: 0 | Refills: 0 | DISCHARGE
Start: 2023-08-07

## 2023-08-07 RX ORDER — POLYETHYLENE GLYCOL 3350 17 G/17G
17 POWDER, FOR SOLUTION ORAL
Qty: 0 | Refills: 0 | DISCHARGE
Start: 2023-08-07

## 2023-08-07 RX ORDER — ATORVASTATIN CALCIUM 80 MG/1
1 TABLET, FILM COATED ORAL
Qty: 0 | Refills: 0 | DISCHARGE
Start: 2023-08-07

## 2023-08-07 RX ORDER — METOPROLOL TARTRATE 50 MG
1 TABLET ORAL
Qty: 0 | Refills: 0 | DISCHARGE
Start: 2023-08-07

## 2023-08-07 RX ORDER — APIXABAN 2.5 MG/1
1 TABLET, FILM COATED ORAL
Qty: 0 | Refills: 0 | DISCHARGE
Start: 2023-08-07

## 2023-08-07 RX ORDER — METRONIDAZOLE 500 MG
500 TABLET ORAL EVERY 8 HOURS
Refills: 0 | Status: DISCONTINUED | OUTPATIENT
Start: 2023-08-07 | End: 2023-08-22

## 2023-08-07 RX ORDER — DILTIAZEM HCL 120 MG
1 CAPSULE, EXT RELEASE 24 HR ORAL
Qty: 0 | Refills: 0 | DISCHARGE
Start: 2023-08-07

## 2023-08-07 RX ADMIN — SENNA PLUS 2 TABLET(S): 8.6 TABLET ORAL at 21:19

## 2023-08-07 RX ADMIN — Medication 500 MILLIGRAM(S): at 15:16

## 2023-08-07 RX ADMIN — ATORVASTATIN CALCIUM 80 MILLIGRAM(S): 80 TABLET, FILM COATED ORAL at 21:18

## 2023-08-07 RX ADMIN — APIXABAN 5 MILLIGRAM(S): 2.5 TABLET, FILM COATED ORAL at 17:21

## 2023-08-07 RX ADMIN — Medication 500 MILLIGRAM(S): at 21:19

## 2023-08-07 RX ADMIN — Medication 120 MILLIGRAM(S): at 05:27

## 2023-08-07 RX ADMIN — Medication 100 MILLIGRAM(S): at 05:27

## 2023-08-07 RX ADMIN — APIXABAN 5 MILLIGRAM(S): 2.5 TABLET, FILM COATED ORAL at 05:26

## 2023-08-07 NOTE — CONSULT NOTE ADULT - ASSESSMENT
8/7/23: GI Consult Note- Pt consulted to service for choledocolithiasis, cholelithiasis with cholecystitis s/p cholecystostomy tube at [facility] on 7/2023.  Now s/p CTAP which showed Percutaneous cholecystostomy catheter is malpositioned within the descending colon distal to the cecum. Inflammation adjacent to the colon and appendix, which is otherwise normal in caliber, likely reactive. She is on Cipro/Flagyl, enrique tube draining light yellowish- brown material.  Pt denies N/V, abd pain.  Tolerating diet, afebrile. She has never had a colonoscopy.

## 2023-08-07 NOTE — DISCHARGE NOTE PROVIDER - NPI NUMBER (FOR SYSADMIN USE ONLY) :
[[id number]],[[id number]] [[id number]]

## 2023-08-07 NOTE — DISCHARGE NOTE PROVIDER - NSDCMRMEDTOKEN_GEN_ALL_CORE_FT
acetaminophen 325 mg oral tablet: 2 tab(s) orally every 6 hours As needed Mild Pain (1 - 3)  apixaban 5 mg oral tablet: 1 tab(s) orally every 12 hours  atorvastatin 80 mg oral tablet: 1 tab(s) orally once a day (at bedtime)  bisacodyl 10 mg rectal suppository: 1 suppository(ies) rectal once a day As needed Constipation  dilTIAZem 120 mg/24 hours oral capsule, extended release: 1 cap(s) orally once a day  levoFLOXacin 750 mg oral tablet: 1 tab(s) orally every 24 hours Until and including 8/10  melatonin 3 mg oral tablet: 1 tab(s) orally once a day (at bedtime) As needed Insomnia  metoprolol succinate 100 mg oral tablet, extended release: 1 tab(s) orally once a day  polyethylene glycol 3350 oral powder for reconstitution: 17 gram(s) orally once a day  senna leaf extract oral tablet: 2 tab(s) orally once a day (at bedtime)   acetaminophen 325 mg oral tablet: 2 tab(s) orally every 6 hours As needed Mild Pain (1 - 3)  apixaban 5 mg oral tablet: 1 tab(s) orally every 12 hours  atorvastatin 80 mg oral tablet: 1 tab(s) orally once a day (at bedtime)  dilTIAZem 120 mg/24 hours oral capsule, extended release: 1 cap(s) orally once a day  melatonin 3 mg oral tablet: 1 tab(s) orally once a day (at bedtime) As needed Insomnia  metoprolol succinate 100 mg oral tablet, extended release: 1 tab(s) orally once a day  polyethylene glycol 3350 oral powder for reconstitution: 17 gram(s) orally once a day as needed for  constipation   acetaminophen 325 mg oral tablet: 2 tab(s) orally every 6 hours As needed Mild Pain (1 - 3)  apixaban 5 mg oral tablet: 1 tab(s) orally every 12 hours  atorvastatin 80 mg oral tablet: 1 tab(s) orally once a day (at bedtime)  dilTIAZem 120 mg/24 hours oral capsule, extended release: 1 cap(s) orally once a day  erythromycin 0.5% ophthalmic ointment: 1 application to each affected eye 4 times a day to both eyes  melatonin 3 mg oral tablet: 1 tab(s) orally once a day (at bedtime) As needed Insomnia  metoprolol succinate 100 mg oral tablet, extended release: 1 tab(s) orally once a day  mometasone 220 mcg/inh inhalation aerosol powder: 1 puff(s) inhaled once a day  polyethylene glycol 3350 oral powder for reconstitution: 17 gram(s) orally once a day as needed for  constipation   acetaminophen 325 mg oral tablet: 2 tab(s) orally every 6 hours As needed Mild Pain (1 - 3)  apixaban 5 mg oral tablet: 1 tab(s) orally every 12 hours  atorvastatin 80 mg oral tablet: 1 tab(s) orally once a day (at bedtime)  dilTIAZem 120 mg/24 hours oral capsule, extended release: 1 cap(s) orally once a day  doxycycline hyclate 100 mg oral tablet: 1 tab(s) orally 2 times a day Take 12 hours apart  doxycycline hyclate 100 mg oral tablet: 1 tab(s) orally 2 times a day Take 12 hours apart  erythromycin 0.5% ophthalmic ointment: 1 application to each affected eye 4 times a day to both eyes  melatonin 3 mg oral tablet: 1 tab(s) orally once a day (at bedtime) As needed Insomnia  metoprolol succinate 100 mg oral tablet, extended release: 1 tab(s) orally once a day  mometasone 220 mcg/inh inhalation aerosol powder: 1 puff(s) inhaled once a day  nystatin 100,000 units/g topical powder: Apply topically to affected area 2 times a day 1 Apply topically to affected area 2 times a day  polyethylene glycol 3350 oral powder for reconstitution: 17 gram(s) orally once a day as needed for  constipation

## 2023-08-07 NOTE — DISCHARGE NOTE PROVIDER - NSDCCPCAREPLAN_GEN_ALL_CORE_FT
PRINCIPAL DISCHARGE DIAGNOSIS  Diagnosis: CVA (cerebrovascular accident)  Assessment and Plan of Treatment: You were admitted to rehab after a stroke. You were admitted to prior hospital after a fall and found to have Atrial fibrillation w/ RVR, rhabdomyolysis, bacteremia, cholecystitis(you had cholecystostomy tube placed 7/27), for which you were on antibiotics. During your rehab stay you had decreased drainage from the cholecystostomy tube and right quadrant abdominal pain prompting a CT of the abdomen and pelvis which revealed malpositioned drain, cholecystitis, diverticulitis. because of this, you were discharged from rehab to higher level care for further management.      SECONDARY DISCHARGE DIAGNOSES  Diagnosis: Cholecystitis  Assessment and Plan of Treatment: You had signs of galbladder infection and gallstones on CT abdomen and pelvis for which you had a cholecystostomy tube placed in 7/27. Your drain decreased and you had R quadrant pain. The drain was not in correct position on CT scan of the abdomen and pelvis. You are also on antibiotics for cholecystitis/bacteremia.     PRINCIPAL DISCHARGE DIAGNOSIS  Diagnosis: CVA (cerebrovascular accident)  Assessment and Plan of Treatment: You were admitted to rehab after a stroke. You were admitted to prior hospital after a fall and found to have Atrial fibrillation w/ RVR, rhabdomyolysis, bacteremia, cholecystitis(you had cholecystostomy tube placed 7/27), for which you were on antibiotics. During your rehab stay you had decreased drainage from the cholecystostomy tube and right quadrant abdominal pain prompting a CT of the abdomen and pelvis which revealed malpositioned drain, cholecystitis, diverticulitis. You were evaluated by surgeon and gastroenterologist for this. You were continued on antibiotics and had MRCP which showed stones in bile duct and you had ERCP done 8/11 to remove the stones and stent was placed. You need to follow-up with gastroenterologist for stent removal and follow-up with surgery for cholecystostomy tube repositioning/possible cholecystectomy. Continue your medications as prescribed. FOllow-up in addition with cardiologist, PCP, and rehab doctor.      SECONDARY DISCHARGE DIAGNOSES  Diagnosis: Cholecystitis  Assessment and Plan of Treatment: You had signs of galbladder infection and gallstones on CT abdomen and pelvis for which you had a cholecystostomy tube placed in 7/27. Your drain decreased and you had R quadrant pain. The drain was not in correct position on CT scan of the abdomen and pelvis. You completed antibiotics for cholecystitis in rehab. You should follow-up with surgery for consideration of cholecystectomy in the future.    Diagnosis: Atrial fibrillation  Assessment and Plan of Treatment: You were found to have a fib which is an arrhythmia of the heart. Sometimes this can cause formation of blood clots in the heart which can cause stroke. You were started on a blood thinner (anticoagulant) called eliquis (apixaban) which you should continue as prescribed. FOllow-up with cardiologist for further management.      Diagnosis: Sigmoid diverticulitis  Assessment and Plan of Treatment: You had inflammation in area of colon for this, continue modified diet (low fiber) and you were seen by GI who recommended antibiotics which you completed.    Diagnosis: Common bile duct dilation  Assessment and Plan of Treatment: You had stones in common bile duct for which you had ERCP done and stent placed to allow for stones to pass. You need to follow-up with gastroenterologist for stent removal.     PRINCIPAL DISCHARGE DIAGNOSIS  Diagnosis: CVA (cerebrovascular accident)  Assessment and Plan of Treatment: You were admitted to rehab after a stroke. You were admitted to prior hospital after a fall and found to have Atrial fibrillation w/ RVR, rhabdomyolysis, bacteremia, cholecystitis(you had cholecystostomy tube placed 7/27), for which you were on antibiotics. During your rehab stay you had decreased drainage from the cholecystostomy tube and right quadrant abdominal pain prompting a CT of the abdomen and pelvis which revealed malpositioned drain, cholecystitis, diverticulitis. You were evaluated by surgeon and gastroenterologist for this. You were continued on antibiotics and had MRCP which showed stones in bile duct and you had ERCP done 8/11 to remove the stones and stent was placed. You need to follow-up with gastroenterologist for stent removal and follow-up with surgery for cholecystostomy tube repositioning/possible cholecystectomy. Continue your medications as prescribed. FOllow-up in addition with cardiologist, PCP, and rehab doctor.      SECONDARY DISCHARGE DIAGNOSES  Diagnosis: Cholecystitis  Assessment and Plan of Treatment: You had signs of galbladder infection and gallstones on CT abdomen and pelvis for which you had a cholecystostomy tube placed in 7/27. Your drain decreased and you had R quadrant pain. The drain was not in correct position on CT scan of the abdomen and pelvis. You completed antibiotics for cholecystitis in rehab. You should follow-up with surgery for consideration of cholecystectomy in the future.    Diagnosis: Atrial fibrillation  Assessment and Plan of Treatment: You were found to have a fib which is an arrhythmia of the heart. Sometimes this can cause formation of blood clots in the heart which can cause stroke. You were started on a blood thinner (anticoagulant) called eliquis (apixaban) which you should continue as prescribed. FOllow-up with cardiologist for further management.      Diagnosis: Sigmoid diverticulitis  Assessment and Plan of Treatment: You had inflammation in area of colon for this, continue modified diet (low fiber) and you were seen by GI who recommended antibiotics which you completed.    Diagnosis: Common bile duct dilation  Assessment and Plan of Treatment: You had stones in common bile duct for which you had ERCP done and stent placed to allow for stones to pass. You need to follow-up with gastroenterologist for stent removal.    Diagnosis: Viral upper respiratory illness  Assessment and Plan of Treatment: You had fever and cough, CXR was stable with no effusion or infiltrate. You had respiratory viral panel which showed infection w/ enterovirus/rhinovirus. This is an upper respiratory tract infection. You should continue symptomatic care such as over the counter cough suppressants as needed and adequate hydration. Follow-up with PCP.     PRINCIPAL DISCHARGE DIAGNOSIS  Diagnosis: CVA (cerebrovascular accident)  Assessment and Plan of Treatment: You were admitted to rehab after a stroke. You were admitted to prior hospital after a fall and found to have Atrial fibrillation w/ RVR, rhabdomyolysis, bacteremia, cholecystitis(you had cholecystostomy tube placed 7/27), for which you were on antibiotics. During your rehab stay you had decreased drainage from the cholecystostomy tube and right quadrant abdominal pain prompting a CT of the abdomen and pelvis which revealed malpositioned drain, cholecystitis, diverticulitis. You were evaluated by surgeon and gastroenterologist for this. You were continued on antibiotics and had MRCP which showed stones in bile duct and you had ERCP done 8/11 to remove the stones and stent was placed. You need to follow-up with gastroenterologist for stent removal and follow-up with surgery for cholecystostomy tube repositioning/possible cholecystectomy. Continue your medications as prescribed. FOllow-up in addition with cardiologist, PCP, and rehab doctor.      SECONDARY DISCHARGE DIAGNOSES  Diagnosis: Cholecystitis  Assessment and Plan of Treatment: You had signs of galbladder infection and gallstones on CT abdomen and pelvis for which you had a cholecystostomy tube placed in 7/27. Your drain decreased and you had R quadrant pain. The drain was not in correct position on CT scan of the abdomen and pelvis. You completed antibiotics for cholecystitis in rehab. You should follow-up with surgery for consideration of cholecystectomy in the future.    Diagnosis: Atrial fibrillation  Assessment and Plan of Treatment: You were found to have a fib which is an arrhythmia of the heart. Sometimes this can cause formation of blood clots in the heart which can cause stroke. You were started on a blood thinner (anticoagulant) called eliquis (apixaban) which you should continue as prescribed. FOllow-up with cardiologist for further management.      Diagnosis: Sigmoid diverticulitis  Assessment and Plan of Treatment: You had inflammation in area of colon for this, continue modified diet (low fiber) and you were seen by GI who recommended antibiotics which you completed.    Diagnosis: Common bile duct dilation  Assessment and Plan of Treatment: You had stones in common bile duct for which you had ERCP done and stent placed to allow for stones to pass. You need to follow-up with gastroenterologist for stent removal.    Diagnosis: Viral upper respiratory illness  Assessment and Plan of Treatment: You had fever and cough, CXR was stable with no effusion or infiltrate. You had respiratory viral panel which showed infection w/ enterovirus/rhinovirus. This is an upper respiratory tract infection. You should continue symptomatic care such as over the counter cough suppressants as needed and adequate hydration. Follow-up with PCP.    Diagnosis: Cellulitis  Assessment and Plan of Treatment: You had cellulitis or a skin infection at the drain site w/ MRSA and were treated with doxycycline and vancomycin. Follow-up w/ your surgeon.     PRINCIPAL DISCHARGE DIAGNOSIS  Diagnosis: CVA (cerebrovascular accident)  Assessment and Plan of Treatment: You were admitted to rehab after a stroke. You were admitted to prior hospital after a fall and found to have Atrial fibrillation w/ RVR, rhabdomyolysis, bacteremia, cholecystitis(you had cholecystostomy tube placed 7/27), for which you were on antibiotics. During your rehab stay you had decreased drainage from the cholecystostomy tube and right quadrant abdominal pain prompting a CT of the abdomen and pelvis which revealed malpositioned drain, cholecystitis, diverticulitis. You were evaluated by surgeon and gastroenterologist for this. You were continued on antibiotics and had MRCP which showed stones in bile duct and you had ERCP done 8/11 to remove the stones and stent was placed. You need to follow-up with gastroenterologist for stent removal and follow-up with surgery for cholecystostomy tube repositioning/possible cholecystectomy. Continue your medications as prescribed. FOllow-up in addition with cardiologist, PCP, and rehab doctor.      SECONDARY DISCHARGE DIAGNOSES  Diagnosis: Cholecystitis  Assessment and Plan of Treatment: You had signs of galbladder infection and gallstones on CT abdomen and pelvis for which you had a cholecystostomy tube placed in 7/27. Your drain decreased and you had R quadrant pain. The drain was not in correct position on CT scan of the abdomen and pelvis. You completed antibiotics for cholecystitis in rehab. You should follow-up with surgery for consideration of cholecystectomy in the future.    Diagnosis: Atrial fibrillation  Assessment and Plan of Treatment: You were found to have a fib which is an arrhythmia of the heart. Sometimes this can cause formation of blood clots in the heart which can cause stroke. You were started on a blood thinner (anticoagulant) called eliquis (apixaban) which you should continue as prescribed. FOllow-up with cardiologist for further management.      Diagnosis: Sigmoid diverticulitis  Assessment and Plan of Treatment: You had inflammation in area of colon for this, continue modified diet (low fiber) and you were seen by GI who recommended antibiotics which you completed.    Diagnosis: Common bile duct dilation  Assessment and Plan of Treatment: You had stones in common bile duct for which you had ERCP done and stent placed to allow for stones to pass. You need to follow-up with gastroenterologist for stent removal.    Diagnosis: Viral upper respiratory illness  Assessment and Plan of Treatment: You had fever and cough, CXR was stable with no effusion or infiltrate. You had respiratory viral panel which showed infection w/ enterovirus/rhinovirus. This is an upper respiratory tract infection. You should continue symptomatic care such as over the counter cough suppressants as needed and adequate hydration. Follow-up with PCP.    Diagnosis: Cellulitis  Assessment and Plan of Treatment: You had cellulitis or a skin infection at the drain site w/ MRSA and were treated with doxycycline and vancomycin. Continue doxycycline as prescribed. Follow-up w/ your surgeon.

## 2023-08-07 NOTE — DISCHARGE NOTE PROVIDER - NSDCQMSTROKERISK_NEU_ALL_CORE
Atrial fibrillation/High blood pressure/High cholesterol Atrial fibrillation/High blood pressure/High cholesterol/History of a stroke or TIA

## 2023-08-07 NOTE — CONSULT NOTE ADULT - NS ATTEND AMEND GEN_ALL_CORE FT
agree with above... will defer invasive procedures, such as ERCP, until conference re management of percutanous drain and perforation.

## 2023-08-07 NOTE — DISCHARGE NOTE PROVIDER - PROVIDER TOKENS
PROVIDER:[TOKEN:[[id number]],FOLLOWUP:[1-3 days]],PROVIDER:[TOKEN:[[id number]],FOLLOWUP:[1 week]],PROVIDER:[TOKEN:[[id number]],FOLLOWUP:[2 weeks]] PROVIDER:[TOKEN:[[id number]],FOLLOWUP:[1-3 days]],PROVIDER:[TOKEN:[[id number]],FOLLOWUP:[1 week]],PROVIDER:[TOKEN:[[id number]],FOLLOWUP:[2 weeks]],PROVIDER:[TOKEN:[[id number]],FOLLOWUP:[2 weeks]],PROVIDER:[TOKEN:[[id number]],FOLLOWUP:[2 weeks]]

## 2023-08-07 NOTE — DISCHARGE NOTE PROVIDER - HOSPITAL COURSE
Karen Carlos is a 78 year old female RH dominant with PMH of HTN, and HLD; who presented to [facility] ED on 7/26/23 s/p fall from chair with + head strike, on the floor for over an hour. ER workup was significant AFIB with RVR and rhabdomyolysis. She was given Cardizem IVP and IVF with improvement HR to the 70s. She was diagnosed with new onset AFIB, seen by cardiology and started on Metoprolol and Diltiazem for rate control and was anticoagulated with Eliquis.  IV fluids were administered for rhabdomyolysis.     RUQ US was significant for acute cholecystitis and cholelithiasis with pericholecystic fluid and gallbladder wall thickening. Her HIDA scan resulted positive and a percutaneous cholecystectomy tube was placed by IR on 7/7.  Her blood cultures resulted positive for Strept Anginosus for which she was started on IV antibiotics.     Her hospital course was complicated by neurological changes/altered mental status. CT head was performed which resulted negative; MRI of the head was significant for a R cerebellar infarct.    PM&R was consulted and deemed her an appropriate candidate for IRF. She was medically stabilized and cleared for discharge to [facility].     Rehab Course significant for nausea/vomiting/dizziness trial of meclizine started. Patient w/ decreased drain output from cholecystostomy and RLQ abdominal pain prompting CT Abdomen and Pelvis showing malpositioned drain in descending colon, acute cholecystitis, acute diverticulitis. Surgery consulted. Patient was discharged from rehab and transferred to higher level of care for further management on 8/7.   Karen Carlos is a 78 year old female RH dominant with PMH of HTN, and HLD; who presented to [facility] ED on 7/26/23 s/p fall from chair with + head strike, on the floor for over an hour. ER workup was significant AFIB with RVR and rhabdomyolysis. She was given Cardizem IVP and IVF with improvement HR to the 70s. She was diagnosed with new onset AFIB, seen by cardiology and started on Metoprolol and Diltiazem for rate control and was anticoagulated with Eliquis.  IV fluids were administered for rhabdomyolysis.     RUQ US was significant for acute cholecystitis and cholelithiasis with pericholecystic fluid and gallbladder wall thickening. Her HIDA scan resulted positive and a percutaneous cholecystectomy tube was placed by IR on 7/7.  Her blood cultures resulted positive for Strept Anginosus for which she was started on IV antibiotics.     Her hospital course was complicated by neurological changes/altered mental status. CT head was performed which resulted negative; MRI of the head was significant for a R cerebellar infarct.    PM&R was consulted and deemed her an appropriate candidate for IRF. She was medically stabilized and cleared for discharge to [facility].     Rehab Course significant for nausea/vomiting/dizziness trial of meclizine started. Patient w/ decreased drain output from cholecystostomy and new onset mild RLQ abdominal pain without leukocytosis or fever prompting CT Abdomen and Pelvis showing malpositioned drain in descending colon, acute cholecystitis, acute diverticulitis. Patient was made NPO, Surgery consulted. Patient was discharged from rehab and transferred to higher level of care for further management on 8/7.    PROCEDURE:  CT of the Abdomen and Pelvis was performed.  Sagittal and coronal reformats were performed.    FINDINGS:  LOWER CHEST: Mosaic attenuation of the lung bases.    LIVER: Within normal limits.  BILE DUCTS: Dilated common bile duct measuring up to 9 mm with   hyperdensity in the distal CBD, suspicious for choledocholithiasis  GALLBLADDER: Cholelithiasis. Gallbladder wall thickening and   pericholecystic fat stranding at the gallbladder fundus  SPLEEN: Within normal limits.  PANCREAS: Within normal limits.  ADRENALS: Within normal limits.  KIDNEYS/URETERS: Within normal limits.    BLADDER: Within normal limits.  REPRODUCTIVE ORGANS: Uterus and adnexa within normal limits.    BOWEL: No bowel obstruction. A percutaneous catheter traverses the   proximal ascending colon just distal to the cecum with pigtail noted in   the adjacent mesentery. There is inflammation adjacent to the ascending   colon and appendix, which is otherwise normal caliber. There is acute   diverticulitis involving the mid sigmoid colon with small extra luminal   locules of gas consistent with localized perforation. Associated adjacent   phlegmonous changes.  PERITONEUM: Roderick pneumoperitoneum. No loculated collection within the   limitations of this exam.  VESSELS: Within normal limits.  RETROPERITONEUM/LYMPH NODES: No lymphadenopathy.  ABDOMINAL WALL: Within normal limits.  BONES: Degenerative changes of thoracolumbar spine. Degenerative changes   of the bilateral sacroiliac joints with erosive changes at the superior   aspect of the SI joints bilaterally, possibly sequela of prior   sacroiliitis.    IMPRESSION:  Percutaneous cholecystostomy catheter is malpositioned within the   descending colon distal to the cecum. Inflammation adjacent to the colon   and appendix, which is otherwise normal in caliber, likely reactive.    Dilated common bile duct with suspicion of choledocholithiasis in the   distal CBD. Cholelithiasis with gallbladder wall thickening and   pericholecystic fat stranding, consistent with acute cholecystitis.    Acute diverticulitis in the mid sigmoid colon with associated localized   perforation.     Karen Carlos is a 78 year old female RH dominant with PMH of HTN, and HLD; who presented to [facility] ED on 7/26/23 s/p fall from chair with + head strike, on the floor for over an hour. ER workup was significant AFIB with RVR and rhabdomyolysis. She was given Cardizem IVP and IVF with improvement HR to the 70s. She was diagnosed with new onset AFIB, seen by cardiology and started on Metoprolol and Diltiazem for rate control and was anticoagulated with Eliquis.  IV fluids were administered for rhabdomyolysis.     RUQ US was significant for acute cholecystitis and cholelithiasis with pericholecystic fluid and gallbladder wall thickening. Her HIDA scan resulted positive and a percutaneous cholecystectomy tube was placed by IR on 7/7.  Her blood cultures resulted positive for Strept Anginosus for which she was started on IV antibiotics.     Her hospital course was complicated by neurological changes/altered mental status. CT head was performed which resulted negative; MRI of the head was significant for a R cerebellar infarct.    PM&R was consulted and deemed her an appropriate candidate for IRF. She was medically stabilized and cleared for discharge to [facility].     Rehab Course significant for nausea/vomiting/dizziness trial of meclizine started. Patient w/ decreased drain output from cholecystostomy and new onset mild RLQ abdominal pain (8/7) without leukocytosis or fever prompting CT Abdomen and Pelvis showing malpositioned drain in descending colon, acute cholecystitis, acute diverticulitis. Patient placed on flagyl in addition to levaquin. Patient was seen by surgery for malpositioned drain - recommended to defer intervention at this time. Patient was seen by gastroenterology who recommended MRCP which showed choledocholithiasis and subsequently had ERCP on 8/11. Patient completed antibiotics 8/22. Course further complicated by fever/cough found to have +rhinovirus on respiratory viral panel with CXR no effusion or infiltrate 8/21-8/22 overnight. Treated symptomatically with antitussives and encouraged PO intake.    Pt tolerated course of inpatient PT/OT/SLP rehab with significant functional improvements and met rehab goals prior to discharge.    Functional status on discharge: supervision iADLs, supervision/CG  bADLs, CG ambulation/stairs, supervision iaDLS and during waking hours    Pt was medically cleared on 8/24 for d/c to home w/ home PT/OT/SLP and caregiver support.     Pt will follow up with the following: cardiologist, gastroenterology, surgery, neurology, PCP, PM&R           Karen Carlos is a 78 year old female RH dominant with PMH of HTN, and HLD; who presented to [facility] ED on 7/26/23 s/p fall from chair with + head strike, on the floor for over an hour. ER workup was significant AFIB with RVR and rhabdomyolysis. She was given Cardizem IVP and IVF with improvement HR to the 70s. She was diagnosed with new onset AFIB, seen by cardiology and started on Metoprolol and Diltiazem for rate control and was anticoagulated with Eliquis.  IV fluids were administered for rhabdomyolysis.     RUQ US was significant for acute cholecystitis and cholelithiasis with pericholecystic fluid and gallbladder wall thickening. Her HIDA scan resulted positive and a percutaneous cholecystectomy tube was placed by IR on 7/7.  Her blood cultures resulted positive for Strept Anginosus for which she was started on IV antibiotics.     Her hospital course was complicated by neurological changes/altered mental status. CT head was performed which resulted negative; MRI of the head was significant for a R cerebellar infarct.    PM&R was consulted and deemed her an appropriate candidate for IRF. She was medically stabilized and cleared for discharge to [facility].     Rehab Course significant for nausea/vomiting/dizziness trial of meclizine started. Patient w/ decreased drain output from cholecystostomy and new onset mild RLQ abdominal pain (8/7) without leukocytosis or fever prompting CT Abdomen and Pelvis showing malpositioned drain in descending colon, acute cholecystitis, acute diverticulitis. Patient placed on flagyl in addition to levaquin. Patient was seen by surgery for malpositioned drain - recommended to defer intervention at this time. Patient was seen by gastroenterology who recommended MRCP which showed choledocholithiasis and subsequently had ERCP on 8/11. Patient completed antibiotics 8/22. Course further complicated by fever/cough found to have +rhinovirus on respiratory viral panel with CXR no effusion or infiltrate 8/21-8/22 overnight. Treated symptomatically with antitussives and encouraged PO intake. She developed itching and injection both eyes on 8/23 and was started on erythromycin ointment QID x 5 days for conjunctivities    Pt tolerated course of inpatient PT/OT/SLP rehab with significant functional improvements and met rehab goals prior to discharge.    Functional status on discharge: supervision iADLs, supervision/CG  bADLs, CG ambulation/stairs, supervision iaDLS and during waking hours    Pt was medically cleared on 8/24 for d/c to home w/ home PT/OT/SLP and caregiver support.     Pt will follow up with the following: cardiologist, gastroenterology, surgery, neurology, PCP, PM&R           Karen Carlos is a 78 year old female RH dominant with PMH of HTN, and HLD; who presented to [facility] ED on 7/26/23 s/p fall from chair with + head strike, on the floor for over an hour. ER workup was significant AFIB with RVR and rhabdomyolysis. She was given Cardizem IVP and IVF with improvement HR to the 70s. She was diagnosed with new onset AFIB, seen by cardiology and started on Metoprolol and Diltiazem for rate control and was anticoagulated with Eliquis.  IV fluids were administered for rhabdomyolysis.     RUQ US was significant for acute cholecystitis and cholelithiasis with pericholecystic fluid and gallbladder wall thickening. Her HIDA scan resulted positive and a percutaneous cholecystectomy tube was placed by IR on 7/7.  Her blood cultures resulted positive for Strept Anginosus for which she was started on IV antibiotics.     Her hospital course was complicated by neurological changes/altered mental status. CT head was performed which resulted negative; MRI of the head was significant for a R cerebellar infarct.    PM&R was consulted and deemed her an appropriate candidate for IRF. She was medically stabilized and cleared for discharge to [facility].     Rehab Course significant for nausea/vomiting/dizziness trial of meclizine started. Patient w/ decreased drain output from cholecystostomy and new onset mild RLQ abdominal pain (8/7) without leukocytosis or fever prompting CT Abdomen and Pelvis showing malpositioned drain in descending colon, acute cholecystitis, acute diverticulitis. Patient placed on flagyl in addition to levaquin. Patient was seen by surgery for malpositioned drain - recommended to defer intervention at this time. Patient was seen by gastroenterology who recommended MRCP which showed choledocholithiasis and subsequently had ERCP on 8/11. Patient completed antibiotics 8/22. Course further complicated by fever/cough found to have +rhinovirus on respiratory viral panel with CXR no effusion or infiltrate 8/21-8/22 overnight. Treated symptomatically with antitussives and encouraged PO intake. She developed itching and injection both eyes on 8/23 and was started on erythromycin ointment QID x 5 days for conjunctivitis.    On 8/24, wound Cx resulted as +MRSA. Blood cultures were negative. Patient was afebrile and no leukocytosis, with  WBC 6.75 8/24. ID consult requested, recommended doxycycline 100 q12, and patient placed on contact isolation. A/P CT + oral and IV contrast also recommended r/o abscess.           Pt tolerated course of inpatient PT/OT/SLP rehab with significant functional improvements and met rehab goals prior to discharge.    Functional status on discharge: supervision iADLs, supervision/CG  bADLs, CG ambulation/stairs, supervision iaDLS and during waking hours    Pt was medically cleared for d/c to home w/ home PT/OT/SLP and caregiver support.  Caregiver training performed    Pt will follow up with the following: cardiologist, gastroenterology, surgery, neurology, PCP, PM&R. home care referral for PT, OT SLP and nursing care made           Karen Carlos is a 78 year old female RH dominant with PMH of HTN, and HLD; who presented to [facility] ED on 7/26/23 s/p fall from chair with + head strike, on the floor for over an hour. ER workup was significant AFIB with RVR and rhabdomyolysis. She was given Cardizem IVP and IVF with improvement HR to the 70s. She was diagnosed with new onset AFIB, seen by cardiology and started on Metoprolol and Diltiazem for rate control and was anticoagulated with Eliquis.  IV fluids were administered for rhabdomyolysis.     RUQ US was significant for acute cholecystitis and cholelithiasis with pericholecystic fluid and gallbladder wall thickening. Her HIDA scan resulted positive and a percutaneous cholecystectomy tube was placed by IR on 7/7.  Her blood cultures resulted positive for Strept Anginosus for which she was started on IV antibiotics.     Her hospital course was complicated by neurological changes/altered mental status. CT head was performed which resulted negative; MRI of the head was significant for a R cerebellar infarct.    PM&R was consulted and deemed her an appropriate candidate for IRF. She was medically stabilized and cleared for discharge to [facility].     Rehab Course significant for nausea/vomiting/dizziness trial of meclizine started. Patient w/ decreased drain output from cholecystostomy and new onset mild RLQ abdominal pain (8/7) without leukocytosis or fever prompting CT Abdomen and Pelvis showing malpositioned drain in descending colon, acute cholecystitis, acute diverticulitis. Patient placed on flagyl in addition to levaquin. Patient was seen by surgery for malpositioned drain - recommended to defer intervention at this time. Patient was seen by gastroenterology who recommended MRCP which showed choledocholithiasis and subsequently had ERCP on 8/11. Patient completed antibiotics 8/22. Course further complicated by fever/cough found to have +rhinovirus on respiratory viral panel with CXR no effusion or infiltrate 8/21-8/22 overnight. Treated symptomatically with antitussives and encouraged PO intake. She developed itching and injection both eyes on 8/23 and was started on erythromycin ointment QID x 5 days for conjunctivitis.    On 8/24, wound Cx resulted as +MRSA. Blood cultures were negative. Patient was afebrile and no leukocytosis, with  WBC 6.75 8/24. ID consult requested, recommended doxycycline 100 q12, and patient placed on contact isolation. A/P CT + oral and IV contrast also recommended r/o abscess.     CT 8/24: - Repeat A/P CT + oral and IV contrast 8/24: Again malpositioned cholecystostomy catheter is appreciated. Patient is now status post ERCP with placement of biliary stent. Resolution of previously seen sigmoid diverticulitis      Pt tolerated course of inpatient PT/OT/SLP rehab with significant functional improvements and met rehab goals prior to discharge.    Functional status on discharge: supervision iADLs, supervision/CG  bADLs, CG ambulation/stairs, supervision iaDLS and during waking hours    Pt was medically cleared for d/c to home 8/25 w/ home PT/OT/SLP and caregiver support.  Caregiver training performed    Pt will follow up with the following: cardiologist, gastroenterology, surgery, neurology, PCP, PM&R. home care referral for PT, OT SLP and nursing care made           Karen Carlos is a 78 year old female RH dominant with PMH of HTN, and HLD; who presented to [place] ED on 7/26/23 s/p fall from chair with + head strike, on the floor for over an hour. ER workup was significant AFIB with RVR and rhabdomyolysis. She was given Cardizem IVP and IVF with improvement HR to the 70s. She was diagnosed with new onset AFIB, seen by cardiology and started on Metoprolol and Diltiazem for rate control and was anticoagulated with Eliquis.  IV fluids were administered for rhabdomyolysis.     RUQ US was significant for acute cholecystitis and cholelithiasis with pericholecystic fluid and gallbladder wall thickening. Her HIDA scan resulted positive and a percutaneous cholecystectomy tube was placed by IR on 7/7.  Her blood cultures resulted positive for Strept Anginosus for which she was started on IV antibiotics.     Her hospital course was complicated by neurological changes/altered mental status. CT head was performed which resulted negative; MRI of the head was significant for a R cerebellar infarct.    PM&R was consulted and deemed her an appropriate candidate for IRF. She was medically stabilized and cleared for discharge to [facility].     Rehab Course significant for nausea/vomiting/dizziness trial of meclizine started. Patient w/ decreased drain output from cholecystostomy and new onset mild RLQ abdominal pain (8/7) without leukocytosis or fever prompting CT Abdomen and Pelvis showing malpositioned drain in descending colon, acute cholecystitis, acute diverticulitis. Patient placed on flagyl in addition to levaquin. Patient was seen by surgery for malpositioned drain - recommended to defer intervention at this time. Patient was seen by gastroenterology who recommended MRCP which showed choledocholithiasis and subsequently had ERCP on 8/11. Patient completed antibiotics 8/22. Course further complicated by fever/cough found to have +rhinovirus on respiratory viral panel with CXR no effusion or infiltrate 8/21-8/22 overnight. Treated symptomatically with antitussives and encouraged PO intake. She developed itching and injection both eyes on 8/23 and was started on erythromycin ointment QID x 5 days for conjunctivitis.    On 8/24, wound Cx resulted as +MRSA. Blood cultures were negative. Patient was afebrile and no leukocytosis, with  WBC 6.75 8/24. ID consult requested, recommended doxycycline 100 q12, and patient placed on contact isolation. A/P CT + oral and IV contrast also recommended r/o abscess.     CT 8/24: - Repeat A/P CT + oral and IV contrast 8/24: Again malpositioned cholecystostomy catheter is appreciated. Patient is now status post ERCP with placement of biliary stent. Resolution of previously seen sigmoid diverticulitis  8/25 - patient w/ worsening erythema at drain site, doxycycline switched to IV vancomycin.      Pt tolerated course of inpatient PT/OT/SLP rehab with significant functional improvements and met rehab goals prior to discharge.    Functional status on discharge: supervision iADLs, supervision/CG  bADLs, CG ambulation/stairs, supervision iaDLS and during waking hours    Pt was medically cleared for d/c to home 8/25 w/ home PT/OT/SLP and caregiver support.  Caregiver training performed    Pt will follow up with the following: cardiologist, gastroenterology, surgery, neurology, PCP, PM&R. home care referral for PT, OT SLP and nursing care made           Karen Carlos is a 78 year old female RH dominant with PMH of HTN, and HLD; who presented to [facility] ED on 7/26/23 s/p fall from chair with + head strike, on the floor for over an hour. ER workup was significant AFIB with RVR and rhabdomyolysis. She was given Cardizem IVP and IVF with improvement HR to the 70s. She was diagnosed with new onset AFIB, seen by cardiology and started on Metoprolol and Diltiazem for rate control and was anticoagulated with Eliquis.  IV fluids were administered for rhabdomyolysis.     RUQ US was significant for acute cholecystitis and cholelithiasis with pericholecystic fluid and gallbladder wall thickening. Her HIDA scan resulted positive and a percutaneous cholecystectomy tube was placed by IR on 7/7.  Her blood cultures resulted positive for Strept Anginosus for which she was started on IV antibiotics.     Her hospital course was complicated by neurological changes/altered mental status. CT head was performed which resulted negative; MRI of the head was significant for a R cerebellar infarct.    PM&R was consulted and deemed her an appropriate candidate for IRF. She was medically stabilized and cleared for discharge to [facility].     Rehab Course significant for nausea/vomiting/dizziness trial of meclizine started. Patient w/ decreased drain output from cholecystostomy and new onset mild RLQ abdominal pain (8/7) without leukocytosis or fever prompting CT Abdomen and Pelvis showing malpositioned drain in descending colon, acute cholecystitis, acute diverticulitis. Patient placed on flagyl in addition to levaquin. Patient was seen by surgery for malpositioned drain - recommended to defer intervention at this time. Patient was seen by gastroenterology who recommended MRCP which showed choledocholithiasis and subsequently had ERCP on 8/11. Patient completed antibiotics 8/22. Course further complicated by fever/cough found to have +rhinovirus on respiratory viral panel with CXR no effusion or infiltrate 8/21-8/22 overnight. Treated symptomatically with antitussives and encouraged PO intake. She developed itching and injection both eyes on 8/23 and was started on erythromycin ointment QID x 5 days for conjunctivitis.    On 8/24, wound Cx resulted as +MRSA. Blood cultures were negative. Patient was afebrile and no leukocytosis, with  WBC 6.75 8/24. ID consult requested, recommended doxycycline 100 q12, and patient placed on contact isolation. A/P CT + oral and IV contrast also recommended r/o abscess.     CT 8/24: - Repeat A/P CT + oral and IV contrast 8/24: Again malpositioned cholecystostomy catheter is appreciated. Patient is now status post ERCP with placement of biliary stent. Resolution of previously seen sigmoid diverticulitis  8/25 - patient w/ worsening erythema at drain site, doxycycline switched to IV vancomycin. Improvement in erythema and drain site noted after removal of drain. Patient discharged on doxycycline 100mg q12 for 10 more days.    Pt tolerated course of inpatient PT/OT/SLP rehab with significant functional improvements and met rehab goals prior to discharge.    Functional status on discharge: supervision iADLs, supervision/CG  bADLs, CG ambulation/stairs, supervision iaDLS and during waking hours    Pt was medically cleared for d/c to home 8/28 w/ home PT/OT/SLP and caregiver support.  Caregiver training performed    Pt will follow up with the following: cardiologist, gastroenterology, surgery, neurology, PCP, PM&R. home care referral for PT, OT SLP and nursing care made           Karen Carlos is a 78 year old female RH dominant with PMH of HTN, and HLD; who presented to [place] ED on 7/26/23 s/p fall from chair with + head strike, on the floor for over an hour. ER workup was significant AFIB with RVR and rhabdomyolysis. She was given Cardizem IVP and IVF with improvement HR to the 70s. She was diagnosed with new onset AFIB, seen by cardiology and started on Metoprolol and Diltiazem for rate control and was anticoagulated with Eliquis.  IV fluids were administered for rhabdomyolysis.     RUQ US was significant for acute cholecystitis and cholelithiasis with pericholecystic fluid and gallbladder wall thickening. Her HIDA scan resulted positive and a percutaneous cholecystectomy tube was placed by IR on 7/7.  Her blood cultures resulted positive for Strept Anginosus for which she was started on IV antibiotics.     Her hospital course was complicated by neurological changes/altered mental status. CT head was performed which resulted negative; MRI of the head was significant for a R cerebellar infarct.    PM&R was consulted and deemed her an appropriate candidate for IRF. She was medically stabilized and cleared for discharge to [facility].     Rehab Course significant for nausea/vomiting/dizziness trial of meclizine started. Patient w/ decreased drain output from cholecystostomy and new onset mild RLQ abdominal pain (8/7) without leukocytosis or fever prompting CT Abdomen and Pelvis showing malpositioned drain in descending colon, acute cholecystitis, acute diverticulitis. Patient placed on flagyl in addition to levaquin. Patient was seen by surgery for malpositioned drain - recommended to defer intervention at this time. Patient was seen by gastroenterology who recommended MRCP which showed choledocholithiasis and subsequently had ERCP on 8/11. Patient completed antibiotics 8/22. Course further complicated by fever/cough found to have +rhinovirus on respiratory viral panel with CXR no effusion or infiltrate 8/21-8/22 overnight. Treated symptomatically with antitussives and encouraged PO intake. She developed itching and injection both eyes on 8/23 and was started on erythromycin ointment QID x 5 days for conjunctivitis.    On 8/24, wound Cx resulted as +MRSA. Blood cultures were negative. Patient was afebrile and no leukocytosis, with  WBC 6.75 8/24. ID consult requested, recommended doxycycline 100 q12, and patient placed on contact isolation. A/P CT + oral and IV contrast also recommended r/o abscess.     CT 8/24: - Repeat A/P CT + oral and IV contrast 8/24: Again malpositioned cholecystostomy catheter is appreciated. Patient is now status post ERCP with placement of biliary stent. Resolution of previously seen sigmoid diverticulitis  8/25 - patient w/ worsening erythema at drain site, doxycycline switched to IV vancomycin. Improvement in erythema and drain site noted after removal of drain. Patient discharged on doxycycline 100mg q12 for 10 more days per ID recommendations.    Pt tolerated course of inpatient PT/OT/SLP rehab with significant functional improvements and met rehab goals prior to discharge.    Functional status on discharge: supervision iADLs, supervision/CG  bADLs, CG ambulation/stairs, supervision iaDLS and during waking hours    Pt was medically cleared for d/c to home 8/28 w/ home PT/OT/SLP and caregiver support.  Caregiver training performed    Pt will follow up with the following: cardiologist, gastroenterology, surgery, neurology, PCP, PM&R. home care referral for PT, OT SLP and nursing care made

## 2023-08-07 NOTE — DISCHARGE NOTE PROVIDER - NSDCCAREPROVSEEN_GEN_ALL_CORE_FT
Luis, Candelaria Forrest, Dilshad Brambila Luis, Candelaria Forrest, Maricel Leach, Scottie Mcconnell, Trav Gonzalez, Laurie Luis, Candelaria Forrest, Maricel Leach, Scottie Mcconnell, Trav Gonzalez, Laurie Chew, Reginaldo TRINH

## 2023-08-07 NOTE — CONSULT NOTE ADULT - ASSESSMENT
a/p    Patient had IR drain placed at [facility] on 7/27/2027 for acute calculous cholecystitis)(2 cm stone and + HIDA scan).  #8.5 fr tube placed.    At time of placement there was brownish material returned (there is possibility drain traversed bowel).  Nevertheless, patient has improved markedly since then and wbc has decreased from 18 to normal.    Her abdomen is benign and I do not suspect any non drained infectious process.  To be on the safe side, I do not plan to remove drain at present time (hopefully we can leave in for a few weeks).    Since patient has dilated CBD and questionable choledocholithiasis I do recommend GI consult with Dr. Leach.      At some point (preferably when patient maxes out on rehab and med goals) she may be candidate for outpatient laparoscopic cholecystectomy.    Patient can continue on DASH diet unless GI plans any intervention.    Thank you.    Dr. Jerman Cyr  cell#[phone]

## 2023-08-07 NOTE — DISCHARGE NOTE PROVIDER - CARE PROVIDER_API CALL
Alli Pascual  Surgery  [address] [place] [address]  Phone: [phone]  Fax: [phone]  Follow Up Time: 1-3 days    Alexis Simon  Cardiovascular Disease  [address] [place], [place] [id number]  Phone: [phone]  Fax: [phone]  Follow Up Time: 1 week    BRIAN ALBA  [address] [place] [address]  Phone: ()-  Fax: ()-  Follow Up Time: 2 weeks   Alli Pascual  Surgery  [address] [place] [address]  Phone: [phone]  Fax: [phone]  Follow Up Time: 1-3 days    Alexis Simon  Cardiovascular Disease  [address] [place], [place] [id number]  Phone: [phone]  Fax: [phone]  Follow Up Time: 1 week    BRIAN ALBA  [address] [place] [address]  Phone: ()-  Fax: ()-  Follow Up Time: 2 weeks    Scottie Leach  Gastroenterology  [address] [place] [address], [address]  [place] [address]  Phone: [phone]  Fax: [phone]  Follow Up Time: 2 weeks    Faheem Carlson  Neurology  [address] [place] [address]  Phone: [phone]  Fax: [phone]  Follow Up Time: 2 weeks

## 2023-08-07 NOTE — PROGRESS NOTE ADULT - SUBJECTIVE AND OBJECTIVE BOX
Patient is a 78y old  Female who presents with a chief complaint of cerebellar CVA (07 Aug 2023 12:51)    Patient seen and examined at bedside. Had right sided abdominal pain this morning. Last BM this morning. Last meal was lunch. No abdominal pain at this time.     ALLERGIES:  No Known Allergies    MEDICATIONS  (STANDING):  apixaban 5 milliGRAM(s) Oral every 12 hours  atorvastatin 80 milliGRAM(s) Oral at bedtime  diltiazem    milliGRAM(s) Oral daily  levoFLOXacin  Tablet 750 milliGRAM(s) Oral every 24 hours  metoprolol succinate  milliGRAM(s) Oral daily  polyethylene glycol 3350 17 Gram(s) Oral daily  senna 2 Tablet(s) Oral at bedtime    MEDICATIONS  (PRN):  acetaminophen     Tablet .. 650 milliGRAM(s) Oral every 6 hours PRN Mild Pain (1 - 3)  acetaminophen     Tablet .. 650 milliGRAM(s) Oral every 6 hours PRN Temp greater or equal to 38C (100.4F)  aluminum hydroxide/magnesium hydroxide/simethicone Suspension 30 milliLiter(s) Oral every 4 hours PRN Dyspepsia  bisacodyl Suppository 10 milliGRAM(s) Rectal daily PRN Constipation  melatonin 3 milliGRAM(s) Oral at bedtime PRN Insomnia    Vital Signs Last 24 Hrs  T(F): 98.3 (07 Aug 2023 07:47), Max: 98.3 (07 Aug 2023 07:47)  HR: 83 (07 Aug 2023 07:47) (81 - 87)  BP: 134/83 (07 Aug 2023 07:47) (134/83 - 152/85)  RR: 16 (07 Aug 2023 07:47) (16 - 16)  SpO2: 98% (07 Aug 2023 07:47) (96% - 99%)  I&O's Summary    06 Aug 2023 07:01  -  07 Aug 2023 07:00  --------------------------------------------------------  IN: 0 mL / OUT: 1350 mL / NET: -1350 mL      PHYSICAL EXAM:  General: NAD, A/O x 3  ENT: No gross hearing impairment, Moist mucous membranes, no thrush  Neck: Supple, No JVD  Lungs: Clear to auscultation bilaterally, good air entry, non-labored breathing  Cardio: IRRR, S1/S2, No murmur  Abdomen: Soft, Nontender, Nondistended; Bowel sounds present, right abdominal drain in place   Extremities: No calf tenderness, No cyanosis, No pitting edema  Psych: Appropriate mood and affect    LABS:                        13.5   8.88  )-----------( 295      ( 07 Aug 2023 05:52 )             41.2     08-07    137  |  103  |  11  ----------------------------<  91  3.8   |  25  |  0.74    Ca    9.0      07 Aug 2023 05:52    TPro  7.3  /  Alb  2.4  /  TBili  0.5  /  DBili  x   /  AST  33  /  ALT  44  /  AlkPhos  80  08-07    07-26 Chol 130 mg/dL LDL -- HDL 48 mg/dL Trig 74 mg/dL    Urinalysis Basic - ( 07 Aug 2023 05:52 )    Color: x / Appearance: x / SG: x / pH: x  Gluc: 91 mg/dL / Ketone: x  / Bili: x / Urobili: x   Blood: x / Protein: x / Nitrite: x   Leuk Esterase: x / RBC: x / WBC x   Sq Epi: x / Non Sq Epi: x / Bacteria: x    COVID-19 PCR: NotDetec (08-03-23 @ 21:00)    < from: CT Abdomen and Pelvis No Cont (08.07.23 @ 11:38) >  Percutaneous cholecystostomy catheter is malpositioned within the   descending colon distal to the cecum. Inflammation adjacent to the colon   and appendix, which is otherwise normal in caliber, likely reactive.    Dilated common bile duct with suspicion of choledocholithiasis in the   distal CBD. Cholelithiasis with gallbladder wall thickening and   pericholecystic fat stranding, consistent with acute cholecystitis.    Acute diverticulitis in the mid sigmoid colon with associated localized   perforation.    Findings of malpositioned percutaneous cholecystomy catheter &   diverticulitis were discussed with Dr. DEYANIRA WORLEY [phone] 8/7/2023   11:49 AM by Dr. Alvarez with read back confirmation.    < end of copied text >    Case d/w Dr Cyr

## 2023-08-07 NOTE — PROGRESS NOTE ADULT - ASSESSMENT
78F with HTN, HLD, recently hospitalized at OSH with acute stroke - with course complicated by acute cholecystitis requiring percutaneous cholecystostomy tube as well as new AF, now in acute rehab. A CT scan was performed today for new abdominal pain, which shows a malpositioned cholecystostomy tube, acute complicated diverticulitis and choledocholithiasis     #Malpositioned cholecystostomy tube (now in ascending colon)  #Acute sigmoid diverticulitis with localized perforation  #Pneumoperitoneum due to above  #Choledocholithiasis, likely new (CBD was WNL on prior imaging)  #Cholecystitis  -Urgent surgery consultation - Dr. Cyr - notified   -Would keep the tube in place for now... until further clarification from surgery - could be "plugging" up the perforated wall at this time   -Will add flagyl to antibiotics for more anaerobic coverage   -If patient is going to remain in rehab, will consult GI, to assess for timing re: ERCP     #Chronic AF  -c/w Toprol  -c/w Diltiazem  -c/w Eliquis.... may need to hold pending surgical recs    #Lung nodule  -outpatient follow-up    DVT ppx: eliquis    Case d/w patient's daughter at bedside

## 2023-08-07 NOTE — PROGRESS NOTE ADULT - ASSESSMENT
CHAY VELEZ is a 78 year old female with PMH HTN, and HLD who presented to [facility] ED 7/26/23 s/p fall from chair with + head strike. She was found to have new onset AFIB with RVR and Rhabdomyolysis. Her hospital course was complicated by acute cholecystitis and cholelithiasis requiring percutaneous enrique drain via IR on 7/7, Bacteremia, and cerebellar infarct confirmed on MRI.    # right cerebellar CVA with incoordination, imbalance, cognitive impairment  - MRI 8/1: There is an acute right cerebellar infarct. There are no foci of susceptibility artifact to suggest hemorrhage. Scattered foci of T2/FLAIR hyperintensity in the bilateral hemispheric white matter are nonspecific but likely related to chronic white matter microvascular ischemic disease. The ventricles are normal in size for patient's age  - ASA and Atorvastatin  - continue Comprehensive Rehab Program of PT/OT/SLP  - 3 hours a day, 5 days a week  - Precautions: cardiac, AC, fall, AMS, enrique drain    # New AFib RVR  # HTN  - Metoprolol XL 100mg daily  - Diltiazem CD 120mg daily  - Eliquis 5 q12hrs  - BP (134/83 - 152/85) HR 81-87 8/7    # Rhabdomyolysis  - resolved  - S/p IVF  - Creatine kinase: 5502 7/25--> 109 8/4    #  Cholecystitis   - Levofloxacin daily - last dose 8/10  - s/p percutaneous cholecystectomy 2/2 cholelithiasis via IR on 7/7   - Patient with leakage from port, inability to flush. Little output today in drainage bag but patient complains of some newer abdominal discomfort, no N/V. Vitals stable, abdomen benign.  -  A/P CT ordered 8/7: Percutaneous cholecystostomy catheter is malpositioned within the descending colon distal to the cecum. Inflammation adjacent to the colon and appendix, which is otherwise normal in caliber, likely reactive. Dilated common bile duct with suspicion of choledocholithiasis in the distal CBD. Cholelithiasis with gallbladder wall thickening and pericholecystic fat stranding, consistent with acute cholecystitis. Acute diverticulitis in the mid sigmoid colon with associated localized perforation.  - Made NPO for now. Surgery consult    # RUL Lung nodule found on CT chest (7/25)  - Incidental finding   - F/U PCP outpatient for repeat CT    # /Bladder Mgmt   -  PVR q8h, CIC>400cc  - patient voiding well,  ml today 8/7    # GI  -  Senna, Miralax     # podiatry  - podiatry appreciated, s/p nail debridement    # Sleep  - Melatonin PRN     # Skin  - Skin on admission: scattered abrasions and ecchymosis  - Pressure injury/Skin: OOB to Chair, PT/OT     # Pain Mgmt   - Tylenol PRN    # FEN   - Diet - Regular + Thins  [DASH/TLC]      #  DVT PPX:  - Eliquis     # Case discussed in IDT rounds 8/7 (initial evaluation):  - min-mod assist bADLS, ambuates 25 feet with RW and min assist with WC follow. Tolerating regular solid and thin liquids, mod language and mod cognitive deficits, reduced selective attention, reduced processing speed , reduced receptive language +RLE ataxia, reduced cooridnaiton  - goals: min assist/CG ambulation, supervision iADLs, supervision /CG bADLs and transfers, min assist stairs  - caregiver training  - target dc home 8/24 with caregiver support waking hours and home Pt OT SLP    # LABs  surgery consult  [facility] 8/10    time spent for evaluation, communication, management and follow up 50 min  ---------------  Outpatient/Follow-Up:    Alli Pascual  Surgery  [address] [place] [address]  Phone: [phone]  Fax: [phone]  Follow Up Time: 1 week    Alexis Simon  Cardiovascular Disease  [address] [place], [place] [id number]  Phone: [phone]  Fax: [phone]  Follow Up Time: 1 week    BRIAN ALBA  [address] [place] [address]  Phone: [phone]  Fax: ()-     CHAY VELEZ is a 78 year old female with PMH HTN, and HLD who presented to [place] ED 7/26/23 s/p fall from chair with + head strike. She was found to have new onset AFIB with RVR and Rhabdomyolysis. Her hospital course was complicated by acute cholecystitis and cholelithiasis requiring percutaneous enrique drain via IR on 7/7, Bacteremia, and cerebellar infarct confirmed on MRI.    # right cerebellar CVA with incoordination, imbalance, cognitive impairment  - MRI 8/1: There is an acute right cerebellar infarct. There are no foci of susceptibility artifact to suggest hemorrhage. Scattered foci of T2/FLAIR hyperintensity in the bilateral hemispheric white matter are nonspecific but likely related to chronic white matter microvascular ischemic disease. The ventricles are normal in size for patient's age  - ASA and Atorvastatin  - continue Comprehensive Rehab Program of PT/OT/SLP  - 3 hours a day, 5 days a week  - Precautions: cardiac, AC, fall, AMS, enrique drain    # New AFib RVR  # HTN  - Metoprolol XL 100mg daily  - Diltiazem CD 120mg daily  - Eliquis 5 q12hrs  - BP (134/83 - 152/85) HR 81-87 8/7    # Rhabdomyolysis  - resolved  - S/p IVF  - Creatine kinase: 5502 7/25--> 109 8/4    #  Cholecystitis   - Levofloxacin daily - last dose 8/10  - s/p percutaneous cholecystectomy 2/2 cholelithiasis via IR on 7/7   - Patient with leakage from port, inability to flush. Little output today in drainage bag but patient complains of some newer abdominal discomfort, no N/V. Vitals stable, abdomen benign.  -  A/P CT ordered 8/7: Percutaneous cholecystostomy catheter is malpositioned within the descending colon distal to the cecum. Inflammation adjacent to the colon and appendix, which is otherwise normal in caliber, likely reactive. Dilated common bile duct with suspicion of choledocholithiasis in the distal CBD. Cholelithiasis with gallbladder wall thickening and pericholecystic fat stranding, consistent with acute cholecystitis. Acute diverticulitis in the mid sigmoid colon with associated localized perforation.  - Made NPO for now. Surgery consult    # RUL Lung nodule found on CT chest (7/25)  - Incidental finding   - F/U PCP outpatient for repeat CT    # /Bladder Mgmt   -  PVR q8h, CIC>400cc  - patient voiding well,  ml today 8/7    # GI  -  Senna, Miralax     # podiatry  - podiatry appreciated, s/p nail debridement    # Sleep  - Melatonin PRN     # Skin  - Skin on admission: scattered abrasions and ecchymosis  - Pressure injury/Skin: OOB to Chair, PT/OT     # Pain Mgmt   - Tylenol PRN    # FEN   - Diet - Regular + Thins  [DASH/TLC]      #  DVT PPX:  - Eliquis     # Case discussed in IDT rounds 8/7 (initial evaluation):  - min-mod assist bADLS, ambuates 25 feet with RW and min assist with WC follow. Tolerating regular solid and thin liquids, mod language and mod cognitive deficits, reduced selective attention, reduced processing speed , reduced receptive language +RLE ataxia, reduced cooridnaiton  - goals: min assist/CG ambulation, supervision iADLs, supervision /CG bADLs and transfers, min assist stairs  - caregiver training  - target dc home 8/24 with caregiver support waking hours and home Pt OT SLP    # LABs  surgery consult  NPO pending surgery consult  CBC BMP 8/10    time spent for evaluation, communication, management and follow up 50 min  ---------------  Outpatient/Follow-Up:    Alli Pascual  Surgery  [address] [place] [address]  Phone: [phone]  Fax: [phone]  Follow Up Time: 1 week    Alexis Simon  Cardiovascular Disease  [address] [place], [place] [id number]  Phone: [phone]  Fax: [phone]  Follow Up Time: 1 week    BRIAN ALBA  [address] [place] [address]  Phone: ()-  Fax: ()-     CHAY VELEZ is a 78 year old female with PMH HTN, and HLD who presented to [facility] ED 7/26/23 s/p fall from chair with + head strike. She was found to have new onset AFIB with RVR and Rhabdomyolysis. Her hospital course was complicated by acute cholecystitis and cholelithiasis requiring percutaneous enrique drain via IR on 7/7, Bacteremia, and cerebellar infarct confirmed on MRI.    # right cerebellar CVA with incoordination, imbalance, cognitive impairment  - MRI 8/1: There is an acute right cerebellar infarct. There are no foci of susceptibility artifact to suggest hemorrhage. Scattered foci of T2/FLAIR hyperintensity in the bilateral hemispheric white matter are nonspecific but likely related to chronic white matter microvascular ischemic disease. The ventricles are normal in size for patient's age  - ASA and Atorvastatin  - continue Comprehensive Rehab Program of PT/OT/SLP  - 3 hours a day, 5 days a week  - Precautions: cardiac, AC, fall, AMS, enrique drain    # New AFib RVR  # HTN  - Metoprolol XL 100mg daily  - Diltiazem CD 120mg daily  - Eliquis 5 q12hrs  - BP (134/83 - 152/85) HR 81-87 8/7    # Rhabdomyolysis  - resolved  - S/p IVF  - Creatine kinase: 5502 7/25--> 109 8/4    #  Cholecystitis   - Levofloxacin daily - last dose 8/10  - s/p percutaneous cholecystectomy 2/2 cholelithiasis via IR on 7/7   - Patient with leakage from port, inability to flush. Little output today in drainage bag but patient complains of some newer abdominal discomfort, no N/V. Vitals stable, abdomen benign.  -  A/P CT ordered 8/7: Percutaneous cholecystostomy catheter is malpositioned within the descending colon distal to the cecum. Inflammation adjacent to the colon and appendix, which is otherwise normal in caliber, likely reactive. Dilated common bile duct with suspicion of choledocholithiasis in the distal CBD. Cholelithiasis with gallbladder wall thickening and pericholecystic fat stranding, consistent with acute cholecystitis. Acute diverticulitis in the mid sigmoid colon with associated localized perforation.  - Made NPO for now. Surgery consult    # RUL Lung nodule found on CT chest (7/25)  - Incidental finding   - F/U PCP outpatient for repeat CT    # /Bladder Mgmt   -  PVR q8h, CIC>400cc  - patient voiding well,  ml today 8/7    # GI  -  Senna, Miralax     # podiatry  - podiatry appreciated, s/p nail debridement    # Sleep  - Melatonin PRN     # Skin  - Skin on admission: scattered abrasions and ecchymosis  - Pressure injury/Skin: OOB to Chair, PT/OT     # Pain Mgmt   - Tylenol PRN    # FEN   - Diet - Regular + Thins  [DASH/TLC]      #  DVT PPX:  - Eliquis     # Case discussed in IDT rounds 8/7 (initial evaluation):  - min-mod assist bADLS, ambuates 25 feet with RW and min assist with WC follow. Tolerating regular solid and thin liquids, mod language and mod cognitive deficits, reduced selective attention, reduced processing speed , reduced receptive language +RLE ataxia, reduced cooridnaiton  - goals: min assist/CG ambulation, supervision iADLs, supervision /CG bADLs and transfers, min assist stairs  - caregiver training  - target dc home 8/24 with caregiver support waking hours and home Pt OT SLP    # LABs  surgery consult  NPO pending surgery consult  CBC BMP 8/10    addendum 1:43 PM  hospitalist and surgery greatly appreciated. no surgical intervention recommended at this time. Leave drain in place, cleared to resume po diet  - will consult GI for choledocholithiasis in distal CBD    time spent for evaluation, communication, management and follow up 50 min  ---------------  Outpatient/Follow-Up:    Alli Pascual  Surgery  [address] [place] [address]  Phone: [phone]  Fax: [phone]  Follow Up Time: 1 week    Alexis Simon  Cardiovascular Disease  [address] [place], [place] [id number]  Phone: [phone]  Fax: [phone]  Follow Up Time: 1 week    BRIAN ALBA  [address] [place] [address]  Phone: ()-  Fax: ()-     CHAY VELEZ is a 78 year old female with PMH HTN, and HLD who presented to [facility] ED 7/26/23 s/p fall from chair with + head strike. She was found to have new onset AFIB with RVR and Rhabdomyolysis. Her hospital course was complicated by acute cholecystitis and cholelithiasis requiring percutaneous enrique drain via IR on 7/7, Bacteremia, and cerebellar infarct confirmed on MRI.    # right cerebellar CVA with incoordination, imbalance, cognitive impairment  - MRI 8/1: There is an acute right cerebellar infarct. There are no foci of susceptibility artifact to suggest hemorrhage. Scattered foci of T2/FLAIR hyperintensity in the bilateral hemispheric white matter are nonspecific but likely related to chronic white matter microvascular ischemic disease. The ventricles are normal in size for patient's age  - ASA and Atorvastatin  - continue Comprehensive Rehab Program of PT/OT/SLP  - 3 hours a day, 5 days a week  - Precautions: cardiac, AC, fall, AMS, enrique drain    # New AFib RVR  # HTN  - Metoprolol XL 100mg daily  - Diltiazem CD 120mg daily  - Eliquis 5 q12hrs  - BP (134/83 - 152/85) HR 81-87 8/7    # Rhabdomyolysis  - resolved  - S/p IVF  - Creatine kinase: 5502 7/25--> 109 8/4    #  Cholecystitis   - Levofloxacin daily - last dose 8/10  - s/p percutaneous cholecystectomy 2/2 cholelithiasis via IR on 7/7   - Patient with leakage from port, inability to flush. Little output today in drainage bag but patient complains of some newer abdominal discomfort, no N/V. Vitals stable, abdomen benign.  -  A/P CT ordered 8/7: Percutaneous cholecystostomy catheter is malpositioned within the descending colon distal to the cecum. Inflammation adjacent to the colon and appendix, which is otherwise normal in caliber, likely reactive. Dilated common bile duct with suspicion of choledocholithiasis in the distal CBD. Cholelithiasis with gallbladder wall thickening and pericholecystic fat stranding, consistent with acute cholecystitis. Acute diverticulitis in the mid sigmoid colon with associated localized perforation.  - Made NPO for now. Surgery consult    # RUL Lung nodule found on CT chest (7/25)  - Incidental finding   - F/U PCP outpatient for repeat CT    # /Bladder Mgmt   -  PVR q8h, CIC>400cc  - patient voiding well,  ml today 8/7    # GI  -  Senna, Miralax     # podiatry  - podiatry appreciated, s/p nail debridement    # Sleep  - Melatonin PRN     # Skin  - Skin on admission: scattered abrasions and ecchymosis  - Pressure injury/Skin: OOB to Chair, PT/OT     # Pain Mgmt   - Tylenol PRN    # FEN   - Diet - Regular + Thins  [DASH/TLC]      #  DVT PPX:  - Eliquis     # Case discussed in IDT rounds 8/7 (initial evaluation):  - min-mod assist bADLS, ambuates 25 feet with RW and min assist with WC follow. Tolerating regular solid and thin liquids, mod language and mod cognitive deficits, reduced selective attention, reduced processing speed , reduced receptive language +RLE ataxia, reduced cooridnaiton  - goals: min assist/CG ambulation, supervision iADLs, supervision /CG bADLs and transfers, min assist stairs  - caregiver training  - target dc home 8/24 with caregiver support waking hours and home Pt OT SLP    # LABs  surgery consult  NPO pending surgery consult  CBC BMP 8/10    addendum 1:43 PM  hospitalist and surgery greatly appreciated. no surgical intervention recommended at this time. Leave drain in place, cleared to resume po diet  - flagyl added to levaquin for increased coverage  - will consult GI for choledocholithiasis in distal CBD    time spent for evaluation, communication, management and follow up 50 min  ---------------  Outpatient/Follow-Up:    Alli Pascual  Surgery  [address] [place] [address]  Phone: [phone]  Fax: [phone]  Follow Up Time: 1 week    Alexis Simon  Cardiovascular Disease  [address] [place], [place] [id number]  Phone: [phone]  Fax: [phone]  Follow Up Time: 1 week    BRIAN ALBA  [address] [place] [address]  Phone: ()-  Fax: ()-

## 2023-08-07 NOTE — CONSULT NOTE ADULT - SUBJECTIVE AND OBJECTIVE BOX
Hospitalist note:    History of Present Illness:   Karen Carlos is a 78 year old female RH dominant with PMH of HTN, and HLD; who presented to [facility] ED on 7/26/23 s/p fall from chair with + head strike, on the floor for over an hour. ER workup was significant AFIB with RVR and rhabdomyolysis. She was given Cardizem IVP and IVF with improvement HR to the 70s. She was diagnosed with new onset AFIB, seen by cardiology and started on Metoprolol and Diltiazem for rate control and was anticoagulated with Eliquis.  IV fluids were administered for rhabdomyolysis.     RUQ US was significant for acute cholecystitis and cholelithiasis with pericholecystic fluid and gallbladder wall thickening. Her HIDA scan resulted positive and a percutaneous cholecystectomy tube was placed by IR on 7/7.  Her blood cultures resulted positive for Strept Anginosus for which she was started on IV antibiotics.     Her hospital course was complicated by neurological changes/altered mental status. CT head was performed which resulted negative; MRI of the head was significant for a R cerebellar infarct.    PM&R was consulted and deemed her an appropriate candidate for IRF. She was medically stabilized and cleared             patient interviewed and examined in rehab room 247 with daughter present    sitting in wheelchair in no distress    tolerated lunch    no abdominal pain    afebrile    vitals stable    Heent-sclera anicteric    abdomen-ruq IR drain, scant bilious material in bag  soft, non distended, non tender    extrem ok    labs:    wbc 8.8  h/h 13/41  plt  295    k+3.8     creat 0.74    imaging(I personally reviewed):    < from: CT Abdomen and Pelvis No Cont (08.07.23 @ 11:38) >  BOWEL: No bowel obstruction. A percutaneous catheter traverses the   proximal ascending colon just distal to the cecum with pigtail noted in   the adjacent mesentery. There is inflammation adjacent to the ascending   colon and appendix, which is otherwise normal caliber. There is acute   diverticulitis involving the mid sigmoid colon with small extra luminal   locules of gas consistent with localized perforation. Associated adjacent   phlegmonous changes.  PERITONEUM: Roderick pneumoperitoneum. No loculated collection within the   limitations of this exam.  VESSELS: Within normal limits.  RETROPERITONEUM/LYMPH NODES: No lymphadenopathy.  ABDOMINAL WALL: Within normal limits.  BONES: Degenerative changesof thoracolumbar spine. Degenerative changes   of the bilateral sacroiliac joints with erosive changes at the superior   aspect of the SI joints bilaterally, possibly sequela of prior   sacroiliitis.    IMPRESSION:  Percutaneous cholecystostomy catheter is malpositioned within the   descending colon distal to the cecum. Inflammation adjacent to the colon   and appendix, which is otherwise normal in caliber, likely reactive.    Dilated common bile duct with suspicion of choledocholithiasis in the     < end of copied text >

## 2023-08-07 NOTE — PROGRESS NOTE ADULT - COMMENTS
21-Jun-2023 Patient seen in . Nursing reports that there was difficulty flushing enrique drain over weekend due to hole in tubing. Today, found to have only scant output in drain bag. No N/V no fever, patient endorses some mild discomfort at drain site/right LQ, which she feels is slightly new. No constiipation, she is voiding, PVR today 163 after voiding, pre void bladder scan 372 ml    She is frustrated as she feels she cannot use her RH and is RH dominant due to incoordination    A/P CT ordered to follow up on collection, patient agreeable

## 2023-08-07 NOTE — CONSULT NOTE ADULT - PROBLEM SELECTOR RECOMMENDATION 3
Pt currently has malposition cholecystostomy tube in the colon. (CTAP- percutaneous catheter traverses the proximal ascending colon just distal to the cecum with pigtail noted in the adjacent mesentery. There is inflammation adjacent to the ascending colon and appendix, which is otherwise normal caliber). She denies abd pain, N/V or dyspepsia.     [ ] will defer to surgery/IR  [ ] PO status per surgery

## 2023-08-07 NOTE — CONSULT NOTE ADULT - PROBLEM SELECTOR RECOMMENDATION 2
CTAP today showed Dilated common bile duct measuring up to 9 mm with hyperdensity in the distal CBD, suspicious for choledocholithiasis.  Pt currently has malposition cholecystostomy tube in the colon. ( CTAP- percutaneous catheter traverses the proximal ascending colon just distal to the cecum with pigtail noted in the adjacent mesentery. There is inflammation adjacent to the ascending colon and appendix, which is otherwise normal caliber).    [ ] will defer ERCP given current CT findings, malpositioned enrique tube (pt currently afebrile, LFT's normal)

## 2023-08-07 NOTE — CONSULT NOTE ADULT - SUBJECTIVE AND OBJECTIVE BOX
INTERVAL HPI/OVERNIGHT EVENTS:  HPI:  Karen Carlos is a 78 year old female RH dominant with PMH of HTN, and HLD; who presented to [facility] ED on 7/26/23 s/p fall from chair with + head strike, on the floor for over an hour. ER workup was significant AFIB with RVR and rhabdomyolysis. She was given Cardizem IVP and IVF with improvement HR to the 70s. She was diagnosed with new onset AFIB, seen by cardiology and started on Metoprolol and Diltiazem for rate control and was anticoagulated with Eliquis.  IV fluids were administered for rhabdomyolysis.     RUQ US was significant for acute cholecystitis and cholelithiasis with pericholecystic fluid and gallbladder wall thickening. Her HIDA scan resulted positive and a percutaneous cholecystectomy tube was placed by IR on 7/7.  Her blood cultures resulted positive for Strept Anginosus for which she was started on IV antibiotics.     Her hospital course was complicated by neurological changes/altered mental status. CT head was performed which resulted negative; MRI of the head was significant for a R cerebellar infarct.    PM&R was consulted and deemed her an appropriate candidate for IRF. She was medically stabilized and cleared for discharge to [facility].  (03 Aug 2023 13:49)    8/7/23: GI Consult Note- Pt consulted to service for choledocolithiasis, cholelithiasis with cholecystitis s/p cholecystostomy tube at [facility] on 7/2023.  Now s/p CTAP which showed Percutaneous cholecystostomy catheter is malpositioned within the descending colon distal to the cecum. Inflammation adjacent to the colon and appendix, which is otherwise normal in caliber, likely reactive. She is on Cipro/Flagyl, enrique tube draining light yellowish- brown material.  Pt denies N/V, abd pain.  Tolerating diet, afebrile. She has never had a colonoscopy.    Dilated common bile duct with suspicion of choledocholithiasis in the   distal CBD. Cholelithiasis with gallbladder wall thickening and   pericholecystic fat stranding, consistent with acute cholecystitis.    Acute diverticulitis in the mid sigmoid colon with associated localized   perforation.    MEDICATIONS  (STANDING):  apixaban 5 milliGRAM(s) Oral every 12 hours  atorvastatin 80 milliGRAM(s) Oral at bedtime  diltiazem    milliGRAM(s) Oral daily  levoFLOXacin  Tablet 750 milliGRAM(s) Oral every 24 hours  metoprolol succinate  milliGRAM(s) Oral daily  metroNIDAZOLE    Tablet 500 milliGRAM(s) Oral every 8 hours  polyethylene glycol 3350 17 Gram(s) Oral daily  senna 2 Tablet(s) Oral at bedtime    MEDICATIONS  (PRN):  acetaminophen     Tablet .. 650 milliGRAM(s) Oral every 6 hours PRN Mild Pain (1 - 3)  acetaminophen     Tablet .. 650 milliGRAM(s) Oral every 6 hours PRN Temp greater or equal to 38C (100.4F)  aluminum hydroxide/magnesium hydroxide/simethicone Suspension 30 milliLiter(s) Oral every 4 hours PRN Dyspepsia  bisacodyl Suppository 10 milliGRAM(s) Rectal daily PRN Constipation  melatonin 3 milliGRAM(s) Oral at bedtime PRN Insomnia      Allergies    No Known Allergies    Intolerances        PAST MEDICAL & SURGICAL HISTORY:  HTN (hypertension)      Hyperlipidemia      Tinnitus  right ear      Seasonal allergies      S/P knee replacement          REVIEW OF SYSTEMS- as per HPI      General:	    Skin/Breast:  	  Ophthalmologic:  	  ENMT:	    Respiratory and Thorax:  	  Cardiovascular:	    Gastrointestinal:	    Genitourinary:	    Musculoskeletal:	    Neurological:	    Psychiatric:	    Hematology/Lymphatics:	    Endocrine:	    Allergic/Immunologic:	    PHYSICAL EXAM:   Vital Signs:  Vital Signs Last 24 Hrs  T(C): 36.8 (07 Aug 2023 07:47), Max: 36.8 (07 Aug 2023 07:47)  T(F): 98.3 (07 Aug 2023 07:47), Max: 98.3 (07 Aug 2023 07:47)  HR: 83 (07 Aug 2023 07:47) (81 - 87)  BP: 134/83 (07 Aug 2023 07:47) (134/83 - 152/85)  BP(mean): --  RR: 16 (07 Aug 2023 07:47) (16 - 16)  SpO2: 98% (07 Aug 2023 07:47) (96% - 99%)    Parameters below as of 06 Aug 2023 20:00  Patient On (Oxygen Delivery Method): room air      Daily     Daily I&O's Summary    06 Aug 2023 07:01  -  07 Aug 2023 07:00  --------------------------------------------------------  IN: 0 mL / OUT: 1350 mL / NET: -1350 mL        GENERAL:  Appears stated age, no distress  HEENT:  NC/AT,  conjunctivae clear and pink, sclera -anicteric  CHEST:  Full & symmetric excursion, no increased effort  HEART:  Regular rhythm  ABDOMEN:  Soft, non-tender, non-distended, normoactive bowel sounds  EXTEREMITIES:  no cyanosis,clubbing or edema  SKIN:  No rash/warm/dry  NEURO:  Alert, oriented      LABS:                        13.5   8.88  )-----------( 295      ( 07 Aug 2023 05:52 )             41.2     08-07    137  |  103  |  11  ----------------------------<  91  3.8   |  25  |  0.74    Ca    9.0      07 Aug 2023 05:52    TPro  7.3  /  Alb  2.4<L>  /  TBili  0.5  /  DBili  x   /  AST  33  /  ALT  44  /  AlkPhos  80  08-07      Urinalysis Basic - ( 07 Aug 2023 05:52 )    Color: x / Appearance: x / SG: x / pH: x  Gluc: 91 mg/dL / Ketone: x  / Bili: x / Urobili: x   Blood: x / Protein: x / Nitrite: x   Leuk Esterase: x / RBC: x / WBC x   Sq Epi: x / Non Sq Epi: x / Bacteria: x      amylase   lipase  RADIOLOGY & ADDITIONAL TESTS:

## 2023-08-07 NOTE — DISCHARGE NOTE PROVIDER - CARE PROVIDERS DIRECT ADDRESSES
,[email or web address],DirectAddress_Unknown ,[email or web address]_Unknown,DirectAddress_Unknown,[email or web address],DirectAddress_Unknown

## 2023-08-07 NOTE — PROGRESS NOTE ADULT - SUBJECTIVE AND OBJECTIVE BOX
Patient is a 78y old  Female who presents with a chief complaint of cerebellar CVA (06 Aug 2023 12:09)      HPI:  Karen Carlos is a 78 year old female RH dominant with PMH of HTN, and HLD; who presented to [facility] ED on 7/26/23 s/p fall from chair with + head strike, on the floor for over an hour. ER workup was significant AFIB with RVR and rhabdomyolysis. She was given Cardizem IVP and IVF with improvement HR to the 70s. She was diagnosed with new onset AFIB, seen by cardiology and started on Metoprolol and Diltiazem for rate control and was anticoagulated with Eliquis.  IV fluids were administered for rhabdomyolysis.     RUQ US was significant for acute cholecystitis and cholelithiasis with pericholecystic fluid and gallbladder wall thickening. Her HIDA scan resulted positive and a percutaneous cholecystectomy tube was placed by IR on 7/7.  Her blood cultures resulted positive for Strept Anginosus for which she was started on IV antibiotics.     Her hospital course was complicated by neurological changes/altered mental status. CT head was performed which resulted negative; MRI of the head was significant for a R cerebellar infarct.    PM&R was consulted and deemed her an appropriate candidate for IRF. She was medically stabilized and cleared for discharge to [facility].  (03 Aug 2023 13:49)      PAST MEDICAL & SURGICAL HISTORY:  HTN (hypertension)      Hyperlipidemia      Tinnitus  right ear      Seasonal allergies      S/P knee replacement          MEDICATIONS  (STANDING):  apixaban 5 milliGRAM(s) Oral every 12 hours  atorvastatin 80 milliGRAM(s) Oral at bedtime  diltiazem    milliGRAM(s) Oral daily  levoFLOXacin  Tablet 750 milliGRAM(s) Oral every 24 hours  metoprolol succinate  milliGRAM(s) Oral daily  polyethylene glycol 3350 17 Gram(s) Oral daily  senna 2 Tablet(s) Oral at bedtime    MEDICATIONS  (PRN):  acetaminophen     Tablet .. 650 milliGRAM(s) Oral every 6 hours PRN Mild Pain (1 - 3)  acetaminophen     Tablet .. 650 milliGRAM(s) Oral every 6 hours PRN Temp greater or equal to 38C (100.4F)  aluminum hydroxide/magnesium hydroxide/simethicone Suspension 30 milliLiter(s) Oral every 4 hours PRN Dyspepsia  bisacodyl Suppository 10 milliGRAM(s) Rectal daily PRN Constipation  melatonin 3 milliGRAM(s) Oral at bedtime PRN Insomnia      Allergies    No Known Allergies    Intolerances          VITALS  78y  Vital Signs Last 24 Hrs  T(C): 36.8 (07 Aug 2023 07:47), Max: 36.8 (07 Aug 2023 07:47)  T(F): 98.3 (07 Aug 2023 07:47), Max: 98.3 (07 Aug 2023 07:47)  HR: 83 (07 Aug 2023 07:47) (81 - 87)  BP: 134/83 (07 Aug 2023 07:47) (134/83 - 152/85)  BP(mean): --  RR: 16 (07 Aug 2023 07:47) (16 - 16)  SpO2: 98% (07 Aug 2023 07:47) (96% - 99%)    Parameters below as of 06 Aug 2023 20:00  Patient On (Oxygen Delivery Method): room air      Daily     Daily         RECENT LABS:                          13.5   8.88  )-----------( 295      ( 07 Aug 2023 05:52 )             41.2     08-07    137  |  103  |  11  ----------------------------<  91  3.8   |  25  |  0.74    Ca    9.0      07 Aug 2023 05:52    TPro  7.3  /  Alb  2.4<L>  /  TBili  0.5  /  DBili  x   /  AST  33  /  ALT  44  /  AlkPhos  80  08-07    LIVER FUNCTIONS - ( 07 Aug 2023 05:52 )  Alb: 2.4 g/dL / Pro: 7.3 g/dL / ALK PHOS: 80 U/L / ALT: 44 U/L / AST: 33 U/L / GGT: x             Urinalysis Basic - ( 07 Aug 2023 05:52 )    Color: x / Appearance: x / SG: x / pH: x  Gluc: 91 mg/dL / Ketone: x  / Bili: x / Urobili: x   Blood: x / Protein: x / Nitrite: x   Leuk Esterase: x / RBC: x / WBC x   Sq Epi: x / Non Sq Epi: x / Bacteria: x        ACC: [id number] EXAM:  CT ABDOMEN AND PELVIS   ORDERED BY: DEYANIRA WORLEY     PROCEDURE DATE:  08/07/2023          INTERPRETATION:  CLINICAL INFORMATION: Cholecystostomy, right quadrant   abdominal pain.    COMPARISON: CT chest 7/25/2023    CONTRAST/COMPLICATIONS:  IV Contrast: None  Oral Contrast: None  Complications: None at the time of this dictation.    PROCEDURE:  CT of the Abdomen and Pelvis was performed.  Sagittal and coronal reformats were performed.    FINDINGS:  LOWER CHEST: Mosaic attenuation of the lung bases.    LIVER: Within normal limits.  BILE DUCTS: Dilated common bile duct measuring up to 9 mm with   hyperdensity in the distal CBD, suspicious for choledocholithiasis  GALLBLADDER: Cholelithiasis. Gallbladder wall thickening and   pericholecystic fat stranding at the gallbladder fundus  SPLEEN: Within normal limits.  PANCREAS: Within normal limits.  ADRENALS: Within normal limits.  KIDNEYS/URETERS: Within normal limits.    BLADDER: Within normal limits.  REPRODUCTIVE ORGANS: Uterus and adnexa within normal limits.    BOWEL: No bowel obstruction. A percutaneous catheter traverses the   proximal ascending colon just distal to the cecum with pigtail noted in   the adjacent mesentery. There is inflammation adjacent to the ascending   colon and appendix, which is otherwise normal caliber. There is acute   diverticulitis involving the mid sigmoid colon with small extra luminal   locules of gas consistent with localized perforation. Associated adjacent   phlegmonous changes.  PERITONEUM: Roderick pneumoperitoneum. No loculated collection within the   limitations of this exam.  VESSELS: Within normal limits.  RETROPERITONEUM/LYMPH NODES: No lymphadenopathy.  ABDOMINAL WALL: Within normal limits.  BONES: Degenerative changes of thoracolumbar spine. Degenerative changes   of the bilateral sacroiliac joints with erosive changes at the superior   aspect of the SI joints bilaterally, possibly sequela of prior   sacroiliitis.    IMPRESSION:  Percutaneous cholecystostomy catheter is malpositioned within the   descending colon distal to the cecum. Inflammation adjacent to the colon   and appendix, which is otherwise normal in caliber, likely reactive.    Dilated common bile duct with suspicion of choledocholithiasis in the   distal CBD. Cholelithiasis with gallbladder wall thickening and   pericholecystic fat stranding, consistent with acute cholecystitis.    Acute diverticulitis in the mid sigmoid colon with associated localized   perforation.    Findings of malpositioned percutaneous cholecystomy catheter &   diverticulitis were discussed with Dr. DEYANIRA WORLEY [phone] 8/7/2023   11:49 AM by Dr. Alvarez with read back confirmation.    --- End of Report ---             MERCY ALVAREZ DO; Attending Radiologist  This document has been electronically signed. Aug  7 2023 11:59AM    CAPILLARY BLOOD GLUCOSE

## 2023-08-08 PROBLEM — Z00.00 ENCOUNTER FOR PREVENTIVE HEALTH EXAMINATION: Status: ACTIVE | Noted: 2023-08-08

## 2023-08-08 PROCEDURE — 99232 SBSQ HOSP IP/OBS MODERATE 35: CPT

## 2023-08-08 PROCEDURE — 99233 SBSQ HOSP IP/OBS HIGH 50: CPT

## 2023-08-08 RX ADMIN — APIXABAN 5 MILLIGRAM(S): 2.5 TABLET, FILM COATED ORAL at 05:23

## 2023-08-08 RX ADMIN — SENNA PLUS 2 TABLET(S): 8.6 TABLET ORAL at 22:15

## 2023-08-08 RX ADMIN — Medication 500 MILLIGRAM(S): at 05:24

## 2023-08-08 RX ADMIN — ATORVASTATIN CALCIUM 80 MILLIGRAM(S): 80 TABLET, FILM COATED ORAL at 22:14

## 2023-08-08 RX ADMIN — Medication 120 MILLIGRAM(S): at 05:24

## 2023-08-08 RX ADMIN — Medication 500 MILLIGRAM(S): at 13:22

## 2023-08-08 RX ADMIN — Medication 100 MILLIGRAM(S): at 05:24

## 2023-08-08 RX ADMIN — Medication 3 MILLIGRAM(S): at 22:17

## 2023-08-08 RX ADMIN — Medication 500 MILLIGRAM(S): at 22:15

## 2023-08-08 RX ADMIN — APIXABAN 5 MILLIGRAM(S): 2.5 TABLET, FILM COATED ORAL at 17:52

## 2023-08-08 NOTE — PROGRESS NOTE ADULT - SUBJECTIVE AND OBJECTIVE BOX
INTERVAL HPI/OVERNIGHT EVENTS:  HPI:  Karen Carlos is a 78 year old female RH dominant with PMH of HTN, and HLD; who presented to [facility] ED on 7/26/23 s/p fall from chair with + head strike, on the floor for over an hour. ER workup was significant AFIB with RVR and rhabdomyolysis. She was given Cardizem IVP and IVF with improvement HR to the 70s. She was diagnosed with new onset AFIB, seen by cardiology and started on Metoprolol and Diltiazem for rate control and was anticoagulated with Eliquis.  IV fluids were administered for rhabdomyolysis.     RUQ US was significant for acute cholecystitis and cholelithiasis with pericholecystic fluid and gallbladder wall thickening. Her HIDA scan resulted positive and a percutaneous cholecystectomy tube was placed by IR on 7/7.  Her blood cultures resulted positive for Strept Anginosus for which she was started on IV antibiotics.     Her hospital course was complicated by neurological changes/altered mental status. CT head was performed which resulted negative; MRI of the head was significant for a R cerebellar infarct.    PM&R was consulted and deemed her an appropriate candidate for IRF. She was medically stabilized and cleared for discharge to [facility].  (03 Aug 2023 13:49)    8/8/23: No acute events overnight. Pt seen and examined after therapy.  She is anxious to have plan.  She denies GERD, N/V or abd pain.  Having daily soft, brown BM's, no hematochezia/melena. Aware will be having MRCP tomorrow.      MEDICATIONS  (STANDING):  apixaban 5 milliGRAM(s) Oral every 12 hours  atorvastatin 80 milliGRAM(s) Oral at bedtime  diltiazem    milliGRAM(s) Oral daily  levoFLOXacin  Tablet 750 milliGRAM(s) Oral every 24 hours  metoprolol succinate  milliGRAM(s) Oral daily  metroNIDAZOLE    Tablet 500 milliGRAM(s) Oral every 8 hours  polyethylene glycol 3350 17 Gram(s) Oral daily  senna 2 Tablet(s) Oral at bedtime    MEDICATIONS  (PRN):  acetaminophen     Tablet .. 650 milliGRAM(s) Oral every 6 hours PRN Temp greater or equal to 38C (100.4F)  acetaminophen     Tablet .. 650 milliGRAM(s) Oral every 6 hours PRN Mild Pain (1 - 3)  aluminum hydroxide/magnesium hydroxide/simethicone Suspension 30 milliLiter(s) Oral every 4 hours PRN Dyspepsia  bisacodyl Suppository 10 milliGRAM(s) Rectal daily PRN Constipation  melatonin 3 milliGRAM(s) Oral at bedtime PRN Insomnia      Allergies    No Known Allergies    Intolerances        PAST MEDICAL & SURGICAL HISTORY:  HTN (hypertension)      Hyperlipidemia      Tinnitus  right ear      Seasonal allergies      S/P knee replacement  	    PHYSICAL EXAM:   Vital Signs:  Vital Signs Last 24 Hrs  T(C): 36.8 (08 Aug 2023 07:45), Max: 36.8 (07 Aug 2023 19:52)  T(F): 98.2 (08 Aug 2023 07:45), Max: 98.3 (07 Aug 2023 19:52)  HR: 99 (08 Aug 2023 07:45) (83 - 99)  BP: 124/85 (08 Aug 2023 07:45) (124/85 - 148/85)  BP(mean): --  RR: 16 (08 Aug 2023 07:45) (16 - 16)  SpO2: 96% (08 Aug 2023 07:45) (95% - 96%)    Parameters below as of 08 Aug 2023 07:45  Patient On (Oxygen Delivery Method): room air      Daily     Daily I&O's Summary      GENERAL:  Appears stated age, well-groomed, well-nourished, no distress  HEENT:  NC/AT,  conjunctivae clear and pink, no thyromegaly, nodules, adenopathy, no JVD, sclera -anicteric  CHEST:  Full & symmetric excursion, no increased effort, breath sounds clear  HEART:  Regular rhythm, S1, S2, no murmur/rub/S3/S4, no abdominal bruit, no edema  ABDOMEN:  Soft, non-tender, non-distended, normoactive bowel sounds,  no masses ,no hepato-splenomegaly, no signs of chronic liver disease  EXTEREMITIES:  no cyanosis,clubbing or edema  SKIN:  No rash/erythema/ecchymoses/petechiae/wounds/abscess/warm/dry  NEURO:  Alert, oriented, no asterixis, no tremor, no encephalopathy      LABS:                        13.5   8.88  )-----------( 295      ( 07 Aug 2023 05:52 )             41.2     08-07    137  |  103  |  11  ----------------------------<  91  3.8   |  25  |  0.74    Ca    9.0      07 Aug 2023 05:52    TPro  7.3  /  Alb  2.4<L>  /  TBili  0.5  /  DBili  x   /  AST  33  /  ALT  44  /  AlkPhos  80  08-07      Urinalysis Basic - ( 07 Aug 2023 05:52 )    Color: x / Appearance: x / SG: x / pH: x  Gluc: 91 mg/dL / Ketone: x  / Bili: x / Urobili: x   Blood: x / Protein: x / Nitrite: x   Leuk Esterase: x / RBC: x / WBC x   Sq Epi: x / Non Sq Epi: x / Bacteria: x      amylase   lipase  RADIOLOGY & ADDITIONAL TESTS:

## 2023-08-08 NOTE — PROGRESS NOTE ADULT - GASTROINTESTINAL COMMENTS
mild TTP RUQ, no R/G. Drain without significant collection in bag. Patient denies N/V, pain after meals

## 2023-08-08 NOTE — PROGRESS NOTE ADULT - ASSESSMENT
CHAY VELEZ is a 78 year old female with PMH HTN, and HLD who presented to [place] ED 7/26/23 s/p fall from chair with + head strike. She was found to have new onset AFIB with RVR and Rhabdomyolysis. Her hospital course was complicated by acute cholecystitis and cholelithiasis requiring percutaneous enrique drain via IR on 7/7, Bacteremia, and cerebellar infarct confirmed on MRI.    # right cerebellar CVA with incoordination, imbalance, cognitive impairment  - MRI 8/1: There is an acute right cerebellar infarct. There are no foci of susceptibility artifact to suggest hemorrhage. Scattered foci of T2/FLAIR hyperintensity in the bilateral hemispheric white matter are nonspecific but likely related to chronic white matter microvascular ischemic disease. The ventricles are normal in size for patient's age  - ASA and Atorvastatin  - continue [facility] of PT/OT/SLP  - 3 hours a day, 5 days a week  - recreation therapy, psychology services added 8/8  - Precautions: cardiac, AC, fall, AMS, enrique drain    # New AFib RVR  # HTN  - Metoprolol XL 100mg daily  - Diltiazem CD 120mg daily  - Eliquis 5 q12hrs  - BP (124/85 - 148/85) HR 83-90 8/8    # Rhabdomyolysis  - S/p IVF  - Creatine kinase: 5502 7/25--> 109 8/4  - resolved    #  Cholecystitis   - Levofloxacin daily - last dose originally scheduled for 8/10, will f/u with hospitalist to see if should extend given CT findings  - s/p percutaneous cholecystectomy 2/2 cholelithiasis via IR on 7/7   -  A/P CT 8/7: Percutaneous cholecystostomy catheter is malpositioned within the descending colon distal to the cecum. Inflammation adjacent to the colon and appendix, which is otherwise normal in caliber, likely reactive. Dilated common bile duct with suspicion of choledocholithiasis in the distal CBD. Cholelithiasis with gallbladder wall thickening and pericholecystic fat stranding, consistent with acute cholecystitis. Acute diverticulitis in the mid sigmoid colon with associated localized perforation.  - Surgery consult appreciated 8/7  ·	low suspicion for non drained infectious process  ·	leave drain in as precaution  ·	GI consult for dilated CBD and questionable choledocholithiasis  ·	may be candidate for outpatient laparoscopic cholecystectomy.  - GI consult appreciated 8/7:  ·	continue levaquin/flagyl  ·	defer ERCP given current CT findings, malpositioned enrique tube (pt currently afebrile, LFT's normal).  ·	MRCP tomorrow 8/9 at 11 AM. Team aware  ·	NPO after midnight 11:59 PM 8/8    # RUL Lung nodule found on CT chest (7/25)  - Incidental finding   - F/U PCP outpatient for repeat CT    # /Bladder Mgmt   -  PVR q8h, CIC>400cc  - patient with episodes retention. consider flomax, will discuss with hosptialist    # GI  -  Senna, Miralax     # podiatry  - podiatry appreciated, s/p nail debridement    # Sleep  - Melatonin PRN     # Skin  - Skin on admission: scattered abrasions and ecchymosis  - Pressure injury/Skin: OOB to Chair, PT/OT     # Pain Mgmt   - Tylenol PRN    # FEN   - Diet - Regular + Thins  [DASH/TLC]      #  DVT PPX:  - Eliquis     # Case discussed in IDT rounds 8/7 (initial evaluation):  - min-mod assist bADLS, ambuates 25 feet with RW and min assist with WC follow. Tolerating regular solid and thin liquids, mod language and mod cognitive deficits, reduced selective attention, reduced processing speed , reduced receptive language +RLE ataxia, reduced cooridnaiton  - goals: min assist/CG ambulation, supervision iADLs, supervision /CG bADLs and transfers, min assist stairs  - caregiver training  - target dc home 8/24 with caregiver support waking hours and home Pt OT SLP    # LABs  MRCP  NPO after MN  CBC BMP 8/10          ---------------  Outpatient/Follow-Up:    Alli Pascual  Surgery  [address] [place] [address]  Phone: [phone]  Fax: [phone]  Follow Up Time: 1 week    Alexis Simon  Cardiovascular Disease  [address], [place] [id number]  Phone: [phone]  Fax: [phone]  Follow Up Time: 1 week    [place] [address]  Phone: ()-  Fax: ()-

## 2023-08-08 NOTE — PROGRESS NOTE ADULT - PROBLEM SELECTOR PLAN 1
CTAP showed mid sigmoid diverticulitis with localized perforation.  She is on Cipro/Flagyl, afebrile.  Tolerating diet and having daily BM's.  NO hematochezia/melena.     [ ] cont antibiotics  [ ] appreciate surgery consult   [ ] can consider out-pt colonoscopy in 8 wks (pt has never had colonoscopy)  [ ] low fiber diet, when not NPO

## 2023-08-08 NOTE — PROGRESS NOTE ADULT - PROBLEM SELECTOR PLAN 3
Pt currently has malpositioned cholecystostomy tube in the colon. (CTAP- percutaneous catheter traverses the proximal ascending colon just distal to the cecum with pigtail noted in the adjacent mesentery. There is inflammation adjacent to the ascending colon and appendix, which is otherwise normal caliber). She denies abd pain, N/V or dyspepsia.     [ ] will defer to surgery  [ ] MRCP planned for tomorrow Pt currently has malpositioned cholecystostomy tube in the colon. (CTAP- percutaneous catheter traverses the proximal ascending colon just distal to the cecum with pigtail noted in the adjacent mesentery. There is inflammation adjacent to the ascending colon and appendix, which is otherwise normal caliber). She denies abd pain, N/V or dyspepsia.     [ ] will defer to surgery re management of perc drain and other findings.  [ ] MRCP planned for tomorrow

## 2023-08-08 NOTE — PROGRESS NOTE ADULT - COMMENTS
Patient sitting in chair. She recalls certain details about tests yesterday but has a hard time incorporating events/information into comprehensive plan. We reviewed the reasons for obtaining tests; discussed her abdominal discomfort (initially denies, but still has some discomfort on exam today). She is aware of the reason for the drain placement and prior recs for possible cholecysstectomy in the future. We discussed CT a/P findings and recommendations for MRCP to further evaluation stones. She was informed would be MRI, and that further eval may be needed depending on findings    test scheduled for 8/9 11 AM

## 2023-08-08 NOTE — PROGRESS NOTE ADULT - PROBLEM SELECTOR PLAN 2
CTAP today showed dilated common bile duct measuring up to 9 mm with hyperdensity in the distal CBD, suspicious for choledocholithiasis.  Pt currently has malposition cholecystostomy tube in the colon. ( CTAP- percutaneous catheter traverses the proximal ascending colon just distal to the cecum with pigtail noted in the adjacent mesentery. There is inflammation adjacent to the ascending colon and appendix, which is otherwise normal caliber).    [ ] MRCP pending for tomorrow  [ ] will defer ERCP given current CT findings, malpositioned enrique tube (pt currently afebrile, LFT's normal)

## 2023-08-08 NOTE — PROGRESS NOTE ADULT - ASSESSMENT
8/8/23: No acute events overnight. Pt seen and examined after therapy.  She is anxious to have plan.  She denies GERD, N/V or abd pain.  Having daily soft, brown BM's, no hematochezia/melena. Aware will be having MRCP tomorrow.

## 2023-08-08 NOTE — PROGRESS NOTE ADULT - SUBJECTIVE AND OBJECTIVE BOX
Patient is a 78y old  Female who presents with a chief complaint of cerebellar CVA (08 Aug 2023 12:13)      HPI:  Karen Carlos is a 78 year old female RH dominant with PMH of HTN, and HLD; who presented to [facility] ED on 7/26/23 s/p fall from chair with + head strike, on the floor for over an hour. ER workup was significant AFIB with RVR and rhabdomyolysis. She was given Cardizem IVP and IVF with improvement HR to the 70s. She was diagnosed with new onset AFIB, seen by cardiology and started on Metoprolol and Diltiazem for rate control and was anticoagulated with Eliquis.  IV fluids were administered for rhabdomyolysis.     RUQ US was significant for acute cholecystitis and cholelithiasis with pericholecystic fluid and gallbladder wall thickening. Her HIDA scan resulted positive and a percutaneous cholecystectomy tube was placed by IR on 7/7.  Her blood cultures resulted positive for Strept Anginosus for which she was started on IV antibiotics.     Her hospital course was complicated by neurological changes/altered mental status. CT head was performed which resulted negative; MRI of the head was significant for a R cerebellar infarct.    PM&R was consulted and deemed her an appropriate candidate for IRF. She was medically stabilized and cleared for discharge to [facility].  (03 Aug 2023 13:49)      PAST MEDICAL & SURGICAL HISTORY:  HTN (hypertension)      Hyperlipidemia      Tinnitus  right ear      Seasonal allergies      S/P knee replacement          MEDICATIONS  (STANDING):  apixaban 5 milliGRAM(s) Oral every 12 hours  atorvastatin 80 milliGRAM(s) Oral at bedtime  diltiazem    milliGRAM(s) Oral daily  levoFLOXacin  Tablet 750 milliGRAM(s) Oral every 24 hours  metoprolol succinate  milliGRAM(s) Oral daily  metroNIDAZOLE    Tablet 500 milliGRAM(s) Oral every 8 hours  polyethylene glycol 3350 17 Gram(s) Oral daily  senna 2 Tablet(s) Oral at bedtime    MEDICATIONS  (PRN):  acetaminophen     Tablet .. 650 milliGRAM(s) Oral every 6 hours PRN Mild Pain (1 - 3)  acetaminophen     Tablet .. 650 milliGRAM(s) Oral every 6 hours PRN Temp greater or equal to 38C (100.4F)  aluminum hydroxide/magnesium hydroxide/simethicone Suspension 30 milliLiter(s) Oral every 4 hours PRN Dyspepsia  bisacodyl Suppository 10 milliGRAM(s) Rectal daily PRN Constipation  melatonin 3 milliGRAM(s) Oral at bedtime PRN Insomnia      Allergies    No Known Allergies    Intolerances          VITALS  78y  Vital Signs Last 24 Hrs  T(C): 36.8 (08 Aug 2023 07:45), Max: 36.8 (07 Aug 2023 19:52)  T(F): 98.2 (08 Aug 2023 07:45), Max: 98.3 (07 Aug 2023 19:52)  HR: 99 (08 Aug 2023 07:45) (83 - 99)  BP: 124/85 (08 Aug 2023 07:45) (124/85 - 148/85)  BP(mean): --  RR: 16 (08 Aug 2023 07:45) (16 - 16)  SpO2: 96% (08 Aug 2023 07:45) (95% - 96%)    Parameters below as of 08 Aug 2023 07:45  Patient On (Oxygen Delivery Method): room air      Daily     Daily         RECENT LABS:                          13.5   8.88  )-----------( 295      ( 07 Aug 2023 05:52 )             41.2     08-07    137  |  103  |  11  ----------------------------<  91  3.8   |  25  |  0.74    Ca    9.0      07 Aug 2023 05:52    TPro  7.3  /  Alb  2.4<L>  /  TBili  0.5  /  DBili  x   /  AST  33  /  ALT  44  /  AlkPhos  80  08-07    LIVER FUNCTIONS - ( 07 Aug 2023 05:52 )  Alb: 2.4 g/dL / Pro: 7.3 g/dL / ALK PHOS: 80 U/L / ALT: 44 U/L / AST: 33 U/L / GGT: x             Urinalysis Basic - ( 07 Aug 2023 05:52 )    Color: x / Appearance: x / SG: x / pH: x  Gluc: 91 mg/dL / Ketone: x  / Bili: x / Urobili: x   Blood: x / Protein: x / Nitrite: x   Leuk Esterase: x / RBC: x / WBC x   Sq Epi: x / Non Sq Epi: x / Bacteria: x          CAPILLARY BLOOD GLUCOSE

## 2023-08-09 ENCOUNTER — APPOINTMENT (OUTPATIENT)
Age: 78
End: 2023-08-09

## 2023-08-09 LAB
ALBUMIN SERPL ELPH-MCNC: 2.7 G/DL — LOW (ref 3.3–5)
ALP SERPL-CCNC: 79 U/L — SIGNIFICANT CHANGE UP (ref 40–120)
ALT FLD-CCNC: 34 U/L — SIGNIFICANT CHANGE UP (ref 10–45)
ANION GAP SERPL CALC-SCNC: 8 MMOL/L — SIGNIFICANT CHANGE UP (ref 5–17)
AST SERPL-CCNC: 29 U/L — SIGNIFICANT CHANGE UP (ref 10–40)
BASOPHILS # BLD AUTO: 0.05 K/UL — SIGNIFICANT CHANGE UP (ref 0–0.2)
BASOPHILS NFR BLD AUTO: 0.5 % — SIGNIFICANT CHANGE UP (ref 0–2)
BILIRUB DIRECT SERPL-MCNC: 0.1 MG/DL — SIGNIFICANT CHANGE UP (ref 0–0.3)
BILIRUB INDIRECT FLD-MCNC: 0.4 MG/DL — SIGNIFICANT CHANGE UP (ref 0.2–1)
BILIRUB SERPL-MCNC: 0.5 MG/DL — SIGNIFICANT CHANGE UP (ref 0.2–1.2)
BUN SERPL-MCNC: 17 MG/DL — SIGNIFICANT CHANGE UP (ref 7–23)
CALCIUM SERPL-MCNC: 8.7 MG/DL — SIGNIFICANT CHANGE UP (ref 8.4–10.5)
CHLORIDE SERPL-SCNC: 104 MMOL/L — SIGNIFICANT CHANGE UP (ref 96–108)
CO2 SERPL-SCNC: 26 MMOL/L — SIGNIFICANT CHANGE UP (ref 22–31)
CREAT SERPL-MCNC: 0.76 MG/DL — SIGNIFICANT CHANGE UP (ref 0.5–1.3)
EGFR: 81 ML/MIN/1.73M2 — SIGNIFICANT CHANGE UP
EOSINOPHIL # BLD AUTO: 0.12 K/UL — SIGNIFICANT CHANGE UP (ref 0–0.5)
EOSINOPHIL NFR BLD AUTO: 1.2 % — SIGNIFICANT CHANGE UP (ref 0–6)
GLUCOSE SERPL-MCNC: 107 MG/DL — HIGH (ref 70–99)
HCT VFR BLD CALC: 40.3 % — SIGNIFICANT CHANGE UP (ref 34.5–45)
HGB BLD-MCNC: 13.4 G/DL — SIGNIFICANT CHANGE UP (ref 11.5–15.5)
IMM GRANULOCYTES NFR BLD AUTO: 0.4 % — SIGNIFICANT CHANGE UP (ref 0–0.9)
LYMPHOCYTES # BLD AUTO: 1.08 K/UL — SIGNIFICANT CHANGE UP (ref 1–3.3)
LYMPHOCYTES # BLD AUTO: 10.7 % — LOW (ref 13–44)
MCHC RBC-ENTMCNC: 30.7 PG — SIGNIFICANT CHANGE UP (ref 27–34)
MCHC RBC-ENTMCNC: 33.3 GM/DL — SIGNIFICANT CHANGE UP (ref 32–36)
MCV RBC AUTO: 92.4 FL — SIGNIFICANT CHANGE UP (ref 80–100)
MONOCYTES # BLD AUTO: 0.74 K/UL — SIGNIFICANT CHANGE UP (ref 0–0.9)
MONOCYTES NFR BLD AUTO: 7.3 % — SIGNIFICANT CHANGE UP (ref 2–14)
NEUTROPHILS # BLD AUTO: 8.08 K/UL — HIGH (ref 1.8–7.4)
NEUTROPHILS NFR BLD AUTO: 79.9 % — HIGH (ref 43–77)
NRBC # BLD: 0 /100 WBCS — SIGNIFICANT CHANGE UP (ref 0–0)
PLATELET # BLD AUTO: 320 K/UL — SIGNIFICANT CHANGE UP (ref 150–400)
POTASSIUM SERPL-MCNC: 3.8 MMOL/L — SIGNIFICANT CHANGE UP (ref 3.5–5.3)
POTASSIUM SERPL-SCNC: 3.8 MMOL/L — SIGNIFICANT CHANGE UP (ref 3.5–5.3)
PROT SERPL-MCNC: 7 G/DL — SIGNIFICANT CHANGE UP (ref 6–8.3)
RBC # BLD: 4.36 M/UL — SIGNIFICANT CHANGE UP (ref 3.8–5.2)
RBC # FLD: 13.2 % — SIGNIFICANT CHANGE UP (ref 10.3–14.5)
SODIUM SERPL-SCNC: 138 MMOL/L — SIGNIFICANT CHANGE UP (ref 135–145)
WBC # BLD: 10.11 K/UL — SIGNIFICANT CHANGE UP (ref 3.8–10.5)
WBC # FLD AUTO: 10.11 K/UL — SIGNIFICANT CHANGE UP (ref 3.8–10.5)

## 2023-08-09 PROCEDURE — 99232 SBSQ HOSP IP/OBS MODERATE 35: CPT

## 2023-08-09 PROCEDURE — 74183 MRI ABD W/O CNTR FLWD CNTR: CPT | Mod: 26

## 2023-08-09 PROCEDURE — 99233 SBSQ HOSP IP/OBS HIGH 50: CPT

## 2023-08-09 RX ORDER — LANOLIN ALCOHOL/MO/W.PET/CERES
3 CREAM (GRAM) TOPICAL AT BEDTIME
Refills: 0 | Status: DISCONTINUED | OUTPATIENT
Start: 2023-08-09 | End: 2023-08-13

## 2023-08-09 RX ORDER — APIXABAN 2.5 MG/1
5 TABLET, FILM COATED ORAL EVERY 12 HOURS
Refills: 0 | Status: DISCONTINUED | OUTPATIENT
Start: 2023-08-09 | End: 2023-08-28

## 2023-08-09 RX ORDER — LANOLIN ALCOHOL/MO/W.PET/CERES
3 CREAM (GRAM) TOPICAL ONCE
Refills: 0 | Status: COMPLETED | OUTPATIENT
Start: 2023-08-09 | End: 2023-08-09

## 2023-08-09 RX ADMIN — APIXABAN 5 MILLIGRAM(S): 2.5 TABLET, FILM COATED ORAL at 21:05

## 2023-08-09 RX ADMIN — Medication 500 MILLIGRAM(S): at 21:06

## 2023-08-09 RX ADMIN — Medication 500 MILLIGRAM(S): at 05:32

## 2023-08-09 RX ADMIN — ATORVASTATIN CALCIUM 80 MILLIGRAM(S): 80 TABLET, FILM COATED ORAL at 21:05

## 2023-08-09 RX ADMIN — Medication 3 MILLIGRAM(S): at 00:54

## 2023-08-09 RX ADMIN — Medication 120 MILLIGRAM(S): at 05:32

## 2023-08-09 RX ADMIN — Medication 100 MILLIGRAM(S): at 05:32

## 2023-08-09 RX ADMIN — Medication 500 MILLIGRAM(S): at 14:31

## 2023-08-09 RX ADMIN — Medication 3 MILLIGRAM(S): at 21:05

## 2023-08-09 NOTE — PROGRESS NOTE ADULT - COMMENTS
Patient was seen this mornig en route to [facility], on stretcher. no issues overnight. No N/V. NPO for test.  no significant output in drain sit, denies any abdominal pain. LFTS stable, WNL today. BMP stable, afebrile    -393 ml, improving

## 2023-08-09 NOTE — PROGRESS NOTE ADULT - PROBLEM SELECTOR PLAN 1
CTAP showed mid sigmoid diverticulitis with localized perforation.  She is on Cipro/Flagyl, afebrile.  Tolerating diet and having daily BM's.  NO hematochezia/melena.     [ ] cont antibiotics  [ ] can consider out-pt colonoscopy in 8 wks (pt has never had colonoscopy)  [ ] low fiber diet, when not NPO

## 2023-08-09 NOTE — PROGRESS NOTE ADULT - ASSESSMENT
CHAY VELEZ is a 78 year old female with PMH HTN, and HLD who presented to [facility] ED 7/26/23 s/p fall from chair with + head strike. She was found to have new onset AFIB with RVR and Rhabdomyolysis. Her hospital course was complicated by acute cholecystitis and cholelithiasis requiring percutaneous enrique drain via IR on 7/7, Bacteremia, and cerebellar infarct confirmed on MRI.    # right cerebellar CVA with incoordination, imbalance, cognitive impairment  - MRI 8/1: There is an acute right cerebellar infarct. There are no foci of susceptibility artifact to suggest hemorrhage. Scattered foci of T2/FLAIR hyperintensity in the bilateral hemispheric white matter  - ASA and Atorvastatin  - continue Comprehensive Rehab Program of PT/OT/SLP  - 3 hours a day, 5 days a week  - recreation therapy, psychology services   - Precautions: cardiac, AC, fall, AMS, enrique drain    # New AFib RVR  # HTN  - Metoprolol XL 100mg daily  - Diltiazem CD 120mg daily  - Eliquis 5 q12hrs  - BP (146/75 - 153/92) HR 89-90 8/9    # Rhabdomyolysis  - S/p IVF  - Creatine kinase: 5502 7/25--> 109 8/4  - resolved    #  Cholecystitis   - Levofloxacin daily - last dose originally scheduled for 8/10, will f/u with hospitalist to see if should extend given CT findings  - s/p percutaneous cholecystectomy 2/2 cholelithiasis via IR on 7/7   -  A/P CT 8/7: Percutaneous cholecystostomy catheter is malpositioned within the descending colon distal to the cecum. Inflammation adjacent to the colon and appendix, which is otherwise normal in caliber, likely reactive. Dilated common bile duct with suspicion of choledocholithiasis in the distal CBD. Cholelithiasis with gallbladder wall thickening and pericholecystic fat stranding, consistent with acute cholecystitis. Acute diverticulitis in the mid sigmoid colon with associated localized perforation.  - Surgery consult appreciated 8/7  ·	low suspicion for non drained infectious process. leave drain in as precaution  ·	may be candidate for outpatient laparoscopic cholecystectomy.  - GI consult appreciated   ·	continue levaquin/flagyl  ·	defer ERCP given current CT findings, malpositioned enrique tube (pt currently afebrile, LFT's normal).  ·	MRCP today 8/9 at 11 AM.  ·	NPO  - AST  29  /  ALT  34  /  AlkPhos  79  08-09    # RUL Lung nodule found on CT chest (7/25)  - Incidental finding   - F/U PCP outpatient for repeat CT    # /Bladder Mgmt   -  PVR q8h, CIC>400cc  - continue mobilization, addressing constipation    # GI  -  Senna, Miralax     # podiatry  - podiatry appreciated, s/p nail debridement    # Sleep  - Melatonin PRN     # Skin  - Skin on admission: scattered abrasions and ecchymosis  - Pressure injury/Skin: OOB to Chair, PT/OT     # Pain Mgmt   - Tylenol PRN    # FEN   - Diet - Regular + Thins  [DASH/TLC]      #  DVT PPX:  - Eliquis     # Case discussed in IDT rounds 8/7 (initial evaluation):  - min-mod assist bADLS, ambuates 25 feet with RW and min assist with WC follow. Tolerating regular solid and thin liquids, mod language and mod cognitive deficits, reduced selective attention, reduced processing speed , reduced receptive language +RLE ataxia, reduced cooridnaiton  - goals: min assist/CG ambulation, supervision iADLs, supervision /CG bADLs and transfers, min assist stairs  - caregiver training  - target dc home 8/24 with caregiver support waking hours and home Pt OT SLP    # LABs  MRCP  resume diet after procedure/cleared by GI  CBC BMP 8/10          ---------------  Outpatient/Follow-Up:    Alli Pascual  Surgery  [address] [place] [address]  Phone: [phone]  Fax: [phone]  Follow Up Time: 1 week    Alexis Simon  Cardiovascular Disease  [address] [place], [place] [id number]  Phone: [phone]  Fax: [phone]  Follow Up Time: 1 week    BRIAN ALBA  [address] [place] [address]  Phone: ()-  Fax: ()-     CHAY VELEZ is a 78 year old female with PMH HTN, and HLD who presented to [facility] ED 7/26/23 s/p fall from chair with + head strike. She was found to have new onset AFIB with RVR and Rhabdomyolysis. Her hospital course was complicated by acute cholecystitis and cholelithiasis requiring percutaneous enrique drain via IR on 7/7, Bacteremia, and cerebellar infarct confirmed on MRI.    # right cerebellar CVA with incoordination, imbalance, cognitive impairment  - MRI 8/1: There is an acute right cerebellar infarct. There are no foci of susceptibility artifact to suggest hemorrhage. Scattered foci of T2/FLAIR hyperintensity in the bilateral hemispheric white matter  - ASA and Atorvastatin  - continue Comprehensive Rehab Program of PT/OT/SLP  - 3 hours a day, 5 days a week  - recreation therapy, psychology services   - Precautions: cardiac, AC, fall, AMS, enrique drain    # New AFib RVR  # HTN  - Metoprolol XL 100mg daily  - Diltiazem CD 120mg daily  - Eliquis 5 q12hrs  - BP (146/75 - 153/92) HR 89-90 8/9    # Rhabdomyolysis  - S/p IVF  - Creatine kinase: 5502 7/25--> 109 8/4  - resolved    #  Cholecystitis   - Levofloxacin daily - last dose originally scheduled for 8/10, will f/u with hospitalist to see if should extend given CT findings  - s/p percutaneous cholecystectomy 2/2 cholelithiasis via IR on 7/7   -  A/P CT 8/7: Percutaneous cholecystostomy catheter is malpositioned within the descending colon distal to the cecum. Inflammation adjacent to the colon and appendix, which is otherwise normal in caliber, likely reactive. Dilated common bile duct with suspicion of choledocholithiasis in the distal CBD. Cholelithiasis with gallbladder wall thickening and pericholecystic fat stranding, consistent with acute cholecystitis. Acute diverticulitis in the mid sigmoid colon with associated localized perforation.  - Surgery consult appreciated 8/7  ·	low suspicion for non drained infectious process. leave drain in as precaution  ·	may be candidate for outpatient laparoscopic cholecystectomy.  - GI consult appreciated   ·	continue levaquin/flagyl  ·	defer ERCP given current CT findings, malpositioned enrique tube (pt currently afebrile, LFT's normal).  ·	MRCP performed 8/9. Results pending, awaiting GI clearance for po resumption depending on results  ·	Currently NPO  - AST  29  /  ALT  34  /  AlkPhos  79  08-09 WNL    # RUL Lung nodule found on CT chest (7/25)  - Incidental finding   - F/U PCP outpatient for repeat CT    # /Bladder Mgmt   -  PVR q8h, CIC>400cc  - continue mobilization, addressing constipation  - improved -300s 8/9    # GI  -  Senna, Miralax     # podiatry  - podiatry appreciated, s/p nail debridement    # Sleep  - Melatonin PRN     # Skin  - Skin on admission: scattered abrasions and ecchymosis  - Pressure injury/Skin: OOB to Chair, PT/OT     # Pain Mgmt   - Tylenol PRN    # FEN   - Diet - Regular + Thins  [DASH/TLC]      #  DVT PPX:  - Eliquis     # Case discussed in IDT rounds 8/7 (initial evaluation):  - min-mod assist bADLS, ambuates 25 feet with RW and min assist with WC follow. Tolerating regular solid and thin liquids, mod language and mod cognitive deficits, reduced selective attention, reduced processing speed , reduced receptive language +RLE ataxia, reduced cooridnaiton  - goals: min assist/CG ambulation, supervision iADLs, supervision /CG bADLs and transfers, min assist stairs  - caregiver training  - target dc home 8/24 with caregiver support waking hours and home Pt OT SLP    # LABs  MRCP results  resume diet after procedure/cleared by GI  Abx duration  CBC BMP 8/10          ---------------  Outpatient/Follow-Up:    Alli Pascual  Surgery  [address] [place] [address]  Phone: [phone]  Fax: [phone]  Follow Up Time: 1 week    Alexis Simon  Cardiovascular Disease  [address] [place], [place] [id number]  Phone: [phone]  Fax: [phone]  Follow Up Time: 1 week    BRIAN ALBA  [address] [place] [address]  Phone: ()-  Fax: ()-     CHAY VELEZ is a 78 year old female with PMH HTN, and HLD who presented to [facility] ED 7/26/23 s/p fall from chair with + head strike. She was found to have new onset AFIB with RVR and Rhabdomyolysis. Her hospital course was complicated by acute cholecystitis and cholelithiasis requiring percutaneous enrique drain via IR on 7/7, Bacteremia, and cerebellar infarct confirmed on MRI.    # right cerebellar CVA with incoordination, imbalance, cognitive impairment  - MRI 8/1: There is an acute right cerebellar infarct. There are no foci of susceptibility artifact to suggest hemorrhage. Scattered foci of T2/FLAIR hyperintensity in the bilateral hemispheric white matter  - ASA and Atorvastatin  - continue Comprehensive Rehab Program of PT/OT/SLP  - 3 hours a day, 5 days a week  - recreation therapy, psychology services   - Precautions: cardiac, AC, fall, AMS, enrique drain    # New AFib RVR  # HTN  - Metoprolol XL 100mg daily  - Diltiazem CD 120mg daily  - Eliquis 5 q12hrs  - BP (146/75 - 153/92) HR 89-90 8/9    # Rhabdomyolysis  - S/p IVF  - Creatine kinase: 5502 7/25--> 109 8/4  - resolved    #  Cholecystitis   - Levofloxacin daily - last dose originally scheduled for 8/10, will f/u with hospitalist to see if should extend given CT findings  - s/p percutaneous cholecystectomy 2/2 cholelithiasis via IR on 7/7   -  A/P CT 8/7: Percutaneous cholecystostomy catheter is malpositioned within the descending colon distal to the cecum. Inflammation adjacent to the colon and appendix, which is otherwise normal in caliber, likely reactive. Dilated common bile duct with suspicion of choledocholithiasis in the distal CBD. Cholelithiasis with gallbladder wall thickening and pericholecystic fat stranding, consistent with acute cholecystitis. Acute diverticulitis in the mid sigmoid colon with associated localized perforation.  - Surgery consult appreciated 8/7  ·	low suspicion for non drained infectious process. leave drain in as precaution  ·	may be candidate for outpatient laparoscopic cholecystectomy.  - GI consult appreciated   ·	continue levaquin/flagyl  ·	defer ERCP given current CT findings, malpositioned enrique tube (pt currently afebrile, LFT's normal).  ·	MRCP performed 8/9. Results pending, awaiting GI clearance for po resumption depending on results  ·	Currently NPO  - AST  29  /  ALT  34  /  AlkPhos  79  08-09 WNL    # RUL Lung nodule found on CT chest (7/25)  - Incidental finding   - F/U PCP outpatient for repeat CT    # /Bladder Mgmt   -  PVR q8h, CIC>400cc  - continue mobilization, addressing constipation  - improved -300s 8/9    # GI  -  Senna, Miralax     # podiatry  - podiatry appreciated, s/p nail debridement    # Sleep  - Melatonin PRN     # Skin  - Skin on admission: scattered abrasions and ecchymosis  - Pressure injury/Skin: OOB to Chair, PT/OT     # Pain Mgmt   - Tylenol PRN    # FEN   - Diet - Regular + Thins  [DASH/TLC]      #  DVT PPX:  - Eliquis     # Case discussed in IDT rounds 8/7 (initial evaluation):  - min-mod assist bADLS, ambuates 25 feet with RW and min assist with WC follow. Tolerating regular solid and thin liquids, mod language and mod cognitive deficits, reduced selective attention, reduced processing speed , reduced receptive language +RLE ataxia, reduced cooridnaiton  - goals: min assist/CG ambulation, supervision iADLs, supervision /CG bADLs and transfers, min assist stairs  - caregiver training  - target dc home 8/24 with caregiver support waking hours and home Pt OT SLP    # LABs  MRCP results  resume diet after procedure/cleared by GI  Abx duration  CBC BMP 8/10    addendum 2:25 PM  - GI follow up appreciated. Possible ercp tomorrow 8/10 based on wet read of MRCP. Cleared to resume diet with NPO after midnight (except meds), Continue to hold eliquis for now, confirmed with GI team          ---------------  Outpatient/Follow-Up:    Alli Pascual  Surgery  [address] [place] [address]  Phone: [phone]  Fax: [phone]  Follow Up Time: 1 week    Alexis Simon  Cardiovascular Disease  [address] [place], [place] [id number]  Phone: [phone]  Fax: [phone]  Follow Up Time: 1 week    BRIAN ALBA  [address] [place] [address]  Phone: ()-  Fax: ()-

## 2023-08-09 NOTE — PROGRESS NOTE ADULT - PROBLEM SELECTOR PLAN 2
CTAP showed dilated common bile duct measuring up to 9 mm with hyperdensity in the distal CBD, suspicious for choledocholithiasis.  Pt currently has malposition cholecystostomy tube in the colon. ( CTAP- percutaneous catheter traverses the proximal ascending colon just distal to the cecum with pigtail noted in the adjacent mesentery. There is inflammation adjacent to the ascending colon and appendix, which is otherwise normal caliber).    [ ] MRCP pending for today  [ ] will defer ERCP given current CT findings, malpositioned enrique tube (pt currently afebrile, LFT's normal) CTAP showed dilated common bile duct measuring up to 9 mm with hyperdensity in the distal CBD, suspicious for choledocholithiasis.  Pt currently has malposition cholecystostomy tube in the colon. ( CTAP- percutaneous catheter traverses the proximal ascending colon just distal to the cecum with pigtail noted in the adjacent mesentery. There is inflammation adjacent to the ascending colon and appendix, which is otherwise normal caliber).    [ ] MRCP pending for today  [ ] ERCP planned for tomorrow (8/10) (pt currently afebrile, LFT's normal) CTAP showed dilated common bile duct measuring up to 9 mm with hyperdensity in the distal CBD, suspicious for choledocholithiasis.  Pt currently has malposition cholecystostomy tube in the colon. ( CTAP- percutaneous catheter traverses the proximal ascending colon just distal to the cecum with pigtail noted in the adjacent mesentery. There is inflammation adjacent to the ascending colon and appendix, which is otherwise normal caliber).    [ ] MRCP demonstrates bile duct stones  [ ] ERCP, timing to be determined AFTER surgery clears the pt for intervention from their perspective.

## 2023-08-09 NOTE — PROGRESS NOTE ADULT - PROBLEM SELECTOR PLAN 3
Pt currently has malpositioned cholecystostomy tube in the colon. No drainage in bag.     (CTAP- percutaneous catheter traverses the proximal ascending colon just distal to the cecum with pigtail noted in the adjacent mesentery. There is inflammation adjacent to the ascending colon and appendix, which is otherwise normal caliber). She denies abd pain, N/V or dyspepsia.     [ ] will defer to surgery re management of perc drain and other findings  [ ] MRCP planned for today Pt currently has malpositioned cholecystostomy tube in the colon. No drainage in bag.     (CTAP- percutaneous catheter traverses the proximal ascending colon just distal to the cecum with pigtail noted in the adjacent mesentery. There is inflammation adjacent to the ascending colon and appendix, which is otherwise normal caliber). She denies abd pain, N/V or dyspepsia.     [ ] will defer to surgery re management of perc drain and other findings

## 2023-08-09 NOTE — PROGRESS NOTE ADULT - SUBJECTIVE AND OBJECTIVE BOX
Patient is a 78y old  Female who presents with a chief complaint of cerebellar CVA (08 Aug 2023 13:00)      HPI:  Karen Carlos is a 78 year old female RH dominant with PMH of HTN, and HLD; who presented to [facility] on 7/26/23 s/p fall from chair with + head strike, on the floor for over an hour. ER workup was significant AFIB with RVR and rhabdomyolysis. She was given Cardizem IVP and IVF with improvement HR to the 70s. She was diagnosed with new onset AFIB, seen by cardiology and started on Metoprolol and Diltiazem for rate control and was anticoagulated with Eliquis.  IV fluids were administered for rhabdomyolysis.     RUQ US was significant for acute cholecystitis and cholelithiasis with pericholecystic fluid and gallbladder wall thickening. Her HIDA scan resulted positive and a percutaneous cholecystectomy tube was placed by IR on 7/7.  Her blood cultures resulted positive for Strept Anginosus for which she was started on IV antibiotics.     Her hospital course was complicated by neurological changes/altered mental status. CT head was performed which resulted negative; MRI of the head was significant for a R cerebellar infarct.    PM&R was consulted and deemed her an appropriate candidate for IRF. She was medically stabilized and cleared for discharge to [facility].  (03 Aug 2023 13:49)      PAST MEDICAL & SURGICAL HISTORY:  HTN (hypertension)      Hyperlipidemia      Tinnitus  right ear      Seasonal allergies      S/P knee replacement          MEDICATIONS  (STANDING):  apixaban 5 milliGRAM(s) Oral every 12 hours  atorvastatin 80 milliGRAM(s) Oral at bedtime  diltiazem    milliGRAM(s) Oral daily  levoFLOXacin  Tablet 750 milliGRAM(s) Oral every 24 hours  metoprolol succinate  milliGRAM(s) Oral daily  metroNIDAZOLE    Tablet 500 milliGRAM(s) Oral every 8 hours  polyethylene glycol 3350 17 Gram(s) Oral daily  senna 2 Tablet(s) Oral at bedtime    MEDICATIONS  (PRN):  acetaminophen     Tablet .. 650 milliGRAM(s) Oral every 6 hours PRN Mild Pain (1 - 3)  acetaminophen     Tablet .. 650 milliGRAM(s) Oral every 6 hours PRN Temp greater or equal to 38C (100.4F)  aluminum hydroxide/magnesium hydroxide/simethicone Suspension 30 milliLiter(s) Oral every 4 hours PRN Dyspepsia  bisacodyl Suppository 10 milliGRAM(s) Rectal daily PRN Constipation  melatonin 3 milliGRAM(s) Oral at bedtime PRN Insomnia      Allergies    No Known Allergies    Intolerances          VITALS  78y  Vital Signs Last 24 Hrs  T(C): 36.9 (08 Aug 2023 19:52), Max: 36.9 (08 Aug 2023 19:52)  T(F): 98.4 (08 Aug 2023 19:52), Max: 98.4 (08 Aug 2023 19:52)  HR: 90 (09 Aug 2023 05:34) (89 - 90)  BP: 146/75 (09 Aug 2023 05:34) (146/75 - 153/92)  BP(mean): --  RR: 14 (08 Aug 2023 19:52) (14 - 14)  SpO2: 95% (08 Aug 2023 19:52) (95% - 95%)    Parameters below as of 08 Aug 2023 19:52  Patient On (Oxygen Delivery Method): room air      Daily     Daily         RECENT LABS:                          13.4   10.11 )-----------( 320      ( 09 Aug 2023 07:19 )             40.3     08-09    138  |  104  |  17  ----------------------------<  107<H>  3.8   |  26  |  0.76    Ca    8.7      09 Aug 2023 07:19    TPro  7.0  /  Alb  2.7<L>  /  TBili  0.5  /  DBili  0.1  /  AST  29  /  ALT  34  /  AlkPhos  79  08-09    LIVER FUNCTIONS - ( 09 Aug 2023 07:19 )  Alb: 2.7 g/dL / Pro: 7.0 g/dL / ALK PHOS: 79 U/L / ALT: 34 U/L / AST: 29 U/L / GGT: x             Urinalysis Basic - ( 09 Aug 2023 07:19 )    Color: x / Appearance: x / SG: x / pH: x  Gluc: 107 mg/dL / Ketone: x  / Bili: x / Urobili: x   Blood: x / Protein: x / Nitrite: x   Leuk Esterase: x / RBC: x / WBC x   Sq Epi: x / Non Sq Epi: x / Bacteria: x          CAPILLARY BLOOD GLUCOSE

## 2023-08-09 NOTE — PROGRESS NOTE ADULT - SUBJECTIVE AND OBJECTIVE BOX
INTERVAL HPI/OVERNIGHT EVENTS:  HPI:  Karen Carlos is a 78 year old female RH dominant with PMH of HTN, and HLD; who presented to [facility] ED on 7/26/23 s/p fall from chair with + head strike, on the floor for over an hour. ER workup was significant AFIB with RVR and rhabdomyolysis. She was given Cardizem IVP and IVF with improvement HR to the 70s. She was diagnosed with new onset AFIB, seen by cardiology and started on Metoprolol and Diltiazem for rate control and was anticoagulated with Eliquis.  IV fluids were administered for rhabdomyolysis.     RUQ US was significant for acute cholecystitis and cholelithiasis with pericholecystic fluid and gallbladder wall thickening. Her HIDA scan resulted positive and a percutaneous cholecystectomy tube was placed by IR on 7/7.  Her blood cultures resulted positive for Strept Anginosus for which she was started on IV antibiotics.     Her hospital course was complicated by neurological changes/altered mental status. CT head was performed which resulted negative; MRI of the head was significant for a R cerebellar infarct.    PM&R was consulted and deemed her an appropriate candidate for IRF. She was medically stabilized and cleared for discharge to [facility].  (03 Aug 2023 13:49)    8/9/23: GI note- pt seen and examined at bedside.  She is aware of MRCP today and NPO until after completed. She denies GERD, N/V or abd pain.  She endorses tolerating her diet, + brown BMs without hematochezia/melena.     MEDICATIONS  (STANDING):  apixaban 5 milliGRAM(s) Oral every 12 hours  atorvastatin 80 milliGRAM(s) Oral at bedtime  diltiazem    milliGRAM(s) Oral daily  levoFLOXacin  Tablet 750 milliGRAM(s) Oral every 24 hours  metoprolol succinate  milliGRAM(s) Oral daily  metroNIDAZOLE    Tablet 500 milliGRAM(s) Oral every 8 hours  polyethylene glycol 3350 17 Gram(s) Oral daily  senna 2 Tablet(s) Oral at bedtime    MEDICATIONS  (PRN):  acetaminophen     Tablet .. 650 milliGRAM(s) Oral every 6 hours PRN Temp greater or equal to 38C (100.4F)  acetaminophen     Tablet .. 650 milliGRAM(s) Oral every 6 hours PRN Mild Pain (1 - 3)  aluminum hydroxide/magnesium hydroxide/simethicone Suspension 30 milliLiter(s) Oral every 4 hours PRN Dyspepsia  bisacodyl Suppository 10 milliGRAM(s) Rectal daily PRN Constipation  melatonin 3 milliGRAM(s) Oral at bedtime PRN Insomnia      Allergies    No Known Allergies    Intolerances        PAST MEDICAL & SURGICAL HISTORY:  HTN (hypertension)      Hyperlipidemia      Tinnitus  right ear      Seasonal allergies      S/P knee replacement    	    PHYSICAL EXAM:   Vital Signs:  Vital Signs Last 24 Hrs  T(C): 36.6 (09 Aug 2023 08:07), Max: 36.9 (08 Aug 2023 19:52)  T(F): 97.9 (09 Aug 2023 08:07), Max: 98.4 (08 Aug 2023 19:52)  HR: 87 (09 Aug 2023 08:07) (87 - 90)  BP: 126/86 (09 Aug 2023 08:07) (126/86 - 153/92)  BP(mean): --  RR: 16 (09 Aug 2023 08:07) (14 - 16)  SpO2: 96% (09 Aug 2023 08:07) (95% - 96%)    Parameters below as of 09 Aug 2023 08:07  Patient On (Oxygen Delivery Method): room air      Daily     Daily I&O's Summary    08 Aug 2023 07:01  -  09 Aug 2023 07:00  --------------------------------------------------------  IN: 0 mL / OUT: 657.5 mL / NET: -657.5 mL        GENERAL:  Appears stated age, no distress  HEENT:  NC/AT,  conjunctivae clear and pink, sclera -anicteric  CHEST:  Full & symmetric excursion  HEART:  Regular rhythm  ABDOMEN:  Soft, non-tender, non-distended, normoactive bowel sounds  EXTEREMITIES:  no cyanosis,clubbing or edema  SKIN:  No rash/warm/dry  NEURO:  Alert, oriented, forgetful      LABS:                        13.4   10.11 )-----------( 320      ( 09 Aug 2023 07:19 )             40.3     08-09    138  |  104  |  17  ----------------------------<  107<H>  3.8   |  26  |  0.76    Ca    8.7      09 Aug 2023 07:19    TPro  7.0  /  Alb  2.7<L>  /  TBili  0.5  /  DBili  0.1  /  AST  29  /  ALT  34  /  AlkPhos  79  08-09      Urinalysis Basic - ( 09 Aug 2023 07:19 )    Color: x / Appearance: x / SG: x / pH: x  Gluc: 107 mg/dL / Ketone: x  / Bili: x / Urobili: x   Blood: x / Protein: x / Nitrite: x   Leuk Esterase: x / RBC: x / WBC x   Sq Epi: x / Non Sq Epi: x / Bacteria: x      amylase   lipase  RADIOLOGY & ADDITIONAL TESTS:

## 2023-08-09 NOTE — PROGRESS NOTE ADULT - ASSESSMENT
8/9/23: GI note- pt seen and examined at bedside.  She is aware of MRCP today and NPO until after completed. She denies GERD, N/V or abd pain.  She endorses tolerating her diet, + brown BMs without hematochezia/melena.        Problem/Plan - 2:  ·  Problem: Common bile duct dilation.   ·  Plan:      Problem/Plan - 3:  ·  Problem: Migration of percutaneous cholecystostomy tube.   ·  Plan:

## 2023-08-10 DIAGNOSIS — K57.32 DIVERTICULITIS OF LARGE INTESTINE WITHOUT PERFORATION OR ABSCESS WITHOUT BLEEDING: ICD-10-CM

## 2023-08-10 LAB
ALBUMIN SERPL ELPH-MCNC: 2.6 G/DL — LOW (ref 3.3–5)
ALP SERPL-CCNC: 77 U/L — SIGNIFICANT CHANGE UP (ref 40–120)
ALT FLD-CCNC: 29 U/L — SIGNIFICANT CHANGE UP (ref 10–45)
ANION GAP SERPL CALC-SCNC: 8 MMOL/L — SIGNIFICANT CHANGE UP (ref 5–17)
AST SERPL-CCNC: 28 U/L — SIGNIFICANT CHANGE UP (ref 10–40)
BASOPHILS # BLD AUTO: 0.05 K/UL — SIGNIFICANT CHANGE UP (ref 0–0.2)
BASOPHILS NFR BLD AUTO: 0.7 % — SIGNIFICANT CHANGE UP (ref 0–2)
BILIRUB SERPL-MCNC: 0.6 MG/DL — SIGNIFICANT CHANGE UP (ref 0.2–1.2)
BUN SERPL-MCNC: 13 MG/DL — SIGNIFICANT CHANGE UP (ref 7–23)
CALCIUM SERPL-MCNC: 8.7 MG/DL — SIGNIFICANT CHANGE UP (ref 8.4–10.5)
CHLORIDE SERPL-SCNC: 104 MMOL/L — SIGNIFICANT CHANGE UP (ref 96–108)
CO2 SERPL-SCNC: 27 MMOL/L — SIGNIFICANT CHANGE UP (ref 22–31)
CREAT SERPL-MCNC: 0.82 MG/DL — SIGNIFICANT CHANGE UP (ref 0.5–1.3)
EGFR: 74 ML/MIN/1.73M2 — SIGNIFICANT CHANGE UP
EOSINOPHIL # BLD AUTO: 0.14 K/UL — SIGNIFICANT CHANGE UP (ref 0–0.5)
EOSINOPHIL NFR BLD AUTO: 1.9 % — SIGNIFICANT CHANGE UP (ref 0–6)
GLUCOSE SERPL-MCNC: 97 MG/DL — SIGNIFICANT CHANGE UP (ref 70–99)
HCT VFR BLD CALC: 41.4 % — SIGNIFICANT CHANGE UP (ref 34.5–45)
HGB BLD-MCNC: 13.8 G/DL — SIGNIFICANT CHANGE UP (ref 11.5–15.5)
IMM GRANULOCYTES NFR BLD AUTO: 0.5 % — SIGNIFICANT CHANGE UP (ref 0–0.9)
LYMPHOCYTES # BLD AUTO: 1.2 K/UL — SIGNIFICANT CHANGE UP (ref 1–3.3)
LYMPHOCYTES # BLD AUTO: 16 % — SIGNIFICANT CHANGE UP (ref 13–44)
MCHC RBC-ENTMCNC: 31 PG — SIGNIFICANT CHANGE UP (ref 27–34)
MCHC RBC-ENTMCNC: 33.3 GM/DL — SIGNIFICANT CHANGE UP (ref 32–36)
MCV RBC AUTO: 93 FL — SIGNIFICANT CHANGE UP (ref 80–100)
MONOCYTES # BLD AUTO: 0.66 K/UL — SIGNIFICANT CHANGE UP (ref 0–0.9)
MONOCYTES NFR BLD AUTO: 8.8 % — SIGNIFICANT CHANGE UP (ref 2–14)
NEUTROPHILS # BLD AUTO: 5.39 K/UL — SIGNIFICANT CHANGE UP (ref 1.8–7.4)
NEUTROPHILS NFR BLD AUTO: 72.1 % — SIGNIFICANT CHANGE UP (ref 43–77)
NRBC # BLD: 0 /100 WBCS — SIGNIFICANT CHANGE UP (ref 0–0)
PLATELET # BLD AUTO: 314 K/UL — SIGNIFICANT CHANGE UP (ref 150–400)
POTASSIUM SERPL-MCNC: 3.8 MMOL/L — SIGNIFICANT CHANGE UP (ref 3.5–5.3)
POTASSIUM SERPL-SCNC: 3.8 MMOL/L — SIGNIFICANT CHANGE UP (ref 3.5–5.3)
PROT SERPL-MCNC: 6.7 G/DL — SIGNIFICANT CHANGE UP (ref 6–8.3)
RBC # BLD: 4.45 M/UL — SIGNIFICANT CHANGE UP (ref 3.8–5.2)
RBC # FLD: 13.2 % — SIGNIFICANT CHANGE UP (ref 10.3–14.5)
SODIUM SERPL-SCNC: 139 MMOL/L — SIGNIFICANT CHANGE UP (ref 135–145)
WBC # BLD: 7.48 K/UL — SIGNIFICANT CHANGE UP (ref 3.8–10.5)
WBC # FLD AUTO: 7.48 K/UL — SIGNIFICANT CHANGE UP (ref 3.8–10.5)

## 2023-08-10 PROCEDURE — 99232 SBSQ HOSP IP/OBS MODERATE 35: CPT

## 2023-08-10 PROCEDURE — 99233 SBSQ HOSP IP/OBS HIGH 50: CPT

## 2023-08-10 PROCEDURE — 99222 1ST HOSP IP/OBS MODERATE 55: CPT

## 2023-08-10 RX ORDER — SACCHAROMYCES BOULARDII 250 MG
250 POWDER IN PACKET (EA) ORAL
Refills: 0 | Status: DISCONTINUED | OUTPATIENT
Start: 2023-08-10 | End: 2023-08-28

## 2023-08-10 RX ORDER — INDOMETHACIN 50 MG
100 CAPSULE ORAL ONCE
Refills: 0 | Status: DISCONTINUED | OUTPATIENT
Start: 2023-08-11 | End: 2023-08-27

## 2023-08-10 RX ADMIN — Medication 500 MILLIGRAM(S): at 14:36

## 2023-08-10 RX ADMIN — Medication 500 MILLIGRAM(S): at 05:55

## 2023-08-10 RX ADMIN — Medication 120 MILLIGRAM(S): at 05:56

## 2023-08-10 RX ADMIN — Medication 500 MILLIGRAM(S): at 21:06

## 2023-08-10 RX ADMIN — Medication 250 MILLIGRAM(S): at 17:16

## 2023-08-10 RX ADMIN — ATORVASTATIN CALCIUM 80 MILLIGRAM(S): 80 TABLET, FILM COATED ORAL at 21:07

## 2023-08-10 RX ADMIN — Medication 3 MILLIGRAM(S): at 21:07

## 2023-08-10 RX ADMIN — Medication 100 MILLIGRAM(S): at 05:55

## 2023-08-10 NOTE — PROGRESS NOTE ADULT - PROBLEM SELECTOR PLAN 3
cont antibiotics  Can consider out-pt colonoscopy in 8 wks (pt has never had colonoscopy)  Low fiber diet, when not NPO

## 2023-08-10 NOTE — CONSULT NOTE ADULT - SUBJECTIVE AND OBJECTIVE BOX
General Surgery Consult    Consulting attending: Dr. Mcconnell      HPI: Karen Carlos is a 78 year old woman with history of HTN and HLD who initially presented on 7/26 after falling from a chair and hitting her head. The patient was found to be in Afib w/ RVR in the ED and subsequently required IV medication for rate control. The patient was subsequently diagnosed with a right cerebellar CVA and with acute cholecystitis. The patient was taken to IR on 7/27 for percutaneous cholecystostomy, due to the severity of her other ongoing problems.     The patient was transferred to [facility] on 8/7. The patient's cholecystostomy tube was reassessed at that time via CT and was found to have migrated to the ascending/transverse colon, prompting surgical consultation.     The patient is also being seen by gastroenterology for new CBD and intrahepatic bile duct dilation.       PAST MEDICAL HISTORY:  HTN (hypertension)    Hyperlipidemia    Tinnitus    Seasonal allergies      PAST SURGICAL HISTORY:  No significant past surgical history    S/P knee replacement      MEDICATIONS:      ALLERGIES:  No Known Allergies      VITALS & I/Os:  Vital Signs Last 24 Hrs  T(C): 36.4 (10 Aug 2023 08:30), Max: 36.5 (09 Aug 2023 19:38)  T(F): 97.5 (10 Aug 2023 08:30), Max: 97.7 (09 Aug 2023 19:38)  HR: 92 (10 Aug 2023 08:30) (87 - 92)  BP: 153/92 (10 Aug 2023 08:30) (124/80 - 153/92)  BP(mean): --  RR: 16 (10 Aug 2023 08:30) (14 - 16)  SpO2: 96% (10 Aug 2023 08:30) (96% - 98%)    Parameters below as of 10 Aug 2023 08:30  Patient On (Oxygen Delivery Method): room air      I&O's Summary    09 Aug 2023 07:01  -  10 Aug 2023 07:00  --------------------------------------------------------  IN: 0 mL / OUT: 15 mL / NET: -15 mL      PHYSICAL EXAM:  General: No acute distress, nontoxic appearing  Respiratory: Nonlabored  Cardiovascular: normotensive, regular rate  Abdominal: Soft, nondistended, nontender. No rebound or guarding. No organomegaly, no palpable mass. Drain with serous fluid and NO stool or other debris  Extremities: Warm      LABS:                        13.8   7.48  )-----------( 314      ( 10 Aug 2023 07:23 )             41.4     08-10    139  |  104  |  13  ----------------------------<  97  3.8   |  27  |  0.82    Ca    8.7      10 Aug 2023 07:23    TPro  6.7  /  Alb  2.6<L>  /  TBili  0.6  /  DBili  x   /  AST  28  /  ALT  29  /  AlkPhos  77  08-10      Urinalysis Basic - ( 10 Aug 2023 07:23 )    Color: x / Appearance: x / SG: x / pH: x  Gluc: 97 mg/dL / Ketone: x  / Bili: x / Urobili: x   Blood: x / Protein: x / Nitrite: x   Leuk Esterase: x / RBC: x / WBC x   Sq Epi: x / Non Sq Epi: x / Bacteria: x      IMAGING:  CT A/P:  LOWER CHEST: Mosaic attenuation of the lung bases.    LIVER: Within normal limits.  BILE DUCTS: Dilated common bile duct measuring up to 9 mm with   hyperdensity in the distal CBD, suspicious for choledocholithiasis  GALLBLADDER: Cholelithiasis. Gallbladder wall thickening and   pericholecystic fat stranding at the gallbladder fundus  SPLEEN: Within normal limits.  PANCREAS: Within normal limits.  ADRENALS: Within normal limits.  KIDNEYS/URETERS: Within normal limits.    BLADDER: Within normal limits.  REPRODUCTIVE ORGANS: Uterus and adnexa within normal limits.    BOWEL: No bowel obstruction. A percutaneous catheter traverses the   proximal ascending colon just distal to the cecum with pigtail noted in   the adjacent mesentery. There is inflammation adjacent to the ascending   colon and appendix, which is otherwise normal caliber. There is acute   diverticulitis involving the mid sigmoid colon with small extra luminal   locules of gas consistent with localized perforation. Associated adjacent   phlegmonous changes.  PERITONEUM: Roderick pneumoperitoneum. No loculated collection within the   limitations of this exam.  VESSELS: Within normal limits.  RETROPERITONEUM/LYMPH NODES: No lymphadenopathy.  ABDOMINAL WALL: Within normal limits.  BONES: Degenerative changes of thoracolumbar spine. Degenerative changes   of the bilateral sacroiliac joints with erosive changes at the superior   aspect of the SI joints bilaterally, possibly sequela of prior   sacroiliitis.    IMPRESSION:  Percutaneous cholecystostomy catheter is malpositioned within the   descending colon distal to the cecum. Inflammation adjacent to the colon   and appendix, which is otherwise normal in caliber, likely reactive.    Dilated common bile duct with suspicion of choledocholithiasis in the   distal CBD. Cholelithiasis with gallbladder wall thickening and   pericholecystic fat stranding, consistent with acute cholecystitis.    Acute diverticulitis in the mid sigmoid colon with associated localized   perforation    MRCP:  LIVER: Liver size within normal limits. Perfusional difference of segment   6 and segment 8 on early phase compared to remainder of the liver, of   unclear etiology, suboptimally characterized due to motion. Liver   parenchyma is suboptimally assessed due to motion.  BILE DUCTS: Distended common bile duct up to 12 mm, new since 7/25/2023,   with mid/distal segment choledocholithiasis (two stones noted, image 54   series 14). Borderline central intrahepatic ductal distention.  GALLBLADDER: Cholelithiasis. Persistent, but decreased, inflammatory   changes of the gallbladder since 7/25/2023. Malpositioned cholecystostomy   tube with fluid and inflammatory changes along the catheter tract better   seen on the recent CT from 8/7/2023. Consider repeat HIDA scan to   evaluate for persistent cholecystitis.  SPLEEN: Spleen size within normal limits  PANCREAS: Limited evaluation due to motion. 3 mm T2 hyperintense foci at   the pancreatic tail are likely a side branch IPMNs or sequelae of prior   pancreatitis.  ADRENALS: Unremarkable  KIDNEYS/URETERS: No hydronephrosis. Trace perinephric fluid.    VISUALIZED PORTIONS:  BOWEL: No bowel distention.  PERITONEUM: Trace fluid adjacent to the right colon along displaced   cholecystostomy tube catheter tract, suboptimally characterized on this   study. Additional trace fluid adjacent to diverticular disease of the   sigmoid colon, better seen on recent CT.  VESSELS: No abdominal aortic aneurysm  RETROPERITONEUM/LYMPH NODES: Small volume nodes  ABDOMINAL WALL: Unremarkable  BONES: Suboptimally characterized and abdominal protocol MRI.    IMPRESSION:    Distended common bile duct up to 12 mm, new since 7/25/2023, with   mid/distal segment choledocholithiasis (two stones noted, image 54 series   14). Borderline central intrahepatic ductal distention.    Cholelithiasis. Persistent, but decreased, inflammatory changes of the   gallbladder since 7/25/2023. Malpositioned cholecystostomy tube with   fluid and inflammatory changes along the catheter tract better seen on   the recent CT from 8/7/2023. Consider repeat HIDA scan to evaluate for   persistent cholecystitis.    Additional trace fluid adjacent to diverticular disease of the sigmoid   colon, better seen on recent CT from 8/7/2023.    3 mm T2 hyperintense foci at the pancreatic tail are likely a side branch   IPMNs or sequelae of prior pancreatitis. Follow-up MRCP is suggested in   12 months.

## 2023-08-10 NOTE — PROGRESS NOTE ADULT - ASSESSMENT
Kraen Carlos is a 78 year old female RH dominant with PMH of HTN, and HLD; who presented to [place] ED on 7/26/23 s/p fall from chair with + head strike, on the floor for over an hour. ER workup was significant AFIB with RVR and rhabdomyolysis. She was given Cardizem IVP and IVF with improvement HR to the 70s. She was diagnosed with new onset AFIB, seen by cardiology and started on Metoprolol and Diltiazem for rate control and was anticoagulated with Eliquis.  IV fluids were administered for rhabdomyolysis.   GI Consultation for choledocholithiases

## 2023-08-10 NOTE — PROGRESS NOTE ADULT - SUBJECTIVE AND OBJECTIVE BOX
Patient is a 78y old  Female who presents with a chief complaint of cerebellar CVA (08 Aug 2023 13:00)      HPI:  Karen Carlos is a 78 year old female RH dominant with PMH of HTN, and HLD; who presented to [place] ED on 7/26/23 s/p fall from chair with + head strike, on the floor for over an hour. ER workup was significant AFIB with RVR and rhabdomyolysis. She was given Cardizem IVP and IVF with improvement HR to the 70s. She was diagnosed with new onset AFIB, seen by cardiology and started on Metoprolol and Diltiazem for rate control and was anticoagulated with Eliquis.  IV fluids were administered for rhabdomyolysis.     RUQ US was significant for acute cholecystitis and cholelithiasis with pericholecystic fluid and gallbladder wall thickening. Her HIDA scan resulted positive and a percutaneous cholecystectomy tube was placed by IR on 7/7.  Her blood cultures resulted positive for Strept Anginosus for which she was started on IV antibiotics.     Her hospital course was complicated by neurological changes/altered mental status. CT head was performed which resulted negative; MRI of the head was significant for a R cerebellar infarct.    PM&R was consulted and deemed her an appropriate candidate for IRF. She was medically stabilized and cleared for discharge to [facility].  (03 Aug 2023 13:49)      PAST MEDICAL & SURGICAL HISTORY:  HTN (hypertension)    Hyperlipidemia    Tinnitus  right ear  MEDICATIONS  (STANDING):  apixaban 5 milliGRAM(s) Oral every 12 hours  atorvastatin 80 milliGRAM(s) Oral at bedtime  diltiazem    milliGRAM(s) Oral daily  levoFLOXacin  Tablet 750 milliGRAM(s) Oral every 24 hours  melatonin 3 milliGRAM(s) Oral at bedtime  metoprolol succinate  milliGRAM(s) Oral daily  metroNIDAZOLE    Tablet 500 milliGRAM(s) Oral every 8 hours  polyethylene glycol 3350 17 Gram(s) Oral daily  saccharomyces boulardii 250 milliGRAM(s) Oral two times a day  senna 2 Tablet(s) Oral at bedtime    MEDICATIONS  (PRN):  acetaminophen     Tablet .. 650 milliGRAM(s) Oral every 6 hours PRN Mild Pain (1 - 3)  acetaminophen     Tablet .. 650 milliGRAM(s) Oral every 6 hours PRN Temp greater or equal to 38C (100.4F)  aluminum hydroxide/magnesium hydroxide/simethicone Suspension 30 milliLiter(s) Oral every 4 hours PRN Dyspepsia  bisacodyl Suppository 10 milliGRAM(s) Rectal daily PRN Constipation  melatonin 3 milliGRAM(s) Oral at bedtime PRN Insomnia      VITAL SIGNS:  Vital Signs Last 24 Hrs  T(C): 36.4 (10 Aug 2023 08:30), Max: 36.5 (09 Aug 2023 19:38)  T(F): 97.5 (10 Aug 2023 08:30), Max: 97.7 (09 Aug 2023 19:38)  HR: 92 (10 Aug 2023 08:30) (87 - 92)  BP: 153/92 (10 Aug 2023 08:30) (124/80 - 153/92)  BP(mean): --  RR: 16 (10 Aug 2023 08:30) (14 - 16)  SpO2: 96% (10 Aug 2023 08:30) (96% - 98%)    Parameters below as of 10 Aug 2023 08:30  Patient On (Oxygen Delivery Method): room air        LABS:                        13.8   7.48  )-----------( 314      ( 10 Aug 2023 07:23 )             41.4     08-10    139  |  104  |  13  ----------------------------<  97  3.8   |  27  |  0.82    Ca    8.7      10 Aug 2023 07:23    TPro  6.7  /  Alb  2.6<L>  /  TBili  0.6  /  DBili  x   /  AST  28  /  ALT  29  /  AlkPhos  77  08-10      CAPILLARY BLOOD GLUCOSE        Seasonal allergies    S/P knee replacement               Patient is a 78y old  Female who presents with a chief complaint of cerebellar CVA (08 Aug 2023 13:00)      HPI:  Karen Carlos is a 78 year old female RH dominant with PMH of HTN, and HLD; who presented to [place] ED on 7/26/23 s/p fall from chair with + head strike, on the floor for over an hour. ER workup was significant AFIB with RVR and rhabdomyolysis. She was given Cardizem IVP and IVF with improvement HR to the 70s. She was diagnosed with new onset AFIB, seen by cardiology and started on Metoprolol and Diltiazem for rate control and was anticoagulated with Eliquis.  IV fluids were administered for rhabdomyolysis.     RUQ US was significant for acute cholecystitis and cholelithiasis with pericholecystic fluid and gallbladder wall thickening. Her HIDA scan resulted positive and a percutaneous cholecystectomy tube was placed by IR on 7/7.  Her blood cultures resulted positive for Strept Anginosus for which she was started on IV antibiotics.     Her hospital course was complicated by neurological changes/altered mental status. CT head was performed which resulted negative; MRI of the head was significant for a R cerebellar infarct.    PM&R was consulted and deemed her an appropriate candidate for IRF. She was medically stabilized and cleared for discharge to [facility].  (03 Aug 2023 13:49)      PAST MEDICAL & SURGICAL HISTORY:  HTN (hypertension)    Hyperlipidemia    Tinnitus  right ear  MEDICATIONS  (STANDING):  apixaban 5 milliGRAM(s) Oral every 12 hours  atorvastatin 80 milliGRAM(s) Oral at bedtime  diltiazem    milliGRAM(s) Oral daily  levoFLOXacin  Tablet 750 milliGRAM(s) Oral every 24 hours  melatonin 3 milliGRAM(s) Oral at bedtime  metoprolol succinate  milliGRAM(s) Oral daily  metroNIDAZOLE    Tablet 500 milliGRAM(s) Oral every 8 hours  polyethylene glycol 3350 17 Gram(s) Oral daily  saccharomyces boulardii 250 milliGRAM(s) Oral two times a day  senna 2 Tablet(s) Oral at bedtime    MEDICATIONS  (PRN):  acetaminophen     Tablet .. 650 milliGRAM(s) Oral every 6 hours PRN Mild Pain (1 - 3)  acetaminophen     Tablet .. 650 milliGRAM(s) Oral every 6 hours PRN Temp greater or equal to 38C (100.4F)  aluminum hydroxide/magnesium hydroxide/simethicone Suspension 30 milliLiter(s) Oral every 4 hours PRN Dyspepsia  bisacodyl Suppository 10 milliGRAM(s) Rectal daily PRN Constipation  melatonin 3 milliGRAM(s) Oral at bedtime PRN Insomnia      VITAL SIGNS:  Vital Signs Last 24 Hrs  T(C): 36.4 (10 Aug 2023 08:30), Max: 36.5 (09 Aug 2023 19:38)  T(F): 97.5 (10 Aug 2023 08:30), Max: 97.7 (09 Aug 2023 19:38)  HR: 92 (10 Aug 2023 08:30) (87 - 92)  BP: 153/92 (10 Aug 2023 08:30) (124/80 - 153/92)  BP(mean): --  RR: 16 (10 Aug 2023 08:30) (14 - 16)  SpO2: 96% (10 Aug 2023 08:30) (96% - 98%)    Parameters below as of 10 Aug 2023 08:30  Patient On (Oxygen Delivery Method): room air        LABS:                        13.8   7.48  )-----------( 314      ( 10 Aug 2023 07:23 )             41.4     08-10    139  |  104  |  13  ----------------------------<  97  3.8   |  27  |  0.82    Ca    8.7      10 Aug 2023 07:23    TPro  6.7  /  Alb  2.6<L>  /  TBili  0.6  /  DBili  x   /  AST  28  /  ALT  29  /  AlkPhos  77  08-10      CAPILLARY BLOOD GLUCOSE        Seasonal allergies    S/P knee replacement

## 2023-08-10 NOTE — PROGRESS NOTE ADULT - GASTROINTESTINAL COMMENTS
No tenderness to palpation throughout abdomen No tenderness to palpation throughout abdomen. No output through drain

## 2023-08-10 NOTE — PROGRESS NOTE ADULT - SUBJECTIVE AND OBJECTIVE BOX
INTERVAL HPI/OVERNIGHT EVENTS:  Patient seen and examined at bed side.     MEDICATIONS  (STANDING):  apixaban 5 milliGRAM(s) Oral every 12 hours  atorvastatin 80 milliGRAM(s) Oral at bedtime  diltiazem    milliGRAM(s) Oral daily  levoFLOXacin  Tablet 750 milliGRAM(s) Oral every 24 hours  melatonin 3 milliGRAM(s) Oral at bedtime  metoprolol succinate  milliGRAM(s) Oral daily  metroNIDAZOLE    Tablet 500 milliGRAM(s) Oral every 8 hours  polyethylene glycol 3350 17 Gram(s) Oral daily  saccharomyces boulardii 250 milliGRAM(s) Oral two times a day  senna 2 Tablet(s) Oral at bedtime    MEDICATIONS  (PRN):  acetaminophen     Tablet .. 650 milliGRAM(s) Oral every 6 hours PRN Temp greater or equal to 38C (100.4F)  acetaminophen     Tablet .. 650 milliGRAM(s) Oral every 6 hours PRN Mild Pain (1 - 3)  aluminum hydroxide/magnesium hydroxide/simethicone Suspension 30 milliLiter(s) Oral every 4 hours PRN Dyspepsia  bisacodyl Suppository 10 milliGRAM(s) Rectal daily PRN Constipation  melatonin 3 milliGRAM(s) Oral at bedtime PRN Insomnia      Allergies    No Known Allergies    Intolerances        PAST MEDICAL & SURGICAL HISTORY:  HTN (hypertension)      Hyperlipidemia      Tinnitus  right ear      Seasonal allergies      S/P knee replacement        PHYSICAL EXAM:   Vital Signs:  Vital Signs Last 24 Hrs  T(C): 36.4 (10 Aug 2023 08:30), Max: 36.5 (09 Aug 2023 19:38)  T(F): 97.5 (10 Aug 2023 08:30), Max: 97.7 (09 Aug 2023 19:38)  HR: 92 (10 Aug 2023 08:30) (87 - 92)  BP: 153/92 (10 Aug 2023 08:30) (124/80 - 153/92)  BP(mean): --  RR: 16 (10 Aug 2023 08:30) (14 - 16)  SpO2: 96% (10 Aug 2023 08:30) (96% - 98%)    Parameters below as of 10 Aug 2023 08:30  Patient On (Oxygen Delivery Method): room air      Daily     Daily I&O's Summary    09 Aug 2023 07:01  -  10 Aug 2023 07:00  --------------------------------------------------------  IN: 0 mL / OUT: 15 mL / NET: -15 mL        GENERAL:  Appears stated age  HEENT:  NC/AT,  conjunctivae clear and pink  CHEST:  Full & symmetric excursion  HEART:  Regular rhythm, S1, S2  ABDOMEN:  Soft, non-tender, non-distended, normoactive bowel sounds, Blake drainage   EXTEREMITIES:  no cyanosis, clubbing or edema  SKIN:  No rash/warm/dry  NEURO:  Alert, oriented    LABS:                        13.8   7.48  )-----------( 314      ( 10 Aug 2023 07:23 )             41.4     08-10    139  |  104  |  13  ----------------------------<  97  3.8   |  27  |  0.82    Ca    8.7      10 Aug 2023 07:23    TPro  6.7  /  Alb  2.6<L>  /  TBili  0.6  /  DBili  x   /  AST  28  /  ALT  29  /  AlkPhos  77  08-10      Urinalysis Basic - ( 10 Aug 2023 07:23 )    Color: x / Appearance: x / SG: x / pH: x  Gluc: 97 mg/dL / Ketone: x  / Bili: x / Urobili: x   Blood: x / Protein: x / Nitrite: x   Leuk Esterase: x / RBC: x / WBC x   Sq Epi: x / Non Sq Epi: x / Bacteria: x      amylase   lipase  RADIOLOGY & ADDITIONAL TESTS:

## 2023-08-10 NOTE — PROGRESS NOTE ADULT - ASSESSMENT
Karne Carlos is a 78 year old female RH dominant with PMH of HTN, and HLD; who presented to [place] ED on 7/26/23 s/p fall from chair with + head strike, on the floor for over an hour. ER workup was significant AFIB with RVR and rhabdomyolysis. She was given Cardizem IVP and IVF with improvement HR to the 70s. She was diagnosed with new onset AFIB, seen by cardiology and started on Metoprolol and Diltiazem for rate control and was anticoagulated with Eliquis.  IV fluids were administered for rhabdomyolysis.   GI Consultation for choledocholithiases

## 2023-08-10 NOTE — PROGRESS NOTE ADULT - ASSESSMENT
CHAY VELEZ is a 78 year old female with PMH HTN, and HLD who presented to [facility] ED 7/26/23 s/p fall from chair with + head strike. She was found to have new onset AFIB with RVR and Rhabdomyolysis. Her hospital course was complicated by acute cholecystitis and cholelithiasis requiring percutaneous enrique drain via IR on 7/7, Bacteremia, and cerebellar infarct confirmed on MRI.    # right cerebellar CVA with incoordination, imbalance, cognitive impairment  - MRI 8/1: There is an acute right cerebellar infarct. There are no foci of susceptibility artifact to suggest hemorrhage. Scattered foci of T2/FLAIR hyperintensity in the bilateral hemispheric white matter  - ASA and Atorvastatin  - continue Comprehensive Rehab Program of PT/OT/SLP  - 3 hours a day, 5 days a week  - recreation therapy, psychology services   - Precautions: cardiac, AC, fall, AMS, enrique drain    # New AFib RVR  # HTN  - Metoprolol XL 100mg daily  - Diltiazem CD 120mg daily  - Eliquis 5 q12hrs (hold for ERCP)  - BP (124/80 - 153/92) HR 87-92 8/10    # Rhabdomyolysis  - S/p IVF  - Creatine kinase: 5502 7/25--> 109 8/4  - resolved    #  Cholecystitis   - Levofloxacin daily - extend d/t diverticulitis and planned ERCP  - s/p percutaneous cholecystectomy 2/2 cholelithiasis via IR on 7/7   -  A/P CT 8/7: Percutaneous cholecystostomy catheter is malpositioned within the descending colon distal to the cecum. Inflammation adjacent to the colon and appendix, which is otherwise normal in caliber, likely reactive. Dilated common bile duct with suspicion of choledocholithiasis in the distal CBD. Cholelithiasis with gallbladder wall thickening and pericholecystic fat stranding, consistent with acute cholecystitis. Acute diverticulitis in the mid sigmoid colon with associated localized perforation.  - Surgery consult appreciated 8/7  ·	low suspicion for non drained infectious process. leave drain in as precaution  ·	may be candidate for outpatient laparoscopic cholecystectomy.  - GI consult appreciated   ·	continue levaquin/flagyl  ·	defer ERCP given current CT findings, malpositioned enrique tube (pt currently afebrile, LFT's normal).  ·	MRCP performed 8/9 - distended CBD up to 12mm + 2 stones in bile duct w/ borderline central intrahepatic ductal distention, + cholelithiasis decreased inflammation,   ·	NPO after midnight for ERCP 8/11 AM  - AP 77, AST 28, ALT 29, bilirubin total 0.6 8/10     # RUL Lung nodule found on CT chest (7/25)  - Incidental finding   - F/U PCP outpatient for repeat CT    # /Bladder Mgmt   -  PVR q8h, CIC>400cc  - continue mobilization, addressing constipation  - improved PVR  8/9-8/10    # GI  -  Senna, Miralax     # podiatry  - podiatry appreciated, s/p nail debridement    # Sleep  - Melatonin PRN     # Skin  - Skin on admission: scattered abrasions and ecchymosis  - Pressure injury/Skin: OOB to Chair, PT/OT     # Pain Mgmt   - Tylenol PRN    # FEN   - Diet - Regular + Thins  [DASH/TLC]      #  DVT PPX:  - Eliquis (hold for ERCP)    # Case discussed in IDT rounds 8/7 (initial evaluation):  - min-mod assist bADLS, ambuates 25 feet with RW and min assist with WC follow. Tolerating regular solid and thin liquids, mod language and mod cognitive deficits, reduced selective attention, reduced processing speed , reduced receptive language +RLE ataxia, reduced cooridnaiton  - goals: min assist/CG ambulation, supervision iADLs, supervision /CG bADLs and transfers, min assist stairs  - caregiver training  - target dc home 8/24 with caregiver support waking hours and home Pt OT SLP    # LABs  CBC BMP 8/14  HOLD eliquis 8/10 AM and PM and 8/11 AM.   NPO after midnight except meds, 8/10  ERCP 8/11      ---------------  Outpatient/Follow-Up:    Alli Pascual  Surgery  [address] [place] [address]  Phone: [phone]  Fax: [phone]  Follow Up Time: 1 week    Alexis Simon  Cardiovascular Disease  [address] [place], [place] [id number]  Phone: [phone]  Fax: [phone]  Follow Up Time: 1 week    BRIAN ALBA  [address] [place] [id number]  Phone: ([phone]  Fax: ()-     CHAY VELEZ is a 78 year old female with PMH HTN, and HLD who presented to [facility] ED 7/26/23 s/p fall from chair with + head strike. She was found to have new onset AFIB with RVR and Rhabdomyolysis. Her hospital course was complicated by acute cholecystitis and cholelithiasis requiring percutaneous enrique drain via IR on 7/7, Bacteremia, and cerebellar infarct confirmed on MRI.    # right cerebellar CVA with incoordination, imbalance, cognitive impairment  - MRI 8/1: There is an acute right cerebellar infarct. There are no foci of susceptibility artifact to suggest hemorrhage. Scattered foci of T2/FLAIR hyperintensity in the bilateral hemispheric white matter  - ASA and Atorvastatin  - continue Comprehensive Rehab Program of PT/OT/SLP  - 3 hours a day, 5 days a week  - recreation therapy, psychology services   - Precautions: cardiac, AC, fall, AMS, enrique drain    # New AFib RVR  # HTN  - Metoprolol XL 100mg daily  - Diltiazem CD 120mg daily  - Eliquis 5 q12hrs (hold for ERCP)  - BP (124/80 - 153/92) HR 87-92 8/10    # Rhabdomyolysis  - S/p IVF  - Creatine kinase: 5502 7/25--> 109 8/4  - resolved    #  Cholecystitis   - Levofloxacin daily - extend d/t diverticulitis and planned ERCP  - s/p percutaneous cholecystectomy 2/2 cholelithiasis via IR on 7/7   -  A/P CT 8/7: Percutaneous cholecystostomy catheter is malpositioned within the descending colon distal to the cecum. Inflammation adjacent to the colon and appendix, which is otherwise normal in caliber, likely reactive. Dilated common bile duct with suspicion of choledocholithiasis in the distal CBD. Cholelithiasis with gallbladder wall thickening and pericholecystic fat stranding, consistent with acute cholecystitis. Acute diverticulitis in the mid sigmoid colon with associated localized perforation.  - Surgery consult appreciated 8/7  ·	low suspicion for non drained infectious process. leave drain in as precaution  ·	may be candidate for outpatient laparoscopic cholecystectomy.  - GI consult appreciated   ·	continue levaquin/flagyl  ·	defer ERCP given current CT findings, malpositioned enrique tube (pt currently afebrile, LFT's normal).  ·	MRCP 8/9: distended CBD up to 12mm + 2 stones in bile duct w/ borderline central intrahepatic ductal distention, + cholelithiasis decreased inflammation,   ·	NPO after midnight for ERCP 8/11 AM  - AP 77, AST 28, ALT 29, bilirubin total 0.6 8/10     # bibasilar atelectasis noted on MRCP  - incentive spirometry, breathing exercises ordered    # RUL Lung nodule found on CT chest (7/25)  - Incidental finding   - F/U PCP outpatient for repeat CT    # /Bladder Mgmt   -  PVR q8h, CIC>400cc  - continue mobilization, addressing constipation  - improved PVR  8/9-8/10    # GI  -  Senna, Miralax     # podiatry  - podiatry appreciated, s/p nail debridement    # Sleep  - Melatonin PRN     # Skin  - Skin on admission: scattered abrasions and ecchymosis  - Pressure injury/Skin: OOB to Chair, PT/OT     # Pain Mgmt   - Tylenol PRN    # FEN   - Diet - Regular + Thins  [DASH/TLC]      #  DVT PPX:  - Eliquis (hold for ERCP)    # Case discussed in IDT rounds 8/7 (initial evaluation):  - min-mod assist bADLS, ambuates 25 feet with RW and min assist with WC follow. Tolerating regular solid and thin liquids, mod language and mod cognitive deficits, reduced selective attention, reduced processing speed , reduced receptive language +RLE ataxia, reduced cooridnaiton  - goals: min assist/CG ambulation, supervision iADLs, supervision /CG bADLs and transfers, min assist stairs  - caregiver training  - target dc home 8/24 with caregiver support waking hours and home Pt OT SLP    # LABs  CBC BMP 8/14  HOLD eliquis 8/10 AM and PM and 8/11 AM.   NPO after midnight except meds, 8/10  ERCP 8/11      ---------------  Outpatient/Follow-Up:    Alli Pascual  Surgery  [address] [place] [address]  Phone: [phone]  Fax: [phone]  Follow Up Time: 1 week    Alexis Simon  Cardiovascular Disease  [address] [place], [place] [id number]  Phone: [phone]  Fax: [phone]  Follow Up Time: 1 week    BRIAN ALBA  [address] [place] [address]  Phone: ([phone]  Fax: ()-

## 2023-08-10 NOTE — CONSULT NOTE ADULT - ASSESSMENT
Karen Carlos is a 78 year old woman with history of HTN and HLD who initially presented on 7/26 after falling from a chair and hitting her head. The patient was found to be in Afib w/ RVR in the ED and subsequently required IV medication for rate control. The patient was subsequently diagnosed with a right cerebellar CVA and with acute cholecystitis. The patient was taken to IR on 7/27 for percutaneous cholecystostomy, due to the severity of her other ongoing problems.     PLAN:  - No indication for emergent surgery at this time; patient with no pain, normal GI function, and tolerating diet without issue  - Tube will need to be removed, but will need time for acute surrounding process to dissipate  - Continue drainage to gravity  - ERCP planned by GI; will also need cholecystectomy when medically optimized

## 2023-08-10 NOTE — PROGRESS NOTE ADULT - SUBJECTIVE AND OBJECTIVE BOX
INTERVAL HPI/OVERNIGHT EVENTS:  Patient seen and examined at bed side. She denies abdominal pain, nausea, vomiting.       MEDICATIONS  (STANDING):  apixaban 5 milliGRAM(s) Oral every 12 hours  atorvastatin 80 milliGRAM(s) Oral at bedtime  diltiazem    milliGRAM(s) Oral daily  levoFLOXacin  Tablet 750 milliGRAM(s) Oral every 24 hours  melatonin 3 milliGRAM(s) Oral at bedtime  metoprolol succinate  milliGRAM(s) Oral daily  metroNIDAZOLE    Tablet 500 milliGRAM(s) Oral every 8 hours  polyethylene glycol 3350 17 Gram(s) Oral daily  saccharomyces boulardii 250 milliGRAM(s) Oral two times a day  senna 2 Tablet(s) Oral at bedtime    MEDICATIONS  (PRN):  acetaminophen     Tablet .. 650 milliGRAM(s) Oral every 6 hours PRN Mild Pain (1 - 3)  acetaminophen     Tablet .. 650 milliGRAM(s) Oral every 6 hours PRN Temp greater or equal to 38C (100.4F)  aluminum hydroxide/magnesium hydroxide/simethicone Suspension 30 milliLiter(s) Oral every 4 hours PRN Dyspepsia  bisacodyl Suppository 10 milliGRAM(s) Rectal daily PRN Constipation  melatonin 3 milliGRAM(s) Oral at bedtime PRN Insomnia      Allergies    No Known Allergies    Intolerances        PAST MEDICAL & SURGICAL HISTORY:  HTN (hypertension)      Hyperlipidemia      Tinnitus  right ear      Seasonal allergies      S/P knee replacement  	    PHYSICAL EXAM:   Vital Signs:  Vital Signs Last 24 Hrs  T(C): 36.4 (10 Aug 2023 08:30), Max: 36.5 (09 Aug 2023 19:38)  T(F): 97.5 (10 Aug 2023 08:30), Max: 97.7 (09 Aug 2023 19:38)  HR: 92 (10 Aug 2023 08:30) (87 - 92)  BP: 153/92 (10 Aug 2023 08:30) (124/80 - 153/92)  BP(mean): --  RR: 16 (10 Aug 2023 08:30) (14 - 16)  SpO2: 96% (10 Aug 2023 08:30) (96% - 98%)    Parameters below as of 10 Aug 2023 08:30  Patient On (Oxygen Delivery Method): room air      Daily     Daily I&O's Summary    09 Aug 2023 07:01  -  10 Aug 2023 07:00  --------------------------------------------------------  IN: 0 mL / OUT: 15 mL / NET: -15 mL        GENERAL:  Appears stated age  HEENT:  NC/AT,  conjunctivae clear and pink  CHEST:  Full & symmetric excursion  HEART:  Regular rhythm, S1, S2  ABDOMEN:  Soft, non-tender, non-distended, normoactive bowel sounds, Shannan tube BSD   EXTREMITIES :  no cyanosis, clubbing or edema  SKIN:  No rash/warm/dry  NEURO:  Alert, oriented    LABS:                        13.8   7.48  )-----------( 314      ( 10 Aug 2023 07:23 )             41.4     08-10    139  |  104  |  13  ----------------------------<  97  3.8   |  27  |  0.82    Ca    8.7      10 Aug 2023 07:23    TPro  6.7  /  Alb  2.6<L>  /  TBili  0.6  /  DBili  x   /  AST  28  /  ALT  29  /  AlkPhos  77  08-10      Urinalysis Basic - ( 10 Aug 2023 07:23 )    Color: x / Appearance: x / SG: x / pH: x  Gluc: 97 mg/dL / Ketone: x  / Bili: x / Urobili: x   Blood: x / Protein: x / Nitrite: x   Leuk Esterase: x / RBC: x / WBC x   Sq Epi: x / Non Sq Epi: x / Bacteria: x      amylase   lipase  RADIOLOGY & ADDITIONAL TESTS:

## 2023-08-10 NOTE — PROGRESS NOTE ADULT - ATTENDING COMMENTS
Progress note amended to include my discussions with patient, resident, hospitalist, RN, GI consult    Patient sitting in wheelchair, appears comfortable. she denies any N/V (had breakfast without incident), no abdominal pain. Drain still in place, no drainage output in bag. She is aware that drain is not sitting in proper place; Results of MRI reviewed in detail along with hospitalist and GI, including presence of gallstones, possible removal via ERCP, and need for eventual cholecystectomy. Levaquin and flagyl continues at this time; afebrile, no leukocytosis, and LFts stable; MRCP with some improvement in inflammation.     Possible ERCP tomorrow, although GI planning to discuss further with surgery since patinet's symptoms are improving and LFts stable/exam stable. Currently made NPO after MN and eliquis held in anticipation of possible procedure. Patient is aware of ERCP recommendation and agreeable.    bibasilar atelectasis also noted on MRCP, IS ordered. cotninue program    RECENT LABS    Vital Signs Last 24 Hrs  T(C): 36.4 (10 Aug 2023 08:30), Max: 36.5 (09 Aug 2023 19:38)  T(F): 97.5 (10 Aug 2023 08:30), Max: 97.7 (09 Aug 2023 19:38)  HR: 92 (10 Aug 2023 08:30) (87 - 92)  BP: 153/92 (10 Aug 2023 08:30) (124/80 - 153/92)  BP(mean): --  RR: 16 (10 Aug 2023 08:30) (14 - 16)  SpO2: 96% (10 Aug 2023 08:30) (96% - 98%)    Parameters below as of 10 Aug 2023 08:30  Patient On (Oxygen Delivery Method): room air                              13.8   7.48  )-----------( 314      ( 10 Aug 2023 07:23 )             41.4     08-10    139  |  104  |  13  ----------------------------<  97  3.8   |  27  |  0.82    Ca    8.7      10 Aug 2023 07:23    TPro  6.7  /  Alb  2.6<L>  /  TBili  0.6  /  DBili  x   /  AST  28  /  ALT  29  /  AlkPhos  77  08-10      Urinalysis Basic - ( 10 Aug 2023 07:23 )    Color: x / Appearance: x / SG: x / pH: x  Gluc: 97 mg/dL / Ketone: x  / Bili: x / Urobili: x   Blood: x / Protein: x / Nitrite: x   Leuk Esterase: x / RBC: x / WBC x   Sq Epi: x / Non Sq Epi: x / Bacteria: x      CAPILLARY BLOOD GLUCOSE

## 2023-08-10 NOTE — PROGRESS NOTE ADULT - COMMENTS
Patient was seen and examined this morning. She notes feeling less fatigued today compared to yesterday. Discussed again imaging findings of drain location, choledocholithiasis and plan for ERCP 8/11 AM, NPO after midnight, Eliquis on hold. She notes no abdominal pain/nausea/vomiting currently. She is tolerating her meals. Patient was seen and examined this morning along with GI and hospitalist. She notes feeling less fatigued today compared to yesterday. Discussed again imaging findings of drain location, choledocholithiasis and plan for ERCP 8/11 AM, NPO after midnight, Eliquis on hold. She notes no abdominal pain/nausea/vomiting currently. She is tolerating her meals.

## 2023-08-10 NOTE — PROGRESS NOTE ADULT - PROBLEM SELECTOR PLAN 2
Pt currently has malpositioned cholecystostomy tube in the colon. No drainage in bag.     (CTAP- percutaneous catheter traverses the proximal ascending colon just distal to the cecum with pigtail noted in the adjacent mesentery. There is inflammation adjacent to the ascending colon and appendix, which is otherwise normal caliber). She denies abd pain, N/V or dyspepsia.     [ ] will defer to surgery re management of perc drain and other findings

## 2023-08-11 ENCOUNTER — TRANSCRIPTION ENCOUNTER (OUTPATIENT)
Age: 78
End: 2023-08-11

## 2023-08-11 PROCEDURE — 99232 SBSQ HOSP IP/OBS MODERATE 35: CPT

## 2023-08-11 PROCEDURE — 43274 ERCP DUCT STENT PLACEMENT: CPT

## 2023-08-11 PROCEDURE — 74328 X-RAY BILE DUCT ENDOSCOPY: CPT | Mod: 26

## 2023-08-11 PROCEDURE — 99233 SBSQ HOSP IP/OBS HIGH 50: CPT

## 2023-08-11 DEVICE — DUODENOSCOPE EXALT MODEL D SINGLE USE
Type: IMPLANTABLE DEVICE | Status: NON-FUNCTIONAL
Removed: 2023-08-11

## 2023-08-11 DEVICE — STENT BIL PUSH CATH 7FRX170CM
Type: IMPLANTABLE DEVICE | Status: NON-FUNCTIONAL
Removed: 2023-08-11

## 2023-08-11 DEVICE — HYDRATOME 44
Type: IMPLANTABLE DEVICE | Status: NON-FUNCTIONAL
Removed: 2023-08-11

## 2023-08-11 DEVICE — STENT BIL PIGTAIL DBL 7FRX5CM
Type: IMPLANTABLE DEVICE | Status: NON-FUNCTIONAL
Removed: 2023-08-11

## 2023-08-11 RX ADMIN — APIXABAN 5 MILLIGRAM(S): 2.5 TABLET, FILM COATED ORAL at 22:23

## 2023-08-11 RX ADMIN — Medication 500 MILLIGRAM(S): at 22:23

## 2023-08-11 RX ADMIN — Medication 120 MILLIGRAM(S): at 05:08

## 2023-08-11 RX ADMIN — Medication 500 MILLIGRAM(S): at 05:08

## 2023-08-11 RX ADMIN — Medication 250 MILLIGRAM(S): at 05:08

## 2023-08-11 RX ADMIN — ATORVASTATIN CALCIUM 80 MILLIGRAM(S): 80 TABLET, FILM COATED ORAL at 22:22

## 2023-08-11 RX ADMIN — Medication 3 MILLIGRAM(S): at 22:23

## 2023-08-11 RX ADMIN — Medication 100 MILLIGRAM(S): at 05:08

## 2023-08-11 RX ADMIN — Medication 250 MILLIGRAM(S): at 19:33

## 2023-08-11 NOTE — PROGRESS NOTE ADULT - SUBJECTIVE AND OBJECTIVE BOX
Patient is a 78y old  Female who presents with a chief complaint of cerebellar CVA (11 Aug 2023 10:53)      HPI:  Karen Carlos is a 78 year old female RH dominant with PMH of HTN, and HLD; who presented to [facility] ED on 7/26/23 s/p fall from chair with + head strike, on the floor for over an hour. ER workup was significant AFIB with RVR and rhabdomyolysis. She was given Cardizem IVP and IVF with improvement HR to the 70s. She was diagnosed with new onset AFIB, seen by cardiology and started on Metoprolol and Diltiazem for rate control and was anticoagulated with Eliquis.  IV fluids were administered for rhabdomyolysis.     RUQ US was significant for acute cholecystitis and cholelithiasis with pericholecystic fluid and gallbladder wall thickening. Her HIDA scan resulted positive and a percutaneous cholecystectomy tube was placed by IR on 7/7.  Her blood cultures resulted positive for Strept Anginosus for which she was started on IV antibiotics.     Her hospital course was complicated by neurological changes/altered mental status. CT head was performed which resulted negative; MRI of the head was significant for a R cerebellar infarct.    PM&R was consulted and deemed her an appropriate candidate for IRF. She was medically stabilized and cleared for discharge to [facility].  (03 Aug 2023 13:49)      PAST MEDICAL & SURGICAL HISTORY:  HTN (hypertension)      Hyperlipidemia      Tinnitus  right ear      Seasonal allergies      S/P knee replacement          MEDICATIONS  (STANDING):  apixaban 5 milliGRAM(s) Oral every 12 hours  atorvastatin 80 milliGRAM(s) Oral at bedtime  diltiazem    milliGRAM(s) Oral daily  indomethacin Suppository 100 milliGRAM(s) Rectal once  levoFLOXacin  Tablet 750 milliGRAM(s) Oral every 24 hours  melatonin 3 milliGRAM(s) Oral at bedtime  metoprolol succinate  milliGRAM(s) Oral daily  metroNIDAZOLE    Tablet 500 milliGRAM(s) Oral every 8 hours  polyethylene glycol 3350 17 Gram(s) Oral daily  saccharomyces boulardii 250 milliGRAM(s) Oral two times a day  senna 2 Tablet(s) Oral at bedtime    MEDICATIONS  (PRN):  acetaminophen     Tablet .. 650 milliGRAM(s) Oral every 6 hours PRN Temp greater or equal to 38C (100.4F)  acetaminophen     Tablet .. 650 milliGRAM(s) Oral every 6 hours PRN Mild Pain (1 - 3)  aluminum hydroxide/magnesium hydroxide/simethicone Suspension 30 milliLiter(s) Oral every 4 hours PRN Dyspepsia  bisacodyl Suppository 10 milliGRAM(s) Rectal daily PRN Constipation  melatonin 3 milliGRAM(s) Oral at bedtime PRN Insomnia      Allergies    No Known Allergies    Intolerances          VITALS  78y  Vital Signs Last 24 Hrs  T(C): 36.4 (11 Aug 2023 08:29), Max: 36.4 (10 Aug 2023 19:55)  T(F): 97.5 (11 Aug 2023 08:29), Max: 97.6 (10 Aug 2023 19:55)  HR: 85 (11 Aug 2023 08:29) (85 - 94)  BP: 145/88 (11 Aug 2023 08:29) (129/89 - 147/88)  BP(mean): --  RR: 15 (11 Aug 2023 08:29) (14 - 15)  SpO2: 98% (11 Aug 2023 08:29) (97% - 98%)    Parameters below as of 11 Aug 2023 08:29  Patient On (Oxygen Delivery Method): room air      Daily     Daily         RECENT LABS:                          13.8   7.48  )-----------( 314      ( 10 Aug 2023 07:23 )             41.4     08-10    139  |  104  |  13  ----------------------------<  97  3.8   |  27  |  0.82    Ca    8.7      10 Aug 2023 07:23    TPro  6.7  /  Alb  2.6<L>  /  TBili  0.6  /  DBili  x   /  AST  28  /  ALT  29  /  AlkPhos  77  08-10    LIVER FUNCTIONS - ( 10 Aug 2023 07:23 )  Alb: 2.6 g/dL / Pro: 6.7 g/dL / ALK PHOS: 77 U/L / ALT: 29 U/L / AST: 28 U/L / GGT: x             Urinalysis Basic - ( 10 Aug 2023 07:23 )    Color: x / Appearance: x / SG: x / pH: x  Gluc: 97 mg/dL / Ketone: x  / Bili: x / Urobili: x   Blood: x / Protein: x / Nitrite: x   Leuk Esterase: x / RBC: x / WBC x   Sq Epi: x / Non Sq Epi: x / Bacteria: x          CAPILLARY BLOOD GLUCOSE

## 2023-08-11 NOTE — PROGRESS NOTE ADULT - COMMENTS
Patient aware of planned ERCP this afternoon at 2 PM, had met with GI earlier. Reason for ERCP discussed again with our team; she currently denies any abdmoinal pain, although she endorses some frustration with the drain and all the tests "I'm just a guinea pig". Hope that she can focus solely on therapies after ERCP, with addressing removal drain and possible enrique at later date, discussed    Otherwise, she is stable, no new complaints or questions,

## 2023-08-11 NOTE — PROGRESS NOTE ADULT - ASSESSMENT
Karen Carlos is a 78 year old female RH dominant with PMH of HTN, and HLD; who presented to [facility] ED on 7/26/23 s/p fall from chair with + head strike, on the floor for over an hour. ER workup was significant AFIB with RVR and rhabdomyolysis. She was given Cardizem IVP and IVF with improvement HR to the 70s. She was diagnosed with new onset AFIB, seen by cardiology and started on Metoprolol and Diltiazem for rate control and was anticoagulated with Eliquis.  IV fluids were administered for rhabdomyolysis.   GI Consultation for choledocholithiases for ERCP today

## 2023-08-11 NOTE — PROGRESS NOTE ADULT - ASSESSMENT
CHAY VELEZ is a 78 year old female with PMH HTN, and HLD who presented to [facility] 7/26/23 s/p fall from chair with + head strike. She was found to have new onset AFIB with RVR and Rhabdomyolysis. Her hospital course was complicated by acute cholecystitis and cholelithiasis requiring percutaneous enrique drain via IR on 7/7, Bacteremia, and cerebellar infarct confirmed on MRI.    # right cerebellar CVA with incoordination, imbalance, cognitive impairment  - MRI 8/1: There is an acute right cerebellar infarct. There are no foci of susceptibility artifact to suggest hemorrhage. Scattered foci of T2/FLAIR hyperintensity in the bilateral hemispheric white matter  - ASA and Atorvastatin  - continue [facility] Program of PT/OT/SLP  - 3 hours a day, 5 days a week  - recreation therapy, psychology services   - Precautions: cardiac, AC, fall, AMS, enrique drain    # New AFib RVR  # HTN  - Metoprolol XL 100mg daily  - Diltiazem CD 120mg daily  - Eliquis 5 q12hrs (on hold for ERCP 8/11)  - BP (129/89 - 147/88) HR 85-94 8/11    # Rhabdomyolysis  - S/p IVF  - Creatine kinase: 5502 7/25--> 109 8/4  - resolved    #  Cholecystitis   - s/p percutaneous cholecystectomy 2/2 cholelithiasis via IR on 7/7   -  A/P CT 8/7: Percutaneous cholecystostomy catheter is malpositioned within the descending colon distal to the cecum. Inflammation adjacent to the colon and appendix, which is otherwise normal in caliber, likely reactive. Dilated common bile duct with suspicion of choledocholithiasis in the distal CBD. Cholelithiasis with gallbladder wall thickening and pericholecystic fat stranding, consistent with acute cholecystitis. Acute diverticulitis in the mid sigmoid colon with associated localized perforation.  - MRCP 8/9: distended CBD up to 12mm + 2 stones in bile duct w/ borderline central intrahepatic ductal distention, + cholelithiasis decreased inflammation,  - Surgery consult appreciated 8/7. low suspicion for non drained infectious process. leave drain in as precaution. May be candidate for outpatient laparoscopic cholecystectomy.  - GI consult appreciated 8/11.   ·	continue levaquin/flagyl  ·	ERCP today 8/11 2 PM  ·	NPO for procedure ,eliquis held  - AP 77, AST 28, ALT 29, bilirubin total 0.6 8/10. CMP 8/14    # bibasilar atelectasis noted on MRCP  - incentive spirometry, breathing exercises ordered    # RUL Lung nodule found on CT chest (7/25)  - Incidental finding   - F/U PCP outpatient for repeat CT    # /Bladder Mgmt   -  PVR q8h, CIC>400cc    # GI  -  Senna, Miralax     # podiatry  - podiatry appreciated, s/p nail debridement    # Sleep  - Melatonin PRN     # Skin  - Skin on admission: scattered abrasions and ecchymosis  - Pressure injury/Skin: OOB to Chair, PT/OT     # Pain Mgmt   - Tylenol PRN    # FEN   - Diet - Regular + Thins  [DASH/TLC]      #  DVT PPX:  - Eliquis (on hold for ERCP 8/11)    # Case discussed in IDT rounds 8/7 (initial evaluation):  - min-mod assist bADLS, ambuates 25 feet with RW and min assist with WC follow. Tolerating regular solid and thin liquids, mod language and mod cognitive deficits, reduced selective attention, reduced processing speed , reduced receptive language +RLE ataxia, reduced cooridnaiton  - goals: min assist/CG ambulation, supervision iADLs, supervision /CG bADLs and transfers, min assist stairs  - caregiver training  - target dc home 8/24 with caregiver support waking hours and home Pt OT SLP    # LABs  CBC BMP 8/14  Resume eliquis and po diet when cleared by GI  ERCP 8/11      ---------------  Outpatient/Follow-Up:    Alli Pascual  Surgery  [address] [place] [address]  Phone: [phone]  Fax: [phone]  Follow Up Time: 1 week    Alexis Simon  Cardiovascular Disease  [address], [place] [id number]  Phone: [phone]  Fax: [phone]  Follow Up Time: 1 week    BRIAN ALBA  [address] [place] [address]  Phone: ()-  Fax: ()-     CHAY VELEZ is a 78 year old female with PMH HTN, and HLD who presented to [facility] ED 7/26/23 s/p fall from chair with + head strike. She was found to have new onset AFIB with RVR and Rhabdomyolysis. Her hospital course was complicated by acute cholecystitis and cholelithiasis requiring percutaneous enrique drain via IR on 7/7, Bacteremia, and cerebellar infarct confirmed on MRI.    # right cerebellar CVA with incoordination, imbalance, cognitive impairment  - MRI 8/1: There is an acute right cerebellar infarct. There are no foci of susceptibility artifact to suggest hemorrhage. Scattered foci of T2/FLAIR hyperintensity in the bilateral hemispheric white matter  - ASA and Atorvastatin  - continue Comprehensive [facility]hab Program of PT/OT/SLP  - 3 hours a day, 5 days a week  - recreation therapy, psychology services   - Precautions: cardiac, AC, fall, AMS, enrique drain    # New AFib RVR  # HTN  - Metoprolol XL 100mg daily  - Diltiazem CD 120mg daily  - Eliquis 5 q12hrs (on hold for ERCP 8/11)  - BP (129/89 - 147/88) HR 85-94 8/11    # Rhabdomyolysis  - S/p IVF  - Creatine kinase: 5502 7/25--> 109 8/4  - resolved    #  Cholecystitis   - s/p percutaneous cholecystectomy 2/2 cholelithiasis via IR on 7/7   -  A/P CT 8/7: Percutaneous cholecystostomy catheter is malpositioned within the descending colon distal to the cecum. Inflammation adjacent to the colon and appendix, which is otherwise normal in caliber, likely reactive. Dilated common bile duct with suspicion of choledocholithiasis in the distal CBD. Cholelithiasis with gallbladder wall thickening and pericholecystic fat stranding, consistent with acute cholecystitis. Acute diverticulitis in the mid sigmoid colon with associated localized perforation.  - MRCP 8/9: distended CBD up to 12mm + 2 stones in bile duct w/ borderline central intrahepatic ductal distention, + cholelithiasis decreased inflammation,  - Surgery consult appreciated 8/7. low suspicion for non drained infectious process. leave drain in as precaution. May be candidate for outpatient laparoscopic cholecystectomy.  - GI consult appreciated 8/11.   ·	continue levaquin/flagyl  ·	ERCP today 8/11 2 PM  ·	NPO for procedure ,eliquis held  - AP 77, AST 28, ALT 29, bilirubin total 0.6 8/10. CMP 8/14    # bibasilar atelectasis noted on MRCP  - incentive spirometry, breathing exercises ordered    # RUL Lung nodule found on CT chest (7/25)  - Incidental finding   - F/U PCP outpatient for repeat CT    # /Bladder Mgmt   -  PVR q8h, CIC>400cc    # GI  -  Senna, Miralax     # podiatry  - podiatry appreciated, s/p nail debridement    # Sleep  - Melatonin PRN     # Skin  - Skin on admission: scattered abrasions and ecchymosis  - Pressure injury/Skin: OOB to Chair, PT/OT     # Pain Mgmt   - Tylenol PRN    # FEN   - Diet - Regular + Thins  [DASH/TLC]      #  DVT PPX:  - Eliquis (on hold for ERCP 8/11)    # Case discussed in IDT rounds 8/7 (initial evaluation):  - min-mod assist bADLS, ambuates 25 feet with RW and min assist with WC follow. Tolerating regular solid and thin liquids, mod language and mod cognitive deficits, reduced selective attention, reduced processing speed , reduced receptive language +RLE ataxia, reduced cooridnaiton  - goals: min assist/CG ambulation, supervision iADLs, supervision /CG bADLs and transfers, min assist stairs  - caregiver training  - target dc home 8/24 with caregiver support waking hours and home Pt OT SLP    # LABs  CBC BMP 8/14  Resume eliquis and po diet when cleared by GI  ERCP 8/11      ---------------  Outpatient/Follow-Up:    Alli Pascual  Surgery  [address] [place] [address]  Phone: [phone]  Fax: [phone]  Follow Up Time: 1 week    Alexis Simon  Cardiovascular Disease  [address], [place] [id number]  Phone: [phone]  Fax: [phone]  Follow Up Time: 1 week    BRIAN ALBA  [address] [place] [address]  Phone: ()-  Fax: ()-

## 2023-08-12 DIAGNOSIS — K80.50 CALCULUS OF BILE DUCT WITHOUT CHOLANGITIS OR CHOLECYSTITIS WITHOUT OBSTRUCTION: ICD-10-CM

## 2023-08-12 DIAGNOSIS — K80.20 CALCULUS OF GALLBLADDER WITHOUT CHOLECYSTITIS WITHOUT OBSTRUCTION: ICD-10-CM

## 2023-08-12 DIAGNOSIS — I48.20 CHRONIC ATRIAL FIBRILLATION, UNSPECIFIED: ICD-10-CM

## 2023-08-12 DIAGNOSIS — I61.9 NONTRAUMATIC INTRACEREBRAL HEMORRHAGE, UNSPECIFIED: ICD-10-CM

## 2023-08-12 DIAGNOSIS — I48.0 PAROXYSMAL ATRIAL FIBRILLATION: ICD-10-CM

## 2023-08-12 PROCEDURE — 99233 SBSQ HOSP IP/OBS HIGH 50: CPT

## 2023-08-12 PROCEDURE — 99232 SBSQ HOSP IP/OBS MODERATE 35: CPT

## 2023-08-12 RX ADMIN — APIXABAN 5 MILLIGRAM(S): 2.5 TABLET, FILM COATED ORAL at 21:21

## 2023-08-12 RX ADMIN — Medication 3 MILLIGRAM(S): at 22:24

## 2023-08-12 RX ADMIN — Medication 250 MILLIGRAM(S): at 17:22

## 2023-08-12 RX ADMIN — Medication 3 MILLIGRAM(S): at 21:19

## 2023-08-12 RX ADMIN — Medication 120 MILLIGRAM(S): at 05:49

## 2023-08-12 RX ADMIN — Medication 250 MILLIGRAM(S): at 05:49

## 2023-08-12 RX ADMIN — ATORVASTATIN CALCIUM 80 MILLIGRAM(S): 80 TABLET, FILM COATED ORAL at 21:20

## 2023-08-12 RX ADMIN — Medication 500 MILLIGRAM(S): at 05:49

## 2023-08-12 RX ADMIN — Medication 500 MILLIGRAM(S): at 21:20

## 2023-08-12 RX ADMIN — Medication 100 MILLIGRAM(S): at 05:49

## 2023-08-12 RX ADMIN — Medication 500 MILLIGRAM(S): at 14:56

## 2023-08-12 RX ADMIN — APIXABAN 5 MILLIGRAM(S): 2.5 TABLET, FILM COATED ORAL at 09:00

## 2023-08-12 NOTE — PROGRESS NOTE ADULT - PROBLEM SELECTOR PROBLEM 1
Migration of percutaneous cholecystostomy tube
Common bile duct dilation
Common bile duct dilation
Sigmoid diverticulitis
Sigmoid diverticulitis
Common bile duct dilation

## 2023-08-12 NOTE — PROGRESS NOTE ADULT - SUBJECTIVE AND OBJECTIVE BOX
HPI:  Karen Carlos is a 78 year old female RH dominant with PMH of HTN, and HLD; who presented to [facility] ED on 7/26/23 s/p fall from chair with + head strike, on the floor for over an hour. ER workup was significant AFIB with RVR and rhabdomyolysis. She was given Cardizem IVP and IVF with improvement HR to the 70s. She was diagnosed with new onset AFIB, seen by cardiology and started on Metoprolol and Diltiazem for rate control and was anticoagulated with Eliquis.  IV fluids were administered for rhabdomyolysis.     RUQ US was significant for acute cholecystitis and cholelithiasis with pericholecystic fluid and gallbladder wall thickening. Her HIDA scan resulted positive and a percutaneous cholecystectomy tube was placed by IR on 7/7.  Her blood cultures resulted positive for Strept Anginosus for which she was started on IV antibiotics.     Her hospital course was complicated by neurological changes/altered mental status. CT head was performed which resulted negative; MRI of the head was significant for a R cerebellar infarct.    PM&R was consulted and deemed her an appropriate candidate for IRF. She was medically stabilized and cleared for discharge to [facility].  (03 Aug 2023 13:49)          Subjective:  Seen this AM.  Doing well.  No pain.  Had ERCP yesterday.  Minimal to no drainage from enrique tube.       VITALS  Vital Signs Last 24 Hrs  T(C): 36.7 (12 Aug 2023 07:55), Max: 36.7 (12 Aug 2023 07:55)  T(F): 98 (12 Aug 2023 07:55), Max: 98 (12 Aug 2023 07:55)  HR: 80 (12 Aug 2023 07:55) (80 - 91)  BP: 131/85 (12 Aug 2023 07:55) (131/85 - 148/92)  BP(mean): --  RR: 16 (12 Aug 2023 07:55) (16 - 16)  SpO2: 98% (12 Aug 2023 07:55) (97% - 98%)    Parameters below as of 12 Aug 2023 07:55  Patient On (Oxygen Delivery Method): room air        REVIEW OF SYMPTOMS  Neurological deficits      PHYSICAL EXAM  Constitutional - NAD, Comfortable  HEENT - NCAT, EOMI  Chest -  Breathing comfortably on RA  Cardiovascular - warm well perfused  Abdomen -  Soft, NTND  Extremities - No edema, No calf tenderness   Neurologic Exam -                    Cognitive - Awake, Alert,      Motor - No new deficits  Psychiatric - Mood stable, Affect WNL        RECENT LABS                  RADIOLOGY/OTHER RESULTS      MEDICATIONS  (STANDING):  apixaban 5 milliGRAM(s) Oral every 12 hours  atorvastatin 80 milliGRAM(s) Oral at bedtime  diltiazem    milliGRAM(s) Oral daily  indomethacin Suppository 100 milliGRAM(s) Rectal once  levoFLOXacin  Tablet 750 milliGRAM(s) Oral every 24 hours  melatonin 3 milliGRAM(s) Oral at bedtime  metoprolol succinate  milliGRAM(s) Oral daily  metroNIDAZOLE    Tablet 500 milliGRAM(s) Oral every 8 hours  polyethylene glycol 3350 17 Gram(s) Oral daily  saccharomyces boulardii 250 milliGRAM(s) Oral two times a day  senna 2 Tablet(s) Oral at bedtime    MEDICATIONS  (PRN):  acetaminophen     Tablet .. 650 milliGRAM(s) Oral every 6 hours PRN Temp greater or equal to 38C (100.4F)  acetaminophen     Tablet .. 650 milliGRAM(s) Oral every 6 hours PRN Mild Pain (1 - 3)  aluminum hydroxide/magnesium hydroxide/simethicone Suspension 30 milliLiter(s) Oral every 4 hours PRN Dyspepsia  bisacodyl Suppository 10 milliGRAM(s) Rectal daily PRN Constipation  melatonin 3 milliGRAM(s) Oral at bedtime PRN Insomnia

## 2023-08-12 NOTE — PROGRESS NOTE ADULT - SUBJECTIVE AND OBJECTIVE BOX
The patient remains asymptomatic regarding her GI tract. She is tolerating a regular diet and has passed stool and flatus. The patient remains afebrile with a normal WBC.     PFSH: All noncontributory    MEDICATIONS REVIEWED:acetaminophen     Tablet .. 650 milliGRAM(s) Oral every 6 hours PRN  acetaminophen     Tablet .. 650 milliGRAM(s) Oral every 6 hours PRN  aluminum hydroxide/magnesium hydroxide/simethicone Suspension 30 milliLiter(s) Oral every 4 hours PRN  apixaban 5 milliGRAM(s) Oral every 12 hours  atorvastatin 80 milliGRAM(s) Oral at bedtime  bisacodyl Suppository 10 milliGRAM(s) Rectal daily PRN  diltiazem    milliGRAM(s) Oral daily  indomethacin Suppository 100 milliGRAM(s) Rectal once  levoFLOXacin  Tablet 750 milliGRAM(s) Oral every 24 hours  melatonin 3 milliGRAM(s) Oral at bedtime  melatonin 3 milliGRAM(s) Oral at bedtime PRN  metoprolol succinate  milliGRAM(s) Oral daily  metroNIDAZOLE    Tablet 500 milliGRAM(s) Oral every 8 hours  polyethylene glycol 3350 17 Gram(s) Oral daily  saccharomyces boulardii 250 milliGRAM(s) Oral two times a day  senna 2 Tablet(s) Oral at bedtime      ALLERGIES:No Known Allergies      PHYSICAL EXAMINATION:  RESPIRATORY: Clear to auscultation bilaterally, respirations non-labored.  CARDIAC: normal S1S2, no murmurs.  ABDOMEN: soft, BS present, no masses, no hernias, no peritoneal signs, no KLS, nontender.  VASCULAR: Equal and normal pulses.  MUSCULOSKELETAL: Full ROM, no deformities.      T(C): 36.7 (08-12-23 @ 07:55), Max: 36.7 (08-12-23 @ 07:55)  HR: 80 (08-12-23 @ 07:55) (80 - 91)  BP: 131/85 (08-12-23 @ 07:55) (131/85 - 148/92)  RR: 16 (08-12-23 @ 07:55) (16 - 16)  SpO2: 98% (08-12-23 @ 07:55) (97% - 98%)

## 2023-08-12 NOTE — PROGRESS NOTE ADULT - PROBLEM SELECTOR PROBLEM 2
Migration of percutaneous cholecystostomy tube
Migration of percutaneous cholecystostomy tube
Common bile duct dilation
Sigmoid diverticulitis
Common bile duct dilation
Migration of percutaneous cholecystostomy tube

## 2023-08-12 NOTE — PROGRESS NOTE ADULT - SUBJECTIVE AND OBJECTIVE BOX
INTERVAL HPI/OVERNIGHT EVENTS:  Allergies: NKDA    Vital Signs Last 24 Hrs  T(C): 36.7 (12 Aug 2023 07:55), Max: 36.7 (12 Aug 2023 07:55)  T(F): 98 (12 Aug 2023 07:55), Max: 98 (12 Aug 2023 07:55)  HR: 80 (12 Aug 2023 07:55) (80 - 91)  BP: 131/85 (12 Aug 2023 07:55) (125/77 - 148/92)    RR: 16 (12 Aug 2023 07:55) (15 - 16)  SpO2: 98% (12 Aug 2023 07:55) (97% - 99%)    Parameters below as of 12 Aug 2023 07:55  Patient On (Oxygen Delivery Method): room air      MEDICATIONS  (STANDING):  apixaban 5 milliGRAM(s) Oral every 12 hours  atorvastatin 80 milliGRAM(s) Oral at bedtime  diltiazem    milliGRAM(s) Oral daily  indomethacin Suppository 100 milliGRAM(s) Rectal once  levoFLOXacin  Tablet 750 milliGRAM(s) Oral every 24 hours  melatonin 3 milliGRAM(s) Oral at bedtime  metoprolol succinate  milliGRAM(s) Oral daily  metroNIDAZOLE    Tablet 500 milliGRAM(s) Oral every 8 hours  polyethylene glycol 3350 17 Gram(s) Oral daily  saccharomyces boulardii 250 milliGRAM(s) Oral two times a day  senna 2 Tablet(s) Oral at bedtime    MEDICATIONS  (PRN):  acetaminophen     Tablet .. 650 milliGRAM(s) Oral every 6 hours PRN Mild Pain (1 - 3)  acetaminophen     Tablet .. 650 milliGRAM(s) Oral every 6 hours PRN Temp greater or equal to 38C (100.4F)  aluminum hydroxide/magnesium hydroxide/simethicone Suspension 30 milliLiter(s) Oral every 4 hours PRN Dyspepsia  bisacodyl Suppository 10 milliGRAM(s) Rectal daily PRN Constipation  melatonin 3 milliGRAM(s) Oral at bedtime PRN Insomnia      LABS:  Hemoglobin: 13.8 g/dL (08-10 @ 07:23)  Hemoglobin: 13.4 g/dL (08-09 @ 07:19)    LIVER FUNCTIONS - ( 10 Aug 2023 07:23 )  Alb: 2.6 g/dL / Pro: 6.7 g/dL / ALK PHOS: 77 U/L / ALT: 29 U/L / AST: 28 U/L / GGT: x           Review of Systems:    PHYSICAL EXAM:  Examined in:   Constitutional: NAD, well-developed  Neck: No LAD, supple  Respiratory: clear to auscultation b/l no rales, rhonchi, wheezing  Cardiovascular: S1 and S2, RRR, no murmur  Gastrointestinal: +BS x4, soft, NT/ND, neg HSM,  Extremities: No peripheral edema, neg clubbing, cyanosis  Vascular: 2+ peripheral pulses  Neurological: A/O x 3, no focal deficits  Psychiatric: Normal mood, normal affect  Skin: No rashes    RADIOLOGY & ADDITIONAL TESTS:    ACC: [id number] EXAM:  MR MRCP WAW IC   ORDERED BY:  EMI CLAY     PROCEDURE DATE:  08/09/2023          INTERPRETATION:  CLINICAL INFORMATION: Prior acute cholecystitis with   percutaneous cholecystostomy tube. Cholelithiasis and choledocholithiasis.    COMPARISON: CT abdomen pelvis 8/7/2023. Right upper quadrant ultrasound   7/26/2023.    CONTRAST/COMPLICATIONS:  IV Contrast: Gadavist  8 cc administered   2 cc discarded  Oral Contrast: NONE  Complications: None reported at time of study completion    PROCEDURE:  MRI of the abdomen was performed.  MRCP was performed.    FINDINGS:    LOWER CHEST: Bibasilar atelectasis better seen on recent CT. No   visualized pleural effusion.    Motion limited evaluation.    LIVER: Liver size within normal limits. Perfusional difference of segment   6 and segment 8 on early phase compared to remainder of the liver, of   unclear etiology, suboptimally characterized due to motion. Liver   parenchyma is suboptimally assessed due to motion.  BILE DUCTS: Distended common bile duct up to 12 mm, new since 7/25/2023,   with mid/distal segment choledocholithiasis (two stones noted, image 54   series 14). Borderline central intrahepatic ductal distention.  GALLBLADDER: Cholelithiasis. Persistent, but decreased, inflammatory   changes of the gallbladder since 7/25/2023. Malpositioned cholecystostomy   tube with fluid and inflammatory changes along the catheter tract better   seen on the recent CT from 8/7/2023. Consider repeat HIDA scan to   evaluate for persistent cholecystitis.  SPLEEN: Spleen size within normal limits  PANCREAS: Limited evaluation due to motion. 3 mm T2 hyperintense foci at   the pancreatic tail are likely a side branch IPMNs or sequelae of prior   pancreatitis.  ADRENALS: Unremarkable  KIDNEYS/URETERS: No hydronephrosis. Trace perinephric fluid.    VISUALIZED PORTIONS:  BOWEL: No bowel distention.  PERITONEUM: Trace fluid adjacent to the right colon along displaced   cholecystostomy tube catheter tract, suboptimally characterized on this   study. Additional trace fluid adjacent to diverticular disease of the   sigmoid colon, better seen on recent CT.  VESSELS: No abdominal aortic aneurysm  RETROPERITONEUM/LYMPH NODES: Small volume nodes  ABDOMINAL WALL: Unremarkable  BONES: Suboptimally characterized and abdominal protocol MRI.    IMPRESSION:    Distended common bile duct up to 12 mm, new since 7/25/2023, with   mid/distal segment choledocholithiasis (two stones noted, image 54 series   14). Borderline central intrahepatic ductal distention.    Cholelithiasis. Persistent, but decreased, inflammatory changes of the   gallbladder since 7/25/2023. Malpositioned cholecystostomy tube with   fluid and inflammatory changes along the catheter tract better seen on   the recent CT from 8/7/2023. Consider repeat HIDA scan to evaluate for   persistent cholecystitis.    Additional trace fluid adjacent to diverticular disease of the sigmoid   colon, better seen on recent CT from 8/7/2023.    3 mm T2 hyperintense foci at the pancreatic tail are likely a side branch   IPMNs or sequelae of prior pancreatitis. Follow-up MRCP is suggested in   12 months.    --- End of Report ---    LUIS BRYANT M.D., ATTENDING RADIOLOGIST      ACC: [id number] EXAM:  CT ABDOMEN AND PELVIS   ORDERED BY: DEYANIRA WORLEY   PROCEDURE DATE:  08/07/2023    INTERPRETATION:  CLINICAL INFORMATION: Cholecystostomy, right quadrant abdominal pain.  COMPARISON: CT chest 7/25/2023  CONTRAST/COMPLICATIONS:  IV Contrast: None  Oral Contrast: None  Complications: None at the time of this dictation.    PROCEDURE:  CT of the Abdomen and Pelvis was performed.  Sagittal and coronal reformats were performed.    FINDINGS:  LOWER CHEST: Mosaic attenuation of the lung bases.    LIVER: Within normal limits.  BILE DUCTS: Dilated common bile duct measuring up to 9 mm with   hyperdensity in the distal CBD, suspicious for choledocholithiasis  GALLBLADDER: Cholelithiasis. Gallbladder wall thickening and   pericholecystic fat stranding at the gallbladder fundus  SPLEEN: Within normal limits.  PANCREAS: Within normal limits.  ADRENALS: Within normal limits.  KIDNEYS/URETERS: Within normal limits.    BLADDER: Within normal limits.  REPRODUCTIVE ORGANS: Uterus and adnexa within normal limits.    BOWEL: No bowel obstruction. A percutaneous catheter traverses the   proximal ascending colon just distal to the cecum with pigtail noted in   the adjacent mesentery. There is inflammation adjacent to the ascending   colon and appendix, which is otherwise normal caliber. There is acute   diverticulitis involving the mid sigmoid colon with small extra luminal   locules of gas consistent with localized perforation. Associated adjacent   phlegmonous changes.  PERITONEUM: Roderick pneumoperitoneum. No loculated collection within the   limitations of this exam.  VESSELS: Within normal limits.  RETROPERITONEUM/LYMPH NODES: No lymphadenopathy.  ABDOMINAL WALL: Within normal limits.  BONES: Degenerative changes of thoracolumbar spine. Degenerative changes   of the bilateral sacroiliac joints with erosive changes at the superior   aspect of the SI joints bilaterally, possibly sequela of prior   sacroiliitis.    IMPRESSION:  Percutaneous cholecystostomy catheter is malpositioned within the   descending colon distal to the cecum. Inflammation adjacent to the colon   and appendix, which is otherwise normal in caliber, likely reactive.    Dilated common bile duct with suspicion of choledocholithiasis in the   distal CBD. Cholelithiasis with gallbladder wall thickening and   pericholecystic fat stranding, consistent with acute cholecystitis.    Acute diverticulitis in the mid sigmoid colon with associated localized   perforation.    Findings of malpositioned percutaneous cholecystomy catheter &   diverticulitis were discussed with Dr. DEYANIRA WORLEY [phone] 8/7/2023   11:49 AM by Dr. Broussard with read back confirmation.    --- End of Report --     MERCY BROUSSARD DO; Attending Radiologist   INTERVAL HPI/OVERNIGHT EVENTS: Tolerating a regular diet, +BM, afebrile, denies pain.     Allergies: NKDA    Vital Signs Last 24 Hrs  T(C): 36.7 (12 Aug 2023 07:55), Max: 36.7 (12 Aug 2023 07:55)  T(F): 98 (12 Aug 2023 07:55), Max: 98 (12 Aug 2023 07:55)  HR: 80 (12 Aug 2023 07:55) (80 - 91)  BP: 131/85 (12 Aug 2023 07:55) (125/77 - 148/92)    RR: 16 (12 Aug 2023 07:55) (15 - 16)  SpO2: 98% (12 Aug 2023 07:55) (97% - 99%)    Parameters below as of 12 Aug 2023 07:55  Patient On (Oxygen Delivery Method): room air      MEDICATIONS  (STANDING):  apixaban 5 milliGRAM(s) Oral every 12 hours  atorvastatin 80 milliGRAM(s) Oral at bedtime  diltiazem    milliGRAM(s) Oral daily  indomethacin Suppository 100 milliGRAM(s) Rectal once  levoFLOXacin  Tablet 750 milliGRAM(s) Oral every 24 hours  melatonin 3 milliGRAM(s) Oral at bedtime  metoprolol succinate  milliGRAM(s) Oral daily  metroNIDAZOLE    Tablet 500 milliGRAM(s) Oral every 8 hours  polyethylene glycol 3350 17 Gram(s) Oral daily  saccharomyces boulardii 250 milliGRAM(s) Oral two times a day  senna 2 Tablet(s) Oral at bedtime    MEDICATIONS  (PRN):  acetaminophen     Tablet .. 650 milliGRAM(s) Oral every 6 hours PRN Mild Pain (1 - 3)  acetaminophen     Tablet .. 650 milliGRAM(s) Oral every 6 hours PRN Temp greater or equal to 38C (100.4F)  aluminum hydroxide/magnesium hydroxide/simethicone Suspension 30 milliLiter(s) Oral every 4 hours PRN Dyspepsia  bisacodyl Suppository 10 milliGRAM(s) Rectal daily PRN Constipation  melatonin 3 milliGRAM(s) Oral at bedtime PRN Insomnia      LABS:  Hemoglobin: 13.8 g/dL (08-10 @ 07:23)  Hemoglobin: 13.4 g/dL (08-09 @ 07:19)    LIVER FUNCTIONS - ( 10 Aug 2023 07:23 )  Alb: 2.6 g/dL / Pro: 6.7 g/dL / ALK PHOS: 77 U/L / ALT: 29 U/L / AST: 28 U/L / GGT: x           Review of Systems:    PHYSICAL EXAM:  Examined in:   Constitutional: NAD, well-developed  Neck: No LAD, supple  Respiratory: clear to auscultation b/l no rales, rhonchi, wheezing  Cardiovascular: S1 and S2, RRR, no murmur  Gastrointestinal: +BS x4, soft, NT/ND, neg HSM,  Extremities: No peripheral edema, neg clubbing, cyanosis  Vascular: 2+ peripheral pulses  Neurological: A/O x 3, no focal deficits  Psychiatric: Normal mood, normal affect  Skin: No rashes    RADIOLOGY & ADDITIONAL TESTS:    ACC: [id number] EXAM:  MR MRCP WAW IC   ORDERED BY:  EMI CLAY     PROCEDURE DATE:  08/09/2023          INTERPRETATION:  CLINICAL INFORMATION: Prior acute cholecystitis with   percutaneous cholecystostomy tube. Cholelithiasis and choledocholithiasis.    COMPARISON: CT abdomen pelvis 8/7/2023. Right upper quadrant ultrasound   7/26/2023.    CONTRAST/COMPLICATIONS:  IV Contrast: Gadavist  8 cc administered   2 cc discarded  Oral Contrast: NONE  Complications: None reported at time of study completion    PROCEDURE:  MRI of the abdomen was performed.  MRCP was performed.    FINDINGS:    LOWER CHEST: Bibasilar atelectasis better seen on recent CT. No   visualized pleural effusion.    Motion limited evaluation.    LIVER: Liver size within normal limits. Perfusional difference of segment   6 and segment 8 on early phase compared to remainder of the liver, of   unclear etiology, suboptimally characterized due to motion. Liver   parenchyma is suboptimally assessed due to motion.  BILE DUCTS: Distended common bile duct up to 12 mm, new since 7/25/2023,   with mid/distal segment choledocholithiasis (two stones noted, image 54   series 14). Borderline central intrahepatic ductal distention.  GALLBLADDER: Cholelithiasis. Persistent, but decreased, inflammatory   changes of the gallbladder since 7/25/2023. Malpositioned cholecystostomy   tube with fluid and inflammatory changes along the catheter tract better   seen on the recent CT from 8/7/2023. Consider repeat HIDA scan to   evaluate for persistent cholecystitis.  SPLEEN: Spleen size within normal limits  PANCREAS: Limited evaluation due to motion. 3 mm T2 hyperintense foci at   the pancreatic tail are likely a side branch IPMNs or sequelae of prior   pancreatitis.  ADRENALS: Unremarkable  KIDNEYS/URETERS: No hydronephrosis. Trace perinephric fluid.    VISUALIZED PORTIONS:  BOWEL: No bowel distention.  PERITONEUM: Trace fluid adjacent to the right colon along displaced   cholecystostomy tube catheter tract, suboptimally characterized on this   study. Additional trace fluid adjacent to diverticular disease of the   sigmoid colon, better seen on recent CT.  VESSELS: No abdominal aortic aneurysm  RETROPERITONEUM/LYMPH NODES: Small volume nodes  ABDOMINAL WALL: Unremarkable  BONES: Suboptimally characterized and abdominal protocol MRI.    IMPRESSION:    Distended common bile duct up to 12 mm, new since 7/25/2023, with   mid/distal segment choledocholithiasis (two stones noted, image 54 series   14). Borderline central intrahepatic ductal distention.    Cholelithiasis. Persistent, but decreased, inflammatory changes of the   gallbladder since 7/25/2023. Malpositioned cholecystostomy tube with   fluid and inflammatory changes along the catheter tract better seen on   the recent CT from 8/7/2023. Consider repeat HIDA scan to evaluate for   persistent cholecystitis.    Additional trace fluid adjacent to diverticular disease of the sigmoid   colon, better seen on recent CT from 8/7/2023.    3 mm T2 hyperintense foci at the pancreatic tail are likely a side branch   IPMNs or sequelae of prior pancreatitis. Follow-up MRCP is suggested in   12 months.    --- End of Report ---    LUIS BRYANT M.D., ATTENDING RADIOLOGIST      ACC: [id number] EXAM:  CT ABDOMEN AND PELVIS   ORDERED BY: DEYANIRA WORLEY   PROCEDURE DATE:  08/07/2023    INTERPRETATION:  CLINICAL INFORMATION: Cholecystostomy, right quadrant abdominal pain.  COMPARISON: CT chest 7/25/2023  CONTRAST/COMPLICATIONS:  IV Contrast: None  Oral Contrast: None  Complications: None at the time of this dictation.    PROCEDURE:  CT of the Abdomen and Pelvis was performed.  Sagittal and coronal reformats were performed.    FINDINGS:  LOWER CHEST: Mosaic attenuation of the lung bases.    LIVER: Within normal limits.  BILE DUCTS: Dilated common bile duct measuring up to 9 mm with   hyperdensity in the distal CBD, suspicious for choledocholithiasis  GALLBLADDER: Cholelithiasis. Gallbladder wall thickening and   pericholecystic fat stranding at the gallbladder fundus  SPLEEN: Within normal limits.  PANCREAS: Within normal limits.  ADRENALS: Within normal limits.  KIDNEYS/URETERS: Within normal limits.    BLADDER: Within normal limits.  REPRODUCTIVE ORGANS: Uterus and adnexa within normal limits.    BOWEL: No bowel obstruction. A percutaneous catheter traverses the   proximal ascending colon just distal to the cecum with pigtail noted in   the adjacent mesentery. There is inflammation adjacent to the ascending   colon and appendix, which is otherwise normal caliber. There is acute   diverticulitis involving the mid sigmoid colon with small extra luminal   locules of gas consistent with localized perforation. Associated adjacent   phlegmonous changes.  PERITONEUM: Roderick pneumoperitoneum. No loculated collection within the   limitations of this exam.  VESSELS: Within normal limits.  RETROPERITONEUM/LYMPH NODES: No lymphadenopathy.  ABDOMINAL WALL: Within normal limits.  BONES: Degenerative changes of thoracolumbar spine. Degenerative changes   of the bilateral sacroiliac joints with erosive changes at the superior   aspect of the SI joints bilaterally, possibly sequela of prior   sacroiliitis.    IMPRESSION:  Percutaneous cholecystostomy catheter is malpositioned within the   descending colon distal to the cecum. Inflammation adjacent to the colon   and appendix, which is otherwise normal in caliber, likely reactive.    Dilated common bile duct with suspicion of choledocholithiasis in the   distal CBD. Cholelithiasis with gallbladder wall thickening and   pericholecystic fat stranding, consistent with acute cholecystitis.    Acute diverticulitis in the mid sigmoid colon with associated localized   perforation.    Findings of malpositioned percutaneous cholecystomy catheter &   diverticulitis were discussed with Dr. DEYANIRA WORLEY [phone] 8/7/2023   11:49 AM by Dr. Broussard with read back confirmation.    --- End of Report --     MERCY BROUSSARD DO; Attending Radiologist   Patient seen and evaluated.  Tolerating a regular diet, +BM, afebrile, denies pain.     Allergies: NKDA    Vital Signs Last 24 Hrs  T(C): 36.7 (12 Aug 2023 07:55), Max: 36.7 (12 Aug 2023 07:55)  T(F): 98 (12 Aug 2023 07:55), Max: 98 (12 Aug 2023 07:55)  HR: 80 (12 Aug 2023 07:55) (80 - 91)  BP: 131/85 (12 Aug 2023 07:55) (125/77 - 148/92)    RR: 16 (12 Aug 2023 07:55) (15 - 16)  SpO2: 98% (12 Aug 2023 07:55) (97% - 99%)    Parameters below as of 12 Aug 2023 07:55  Patient On (Oxygen Delivery Method): room air      MEDICATIONS  (STANDING):  apixaban 5 milliGRAM(s) Oral every 12 hours  atorvastatin 80 milliGRAM(s) Oral at bedtime  diltiazem    milliGRAM(s) Oral daily  indomethacin Suppository 100 milliGRAM(s) Rectal once  levoFLOXacin  Tablet 750 milliGRAM(s) Oral every 24 hours  melatonin 3 milliGRAM(s) Oral at bedtime  metoprolol succinate  milliGRAM(s) Oral daily  metroNIDAZOLE    Tablet 500 milliGRAM(s) Oral every 8 hours  polyethylene glycol 3350 17 Gram(s) Oral daily  saccharomyces boulardii 250 milliGRAM(s) Oral two times a day  senna 2 Tablet(s) Oral at bedtime    MEDICATIONS  (PRN):  acetaminophen     Tablet .. 650 milliGRAM(s) Oral every 6 hours PRN Mild Pain (1 - 3)  acetaminophen     Tablet .. 650 milliGRAM(s) Oral every 6 hours PRN Temp greater or equal to 38C (100.4F)  aluminum hydroxide/magnesium hydroxide/simethicone Suspension 30 milliLiter(s) Oral every 4 hours PRN Dyspepsia  bisacodyl Suppository 10 milliGRAM(s) Rectal daily PRN Constipation  melatonin 3 milliGRAM(s) Oral at bedtime PRN Insomnia      LABS:  Hemoglobin: 13.8 g/dL (08-10 @ 07:23)  Hemoglobin: 13.4 g/dL (08-09 @ 07:19)    LIVER FUNCTIONS - ( 10 Aug 2023 07:23 )  Alb: 2.6 g/dL / Pro: 6.7 g/dL / ALK PHOS: 77 U/L / ALT: 29 U/L / AST: 28 U/L / GGT: x           Review of Systems:    PHYSICAL EXAM:  Examined in:   Constitutional: NAD, well-developed  Neck: No LAD, supple  Respiratory: clear to auscultation b/l no rales, rhonchi, wheezing  Cardiovascular: S1 and S2, RRR, no murmur  Gastrointestinal: +BS x4, soft, NT/ND, neg HSM,  Extremities: No peripheral edema, neg clubbing, cyanosis  Vascular: 2+ peripheral pulses  Neurological: A/O x 3, no focal deficits  Psychiatric: Normal mood, normal affect  Skin: No rashes    RADIOLOGY & ADDITIONAL TESTS:    ACC: [id number] EXAM:  MR MRCP WAW IC   ORDERED BY:  EMI CLAY     PROCEDURE DATE:  08/09/2023          INTERPRETATION:  CLINICAL INFORMATION: Prior acute cholecystitis with   percutaneous cholecystostomy tube. Cholelithiasis and choledocholithiasis.    COMPARISON: CT abdomen pelvis 8/7/2023. Right upper quadrant ultrasound   7/26/2023.    CONTRAST/COMPLICATIONS:  IV Contrast: Gadavist  8 cc administered   2 cc discarded  Oral Contrast: NONE  Complications: None reported at time of study completion    PROCEDURE:  MRI of the abdomen was performed.  MRCP was performed.    FINDINGS:    LOWER CHEST: Bibasilar atelectasis better seen on recent CT. No   visualized pleural effusion.    Motion limited evaluation.    LIVER: Liver size within normal limits. Perfusional difference of segment   6 and segment 8 on early phase compared to remainder of the liver, of   unclear etiology, suboptimally characterized due to motion. Liver   parenchyma is suboptimally assessed due to motion.  BILE DUCTS: Distended common bile duct up to 12 mm, new since 7/25/2023,   with mid/distal segment choledocholithiasis (two stones noted, image 54   series 14). Borderline central intrahepatic ductal distention.  GALLBLADDER: Cholelithiasis. Persistent, but decreased, inflammatory   changes of the gallbladder since 7/25/2023. Malpositioned cholecystostomy   tube with fluid and inflammatory changes along the catheter tract better   seen on the recent CT from 8/7/2023. Consider repeat HIDA scan to   evaluate for persistent cholecystitis.  SPLEEN: Spleen size within normal limits  PANCREAS: Limited evaluation due to motion. 3 mm T2 hyperintense foci at   the pancreatic tail are likely a side branch IPMNs or sequelae of prior   pancreatitis.  ADRENALS: Unremarkable  KIDNEYS/URETERS: No hydronephrosis. Trace perinephric fluid.    VISUALIZED PORTIONS:  BOWEL: No bowel distention.  PERITONEUM: Trace fluid adjacent to the right colon along displaced   cholecystostomy tube catheter tract, suboptimally characterized on this   study. Additional trace fluid adjacent to diverticular disease of the   sigmoid colon, better seen on recent CT.  VESSELS: No abdominal aortic aneurysm  RETROPERITONEUM/LYMPH NODES: Small volume nodes  ABDOMINAL WALL: Unremarkable  BONES: Suboptimally characterized and abdominal protocol MRI.    IMPRESSION:    Distended common bile duct up to 12 mm, new since 7/25/2023, with   mid/distal segment choledocholithiasis (two stones noted, image 54 series   14). Borderline central intrahepatic ductal distention.    Cholelithiasis. Persistent, but decreased, inflammatory changes of the   gallbladder since 7/25/2023. Malpositioned cholecystostomy tube with   fluid and inflammatory changes along the catheter tract better seen on   the recent CT from 8/7/2023. Consider repeat HIDA scan to evaluate for   persistent cholecystitis.    Additional trace fluid adjacent to diverticular disease of the sigmoid   colon, better seen on recent CT from 8/7/2023.    3 mm T2 hyperintense foci at the pancreatic tail are likely a side branch   IPMNs or sequelae of prior pancreatitis. Follow-up MRCP is suggested in   12 months.    --- End of Report ---    LUIS BRYANT M.D., ATTENDING RADIOLOGIST      ACC: [id number] EXAM:  CT ABDOMEN AND PELVIS   ORDERED BY: DEYANIRA WORLEY   PROCEDURE DATE:  08/07/2023    INTERPRETATION:  CLINICAL INFORMATION: Cholecystostomy, right quadrant abdominal pain.  COMPARISON: CT chest 7/25/2023  CONTRAST/COMPLICATIONS:  IV Contrast: None  Oral Contrast: None  Complications: None at the time of this dictation.    PROCEDURE:  CT of the Abdomen and Pelvis was performed.  Sagittal and coronal reformats were performed.    FINDINGS:  LOWER CHEST: Mosaic attenuation of the lung bases.    LIVER: Within normal limits.  BILE DUCTS: Dilated common bile duct measuring up to 9 mm with   hyperdensity in the distal CBD, suspicious for choledocholithiasis  GALLBLADDER: Cholelithiasis. Gallbladder wall thickening and   pericholecystic fat stranding at the gallbladder fundus  SPLEEN: Within normal limits.  PANCREAS: Within normal limits.  ADRENALS: Within normal limits.  KIDNEYS/URETERS: Within normal limits.    BLADDER: Within normal limits.  REPRODUCTIVE ORGANS: Uterus and adnexa within normal limits.    BOWEL: No bowel obstruction. A percutaneous catheter traverses the   proximal ascending colon just distal to the cecum with pigtail noted in   the adjacent mesentery. There is inflammation adjacent to the ascending   colon and appendix, which is otherwise normal caliber. There is acute   diverticulitis involving the mid sigmoid colon with small extra luminal   locules of gas consistent with localized perforation. Associated adjacent   phlegmonous changes.  PERITONEUM: Roderick pneumoperitoneum. No loculated collection within the   limitations of this exam.  VESSELS: Within normal limits.  RETROPERITONEUM/LYMPH NODES: No lymphadenopathy.  ABDOMINAL WALL: Within normal limits.  BONES: Degenerative changes of thoracolumbar spine. Degenerative changes   of the bilateral sacroiliac joints with erosive changes at the superior   aspect of the SI joints bilaterally, possibly sequela of prior   sacroiliitis.    IMPRESSION:  Percutaneous cholecystostomy catheter is malpositioned within the   descending colon distal to the cecum. Inflammation adjacent to the colon   and appendix, which is otherwise normal in caliber, likely reactive.    Dilated common bile duct with suspicion of choledocholithiasis in the   distal CBD. Cholelithiasis with gallbladder wall thickening and   pericholecystic fat stranding, consistent with acute cholecystitis.    Acute diverticulitis in the mid sigmoid colon with associated localized   perforation.    Findings of malpositioned percutaneous cholecystomy catheter &   diverticulitis were discussed with Dr. DEYANIRA WORLEY [phone] 8/7/2023   11:49 AM by Dr. Broussard with read back confirmation.    --- End of Report --     MERCY BROUSSARD DO; Attending Radiologist

## 2023-08-12 NOTE — PROGRESS NOTE ADULT - PROBLEM SELECTOR PROBLEM 3
Sigmoid diverticulitis
Migration of percutaneous cholecystostomy tube
Sigmoid diverticulitis
Choledocholithiasis
Migration of percutaneous cholecystostomy tube
Sigmoid diverticulitis

## 2023-08-12 NOTE — PROGRESS NOTE ADULT - PROBLEM SELECTOR PLAN 2
cont antibiotics  Can consider out-pt colonoscopy in 8 wks (pt has never had colonoscopy)  Low fiber diet, when not NPO -cont antibiotics  -Can consider out-pt colonoscopy in 8 wks (pt has never had colonoscopy)  -Low fiber diet

## 2023-08-12 NOTE — PROGRESS NOTE ADULT - SUBJECTIVE AND OBJECTIVE BOX
Patient is a 78y old  Female who presents with a chief complaint of cerebellar CVA (11 Aug 2023 13:19)      History, interim events and clinically pertinent issues reviewed; patient interviewed and examined.   She has no complaints this AM - patient denies HA's, CP, palpitations, shortness of breath or upper respiratory symptoms, nausea, vomiting, diarrhea, constipation, dysuria, bruising/bleeding and all other systems were reviewed as negative      ALLERGIES:  No Known Allergies    MEDICATIONS  (STANDING):  apixaban 5 milliGRAM(s) Oral every 12 hours  atorvastatin 80 milliGRAM(s) Oral at bedtime  diltiazem    milliGRAM(s) Oral daily  indomethacin Suppository 100 milliGRAM(s) Rectal once  levoFLOXacin  Tablet 750 milliGRAM(s) Oral every 24 hours  melatonin 3 milliGRAM(s) Oral at bedtime  metoprolol succinate  milliGRAM(s) Oral daily  metroNIDAZOLE    Tablet 500 milliGRAM(s) Oral every 8 hours  polyethylene glycol 3350 17 Gram(s) Oral daily  saccharomyces boulardii 250 milliGRAM(s) Oral two times a day  senna 2 Tablet(s) Oral at bedtime    MEDICATIONS  (PRN):  acetaminophen     Tablet .. 650 milliGRAM(s) Oral every 6 hours PRN Mild Pain (1 - 3)  acetaminophen     Tablet .. 650 milliGRAM(s) Oral every 6 hours PRN Temp greater or equal to 38C (100.4F)  aluminum hydroxide/magnesium hydroxide/simethicone Suspension 30 milliLiter(s) Oral every 4 hours PRN Dyspepsia  bisacodyl Suppository 10 milliGRAM(s) Rectal daily PRN Constipation  melatonin 3 milliGRAM(s) Oral at bedtime PRN Insomnia    Vital Signs Last 24 Hrs  T(F): 97.7 (11 Aug 2023 19:49), Max: 97.7 (11 Aug 2023 19:49)  HR: 89 (12 Aug 2023 05:51) (85 - 91)  BP: 139/81 (12 Aug 2023 05:51) (125/77 - 148/92)  RR: 16 (11 Aug 2023 19:49) (15 - 16)  SpO2: 97% (11 Aug 2023 19:49) (97% - 99%)  I&O's Summary    11 Aug 2023 07:01  -  12 Aug 2023 07:00  --------------------------------------------------------  IN: 0 mL / OUT: 17.5 mL / NET: -17.5 mL                PHYSICAL EXAM:  General: NAD, A/O x 3  ENT: MMM  Neck: Supple, No JVD  Lungs: Clear to auscultation bilaterally  Cardio: RRR, S1/S2, No murmurs  Abdomen: Soft, Nontender, Nondistended; Bowel sounds present. RUQ cholecystostomy tube draining thin bilious colored liquid  Extremities: No calf tenderness, No pitting edema  Neuro: no new deficits speech fluent moving all extremeties      LABS:                        13.8   7.48  )-----------( 314      ( 10 Aug 2023 07:23 )             41.4       08-10    139  |  104  |  13  ----------------------------<  97  3.8   |  27  |  0.82    Ca    8.7      10 Aug 2023 07:23    TPro  6.7  /  Alb  2.6  /  TBili  0.6  /  DBili  x   /  AST  28  /  ALT  29  /  AlkPhos  77  08-10                07-26 Chol 130 mg/dL LDL -- HDL 48 mg/dL Trig 74 mg/dL            Urinalysis Basic - ( 10 Aug 2023 07:23 )    Color: x / Appearance: x / SG: x / pH: x  Gluc: 97 mg/dL / Ketone: x  / Bili: x / Urobili: x   Blood: x / Protein: x / Nitrite: x   Leuk Esterase: x / RBC: x / WBC x   Sq Epi: x / Non Sq Epi: x / Bacteria: x        COVID-19 PCR: Mir (08-03-23 @ 21:00)

## 2023-08-12 NOTE — PROGRESS NOTE ADULT - ASSESSMENT
IMPRESSION: Malposition of IR cholecystostomy tube    PLAN: No indication for surgery as long as patient remains asymptomatic regarding her GI tract           Continue regular diet

## 2023-08-12 NOTE — PROGRESS NOTE ADULT - ASSESSMENT
78F with HTN, HLD, recently hospitalized at OSH with acute stroke - with course complicated by acute cholecystitis requiring percutaneous cholecystostomy tube as well as new AF, now in acute rehab. A CT scan was performed today for new abdominal pain, which shows a malpositioned cholecystostomy tube, acute complicated diverticulitis and choledocholithiasis.  Now s/p ERCP with findings of  several filling defects, sphincterotomy performed with stent placement     78F with HTN, HLD, recently hospitalized at OSH with acute stroke - with course complicated by acute cholecystitis requiring percutaneous cholecystostomy tube as well as new AF, now in acute rehab. A CT scan was performed today for new abdominal pain, which shows a malpositioned cholecystostomy tube, acute complicated diverticulitis and choledocholithiasis.    Now s/p ERCP with findings of several filling defects. Sphincterotomy performed with stent placement

## 2023-08-12 NOTE — PROGRESS NOTE ADULT - NSPROGADDITIONALINFOA_GEN_ALL_CORE
I have personally interviewed and examined this patient, reviewed pertinent clinical information, and performed the evaluation and management services provided at today's visit for inpatient medical follow up    I am available to discuss any issues related to the medical care of this patient on the unit this morning, through [employer] Teams Chat or by phone at [phone]    will d/w DR Carrillo and team @ [facility]

## 2023-08-12 NOTE — PROGRESS NOTE ADULT - ASSESSMENT
78F with HTN, HLD, recently hospitalized at OSH with acute stroke - with course complicated by acute cholecystitis requiring percutaneous cholecystostomy tube as well as new AF, now in acute rehab. A CT scan was performed today for new abdominal pain, which shows a malpositioned cholecystostomy tube, acute complicated diverticulitis and choledocholithiasis     #Malpositioned cholecystostomy tube (now in ascending colon)  #Acute sigmoid diverticulitis with localized perforation  #Pneumoperitoneum due to above  #Choledocholithiasis, likely new (CBD was WNL on prior imaging)  #Cholecystitis  -currently asymptomatic continue to monitor reconsult surgery if needed  -continue flagyl    -If patient is going to remain in rehab, will consult GI, to assess for timing re: ERCP     #Chronic AF  -c/w Toprol  -c/w Diltiazem  -c/w Eliquis.... may need to hold pending surgical recs    #Lung nodule  -outpatient follow-up    DVT ppx: eliquis

## 2023-08-12 NOTE — PROGRESS NOTE ADULT - NS ATTEND AMEND GEN_ALL_CORE FT
Patient seen and examined at the bedside. Agree with the assessment and plan of LILO Cantrell.  S/P ERCP. Recheck liver panel and if unremarkable recommend follow up with GI in 8 weeks for ERCP, stent removal. Also consideration for eventual colonoscopy as above.  Surgery note appreciated.

## 2023-08-12 NOTE — PROGRESS NOTE ADULT - PROBLEM SELECTOR PLAN 1
-surgery re management of perc drain and other findings -IR tube malpositioned,surgery commentary appreciated -IR tube malpositioned, surgery commentary appreciated none

## 2023-08-12 NOTE — PROGRESS NOTE ADULT - ASSESSMENT
79 yo F  admitted to acute Rehabilitation with  right cerebellar Infarct    Pt. Stable  Active Issues    CVA-- lipitor    Afib- cont. eliquis-- cleared by GI to resume;  diltiazem and metoprolol    #Cholecystitis  Malpositioned cholecystostomy tube   #Acute sigmoid diverticulitis with localized perforation  --GI note 8/12 reviewed- -s/p ERCP with findings of  several filling defects, sphincterotomy performed with stent placement  -continue flagyl & levoquin  Surgery note reviewed-- No indication for surgery as long as patient remains asymptomatic regarding her GI tract           Continue regular diet     HTN-- diltiazem and metoprolol    Cont. comprehensive inpatient PT, OT and Speech    No acute issues,   Cont. current plan of care and medications as per primary team

## 2023-08-13 LAB
ALBUMIN SERPL ELPH-MCNC: 2.7 G/DL — LOW (ref 3.3–5)
ALP SERPL-CCNC: 80 U/L — SIGNIFICANT CHANGE UP (ref 40–120)
ALT FLD-CCNC: 29 U/L — SIGNIFICANT CHANGE UP (ref 10–45)
ANION GAP SERPL CALC-SCNC: 10 MMOL/L — SIGNIFICANT CHANGE UP (ref 5–17)
AST SERPL-CCNC: 31 U/L — SIGNIFICANT CHANGE UP (ref 10–40)
BILIRUB SERPL-MCNC: 0.4 MG/DL — SIGNIFICANT CHANGE UP (ref 0.2–1.2)
BUN SERPL-MCNC: 15 MG/DL — SIGNIFICANT CHANGE UP (ref 7–23)
CALCIUM SERPL-MCNC: 8.8 MG/DL — SIGNIFICANT CHANGE UP (ref 8.4–10.5)
CHLORIDE SERPL-SCNC: 104 MMOL/L — SIGNIFICANT CHANGE UP (ref 96–108)
CO2 SERPL-SCNC: 26 MMOL/L — SIGNIFICANT CHANGE UP (ref 22–31)
CREAT SERPL-MCNC: 0.77 MG/DL — SIGNIFICANT CHANGE UP (ref 0.5–1.3)
EGFR: 79 ML/MIN/1.73M2 — SIGNIFICANT CHANGE UP
GLUCOSE SERPL-MCNC: 92 MG/DL — SIGNIFICANT CHANGE UP (ref 70–99)
HCT VFR BLD CALC: 40.6 % — SIGNIFICANT CHANGE UP (ref 34.5–45)
HGB BLD-MCNC: 13.5 G/DL — SIGNIFICANT CHANGE UP (ref 11.5–15.5)
MCHC RBC-ENTMCNC: 30.6 PG — SIGNIFICANT CHANGE UP (ref 27–34)
MCHC RBC-ENTMCNC: 33.3 GM/DL — SIGNIFICANT CHANGE UP (ref 32–36)
MCV RBC AUTO: 92.1 FL — SIGNIFICANT CHANGE UP (ref 80–100)
NRBC # BLD: 0 /100 WBCS — SIGNIFICANT CHANGE UP (ref 0–0)
PLATELET # BLD AUTO: 307 K/UL — SIGNIFICANT CHANGE UP (ref 150–400)
POTASSIUM SERPL-MCNC: 3.5 MMOL/L — SIGNIFICANT CHANGE UP (ref 3.5–5.3)
POTASSIUM SERPL-SCNC: 3.5 MMOL/L — SIGNIFICANT CHANGE UP (ref 3.5–5.3)
PROT SERPL-MCNC: 7.2 G/DL — SIGNIFICANT CHANGE UP (ref 6–8.3)
RBC # BLD: 4.41 M/UL — SIGNIFICANT CHANGE UP (ref 3.8–5.2)
RBC # FLD: 13.3 % — SIGNIFICANT CHANGE UP (ref 10.3–14.5)
SODIUM SERPL-SCNC: 140 MMOL/L — SIGNIFICANT CHANGE UP (ref 135–145)
WBC # BLD: 7.52 K/UL — SIGNIFICANT CHANGE UP (ref 3.8–10.5)
WBC # FLD AUTO: 7.52 K/UL — SIGNIFICANT CHANGE UP (ref 3.8–10.5)

## 2023-08-13 PROCEDURE — 99232 SBSQ HOSP IP/OBS MODERATE 35: CPT

## 2023-08-13 RX ORDER — LANOLIN ALCOHOL/MO/W.PET/CERES
6 CREAM (GRAM) TOPICAL AT BEDTIME
Refills: 0 | Status: DISCONTINUED | OUTPATIENT
Start: 2023-08-13 | End: 2023-08-28

## 2023-08-13 RX ORDER — LANOLIN ALCOHOL/MO/W.PET/CERES
3 CREAM (GRAM) TOPICAL ONCE
Refills: 0 | Status: COMPLETED | OUTPATIENT
Start: 2023-08-13 | End: 2023-08-13

## 2023-08-13 RX ADMIN — Medication 3 MILLIGRAM(S): at 01:30

## 2023-08-13 RX ADMIN — Medication 250 MILLIGRAM(S): at 17:38

## 2023-08-13 RX ADMIN — Medication 500 MILLIGRAM(S): at 06:13

## 2023-08-13 RX ADMIN — APIXABAN 5 MILLIGRAM(S): 2.5 TABLET, FILM COATED ORAL at 21:05

## 2023-08-13 RX ADMIN — Medication 100 MILLIGRAM(S): at 06:13

## 2023-08-13 RX ADMIN — Medication 250 MILLIGRAM(S): at 06:12

## 2023-08-13 RX ADMIN — Medication 500 MILLIGRAM(S): at 21:07

## 2023-08-13 RX ADMIN — Medication 500 MILLIGRAM(S): at 14:37

## 2023-08-13 RX ADMIN — ATORVASTATIN CALCIUM 80 MILLIGRAM(S): 80 TABLET, FILM COATED ORAL at 21:05

## 2023-08-13 RX ADMIN — APIXABAN 5 MILLIGRAM(S): 2.5 TABLET, FILM COATED ORAL at 09:15

## 2023-08-13 RX ADMIN — Medication 6 MILLIGRAM(S): at 21:05

## 2023-08-13 RX ADMIN — Medication 120 MILLIGRAM(S): at 06:13

## 2023-08-13 NOTE — PROGRESS NOTE ADULT - SUBJECTIVE AND OBJECTIVE BOX
Subjective: Seen this AM.  Pt c/o Poor sleep at night.  Denies Pain, tolerating PO diet.  Minimal to no drainage from enrique tube.      VITALS  T(C): 36.4 (08-13-23 @ 07:39), Max: 36.4 (08-13-23 @ 07:39)  HR: 86 (08-13-23 @ 07:39) (86 - 98)  BP: 138/87 (08-13-23 @ 07:39) (138/87 - 156/92)  RR: 15 (08-13-23 @ 07:39) (15 - 16)  SpO2: 97% (08-13-23 @ 07:39) (97% - 97%)  Wt(kg): --    REVIEW OF SYSTEMS  Pertinent in last 24 h: Neurological deficits      Physical Exam:  Constitutional - NAD, Comfortable  HEENT - NCAT, EOMI  Chest -  Breathing comfortably on RA  Cardiovascular - warm well perfused  Abdomen -  Soft, NTND,  enrique tube  Extremities - No edema, No calf tenderness   Neurologic Exam -                    Cognitive - Awake, Alert,      Motor - No new deficits  Psychiatric - Mood stable, Affect WNL  -    RECENT LABS/IMAGING                        13.5   7.52  )-----------( 307      ( 13 Aug 2023 05:22 )             40.6     08-13    140  |  104  |  15  ----------------------------<  92  3.5   |  26  |  0.77    Ca    8.8      13 Aug 2023 05:22    TPro  7.2  /  Alb  2.7<L>  /  TBili  0.4  /  DBili  x   /  AST  31  /  ALT  29  /  AlkPhos  80  08-13    Urinalysis Basic - ( 13 Aug 2023 05:22 )    Color: x / Appearance: x / SG: x / pH: x  Gluc: 92 mg/dL / Ketone: x  / Bili: x / Urobili: x   Blood: x / Protein: x / Nitrite: x   Leuk Esterase: x / RBC: x / WBC x   Sq Epi: x / Non Sq Epi: x / Bacteria: x            MEDICATIONS   acetaminophen     Tablet .. 650 milliGRAM(s) every 6 hours PRN  acetaminophen     Tablet .. 650 milliGRAM(s) every 6 hours PRN  aluminum hydroxide/magnesium hydroxide/simethicone Suspension 30 milliLiter(s) every 4 hours PRN  apixaban 5 milliGRAM(s) every 12 hours  atorvastatin 80 milliGRAM(s) at bedtime  bisacodyl Suppository 10 milliGRAM(s) daily PRN  diltiazem    milliGRAM(s) daily  indomethacin Suppository 100 milliGRAM(s) once  levoFLOXacin  Tablet 750 milliGRAM(s) every 24 hours  melatonin 6 milliGRAM(s) at bedtime  metoprolol succinate  milliGRAM(s) daily  metroNIDAZOLE    Tablet 500 milliGRAM(s) every 8 hours  polyethylene glycol 3350 17 Gram(s) daily  saccharomyces boulardii 250 milliGRAM(s) two times a day  senna 2 Tablet(s) at bedtime      --------------------------------------------------------------------

## 2023-08-13 NOTE — PROGRESS NOTE ADULT - ASSESSMENT
79 yo F  admitted to acute Rehabilitation with  right cerebellar Infarct    Pt. Stable  Active Issues    CVA-- lipitor    Afib- cont. eliquis-- cleared by GI to resume;  diltiazem and metoprolol    sleep-- Increase melatonin to 6mg qhs.      #Cholecystitis  Malpositioned cholecystostomy tube   #Acute sigmoid diverticulitis with localized perforation  --GI note 8/12 reviewed- -s/p ERCP with findings of  several filling defects, sphincterotomy performed with stent placement  -continue flagyl & levoquin  Surgery note reviewed-- No indication for surgery as long as patient remains asymptomatic regarding her GI tract           Continue regular diet     HTN-- diltiazem and metoprolol    Cont. comprehensive inpatient PT, OT and Speech    No acute issues,   Cont. current plan of care and medications as per primary team

## 2023-08-14 LAB
ALBUMIN SERPL ELPH-MCNC: 2.6 G/DL — LOW (ref 3.3–5)
ALP SERPL-CCNC: 76 U/L — SIGNIFICANT CHANGE UP (ref 40–120)
ALT FLD-CCNC: 25 U/L — SIGNIFICANT CHANGE UP (ref 10–45)
ANION GAP SERPL CALC-SCNC: 6 MMOL/L — SIGNIFICANT CHANGE UP (ref 5–17)
AST SERPL-CCNC: 32 U/L — SIGNIFICANT CHANGE UP (ref 10–40)
BASOPHILS # BLD AUTO: 0.04 K/UL — SIGNIFICANT CHANGE UP (ref 0–0.2)
BASOPHILS NFR BLD AUTO: 0.6 % — SIGNIFICANT CHANGE UP (ref 0–2)
BILIRUB SERPL-MCNC: 0.4 MG/DL — SIGNIFICANT CHANGE UP (ref 0.2–1.2)
BUN SERPL-MCNC: 13 MG/DL — SIGNIFICANT CHANGE UP (ref 7–23)
CALCIUM SERPL-MCNC: 8.5 MG/DL — SIGNIFICANT CHANGE UP (ref 8.4–10.5)
CHLORIDE SERPL-SCNC: 105 MMOL/L — SIGNIFICANT CHANGE UP (ref 96–108)
CO2 SERPL-SCNC: 28 MMOL/L — SIGNIFICANT CHANGE UP (ref 22–31)
CREAT SERPL-MCNC: 0.86 MG/DL — SIGNIFICANT CHANGE UP (ref 0.5–1.3)
EGFR: 70 ML/MIN/1.73M2 — SIGNIFICANT CHANGE UP
EOSINOPHIL # BLD AUTO: 0.16 K/UL — SIGNIFICANT CHANGE UP (ref 0–0.5)
EOSINOPHIL NFR BLD AUTO: 2.3 % — SIGNIFICANT CHANGE UP (ref 0–6)
GLUCOSE SERPL-MCNC: 94 MG/DL — SIGNIFICANT CHANGE UP (ref 70–99)
HCT VFR BLD CALC: 41.6 % — SIGNIFICANT CHANGE UP (ref 34.5–45)
HGB BLD-MCNC: 13.6 G/DL — SIGNIFICANT CHANGE UP (ref 11.5–15.5)
IMM GRANULOCYTES NFR BLD AUTO: 0.3 % — SIGNIFICANT CHANGE UP (ref 0–0.9)
LYMPHOCYTES # BLD AUTO: 1.23 K/UL — SIGNIFICANT CHANGE UP (ref 1–3.3)
LYMPHOCYTES # BLD AUTO: 17.7 % — SIGNIFICANT CHANGE UP (ref 13–44)
MCHC RBC-ENTMCNC: 30.6 PG — SIGNIFICANT CHANGE UP (ref 27–34)
MCHC RBC-ENTMCNC: 32.7 GM/DL — SIGNIFICANT CHANGE UP (ref 32–36)
MCV RBC AUTO: 93.7 FL — SIGNIFICANT CHANGE UP (ref 80–100)
MONOCYTES # BLD AUTO: 0.63 K/UL — SIGNIFICANT CHANGE UP (ref 0–0.9)
MONOCYTES NFR BLD AUTO: 9.1 % — SIGNIFICANT CHANGE UP (ref 2–14)
NEUTROPHILS # BLD AUTO: 4.87 K/UL — SIGNIFICANT CHANGE UP (ref 1.8–7.4)
NEUTROPHILS NFR BLD AUTO: 70 % — SIGNIFICANT CHANGE UP (ref 43–77)
NRBC # BLD: 0 /100 WBCS — SIGNIFICANT CHANGE UP (ref 0–0)
PLATELET # BLD AUTO: 282 K/UL — SIGNIFICANT CHANGE UP (ref 150–400)
POTASSIUM SERPL-MCNC: 3.7 MMOL/L — SIGNIFICANT CHANGE UP (ref 3.5–5.3)
POTASSIUM SERPL-SCNC: 3.7 MMOL/L — SIGNIFICANT CHANGE UP (ref 3.5–5.3)
PROT SERPL-MCNC: 6.5 G/DL — SIGNIFICANT CHANGE UP (ref 6–8.3)
RBC # BLD: 4.44 M/UL — SIGNIFICANT CHANGE UP (ref 3.8–5.2)
RBC # FLD: 13.4 % — SIGNIFICANT CHANGE UP (ref 10.3–14.5)
SODIUM SERPL-SCNC: 139 MMOL/L — SIGNIFICANT CHANGE UP (ref 135–145)
WBC # BLD: 6.95 K/UL — SIGNIFICANT CHANGE UP (ref 3.8–10.5)
WBC # FLD AUTO: 6.95 K/UL — SIGNIFICANT CHANGE UP (ref 3.8–10.5)

## 2023-08-14 PROCEDURE — 99232 SBSQ HOSP IP/OBS MODERATE 35: CPT

## 2023-08-14 RX ADMIN — Medication 500 MILLIGRAM(S): at 14:10

## 2023-08-14 RX ADMIN — Medication 6 MILLIGRAM(S): at 21:44

## 2023-08-14 RX ADMIN — SENNA PLUS 2 TABLET(S): 8.6 TABLET ORAL at 21:44

## 2023-08-14 RX ADMIN — APIXABAN 5 MILLIGRAM(S): 2.5 TABLET, FILM COATED ORAL at 21:44

## 2023-08-14 RX ADMIN — ATORVASTATIN CALCIUM 80 MILLIGRAM(S): 80 TABLET, FILM COATED ORAL at 21:44

## 2023-08-14 RX ADMIN — Medication 120 MILLIGRAM(S): at 05:38

## 2023-08-14 RX ADMIN — Medication 500 MILLIGRAM(S): at 05:38

## 2023-08-14 RX ADMIN — Medication 100 MILLIGRAM(S): at 05:38

## 2023-08-14 RX ADMIN — APIXABAN 5 MILLIGRAM(S): 2.5 TABLET, FILM COATED ORAL at 08:58

## 2023-08-14 RX ADMIN — Medication 500 MILLIGRAM(S): at 21:44

## 2023-08-14 RX ADMIN — Medication 250 MILLIGRAM(S): at 05:39

## 2023-08-14 RX ADMIN — Medication 250 MILLIGRAM(S): at 17:27

## 2023-08-14 NOTE — PROGRESS NOTE ADULT - COMMENTS
Results and procedure of ERCP reviewed with patient who states she was too groggy after to comprehened everything. She is afebrile, denies any abdominal pain. no urinary complaints, although she does report frequent stool, and had some bowel incontinence due to coming out too quickly/poor intiiation request to toilet. She also reports that she does not like fish/yogurt/apple juice in diet and has received; dietary order adjusted

## 2023-08-14 NOTE — PROGRESS NOTE ADULT - SUBJECTIVE AND OBJECTIVE BOX
Patient is a 78y old  Female who presents with a chief complaint of cerebellar CVA (13 Aug 2023 12:55)      HPI:  Karen Carlos is a 78 year old female RH dominant with PMH of HTN, and HLD; who presented to [place] ED on 7/26/23 s/p fall from chair with + head strike, on the floor for over an hour. ER workup was significant AFIB with RVR and rhabdomyolysis. She was given Cardizem IVP and IVF with improvement HR to the 70s. She was diagnosed with new onset AFIB, seen by cardiology and started on Metoprolol and Diltiazem for rate control and was anticoagulated with Eliquis.  IV fluids were administered for rhabdomyolysis.     RUQ US was significant for acute cholecystitis and cholelithiasis with pericholecystic fluid and gallbladder wall thickening. Her HIDA scan resulted positive and a percutaneous cholecystectomy tube was placed by IR on 7/7.  Her blood cultures resulted positive for Strept Anginosus for which she was started on IV antibiotics.     Her hospital course was complicated by neurological changes/altered mental status. CT head was performed which resulted negative; MRI of the head was significant for a R cerebellar infarct.    PM&R was consulted and deemed her an appropriate candidate for IRF. She was medically stabilized and cleared for discharge to [facility].  (03 Aug 2023 13:49)      PAST MEDICAL & SURGICAL HISTORY:  HTN (hypertension)      Hyperlipidemia      Tinnitus  right ear      Seasonal allergies      S/P knee replacement          MEDICATIONS  (STANDING):  apixaban 5 milliGRAM(s) Oral every 12 hours  atorvastatin 80 milliGRAM(s) Oral at bedtime  diltiazem    milliGRAM(s) Oral daily  indomethacin Suppository 100 milliGRAM(s) Rectal once  levoFLOXacin  Tablet 750 milliGRAM(s) Oral every 24 hours  melatonin 6 milliGRAM(s) Oral at bedtime  metoprolol succinate  milliGRAM(s) Oral daily  metroNIDAZOLE    Tablet 500 milliGRAM(s) Oral every 8 hours  saccharomyces boulardii 250 milliGRAM(s) Oral two times a day  senna 2 Tablet(s) Oral at bedtime    MEDICATIONS  (PRN):  acetaminophen     Tablet .. 650 milliGRAM(s) Oral every 6 hours PRN Mild Pain (1 - 3)  acetaminophen     Tablet .. 650 milliGRAM(s) Oral every 6 hours PRN Temp greater or equal to 38C (100.4F)  aluminum hydroxide/magnesium hydroxide/simethicone Suspension 30 milliLiter(s) Oral every 4 hours PRN Dyspepsia  bisacodyl Suppository 10 milliGRAM(s) Rectal daily PRN Constipation      Allergies    No Known Allergies    Intolerances          VITALS  78y  Vital Signs Last 24 Hrs  T(C): 36.7 (13 Aug 2023 19:24), Max: 36.7 (13 Aug 2023 19:24)  T(F): 98.1 (13 Aug 2023 19:24), Max: 98.1 (13 Aug 2023 19:24)  HR: 89 (14 Aug 2023 08:53) (81 - 97)  BP: 138/87 (14 Aug 2023 08:53) (131/84 - 154/85)  BP(mean): --  RR: 15 (14 Aug 2023 08:53) (15 - 15)  SpO2: 95% (14 Aug 2023 08:53) (95% - 97%)    Parameters below as of 14 Aug 2023 08:53  Patient On (Oxygen Delivery Method): room air      Daily     Daily         RECENT LABS:                          13.6   6.95  )-----------( 282      ( 14 Aug 2023 06:43 )             41.6     08-14    139  |  105  |  13  ----------------------------<  94  3.7   |  28  |  0.86    Ca    8.5      14 Aug 2023 06:43    TPro  6.5  /  Alb  2.6<L>  /  TBili  0.4  /  DBili  x   /  AST  32  /  ALT  25  /  AlkPhos  76  08-14    LIVER FUNCTIONS - ( 14 Aug 2023 06:43 )  Alb: 2.6 g/dL / Pro: 6.5 g/dL / ALK PHOS: 76 U/L / ALT: 25 U/L / AST: 32 U/L / GGT: x             Urinalysis Basic - ( 14 Aug 2023 06:43 )    Color: x / Appearance: x / SG: x / pH: x  Gluc: 94 mg/dL / Ketone: x  / Bili: x / Urobili: x   Blood: x / Protein: x / Nitrite: x   Leuk Esterase: x / RBC: x / WBC x   Sq Epi: x / Non Sq Epi: x / Bacteria: x          CAPILLARY BLOOD GLUCOSE

## 2023-08-14 NOTE — PROGRESS NOTE ADULT - ASSESSMENT
CHAY VELEZ is a 78 year old female with PMH HTN, and HLD who presented to [place] ED 7/26/23 s/p fall from chair with + head strike. She was found to have new onset AFIB with RVR and Rhabdomyolysis. Her hospital course was complicated by acute cholecystitis and cholelithiasis requiring percutaneous enrique drain via IR on 7/7, Bacteremia, and cerebellar infarct confirmed on MRI.    # right cerebellar CVA with incoordination, imbalance, cognitive impairment  - MRI 8/1: There is an acute right cerebellar infarct. There are no foci of susceptibility artifact to suggest hemorrhage. Scattered foci of T2/FLAIR hyperintensity in the bilateral hemispheric white matter  - ASA and Atorvastatin  - continue Comprehensive Rehab Program of PT/OT/SLP  - 3 hours a day, 5 days a week  - recreation therapy, psychology services   - Precautions: cardiac, AC, fall, AMS, enrique drain    # New AFib RVR  # HTN  - Metoprolol XL 100mg daily  - Diltiazem CD 120mg daily  - Eliquis 5 q12hrs   - BP (131/84 - 154/85) 8/14    # Rhabdomyolysis  - S/p IVF  - Creatine kinase: 5502 7/25--> 109 8/4  - resolved    #  Cholecystitis   - s/p percutaneous cholecystectomy 2/2 cholelithiasis via IR on 7/7   -  A/P CT 8/7: Percutaneous cholecystostomy catheter is malpositioned within the descending colon distal to the cecum. Inflammation adjacent to the colon and appendix, which is otherwise normal in caliber, likely reactive. Dilated common bile duct with suspicion of choledocholithiasis in the distal CBD. Cholelithiasis with gallbladder wall thickening and pericholecystic fat stranding, consistent with acute cholecystitis. Acute diverticulitis in the mid sigmoid colon with associated localized perforation.  - MRCP 8/9: distended CBD up to 12mm + 2 stones in bile duct w/ borderline central intrahepatic ductal distention, + cholelithiasis decreased inflammation,  - Surgery consult appreciated 8/7. low suspicion for non drained infectious process. leave drain in as precaution. May be candidate for outpatient laparoscopic cholecystectomy.  - continue levaquin/flagyl  - s/p ERCP 8/11 . Sphincterotomy performed with stent placement, reviewed with patient  8/14   - f/u outpatient for drain removal and cholecystectomy, discussed with patient  - AP 77, AST 28, ALT 29, bilirubin total 0.6 8/10.-->  TBili  0.4  AST  32  /  ALT  25  /  AlkPhos  76  08-14     # bibasilar atelectasis noted on MRCP  - incentive spirometry, breathing exercises     # RUL Lung nodule found on CT chest (7/25)  - Incidental finding   - F/U PCP outpatient for repeat CT    # /Bladder Mgmt   -  PVR q8h,--> 316, 340 ml 8/14    # GI  -  Senna, Miralax     # podiatry  - podiatry appreciated, s/p nail debridement    # Sleep  - Melatonin PRN     # Pain Mgmt   - Tylenol PRN    # FEN   - Diet - Regular + Thins  [DASH/TLC]      #  DVT PPX:  - Eliquis     # Case discussed in IDT rounds 8/14 (follow up):  - CG transfers, ambulates 25 feet with RW and min assist, CG bADLs, Deficits in STM, problem solving, and carry over. Significant defictis in word retrieval and sentence completion  - goals adjusted: supervision bADLs, CG stairs, supervision iaDLS and during waking hours  - caregiver training  - target dc home 8/24 with caregiver support waking hours and home Pt OT SLP    # LABs  CBC BMP 8/17      ---------------  Outpatient/Follow-Up:    Alli Pascual  Surgery  [address] [place] [address]  Phone: [phone]  Fax: [phone]  Follow Up Time: 1 week    Alexis Simon  Cardiovascular Disease  [address] [place], [place] [id number]  Phone: [phone]  Fax: [phone]  Follow Up Time: 1 week    BRIAN ALBA  [address] [place] [id number]  Phone: ()-  Fax: ()-     CHAY VELEZ is a 78 year old female with PMH HTN, and HLD who presented to [place] ED 7/26/23 s/p fall from chair with + head strike. She was found to have new onset AFIB with RVR and Rhabdomyolysis. Her hospital course was complicated by acute cholecystitis and cholelithiasis requiring percutaneous enrique drain via IR on 7/7, Bacteremia, and cerebellar infarct confirmed on MRI.    # right cerebellar CVA with incoordination, imbalance, cognitive impairment  - MRI 8/1: There is an acute right cerebellar infarct. There are no foci of susceptibility artifact to suggest hemorrhage. Scattered foci of T2/FLAIR hyperintensity in the bilateral hemispheric white matter  - ASA and Atorvastatin  - continue Comprehensive Rehab Program of PT/OT/SLP  - 3 hours a day, 5 days a week  - recreation therapy, psychology services   - Precautions: cardiac, AC, fall, AMS, enrique drain    # New AFib RVR  # HTN  - Metoprolol XL 100mg daily  - Diltiazem CD 120mg daily  - Eliquis 5 q12hrs   - BP (131/84 - 154/85) 8/14    # Rhabdomyolysis  - S/p IVF  - Creatine kinase: 5502 7/25--> 109 8/4  - resolved    #  Cholecystitis   - s/p percutaneous cholecystectomy 2/2 cholelithiasis via IR on 7/7   -  A/P CT 8/7: Percutaneous cholecystostomy catheter is malpositioned within the descending colon distal to the cecum. Inflammation adjacent to the colon and appendix, which is otherwise normal in caliber, likely reactive. Dilated common bile duct with suspicion of choledocholithiasis in the distal CBD. Cholelithiasis with gallbladder wall thickening and pericholecystic fat stranding, consistent with acute cholecystitis. Acute diverticulitis in the mid sigmoid colon with associated localized perforation.  - MRCP 8/9: distended CBD up to 12mm + 2 stones in bile duct w/ borderline central intrahepatic ductal distention, + cholelithiasis decreased inflammation,  - Surgery consult appreciated 8/7. low suspicion for non drained infectious process. leave drain in as precaution. May be candidate for outpatient laparoscopic cholecystectomy.  - continue levaquin/flagyl  - s/p ERCP 8/11 . Sphincterotomy performed with stent placement, reviewed with patient  8/14   - f/u outpatient for drain removal and cholecystectomy, discussed with patient  - AP 77, AST 28, ALT 29, bilirubin total 0.6 8/10.-->  TBili  0.4  AST  32  /  ALT  25  /  AlkPhos  76  08-14     # bibasilar atelectasis noted on MRCP  - incentive spirometry, breathing exercises     # RUL Lung nodule found on CT chest (7/25)  - Incidental finding   - F/U PCP outpatient for repeat CT    # /Bladder Mgmt   -  PVR q8h,--> 316, 340 ml 8/14    # GI  -  Senna  - Miralax dc given bowel incontinence, loose stool 8/14    # podiatry  - podiatry appreciated, s/p nail debridement    # Sleep  - Melatonin PRN     # Pain Mgmt   - Tylenol PRN    # FEN   - Diet - Regular + Thins  [DASH/TLC]      #  DVT PPX:  - Eliquis     # Case discussed in IDT rounds 8/14 (follow up):  - CG transfers, ambulates 25 feet with RW and min assist, CG bADLs, Deficits in STM, problem solving, and carry over. Significant defictis in word retrieval and sentence completion  - goals adjusted: supervision bADLs, CG stairs, supervision iaDLS and during waking hours  - caregiver training  - target dc home 8/24 with caregiver support waking hours and home Pt OT SLP    # LABs  CBC BMP 8/17      ---------------  Outpatient/Follow-Up:    Alli Pascual  Surgery  [address] [place] [address]  Phone: [phone]  Fax: [phone]  Follow Up Time: 1 week    Alexis Simon  Cardiovascular Disease  [address] [place], [place] [id number]  Phone: [phone]  Fax: [phone]  Follow Up Time: 1 week    BRIAN ALBA  [address] [place] [address]  Phone: ()-  Fax: ()-     Statement Selected

## 2023-08-15 PROCEDURE — 99232 SBSQ HOSP IP/OBS MODERATE 35: CPT

## 2023-08-15 RX ADMIN — APIXABAN 5 MILLIGRAM(S): 2.5 TABLET, FILM COATED ORAL at 08:45

## 2023-08-15 RX ADMIN — Medication 120 MILLIGRAM(S): at 05:38

## 2023-08-15 RX ADMIN — Medication 500 MILLIGRAM(S): at 21:29

## 2023-08-15 RX ADMIN — Medication 250 MILLIGRAM(S): at 17:52

## 2023-08-15 RX ADMIN — Medication 500 MILLIGRAM(S): at 15:29

## 2023-08-15 RX ADMIN — SENNA PLUS 2 TABLET(S): 8.6 TABLET ORAL at 21:28

## 2023-08-15 RX ADMIN — Medication 100 MILLIGRAM(S): at 05:38

## 2023-08-15 RX ADMIN — Medication 250 MILLIGRAM(S): at 05:38

## 2023-08-15 RX ADMIN — ATORVASTATIN CALCIUM 80 MILLIGRAM(S): 80 TABLET, FILM COATED ORAL at 21:28

## 2023-08-15 RX ADMIN — Medication 500 MILLIGRAM(S): at 05:38

## 2023-08-15 RX ADMIN — Medication 6 MILLIGRAM(S): at 21:29

## 2023-08-15 RX ADMIN — APIXABAN 5 MILLIGRAM(S): 2.5 TABLET, FILM COATED ORAL at 21:28

## 2023-08-15 NOTE — PROGRESS NOTE ADULT - SUBJECTIVE AND OBJECTIVE BOX
Patient is a 78y old  Female who presents with a chief complaint of cerebellar CVA (14 Aug 2023 14:40)      HPI:  Karen Carlos is a 78 year old female RH dominant with PMH of HTN, and HLD; who presented to [facility] ED on 7/26/23 s/p fall from chair with + head strike, on the floor for over an hour. ER workup was significant AFIB with RVR and rhabdomyolysis. She was given Cardizem IVP and IVF with improvement HR to the 70s. She was diagnosed with new onset AFIB, seen by cardiology and started on Metoprolol and Diltiazem for rate control and was anticoagulated with Eliquis.  IV fluids were administered for rhabdomyolysis.     RUQ US was significant for acute cholecystitis and cholelithiasis with pericholecystic fluid and gallbladder wall thickening. Her HIDA scan resulted positive and a percutaneous cholecystectomy tube was placed by IR on 7/7.  Her blood cultures resulted positive for Strept Anginosus for which she was started on IV antibiotics.     Her hospital course was complicated by neurological changes/altered mental status. CT head was performed which resulted negative; MRI of the head was significant for a R cerebellar infarct.    PM&R was consulted and deemed her an appropriate candidate for IRF. She was medically stabilized and cleared for discharge to [facility].  (03 Aug 2023 13:49)      PAST MEDICAL & SURGICAL HISTORY:  HTN (hypertension)      Hyperlipidemia      Tinnitus  right ear      Seasonal allergies      S/P knee replacement          MEDICATIONS  (STANDING):  apixaban 5 milliGRAM(s) Oral every 12 hours  atorvastatin 80 milliGRAM(s) Oral at bedtime  diltiazem    milliGRAM(s) Oral daily  indomethacin Suppository 100 milliGRAM(s) Rectal once  levoFLOXacin  Tablet 750 milliGRAM(s) Oral every 24 hours  melatonin 6 milliGRAM(s) Oral at bedtime  metoprolol succinate  milliGRAM(s) Oral daily  metroNIDAZOLE    Tablet 500 milliGRAM(s) Oral every 8 hours  saccharomyces boulardii 250 milliGRAM(s) Oral two times a day  senna 2 Tablet(s) Oral at bedtime    MEDICATIONS  (PRN):  acetaminophen     Tablet .. 650 milliGRAM(s) Oral every 6 hours PRN Temp greater or equal to 38C (100.4F)  acetaminophen     Tablet .. 650 milliGRAM(s) Oral every 6 hours PRN Mild Pain (1 - 3)  aluminum hydroxide/magnesium hydroxide/simethicone Suspension 30 milliLiter(s) Oral every 4 hours PRN Dyspepsia  bisacodyl Suppository 10 milliGRAM(s) Rectal daily PRN Constipation      Allergies    No Known Allergies    Intolerances          VITALS  78y  Vital Signs Last 24 Hrs  T(C): 36.5 (14 Aug 2023 19:57), Max: 36.5 (14 Aug 2023 19:57)  T(F): 97.7 (14 Aug 2023 19:57), Max: 97.7 (14 Aug 2023 19:57)  HR: 99 (15 Aug 2023 05:37) (86 - 99)  BP: 130/89 (15 Aug 2023 05:37) (126/85 - 138/87)  BP(mean): --  RR: 15 (14 Aug 2023 19:57) (15 - 15)  SpO2: 96% (14 Aug 2023 19:57) (95% - 96%)    Parameters below as of 14 Aug 2023 19:57  Patient On (Oxygen Delivery Method): room air      Daily     Daily         RECENT LABS:                          13.6   6.95  )-----------( 282      ( 14 Aug 2023 06:43 )             41.6     08-14    139  |  105  |  13  ----------------------------<  94  3.7   |  28  |  0.86    Ca    8.5      14 Aug 2023 06:43    TPro  6.5  /  Alb  2.6<L>  /  TBili  0.4  /  DBili  x   /  AST  32  /  ALT  25  /  AlkPhos  76  08-14    LIVER FUNCTIONS - ( 14 Aug 2023 06:43 )  Alb: 2.6 g/dL / Pro: 6.5 g/dL / ALK PHOS: 76 U/L / ALT: 25 U/L / AST: 32 U/L / GGT: x             Urinalysis Basic - ( 14 Aug 2023 06:43 )    Color: x / Appearance: x / SG: x / pH: x  Gluc: 94 mg/dL / Ketone: x  / Bili: x / Urobili: x   Blood: x / Protein: x / Nitrite: x   Leuk Esterase: x / RBC: x / WBC x   Sq Epi: x / Non Sq Epi: x / Bacteria: x          CAPILLARY BLOOD GLUCOSE

## 2023-08-15 NOTE — PROGRESS NOTE ADULT - ASSESSMENT
Patient is a 78 year old female with PMH HTN, and HLD who presented to [facility] ED 7/26/23 s/p fall from chair with + head strike. She was found to have new onset AFIB with RVR and Rhabdomyolysis. Her hospital course was complicated by acute cholecystitis and cholelithiasis requiring percutaneous enrique drain via IR on 7/7, Bacteremia, and cerebellar infarct confirmed on MRI.    # right cerebellar CVA with incoordination, imbalance, cognitive impairment  - MRI 8/1: acute right cerebellar infarct. Scattered foci of T2/FLAIR hyperintensity in the bilateral hemispheric white matter  - ASA and Atorvastatin  - continue Comprehensive Rehab Program of PT/OT/SLP  - 3 hours a day, 5 days a week  - recreation therapy, psychology services   - Precautions: cardiac, AC, fall, AMS, enrique drain    # New AFib RVR  # HTN  - Metoprolol XL 100mg daily  - Diltiazem CD 120mg daily  - Eliquis 5 q12hrs   - BP (126/85 - 138/87) HR 86-99 8/15    # Rhabdomyolysis  - S/p IVF  - Creatine kinase: 5502 7/25--> 109 8/4  - resolved    #  Cholecystitis   - s/p percutaneous cholecystectomy 2/2 cholelithiasis via IR on 7/7   -  A/P CT 8/7: Percutaneous cholecystostomy catheter is malpositioned within the descending colon distal to the cecum. Inflammation adjacent to the colon and appendix, which is otherwise normal in caliber, likely reactive. Dilated common bile duct with suspicion of choledocholithiasis in the distal CBD. Cholelithiasis with gallbladder wall thickening and pericholecystic fat stranding, consistent with acute cholecystitis. Acute diverticulitis in the mid sigmoid colon with associated localized perforation.  - MRCP 8/9: distended CBD up to 12mm + 2 stones in bile duct w/ borderline central intrahepatic ductal distention, + cholelithiasis decreased inflammation,  - Surgery appreciated 8/7. low suspicion for non drained infectious process. leave drain in as precaution. May be candidate for outpatient laparoscopic cholecystectomy.  - continue levaquin/flagyl  - s/p ERCP 8/11 . Sphincterotomy performed with stent placement, reviewed with patient  8/14   - f/u outpatient for drain removal and cholecystectomy, discussed with patient  - LFTs WNL    # bibasilar atelectasis noted on MRCP  - incentive spirometry, breathing exercises     # RUL Lung nodule found on CT chest (7/25)  - Incidental finding   - F/U PCP outpatient for repeat CT    # /Bladder Mgmt   -  PVR q8h,    # GI  -  Senna  - Miralax dc given bowel incontinence, loose stool 8/14    # podiatry  - podiatry appreciated, s/p nail debridement    # Sleep  - Melatonin PRN     # Pain Mgmt   - Tylenol PRN    # FEN   - Diet - Regular + Thins  [DASH/TLC]      #  DVT PPX:  - Eliquis     # Case discussed in IDT rounds 8/14 (follow up):  - CG transfers, ambulates 25 feet with RW and min assist, CG bADLs, Deficits in STM, problem solving, and carry over. Significant defictis in word retrieval and sentence completion  - goals adjusted: supervision bADLs, CG stairs, supervision iaDLS and during waking hours  - caregiver training  - target dc home 8/24 with caregiver support waking hours and home Pt OT SLP    # LABs  CBC BMP 8/17      ---------------  Outpatient/Follow-Up:    Alli Pascual  Surgery  [address] [place] [address]  Phone: [phone]  Fax: [phone]  Follow Up Time: 1 week    Alexis Simon  Cardiovascular Disease  [address] [place], [place] [id number]  Phone: [phone]  Fax: [phone]  Follow Up Time: 1 week    BRIAN ALBA  [address] [place] [address]  Phone: ([phone]  Fax: ()-     Patient is a 78 year old female with PMH HTN, and HLD who presented to [facility] ED 7/26/23 s/p fall from chair with + head strike. She was found to have new onset AFIB with RVR and Rhabdomyolysis. Her hospital course was complicated by acute cholecystitis and cholelithiasis requiring percutaneous enrique drain via IR on 7/7, Bacteremia, and cerebellar infarct confirmed on MRI.    # right cerebellar CVA with incoordination, imbalance, cognitive impairment  - MRI 8/1: acute right cerebellar infarct. Scattered foci of T2/FLAIR hyperintensity in the bilateral hemispheric white matter  - ASA and Atorvastatin  - continue Comprehensive Rehab Program of PT/OT/SLP  - 3 hours a day, 5 days a week  - recreation therapy, psychology services   - Precautions: cardiac, AC, fall, AMS, enrique drain    # New AFib RVR  # HTN  - Metoprolol XL 100mg daily  - Diltiazem CD 120mg daily  - Eliquis 5 q12hrs   - BP (126/85 - 138/87) HR 86-99 8/15    # Rhabdomyolysis  - S/p IVF  - Creatine kinase: 5502 7/25--> 109 8/4  - resolved    #  Cholecystitis   - s/p percutaneous cholecystectomy 2/2 cholelithiasis via IR on 7/7   -  A/P CT 8/7: Percutaneous cholecystostomy catheter is malpositioned within the descending colon distal to the cecum. Inflammation adjacent to the colon and appendix, which is otherwise normal in caliber, likely reactive. Dilated common bile duct with suspicion of choledocholithiasis in the distal CBD. Cholelithiasis with gallbladder wall thickening and pericholecystic fat stranding, consistent with acute cholecystitis. Acute diverticulitis in the mid sigmoid colon with associated localized perforation.  - MRCP 8/9: distended CBD up to 12mm + 2 stones in bile duct w/ borderline central intrahepatic ductal distention, + cholelithiasis decreased inflammation,  - Surgery appreciated 8/7. low suspicion for non drained infectious process. leave drain in as precaution. May be candidate for outpatient laparoscopic cholecystectomy.  - continue levaquin/flagyl  - s/p ERCP 8/11 . Sphincterotomy performed with stent placement, reviewed with patient  8/14   - f/u outpatient for drain removal and cholecystectomy, discussed with patient  - LFTs WNL. BMP 8/17    # bibasilar atelectasis noted on MRCP  - incentive spirometry, breathing exercises     # RUL Lung nodule found on CT chest (7/25)  - Incidental finding   - F/U PCP outpatient for repeat CT    # /Bladder Mgmt   -  PVR q8h,    # GI  -  Senna  - Miralax dc given bowel incontinence, loose stool 8/14    # podiatry  - podiatry appreciated, s/p nail debridement    # Sleep  - Melatonin PRN     # Pain Mgmt   - Tylenol PRN    # FEN   - Diet - Regular + Thins  [DASH/TLC]    - Food preferences: no cheese, no yogurt, no fish, no apple juice placed in EMR  - nutrition f/u requested    #  DVT PPX:  - Eliquis     # Case discussed in IDT rounds 8/14 (follow up):  - CG transfers, ambulates 25 feet with RW and min assist, CG bADLs, Deficits in STM, problem solving, and carry over. Significant defictis in word retrieval and sentence completion  - goals adjusted: supervision bADLs, CG stairs, supervision iaDLS and during waking hours  - caregiver training  - target dc home 8/24 with caregiver support waking hours and home Pt OT SLP    # LABs  CBC BMP 8/17  nutrition f/u    ---------------  Outpatient/Follow-Up:    Alli Pascual  Surgery  [address] [place] [address]  Phone: [phone]  Fax: [phone]  Follow Up Time: 1 week    Alexis Simon  Cardiovascular Disease  [address] [place], [place] [id number]  Phone: [phone]  Fax: [phone]  Follow Up Time: 1 week    BRIAN ALBA  [address] [place] [address]  Phone: ()-  Fax: ()-     Patient is a 78 year old female with PMH HTN, and HLD who presented to [facility] ED 7/26/23 s/p fall from chair with + head strike. She was found to have new onset AFIB with RVR and Rhabdomyolysis. Her hospital course was complicated by acute cholecystitis and cholelithiasis requiring percutaneous enrique drain via IR on 7/7, Bacteremia, and cerebellar infarct confirmed on MRI.    # right cerebellar CVA with incoordination, imbalance, cognitive impairment  - MRI 8/1: acute right cerebellar infarct. Scattered foci of T2/FLAIR hyperintensity in the bilateral hemispheric white matter  - ASA and Atorvastatin  - continue Comprehensive Rehab Program of PT/OT/SLP  - 3 hours a day, 5 days a week  - recreation therapy, psychology services   - Precautions: cardiac, AC, fall, AMS, enrique drain    # New AFib RVR  # HTN  - Metoprolol XL 100mg daily  - Diltiazem CD 120mg daily  - Eliquis 5 q12hrs   - BP (126/85 - 138/87) HR 86-99 8/15    # Rhabdomyolysis  - S/p IVF  - Creatine kinase: 5502 7/25--> 109 8/4  - resolved    #  Cholecystitis   - s/p percutaneous cholecystectomy 2/2 cholelithiasis via IR on 7/7 DC flushes as not in correct position/unable to be flushed  -  A/P CT 8/7: Percutaneous cholecystostomy catheter is malpositioned within the descending colon distal to the cecum. Inflammation adjacent to the colon and appendix, which is otherwise normal in caliber, likely reactive. Dilated common bile duct with suspicion of choledocholithiasis in the distal CBD. Cholelithiasis with gallbladder wall thickening and pericholecystic fat stranding, consistent with acute cholecystitis. Acute diverticulitis in the mid sigmoid colon with associated localized perforation.  - MRCP 8/9: distended CBD up to 12mm + 2 stones in bile duct w/ borderline central intrahepatic ductal distention, + cholelithiasis decreased inflammation,  - Surgery appreciated 8/7. low suspicion for non drained infectious process. leave drain in as precaution. May be candidate for outpatient laparoscopic cholecystectomy.  - continue levaquin/flagyl  - s/p ERCP 8/11 . Sphincterotomy performed with stent placement, reviewed with patient  8/14   - f/u outpatient for drain removal and cholecystectomy, discussed with patient  - LFTs WNL. BMP 8/17    # bibasilar atelectasis noted on MRCP  - incentive spirometry, breathing exercises     # RUL Lung nodule found on CT chest (7/25)  - Incidental finding   - F/U PCP outpatient for repeat CT    # /Bladder Mgmt   -  PVR q8h,    # GI  -  Senna  - Miralax dc given bowel incontinence, loose stool 8/14    # podiatry  - podiatry appreciated, s/p nail debridement    # Sleep  - Melatonin PRN     # Pain Mgmt   - Tylenol PRN    # FEN   - Diet - Regular + Thins  [DASH/TLC]    - Food preferences: no cheese, no yogurt, no fish, no apple juice placed in EMR  - nutrition f/u requested    #  DVT PPX:  - Eliquis     # Case discussed in IDT rounds 8/14 (follow up):  - CG transfers, ambulates 25 feet with RW and min assist, CG bADLs, Deficits in STM, problem solving, and carry over. Significant defictis in word retrieval and sentence completion  - goals adjusted: supervision bADLs, CG stairs, supervision iaDLS and during waking hours  - caregiver training  - target dc home 8/24 with caregiver support waking hours and home Pt OT SLP    # LABs  CBC BMP 8/17  nutrition f/u    ---------------  Outpatient/Follow-Up:    Alli Pascual  Surgery  [address] [place] [address]  Phone: [phone]  Fax: [phone]  Follow Up Time: 1 week    Alexis Simon  Cardiovascular Disease  [address] [place], [place] [id number]  Phone: [phone]  Fax: [phone]  Follow Up Time: 1 week    BRIAN ALBA  [address] [place] [address]  Phone: ()-  Fax: ()-

## 2023-08-15 NOTE — PROGRESS NOTE ADULT - ASSESSMENT
78F with HTN, HLD, recently hospitalized at OSH with acute stroke - with course complicated by acute cholecystitis requiring percutaneous cholecystostomy tube as well as new AF, now in acute rehab. A CT scan was performed today for new abdominal pain, which shows a malpositioned cholecystostomy tube, acute complicated diverticulitis and choledocholithiasis     #Malpositioned cholecystostomy tube  #Acute sigmoid diverticulitis with localized perforation  #Pneumoperitoneum due to above  #Choledocholithiasis, likely new (CBD was WNL on prior imaging)  #Cholecystitis  -S/p ERCP  -GI recommendations appreciated  -Continue Levaquin/Flagyl  -currently asymptomatic continue to monitor    #Chronic AF  -c/w Toprol  -c/w Diltiazem  -c/w Eliquis    #Lung nodule  -outpatient follow-up    DVT ppx: eliquis

## 2023-08-15 NOTE — PROGRESS NOTE ADULT - SUBJECTIVE AND OBJECTIVE BOX
Patient is a 78y old  Female who presents with a chief complaint of cerebellar CVA (15 Aug 2023 08:52)      SUBJECTIVE / OVERNIGHT EVENTS:  Pt seen and examined at bedside. No acute events overnight.  Pt denies cp, palpitations, sob, abd pain, N/V, fever, chills.    ROS:  All other review of systems negative    Allergies    No Known Allergies    Intolerances        MEDICATIONS  (STANDING):  apixaban 5 milliGRAM(s) Oral every 12 hours  atorvastatin 80 milliGRAM(s) Oral at bedtime  diltiazem    milliGRAM(s) Oral daily  indomethacin Suppository 100 milliGRAM(s) Rectal once  levoFLOXacin  Tablet 750 milliGRAM(s) Oral every 24 hours  melatonin 6 milliGRAM(s) Oral at bedtime  metoprolol succinate  milliGRAM(s) Oral daily  metroNIDAZOLE    Tablet 500 milliGRAM(s) Oral every 8 hours  saccharomyces boulardii 250 milliGRAM(s) Oral two times a day  senna 2 Tablet(s) Oral at bedtime    MEDICATIONS  (PRN):  acetaminophen     Tablet .. 650 milliGRAM(s) Oral every 6 hours PRN Temp greater or equal to 38C (100.4F)  acetaminophen     Tablet .. 650 milliGRAM(s) Oral every 6 hours PRN Mild Pain (1 - 3)  aluminum hydroxide/magnesium hydroxide/simethicone Suspension 30 milliLiter(s) Oral every 4 hours PRN Dyspepsia  bisacodyl Suppository 10 milliGRAM(s) Rectal daily PRN Constipation      Vital Signs Last 24 Hrs  T(C): 36.5 (14 Aug 2023 19:57), Max: 36.5 (14 Aug 2023 19:57)  T(F): 97.7 (14 Aug 2023 19:57), Max: 97.7 (14 Aug 2023 19:57)  HR: 99 (15 Aug 2023 05:37) (86 - 99)  BP: 130/89 (15 Aug 2023 05:37) (126/85 - 130/89)  BP(mean): --  RR: 15 (14 Aug 2023 19:57) (15 - 15)  SpO2: 96% (14 Aug 2023 19:57) (96% - 96%)    Parameters below as of 14 Aug 2023 19:57  Patient On (Oxygen Delivery Method): room air      CAPILLARY BLOOD GLUCOSE        I&O's Summary    14 Aug 2023 07:01  -  15 Aug 2023 07:00  --------------------------------------------------------  IN: 0 mL / OUT: 0 mL / NET: 0 mL        PHYSICAL EXAM:  GENERAL: NAD, well-developed female  HEAD:  Atraumatic, Normocephalic  NECK: Supple, No JVD  CHEST/LUNG: Clear to auscultation bilaterally; No wheeze, nonlabored breathing  HEART: Regular rate and rhythm; No murmurs, rubs, or gallops  ABDOMEN: Soft, Nontender, Nondistended; Bowel sounds present  EXTREMITIES: No clubbing, cyanosis, or edema  PSYCH: calm, appropriate mood    LABS:                        13.6   6.95  )-----------( 282      ( 14 Aug 2023 06:43 )             41.6     08-14    139  |  105  |  13  ----------------------------<  94  3.7   |  28  |  0.86    Ca    8.5      14 Aug 2023 06:43    TPro  6.5  /  Alb  2.6<L>  /  TBili  0.4  /  DBili  x   /  AST  32  /  ALT  25  /  AlkPhos  76  08-14          Urinalysis Basic - ( 14 Aug 2023 06:43 )    Color: x / Appearance: x / SG: x / pH: x  Gluc: 94 mg/dL / Ketone: x  / Bili: x / Urobili: x   Blood: x / Protein: x / Nitrite: x   Leuk Esterase: x / RBC: x / WBC x   Sq Epi: x / Non Sq Epi: x / Bacteria: x        RADIOLOGY & ADDITIONAL TESTS:  Results Reviewed:   Imaging Personally Reviewed:  Electrocardiogram Personally Reviewed:    COORDINATION OF CARE:  Care Discussed with Consultants/Other Providers [Y/N]:  Prior or Outpatient Records Reviewed [Y/N]:

## 2023-08-15 NOTE — PROGRESS NOTE ADULT - COMMENTS
Patient stable, deneis any abdominal pain. Drain without any output (not in correct position) no pain or redness or irritation around area. No N/V, tolerating po, although she still dislikes food preferences, states she doesn't want any cheese. Dietary preferences updated and nutrition f/u requested

## 2023-08-16 PROCEDURE — 99232 SBSQ HOSP IP/OBS MODERATE 35: CPT

## 2023-08-16 RX ORDER — PSYLLIUM SEED (WITH DEXTROSE)
1 POWDER (GRAM) ORAL DAILY
Refills: 0 | Status: DISCONTINUED | OUTPATIENT
Start: 2023-08-16 | End: 2023-08-28

## 2023-08-16 RX ADMIN — Medication 250 MILLIGRAM(S): at 17:33

## 2023-08-16 RX ADMIN — APIXABAN 5 MILLIGRAM(S): 2.5 TABLET, FILM COATED ORAL at 10:04

## 2023-08-16 RX ADMIN — Medication 100 MILLIGRAM(S): at 05:17

## 2023-08-16 RX ADMIN — Medication 120 MILLIGRAM(S): at 05:17

## 2023-08-16 RX ADMIN — Medication 500 MILLIGRAM(S): at 13:16

## 2023-08-16 RX ADMIN — APIXABAN 5 MILLIGRAM(S): 2.5 TABLET, FILM COATED ORAL at 21:41

## 2023-08-16 RX ADMIN — Medication 6 MILLIGRAM(S): at 21:41

## 2023-08-16 RX ADMIN — Medication 250 MILLIGRAM(S): at 05:17

## 2023-08-16 RX ADMIN — ATORVASTATIN CALCIUM 80 MILLIGRAM(S): 80 TABLET, FILM COATED ORAL at 21:41

## 2023-08-16 RX ADMIN — Medication 500 MILLIGRAM(S): at 05:18

## 2023-08-16 RX ADMIN — Medication 500 MILLIGRAM(S): at 21:41

## 2023-08-16 NOTE — PROGRESS NOTE ADULT - SUBJECTIVE AND OBJECTIVE BOX
Patient is a 78y old  Female who presents with a chief complaint of cerebellar CVA (15 Aug 2023 09:41)      HPI:  Karen Carlos is a 78 year old female RH dominant with PMH of HTN, and HLD; who presented to [facility] ED on 7/26/23 s/p fall from chair with + head strike, on the floor for over an hour. ER workup was significant AFIB with RVR and rhabdomyolysis. She was given Cardizem IVP and IVF with improvement HR to the 70s. She was diagnosed with new onset AFIB, seen by cardiology and started on Metoprolol and Diltiazem for rate control and was anticoagulated with Eliquis.  IV fluids were administered for rhabdomyolysis.     RUQ US was significant for acute cholecystitis and cholelithiasis with pericholecystic fluid and gallbladder wall thickening. Her HIDA scan resulted positive and a percutaneous cholecystectomy tube was placed by IR on 7/7.  Her blood cultures resulted positive for Strept Anginosus for which she was started on IV antibiotics.     Her hospital course was complicated by neurological changes/altered mental status. CT head was performed which resulted negative; MRI of the head was significant for a R cerebellar infarct.    PM&R was consulted and deemed her an appropriate candidate for IRF. She was medically stabilized and cleared for discharge to [facility].  (03 Aug 2023 13:49)      PAST MEDICAL & SURGICAL HISTORY:  HTN (hypertension)      Hyperlipidemia      Tinnitus  right ear      Seasonal allergies      S/P knee replacement          MEDICATIONS  (STANDING):  apixaban 5 milliGRAM(s) Oral every 12 hours  atorvastatin 80 milliGRAM(s) Oral at bedtime  diltiazem    milliGRAM(s) Oral daily  indomethacin Suppository 100 milliGRAM(s) Rectal once  levoFLOXacin  Tablet 750 milliGRAM(s) Oral every 24 hours  melatonin 6 milliGRAM(s) Oral at bedtime  metoprolol succinate  milliGRAM(s) Oral daily  metroNIDAZOLE    Tablet 500 milliGRAM(s) Oral every 8 hours  saccharomyces boulardii 250 milliGRAM(s) Oral two times a day  senna 2 Tablet(s) Oral at bedtime    MEDICATIONS  (PRN):  acetaminophen     Tablet .. 650 milliGRAM(s) Oral every 6 hours PRN Mild Pain (1 - 3)  acetaminophen     Tablet .. 650 milliGRAM(s) Oral every 6 hours PRN Temp greater or equal to 38C (100.4F)  aluminum hydroxide/magnesium hydroxide/simethicone Suspension 30 milliLiter(s) Oral every 4 hours PRN Dyspepsia  bisacodyl Suppository 10 milliGRAM(s) Rectal daily PRN Constipation      Allergies    No Known Allergies    Intolerances          VITALS  78y  Vital Signs Last 24 Hrs  T(C): 36.4 (15 Aug 2023 19:31), Max: 36.4 (15 Aug 2023 19:31)  T(F): 97.5 (15 Aug 2023 19:31), Max: 97.5 (15 Aug 2023 19:31)  HR: 85 (16 Aug 2023 05:19) (82 - 85)  BP: 135/81 (16 Aug 2023 05:19) (120/74 - 135/81)  BP(mean): --  RR: 16 (15 Aug 2023 19:31) (16 - 16)  SpO2: 98% (15 Aug 2023 19:31) (98% - 98%)    Parameters below as of 15 Aug 2023 19:31  Patient On (Oxygen Delivery Method): room air      Daily     Daily         RECENT LABS:                      CAPILLARY BLOOD GLUCOSE

## 2023-08-16 NOTE — PROGRESS NOTE ADULT - ASSESSMENT
Patient is a 78 year old female with PMH HTN, and HLD who presented to [facility] 7/26/23 s/p fall from chair with + head strike. She was found to have new onset AFIB with RVR and Rhabdomyolysis. Her hospital course was complicated by acute cholecystitis and cholelithiasis requiring percutaneous enrique drain via IR on 7/7, Bacteremia, and cerebellar infarct     # right cerebellar CVA with incoordination, imbalance, cognitive impairment  - MRI 8/1: acute right cerebellar infarct. Scattered foci of T2/FLAIR hyperintensity in the bilateral hemispheric white matter  - ASA and Atorvastatin  - continue Comprehensive Rehab Program of PT/OT/SLP  - 3 hours a day, 5 days a week  - recreation therapy, psychology services   - Precautions: cardiac, AC, fall, AMS, enrique drain    # HTN  - Metoprolol XL 100mg daily  - Diltiazem CD 120mg daily  - BP  (120/74 - 135/81) 8/16    # AFib RVR  - Metoprolol XL 100mg daily  - Eliquis 5 q12hrs   - HR 82-85 8/16    # Rhabdomyolysis  - S/p IVF  - Creatine kinase: 5502 7/25--> 109 8/4  - resolved    #  Cholecystitis   - s/p percutaneous cholecystectomy via IR on 7/7   -  A/P CT 8/7: Percutaneous cholecystostomy catheter is malpositioned within the descending colon distal to the cecum. Inflammation adjacent to the colon and appendix, which is otherwise normal in caliber, likely reactive. Dilated common bile duct with suspicion of choledocholithiasis in the distal CBD. Cholelithiasis with gallbladder wall thickening and pericholecystic fat stranding, consistent with acute cholecystitis. Acute diverticulitis in the mid sigmoid colon with associated localized perforation.  - MRCP 8/9: distended CBD up to 12mm + 2 stones in bile duct w/ borderline central intrahepatic ductal distention, + cholelithiasis decreased inflammation,  - Surgery appreciated 8/7. Low suspicion for non drained infectious process. leave drain in as precaution. May be candidate for outpatient laparoscopic cholecystectomy.  - continue levaquin/flagyl  - s/p ERCP 8/11 . Sphincterotomy performed with stent placement, reviewed with patient  8/14   - f/u outpatient for drain removal and cholecystectomy, discussed with patient  - LFTs WNL. BMP 8/17    # bibasilar atelectasis noted on MRCP  - incentive spirometry, breathing exercises     # RUL Lung nodule found on CT chest (7/25)  - Incidental finding   - F/U PCP outpatient for repeat CT    # GI  -  Senna    # podiatry  - podiatry appreciated, s/p nail debridement    # Sleep  - Melatonin PRN     # Pain Mgmt   - Tylenol PRN    # FEN   - Diet - Regular + Thins  [DASH/TLC]    - Food preferences: no cheese, no yogurt, no fish, no apple juice placed in EMR    #  DVT PPX:  - Eliquis     # Case discussed in IDT rounds 8/14 (follow up):  - CG transfers, ambulates 25 feet with RW and min assist, CG bADLs, Deficits in STM, problem solving, and carry over. Significant deficits in word retrieval and sentence completion  - goals adjusted: supervision bADLs, CG stairs, supervision iaDLS and during waking hours  - caregiver training  - target dc home 8/24 with caregiver support waking hours and home Pt OT SLP    # LABs  CBC BMP 8/17      ---------------  Outpatient/Follow-Up:    Alli Pascual  Surgery  [address] [place] [address]  Phone: [phone]  Fax: [phone]  Follow Up Time: 1 week    Alexis Simon  Cardiovascular Disease  [address] [place], [place] [id number]  Phone: [phone]  Fax: [phone]  Follow Up Time: 1 week    BRIAN ALBA  [address] [place] [address]  Phone: ()-  Fax: ()-     Patient is a 78 year old female with PMH HTN, and HLD who presented to [facility] ED 7/26/23 s/p fall from chair with + head strike. She was found to have new onset AFIB with RVR and Rhabdomyolysis. Her hospital course was complicated by acute cholecystitis and cholelithiasis requiring percutaneous enrique drain via IR on 7/7, Bacteremia, and cerebellar infarct     # right cerebellar CVA with incoordination, imbalance, cognitive impairment  - MRI 8/1: acute right cerebellar infarct. Scattered foci of T2/FLAIR hyperintensity in the bilateral hemispheric white matter  - ASA and Atorvastatin  - continue Comprehensive Rehab Program of PT/OT/SLP  - 3 hours a day, 5 days a week  - recreation therapy, psychology services   - Precautions: cardiac, AC, fall, AMS, enrique drain    # HTN  - Metoprolol XL 100mg daily  - Diltiazem CD 120mg daily  - BP  (120/74 - 135/81) 8/16    # AFib RVR  - Metoprolol XL 100mg daily  - Eliquis 5 q12hrs   - HR 82-85 8/16    # Rhabdomyolysis  - S/p IVF  - Creatine kinase: 5502 7/25--> 109 8/4  - resolved    #  Cholecystitis   - s/p percutaneous cholecystectomy via IR on 7/7   -  A/P CT 8/7: Percutaneous cholecystostomy catheter is malpositioned within the descending colon distal to the cecum. Inflammation adjacent to the colon and appendix, which is otherwise normal in caliber, likely reactive. Dilated common bile duct with suspicion of choledocholithiasis in the distal CBD. Cholelithiasis with gallbladder wall thickening and pericholecystic fat stranding, consistent with acute cholecystitis. Acute diverticulitis in the mid sigmoid colon with associated localized perforation.  - MRCP 8/9: distended CBD up to 12mm + 2 stones in bile duct w/ borderline central intrahepatic ductal distention, + cholelithiasis decreased inflammation,  - Surgery appreciated 8/7. Low suspicion for non drained infectious process. leave drain in as precaution. May be candidate for outpatient laparoscopic cholecystectomy.  - continue levaquin/flagyl  - s/p ERCP 8/11 . Sphincterotomy performed with stent placement, reviewed with patient  8/14   - f/u outpatient for drain removal and cholecystectomy, discussed with patient  - LFTs WNL. BMP 8/17    # bibasilar atelectasis noted on MRCP  - incentive spirometry, breathing exercises     # RUL Lung nodule found on CT chest (7/25)  - Incidental finding   - F/U PCP outpatient for repeat CT    # GI  -  Senna--> dc due to loose stool 8/16  - will add metamucil for bulking 8/16  - on florastor secondary to Abx  - toileting program, disucssed with team    # podiatry  - podiatry appreciated, s/p nail debridement    # Sleep  - Melatonin PRN     # Pain Mgmt   - Tylenol PRN    # FEN   - Diet - Regular + Thins  [DASH/TLC]    - Food preferences: no cheese, no yogurt, no fish, no apple juice placed in EMR    #  DVT PPX:  - Eliquis     # Case discussed in IDT rounds 8/14 (follow up):  - CG transfers, ambulates 25 feet with RW and min assist, CG bADLs, Deficits in STM, problem solving, and carry over. Significant deficits in word retrieval and sentence completion  - goals adjusted: supervision bADLs, CG stairs, supervision iaDLS and during waking hours  - caregiver training  - target dc home 8/24 with caregiver support waking hours and home Pt OT SLP    # LABs  CBC BMP 8/17      ---------------  Outpatient/Follow-Up:    Alli Pascual  Surgery  [address] [place] [address]  Phone: [phone]  Fax: [phone]  Follow Up Time: 1 week    Alexis Simon  Cardiovascular Disease  [address] [place], [place] [id number]  Phone: [phone]  Fax: [phone]  Follow Up Time: 1 week    BRIAN ALBA  [address] [place] [address]  Phone: ([phone]  Fax: ()-

## 2023-08-16 NOTE — PROGRESS NOTE ADULT - COMMENTS
Patient keeps her "friend" [enrique drain] tucked in pouch at side. No pain at drain insertion site. Deneis any abdominal pain, N/V. She is eating well and likes her food selections now    she continues to have periods of incontinence, partially functional. She also complains of loose stool. Agreeable to add metamucil  to diet to help. Will start on toielting schedule    Continues to have periods of retention urine

## 2023-08-17 LAB
ALBUMIN SERPL ELPH-MCNC: 2.6 G/DL — LOW (ref 3.3–5)
ALP SERPL-CCNC: 78 U/L — SIGNIFICANT CHANGE UP (ref 40–120)
ALT FLD-CCNC: 26 U/L — SIGNIFICANT CHANGE UP (ref 10–45)
ANION GAP SERPL CALC-SCNC: 8 MMOL/L — SIGNIFICANT CHANGE UP (ref 5–17)
AST SERPL-CCNC: 29 U/L — SIGNIFICANT CHANGE UP (ref 10–40)
BASOPHILS # BLD AUTO: 0.05 K/UL — SIGNIFICANT CHANGE UP (ref 0–0.2)
BASOPHILS NFR BLD AUTO: 0.7 % — SIGNIFICANT CHANGE UP (ref 0–2)
BILIRUB SERPL-MCNC: 0.4 MG/DL — SIGNIFICANT CHANGE UP (ref 0.2–1.2)
BUN SERPL-MCNC: 15 MG/DL — SIGNIFICANT CHANGE UP (ref 7–23)
CALCIUM SERPL-MCNC: 8.6 MG/DL — SIGNIFICANT CHANGE UP (ref 8.4–10.5)
CHLORIDE SERPL-SCNC: 103 MMOL/L — SIGNIFICANT CHANGE UP (ref 96–108)
CO2 SERPL-SCNC: 29 MMOL/L — SIGNIFICANT CHANGE UP (ref 22–31)
CREAT SERPL-MCNC: 0.81 MG/DL — SIGNIFICANT CHANGE UP (ref 0.5–1.3)
EGFR: 74 ML/MIN/1.73M2 — SIGNIFICANT CHANGE UP
EOSINOPHIL # BLD AUTO: 0.22 K/UL — SIGNIFICANT CHANGE UP (ref 0–0.5)
EOSINOPHIL NFR BLD AUTO: 3.1 % — SIGNIFICANT CHANGE UP (ref 0–6)
GLUCOSE SERPL-MCNC: 97 MG/DL — SIGNIFICANT CHANGE UP (ref 70–99)
HCT VFR BLD CALC: 40.4 % — SIGNIFICANT CHANGE UP (ref 34.5–45)
HGB BLD-MCNC: 13.1 G/DL — SIGNIFICANT CHANGE UP (ref 11.5–15.5)
IMM GRANULOCYTES NFR BLD AUTO: 0.3 % — SIGNIFICANT CHANGE UP (ref 0–0.9)
LYMPHOCYTES # BLD AUTO: 1.12 K/UL — SIGNIFICANT CHANGE UP (ref 1–3.3)
LYMPHOCYTES # BLD AUTO: 15.9 % — SIGNIFICANT CHANGE UP (ref 13–44)
MCHC RBC-ENTMCNC: 30.3 PG — SIGNIFICANT CHANGE UP (ref 27–34)
MCHC RBC-ENTMCNC: 32.4 GM/DL — SIGNIFICANT CHANGE UP (ref 32–36)
MCV RBC AUTO: 93.3 FL — SIGNIFICANT CHANGE UP (ref 80–100)
MONOCYTES # BLD AUTO: 0.67 K/UL — SIGNIFICANT CHANGE UP (ref 0–0.9)
MONOCYTES NFR BLD AUTO: 9.5 % — SIGNIFICANT CHANGE UP (ref 2–14)
NEUTROPHILS # BLD AUTO: 4.97 K/UL — SIGNIFICANT CHANGE UP (ref 1.8–7.4)
NEUTROPHILS NFR BLD AUTO: 70.5 % — SIGNIFICANT CHANGE UP (ref 43–77)
NRBC # BLD: 0 /100 WBCS — SIGNIFICANT CHANGE UP (ref 0–0)
PLATELET # BLD AUTO: 263 K/UL — SIGNIFICANT CHANGE UP (ref 150–400)
POTASSIUM SERPL-MCNC: 3.9 MMOL/L — SIGNIFICANT CHANGE UP (ref 3.5–5.3)
POTASSIUM SERPL-SCNC: 3.9 MMOL/L — SIGNIFICANT CHANGE UP (ref 3.5–5.3)
PROT SERPL-MCNC: 6.9 G/DL — SIGNIFICANT CHANGE UP (ref 6–8.3)
RBC # BLD: 4.33 M/UL — SIGNIFICANT CHANGE UP (ref 3.8–5.2)
RBC # FLD: 13.6 % — SIGNIFICANT CHANGE UP (ref 10.3–14.5)
SODIUM SERPL-SCNC: 140 MMOL/L — SIGNIFICANT CHANGE UP (ref 135–145)
WBC # BLD: 7.05 K/UL — SIGNIFICANT CHANGE UP (ref 3.8–10.5)
WBC # FLD AUTO: 7.05 K/UL — SIGNIFICANT CHANGE UP (ref 3.8–10.5)

## 2023-08-17 PROCEDURE — 99232 SBSQ HOSP IP/OBS MODERATE 35: CPT

## 2023-08-17 RX ADMIN — APIXABAN 5 MILLIGRAM(S): 2.5 TABLET, FILM COATED ORAL at 21:41

## 2023-08-17 RX ADMIN — APIXABAN 5 MILLIGRAM(S): 2.5 TABLET, FILM COATED ORAL at 09:47

## 2023-08-17 RX ADMIN — Medication 250 MILLIGRAM(S): at 17:33

## 2023-08-17 RX ADMIN — Medication 120 MILLIGRAM(S): at 05:07

## 2023-08-17 RX ADMIN — Medication 250 MILLIGRAM(S): at 05:07

## 2023-08-17 RX ADMIN — Medication 500 MILLIGRAM(S): at 21:42

## 2023-08-17 RX ADMIN — Medication 100 MILLIGRAM(S): at 05:07

## 2023-08-17 RX ADMIN — Medication 500 MILLIGRAM(S): at 13:10

## 2023-08-17 RX ADMIN — ATORVASTATIN CALCIUM 80 MILLIGRAM(S): 80 TABLET, FILM COATED ORAL at 21:41

## 2023-08-17 RX ADMIN — Medication 500 MILLIGRAM(S): at 05:07

## 2023-08-17 RX ADMIN — Medication 6 MILLIGRAM(S): at 21:43

## 2023-08-17 NOTE — PROGRESS NOTE ADULT - COMMENTS
Patient is eating breakfast , denies any abdominal pain, no N/V. Denies any H/A, dizziness. She still has some recall difficulties; does not remember seeing every member of the team even though she interacts with them frequently. Her mood overall is stable, and simple attention appears improved    no acute issues overnight

## 2023-08-17 NOTE — PROGRESS NOTE ADULT - ASSESSMENT
Patient is a 78 year old female with PMH HTN, and HLD who presented to [facility] ED 7/26/23 s/p fall from chair with + head strike. She was found to have new onset AFIB with RVR and Rhabdomyolysis. Her hospital course was complicated by acute cholecystitis and cholelithiasis requiring percutaneous enrique drain via IR on 7/7, Bacteremia, and cerebellar infarct     # right cerebellar CVA with incoordination, imbalance, cognitive impairment  - MRI 8/1: acute right cerebellar infarct. Scattered foci of T2/FLAIR hyperintensity in the bilateral hemispheric white matter  - ASA and Atorvastatin  - continue Comprehensive Rehab Program of PT/OT/SLP  - 3 hours a day, 5 days a week  - recreation therapy, psychology services   - Precautions: cardiac, AC, fall, AMS, enrique drain    # HTN  - Metoprolol XL 100mg daily  - Diltiazem CD 120mg daily  - BP  (127/70 - 137/83) 8/17    # AFib RVR  - Metoprolol XL 100mg daily  - Eliquis 5 q12hrs   - HR 84-88 8/17    # Rhabdomyolysis  - S/p IVF  - Creatine kinase: 5502 7/25--> 109 8/4  - resolved    #  Cholecystitis   - s/p percutaneous cholecystectomy via IR on 7/7   -  A/P CT 8/7: Percutaneous cholecystostomy catheter is malpositioned within the descending colon distal to the cecum. Inflammation adjacent to the colon and appendix, which is otherwise normal in caliber, likely reactive. Dilated common bile duct with suspicion of choledocholithiasis in the distal CBD. Cholelithiasis with gallbladder wall thickening and pericholecystic fat stranding, consistent with acute cholecystitis. Acute diverticulitis in the mid sigmoid colon with associated localized perforation.  - MRCP 8/9: distended CBD up to 12mm + 2 stones in bile duct w/ borderline central intrahepatic ductal distention, + cholelithiasis decreased inflammation,  - Surgery appreciated 8/7. Low suspicion for non drained infectious process. leave drain in as precaution. May be candidate for outpatient laparoscopic cholecystectomy.  - continue levaquin/flagyl  - s/p ERCP 8/11 . Sphincterotomy performed with stent placement, reviewed with patient  8/14   - f/u outpatient for drain removal and cholecystectomy, discussed with patient  - LFTs   AST  29  /  ALT  26  /  AlkPhos  78  08-17    # bibasilar atelectasis noted on MRCP  - incentive spirometry, breathing exercises     # RUL Lung nodule found on CT chest (7/25)  - Incidental finding   - F/U PCP outpatient for repeat CT    # GI  - Senna dc due to loose stool 8/16  - metamucil for bulking 8/16  - on florastor secondary to Abx  - toileting program    # podiatry  - podiatry appreciated, s/p nail debridement    # Sleep  - Melatonin PRN     # Pain Mgmt   - Tylenol PRN    # FEN   - Diet - Regular + Thins  [DASH/TLC]    - Food preferences: no cheese, no yogurt, no fish, no apple juice placed in EMR    #  DVT PPX:  - Eliquis     # Case discussed in IDT rounds 8/14 (follow up):  - CG transfers, ambulates 25 feet with RW and min assist, CG bADLs, Deficits in STM, problem solving, and carry over. Significant deficits in word retrieval and sentence completion  - goals adjusted: supervision bADLs, CG stairs, supervision iaDLS and during waking hours  - caregiver training  - target dc home 8/24 with caregiver support waking hours and home Pt OT SLP    # LABs  CBC BMP 8/21      ---------------  Outpatient/Follow-Up:    Alli Pascual  Surgery  [address] [place] [address]  Phone: [phone]  Fax: [phone]  Follow Up Time: 1 week    Alexis Simon  Cardiovascular Disease  [address] [place], [place] [id number]  Phone: [phone]  Fax: [phone]  Follow Up Time: 1 week    BRIAN ALBA  [address] [place] [address]  Phone: ()-  Fax: ()-

## 2023-08-17 NOTE — PROGRESS NOTE ADULT - SUBJECTIVE AND OBJECTIVE BOX
Patient is a 78y old  Female who presents with a chief complaint of cerebellar CVA (16 Aug 2023 08:51)      HPI:  Karen Carlos is a 78 year old female RH dominant with PMH of HTN, and HLD; who presented to [facility] ED on 7/26/23 s/p fall from chair with + head strike, on the floor for over an hour. ER workup was significant AFIB with RVR and rhabdomyolysis. She was given Cardizem IVP and IVF with improvement HR to the 70s. She was diagnosed with new onset AFIB, seen by cardiology and started on Metoprolol and Diltiazem for rate control and was anticoagulated with Eliquis.  IV fluids were administered for rhabdomyolysis.     RUQ US was significant for acute cholecystitis and cholelithiasis with pericholecystic fluid and gallbladder wall thickening. Her HIDA scan resulted positive and a percutaneous cholecystectomy tube was placed by IR on 7/7.  Her blood cultures resulted positive for Strept Anginosus for which she was started on IV antibiotics.     Her hospital course was complicated by neurological changes/altered mental status. CT head was performed which resulted negative; MRI of the head was significant for a R cerebellar infarct.    PM&R was consulted and deemed her an appropriate candidate for IRF. She was medically stabilized and cleared for discharge to [facility].  (03 Aug 2023 13:49)      PAST MEDICAL & SURGICAL HISTORY:  HTN (hypertension)      Hyperlipidemia      Tinnitus  right ear      Seasonal allergies      S/P knee replacement          MEDICATIONS  (STANDING):  apixaban 5 milliGRAM(s) Oral every 12 hours  atorvastatin 80 milliGRAM(s) Oral at bedtime  diltiazem    milliGRAM(s) Oral daily  indomethacin Suppository 100 milliGRAM(s) Rectal once  levoFLOXacin  Tablet 750 milliGRAM(s) Oral every 24 hours  melatonin 6 milliGRAM(s) Oral at bedtime  metoprolol succinate  milliGRAM(s) Oral daily  metroNIDAZOLE    Tablet 500 milliGRAM(s) Oral every 8 hours  psyllium Powder 1 Packet(s) Oral daily  saccharomyces boulardii 250 milliGRAM(s) Oral two times a day    MEDICATIONS  (PRN):  acetaminophen     Tablet .. 650 milliGRAM(s) Oral every 6 hours PRN Mild Pain (1 - 3)  acetaminophen     Tablet .. 650 milliGRAM(s) Oral every 6 hours PRN Temp greater or equal to 38C (100.4F)  aluminum hydroxide/magnesium hydroxide/simethicone Suspension 30 milliLiter(s) Oral every 4 hours PRN Dyspepsia  bisacodyl Suppository 10 milliGRAM(s) Rectal daily PRN Constipation      Allergies    No Known Allergies    Intolerances          VITALS  78y  Vital Signs Last 24 Hrs  T(C): 36.4 (17 Aug 2023 08:10), Max: 36.4 (17 Aug 2023 08:10)  T(F): 97.6 (17 Aug 2023 08:10), Max: 97.6 (17 Aug 2023 08:10)  HR: 88 (17 Aug 2023 08:10) (84 - 88)  BP: 137/83 (17 Aug 2023 08:10) (127/70 - 137/83)  BP(mean): --  RR: 16 (17 Aug 2023 08:10) (16 - 16)  SpO2: 97% (17 Aug 2023 08:10) (97% - 97%)    Parameters below as of 17 Aug 2023 08:10  Patient On (Oxygen Delivery Method): room air      Daily     Daily         RECENT LABS:                          13.1   7.05  )-----------( 263      ( 17 Aug 2023 05:27 )             40.4     08-17    140  |  103  |  15  ----------------------------<  97  3.9   |  29  |  0.81    Ca    8.6      17 Aug 2023 05:27    TPro  6.9  /  Alb  2.6<L>  /  TBili  0.4  /  DBili  x   /  AST  29  /  ALT  26  /  AlkPhos  78  08-17    LIVER FUNCTIONS - ( 17 Aug 2023 05:27 )  Alb: 2.6 g/dL / Pro: 6.9 g/dL / ALK PHOS: 78 U/L / ALT: 26 U/L / AST: 29 U/L / GGT: x             Urinalysis Basic - ( 17 Aug 2023 05:27 )    Color: x / Appearance: x / SG: x / pH: x  Gluc: 97 mg/dL / Ketone: x  / Bili: x / Urobili: x   Blood: x / Protein: x / Nitrite: x   Leuk Esterase: x / RBC: x / WBC x   Sq Epi: x / Non Sq Epi: x / Bacteria: x          CAPILLARY BLOOD GLUCOSE

## 2023-08-18 PROCEDURE — 99232 SBSQ HOSP IP/OBS MODERATE 35: CPT

## 2023-08-18 RX ADMIN — Medication 500 MILLIGRAM(S): at 05:20

## 2023-08-18 RX ADMIN — Medication 100 MILLIGRAM(S): at 05:20

## 2023-08-18 RX ADMIN — ATORVASTATIN CALCIUM 80 MILLIGRAM(S): 80 TABLET, FILM COATED ORAL at 21:28

## 2023-08-18 RX ADMIN — Medication 6 MILLIGRAM(S): at 21:28

## 2023-08-18 RX ADMIN — Medication 250 MILLIGRAM(S): at 05:20

## 2023-08-18 RX ADMIN — Medication 250 MILLIGRAM(S): at 17:03

## 2023-08-18 RX ADMIN — Medication 500 MILLIGRAM(S): at 14:29

## 2023-08-18 RX ADMIN — APIXABAN 5 MILLIGRAM(S): 2.5 TABLET, FILM COATED ORAL at 08:55

## 2023-08-18 RX ADMIN — Medication 500 MILLIGRAM(S): at 21:28

## 2023-08-18 RX ADMIN — Medication 120 MILLIGRAM(S): at 05:20

## 2023-08-18 RX ADMIN — APIXABAN 5 MILLIGRAM(S): 2.5 TABLET, FILM COATED ORAL at 21:28

## 2023-08-18 NOTE — PROGRESS NOTE ADULT - ASSESSMENT
Patient is a 78 year old female with PMH HTN, and HLD who presented to [facility] ED 7/26/23 s/p fall from chair with + head strike. She was found to have new onset AFIB with RVR and Rhabdomyolysis. Her hospital course was complicated by acute cholecystitis and cholelithiasis requiring percutaneous enrique drain via IR on 7/7, Bacteremia, and cerebellar infarct     # right cerebellar CVA with incoordination, imbalance, cognitive impairment  - MRI 8/1: acute right cerebellar infarct. Scattered foci of T2/FLAIR hyperintensity in the bilateral hemispheric white matter  - ASA and Atorvastatin  - continue Comprehensive Rehab Program of PT/OT/SLP  - 3 hours a day, 5 days a week  - recreation therapy, psychology services   - Precautions: cardiac, AC, fall, AMS, enrique drain    # HTN  - Metoprolol XL 100mg daily  - Diltiazem CD 120mg daily  - /79 - 134/85) 8/18    # AFib RVR  - Metoprolol XL 100mg daily  - Eliquis 5 q12hrs   - HR 88-93 8/18    # Rhabdomyolysis  - S/p IVF  - CK 5502 7/25--> 109 8/4  - resolved    #  Cholecystitis   - s/p percutaneous cholecystectomy via IR on 7/7   -  A/P CT 8/7: Percutaneous cholecystostomy catheter is malpositioned within the descending colon distal to the cecum. Inflammation adjacent to the colon and appendix, which is otherwise normal in caliber, likely reactive. Dilated common bile duct with suspicion of choledocholithiasis in the distal CBD. Cholelithiasis with gallbladder wall thickening and pericholecystic fat stranding, consistent with acute cholecystitis. Acute diverticulitis in the mid sigmoid colon with associated localized perforation.  - MRCP 8/9: distended CBD up to 12mm + 2 stones in bile duct w/ borderline central intrahepatic ductal distention, + cholelithiasis decreased inflammation,  - GI appreciated. s/p ERCP 8/11 . Sphincterotomy performed with stent placement, reviewed with patient  8/14   - LFTS WNL  - Surgery appreciated 8/7. Low suspicion for non drained infectious process. Leave drain in as precaution. May be candidate for outpatient laparoscopic cholecystectomy.  - continue levaquin/flagyl. Probiotic  - f/u outpatient for drain removal and cholecystectomy, discussed with patient    # bibasilar atelectasis noted on MRCP  - incentive spirometry, breathing exercises     # RUL Lung nodule found on CT chest (7/25)  - Incidental finding   - F/U PCP outpatient for repeat CT    # GI  - Senna dc due to loose stool 8/16  - metamucil for bulking 8/16  - on florastor secondary to Abx  - toileting program    #   - h/o retention. continue monitoring bladder scan, SC for PVR >350 ml  - toileting program  - PVR 57-80 ml 8/18    # podiatry  - podiatry appreciated, s/p nail debridement    # Sleep  - Melatonin PRN     # Pain Mgmt   - Tylenol PRN    # FEN   - Diet - Regular + Thins  [DASH/TLC]    - Food preferences: no cheese, no yogurt, no fish, no apple juice placed in EMR    #  DVT PPX:  - Eliquis     # Case discussed in IDT rounds 8/14 (follow up):  - CG transfers, ambulates 25 feet with RW and min assist, CG bADLs, Deficits in STM, problem solving, and carry over. Significant deficits in word retrieval and sentence completion  - goals adjusted: supervision bADLs, CG stairs, supervision iaDLS and during waking hours  - caregiver training today 8/18. Patient encouraged to let team know of any concerns during sessions and training so we can target them for dc as well  - target dc home 8/24 with caregiver support waking hours and home Pt OT SLP    # LABs  CBC BMP 8/21      ---------------  Outpatient/Follow-Up:    Alli Pascual  Surgery  [address] [place] [address]  Phone: [phone]  Fax: [phone]  Follow Up Time: 1 week    Alexis Simon  Cardiovascular Disease  [address] [place], [place] [id number]  Phone: [phone]  Fax: [phone]  Follow Up Time: 1 week    BRIAN ALBA  [address] [place] [address]  Phone: ()-  Fax: ()-

## 2023-08-18 NOTE — PROGRESS NOTE ADULT - SUBJECTIVE AND OBJECTIVE BOX
Patient is a 78y old  Female who presents with a chief complaint of cerebellar CVA (17 Aug 2023 13:49)      HPI:  Karen Carlos is a 78 year old female RH dominant with PMH of HTN, and HLD; who presented to [facility] ED on 7/26/23 s/p fall from chair with + head strike, on the floor for over an hour. ER workup was significant AFIB with RVR and rhabdomyolysis. She was given Cardizem IVP and IVF with improvement HR to the 70s. She was diagnosed with new onset AFIB, seen by cardiology and started on Metoprolol and Diltiazem for rate control and was anticoagulated with Eliquis.  IV fluids were administered for rhabdomyolysis.     RUQ US was significant for acute cholecystitis and cholelithiasis with pericholecystic fluid and gallbladder wall thickening. Her HIDA scan resulted positive and a percutaneous cholecystectomy tube was placed by IR on 7/7.  Her blood cultures resulted positive for Strept Anginosus for which she was started on IV antibiotics.     Her hospital course was complicated by neurological changes/altered mental status. CT head was performed which resulted negative; MRI of the head was significant for a R cerebellar infarct.    PM&R was consulted and deemed her an appropriate candidate for IRF. She was medically stabilized and cleared for discharge to [facility].  (03 Aug 2023 13:49)      PAST MEDICAL & SURGICAL HISTORY:  HTN (hypertension)      Hyperlipidemia      Tinnitus  right ear      Seasonal allergies      S/P knee replacement          MEDICATIONS  (STANDING):  apixaban 5 milliGRAM(s) Oral every 12 hours  atorvastatin 80 milliGRAM(s) Oral at bedtime  diltiazem    milliGRAM(s) Oral daily  indomethacin Suppository 100 milliGRAM(s) Rectal once  levoFLOXacin  Tablet 750 milliGRAM(s) Oral every 24 hours  melatonin 6 milliGRAM(s) Oral at bedtime  metoprolol succinate  milliGRAM(s) Oral daily  metroNIDAZOLE    Tablet 500 milliGRAM(s) Oral every 8 hours  psyllium Powder 1 Packet(s) Oral daily  saccharomyces boulardii 250 milliGRAM(s) Oral two times a day    MEDICATIONS  (PRN):  acetaminophen     Tablet .. 650 milliGRAM(s) Oral every 6 hours PRN Mild Pain (1 - 3)  acetaminophen     Tablet .. 650 milliGRAM(s) Oral every 6 hours PRN Temp greater or equal to 38C (100.4F)  aluminum hydroxide/magnesium hydroxide/simethicone Suspension 30 milliLiter(s) Oral every 4 hours PRN Dyspepsia  bisacodyl Suppository 10 milliGRAM(s) Rectal daily PRN Constipation      Allergies    No Known Allergies    Intolerances          VITALS  78y  Vital Signs Last 24 Hrs  T(C): 36.4 (18 Aug 2023 08:38), Max: 36.4 (17 Aug 2023 21:45)  T(F): 97.5 (18 Aug 2023 08:38), Max: 97.5 (17 Aug 2023 21:45)  HR: 88 (18 Aug 2023 08:38) (88 - 93)  BP: 134/85 (18 Aug 2023 08:38) (126/79 - 134/85)  BP(mean): --  RR: 16 (18 Aug 2023 08:38) (15 - 16)  SpO2: 98% (18 Aug 2023 08:38) (97% - 98%)    Parameters below as of 18 Aug 2023 08:38  Patient On (Oxygen Delivery Method): room air      Daily     Daily         RECENT LABS:                          13.1   7.05  )-----------( 263      ( 17 Aug 2023 05:27 )             40.4     08-17    140  |  103  |  15  ----------------------------<  97  3.9   |  29  |  0.81    Ca    8.6      17 Aug 2023 05:27    TPro  6.9  /  Alb  2.6<L>  /  TBili  0.4  /  DBili  x   /  AST  29  /  ALT  26  /  AlkPhos  78  08-17    LIVER FUNCTIONS - ( 17 Aug 2023 05:27 )  Alb: 2.6 g/dL / Pro: 6.9 g/dL / ALK PHOS: 78 U/L / ALT: 26 U/L / AST: 29 U/L / GGT: x             Urinalysis Basic - ( 17 Aug 2023 05:27 )    Color: x / Appearance: x / SG: x / pH: x  Gluc: 97 mg/dL / Ketone: x  / Bili: x / Urobili: x   Blood: x / Protein: x / Nitrite: x   Leuk Esterase: x / RBC: x / WBC x   Sq Epi: x / Non Sq Epi: x / Bacteria: x          CAPILLARY BLOOD GLUCOSE

## 2023-08-18 NOTE — PROGRESS NOTE ADULT - COMMENTS
Patient in middle of breakfast, ate about 2/3, reports she usually likes oatmeal, berries. She still has some loose stool and difficulty getting to bathroom in time although feels slightly more solid; is on toileting program but staff reports difficulty as both patients in room are on aschedule. Voiding improved, with PVR 57-80 ml    denies N/V, no abdominal pain. Scheduled for family training today, patient aware, aware of projected dc date

## 2023-08-19 PROCEDURE — 99232 SBSQ HOSP IP/OBS MODERATE 35: CPT

## 2023-08-19 RX ADMIN — Medication 250 MILLIGRAM(S): at 17:30

## 2023-08-19 RX ADMIN — Medication 6 MILLIGRAM(S): at 21:38

## 2023-08-19 RX ADMIN — Medication 500 MILLIGRAM(S): at 05:27

## 2023-08-19 RX ADMIN — Medication 120 MILLIGRAM(S): at 05:32

## 2023-08-19 RX ADMIN — Medication 100 MILLIGRAM(S): at 05:32

## 2023-08-19 RX ADMIN — Medication 500 MILLIGRAM(S): at 21:37

## 2023-08-19 RX ADMIN — Medication 250 MILLIGRAM(S): at 05:28

## 2023-08-19 RX ADMIN — Medication 500 MILLIGRAM(S): at 14:09

## 2023-08-19 RX ADMIN — APIXABAN 5 MILLIGRAM(S): 2.5 TABLET, FILM COATED ORAL at 21:37

## 2023-08-19 RX ADMIN — Medication 1 PACKET(S): at 11:25

## 2023-08-19 RX ADMIN — APIXABAN 5 MILLIGRAM(S): 2.5 TABLET, FILM COATED ORAL at 11:24

## 2023-08-19 RX ADMIN — ATORVASTATIN CALCIUM 80 MILLIGRAM(S): 80 TABLET, FILM COATED ORAL at 21:37

## 2023-08-19 NOTE — PROGRESS NOTE ADULT - SUBJECTIVE AND OBJECTIVE BOX
Cc: cerebellar CVA    HPI: Patient with no new medical issues today.  Pain controlled, no chest pain, no N/V, no Fevers/Chills. No other new ROS  Has been tolerating rehabilitation program.    acetaminophen     Tablet .. 650 milliGRAM(s) Oral every 6 hours PRN  acetaminophen     Tablet .. 650 milliGRAM(s) Oral every 6 hours PRN  aluminum hydroxide/magnesium hydroxide/simethicone Suspension 30 milliLiter(s) Oral every 4 hours PRN  apixaban 5 milliGRAM(s) Oral every 12 hours  atorvastatin 80 milliGRAM(s) Oral at bedtime  bisacodyl Suppository 10 milliGRAM(s) Rectal daily PRN  diltiazem    milliGRAM(s) Oral daily  indomethacin Suppository 100 milliGRAM(s) Rectal once  levoFLOXacin  Tablet 750 milliGRAM(s) Oral every 24 hours  melatonin 6 milliGRAM(s) Oral at bedtime  metoprolol succinate  milliGRAM(s) Oral daily  metroNIDAZOLE    Tablet 500 milliGRAM(s) Oral every 8 hours  psyllium Powder 1 Packet(s) Oral daily  saccharomyces boulardii 250 milliGRAM(s) Oral two times a day      T(C): 36.6 (08-19-23 @ 08:37), Max: 36.6 (08-18-23 @ 20:06)  HR: 89 (08-19-23 @ 08:37) (78 - 89)  BP: 133/86 (08-19-23 @ 08:37) (133/86 - 141/76)  RR: 15 (08-19-23 @ 08:37) (15 - 15)  SpO2: 97% (08-19-23 @ 08:37) (95% - 97%)    In NAD  HEENT- EOMI  Heart- Well Perfused  Lungs- No resp distress, no use of accessory resp muscles  Abd- colostomy bag  Ext- No calf pain  Neuro- Exam unchanged  Psych- Affect wnl          Imp: Patient with diagnosis of    cerebellar CVA      admitted for comprehensive acute rehabilitation.    Plan:  - Continue therapies  - DVT prophylaxis  - Skin- Turn q2h, check skin daily  - Continue current medications; patient medically stable.   - Patient is stable to continue current rehabilitation program.

## 2023-08-19 NOTE — PROGRESS NOTE ADULT - ASSESSMENT
From primary team notes:  78 year old female with PMH HTN, and HLD who presented to [facility] ED 7/26/23 s/p fall from chair with + head strike. She was found to have new onset AFIB with RVR and Rhabdomyolysis. Her hospital course was complicated by acute cholecystitis and cholelithiasis requiring percutaneous enrique drain via IR on 7/7, Bacteremia, and cerebellar infarct     # right cerebellar CVA with incoordination, imbalance, cognitive impairment  - MRI 8/1: acute right cerebellar infarct. Scattered foci of T2/FLAIR hyperintensity in the bilateral hemispheric white matter  - ASA and Atorvastatin  - continue Comprehensive Rehab Program of PT/OT/SLP  - 3 hours a day, 5 days a week  - recreation therapy, psychology services   - Precautions: cardiac, AC, fall, AMS, enrique drain    # HTN  - Metoprolol XL 100mg daily  - Diltiazem CD 120mg daily  - /79 - 134/85) 8/18    # AFib RVR  - Metoprolol XL 100mg daily  - Eliquis 5 q12hrs   - HR 88-93 8/18    # Rhabdomyolysis  - S/p IVF  - CK 5502 7/25--> 109 8/4  - resolved    #  Cholecystitis   - s/p percutaneous cholecystectomy via IR on 7/7   -  A/P CT 8/7: Percutaneous cholecystostomy catheter is malpositioned within the descending colon distal to the cecum. Inflammation adjacent to the colon and appendix, which is otherwise normal in caliber, likely reactive. Dilated common bile duct with suspicion of choledocholithiasis in the distal CBD. Cholelithiasis with gallbladder wall thickening and pericholecystic fat stranding, consistent with acute cholecystitis. Acute diverticulitis in the mid sigmoid colon with associated localized perforation.  - MRCP 8/9: distended CBD up to 12mm + 2 stones in bile duct w/ borderline central intrahepatic ductal distention, + cholelithiasis decreased inflammation,  - GI appreciated. s/p ERCP 8/11 . Sphincterotomy performed with stent placement, reviewed with patient  8/14   - LFTS WNL  - Surgery appreciated 8/7. Low suspicion for non drained infectious process. Leave drain in as precaution. May be candidate for outpatient laparoscopic cholecystectomy.  - continue levaquin/flagyl. Probiotic  - f/u outpatient for drain removal and cholecystectomy, discussed with patient    # bibasilar atelectasis noted on MRCP  - incentive spirometry, breathing exercises     # RUL Lung nodule found on CT chest (7/25)  - Incidental finding   - F/U PCP outpatient for repeat CT    # GI  - Senna dc due to loose stool 8/16  - metamucil for bulking 8/16  - on florastor secondary to Abx  - toileting program    #   - h/o retention. continue monitoring bladder scan, SC for PVR >350 ml  - toileting program  - PVR 57-80 ml 8/18    # podiatry  - podiatry appreciated, s/p nail debridement    # Sleep  - Melatonin PRN     # Pain Mgmt   - Tylenol PRN    # FEN   - Diet - Regular + Thins  [DASH/TLC]    - Food preferences: no cheese, no yogurt, no fish, no apple juice placed in EMR    #  DVT PPX:  - Eliquis     # Case discussed in IDT rounds 8/14 (follow up):  - CG transfers, ambulates 25 feet with RW and min assist, CG bADLs, Deficits in STM, problem solving, and carry over. Significant deficits in word retrieval and sentence completion  - goals adjusted: supervision bADLs, CG stairs, supervision iaDLS and during waking hours  - caregiver training today 8/18. Patient encouraged to let team know of any concerns during sessions and training so we can target them for dc as well  - target dc home 8/24 with caregiver support waking hours and home Pt OT SLP    # LABs  CBC BMP 8/21      ---------------  Outpatient/Follow-Up:    Alli Pascaul  Surgery  [address] [place] [address]  Phone: [phone]  Fax: [phone]  Follow Up Time: 1 week    Alexis Simon  Cardiovascular Disease  [address] [place], [place] [id number]  Phone: [phone]  Fax: [phone]  Follow Up Time: 1 week    BRIAN ALBA  [address] [place] [id number]  Phone: ([phone]  Fax: ()-

## 2023-08-19 NOTE — PROGRESS NOTE ADULT - ASSESSMENT
78F with HTN, HLD, recently hospitalized at OSH with acute stroke - with course complicated by acute cholecystitis requiring percutaneous cholecystostomy tube as well as new AF, now in acute rehab. A CT scan was performed today for new abdominal pain, which shows a malpositioned cholecystostomy tube, acute complicated diverticulitis and choledocholithiasis     #Malpositioned cholecystostomy tube  #Acute sigmoid diverticulitis with localized perforation  #Pneumoperitoneum due to above  #Choledocholithiasis, likely new (CBD was WNL on prior imaging)  #Cholecystitis  -S/p ERCP  -Will need to discuss w/ GI regarding abx duration. In the meantime, continue Levaquin/Flagyl  -currently asymptomatic continue to monitor    #Chronic AF  -c/w Toprol  -c/w Diltiazem  -c/w Eliquis    #Lung nodule  -outpatient follow-up    DVT ppx: eliquis

## 2023-08-19 NOTE — PROGRESS NOTE ADULT - SUBJECTIVE AND OBJECTIVE BOX
Patient is a 78y old  Female who presents with a chief complaint of cerebellar CVA (19 Aug 2023 09:14)      SUBJECTIVE / OVERNIGHT EVENTS:  Pt seen and examined at bedside. No acute events overnight.  Pt denies cp, palpitations, sob, abd pain, N/V, fever, chills.    ROS:  All other review of systems negative    Allergies    No Known Allergies    Intolerances        MEDICATIONS  (STANDING):  apixaban 5 milliGRAM(s) Oral every 12 hours  atorvastatin 80 milliGRAM(s) Oral at bedtime  diltiazem    milliGRAM(s) Oral daily  indomethacin Suppository 100 milliGRAM(s) Rectal once  levoFLOXacin  Tablet 750 milliGRAM(s) Oral every 24 hours  melatonin 6 milliGRAM(s) Oral at bedtime  metoprolol succinate  milliGRAM(s) Oral daily  metroNIDAZOLE    Tablet 500 milliGRAM(s) Oral every 8 hours  psyllium Powder 1 Packet(s) Oral daily  saccharomyces boulardii 250 milliGRAM(s) Oral two times a day    MEDICATIONS  (PRN):  acetaminophen     Tablet .. 650 milliGRAM(s) Oral every 6 hours PRN Temp greater or equal to 38C (100.4F)  acetaminophen     Tablet .. 650 milliGRAM(s) Oral every 6 hours PRN Mild Pain (1 - 3)  aluminum hydroxide/magnesium hydroxide/simethicone Suspension 30 milliLiter(s) Oral every 4 hours PRN Dyspepsia  bisacodyl Suppository 10 milliGRAM(s) Rectal daily PRN Constipation      Vital Signs Last 24 Hrs  T(C): 36.6 (19 Aug 2023 08:37), Max: 36.6 (18 Aug 2023 20:06)  T(F): 97.8 (19 Aug 2023 08:37), Max: 97.8 (18 Aug 2023 20:06)  HR: 89 (19 Aug 2023 08:37) (78 - 89)  BP: 133/86 (19 Aug 2023 08:37) (133/86 - 141/76)  BP(mean): --  RR: 15 (19 Aug 2023 08:37) (15 - 15)  SpO2: 97% (19 Aug 2023 08:37) (95% - 97%)    Parameters below as of 19 Aug 2023 08:37  Patient On (Oxygen Delivery Method): room air      CAPILLARY BLOOD GLUCOSE        I&O's Summary      PHYSICAL EXAM:  GENERAL: NAD, well-developed female  HEAD:  Atraumatic, Normocephalic  NECK: Supple, No JVD  CHEST/LUNG: Clear to auscultation bilaterally; No wheeze, nonlabored breathing  HEART: Regular rate and rhythm; No murmurs, rubs, or gallops  ABDOMEN: Soft, Nontender, Nondistended; Bowel sounds present  EXTREMITIES: No clubbing, cyanosis, or edema  PSYCH: calm, appropriate mood    LABS:                    RADIOLOGY & ADDITIONAL TESTS:  Results Reviewed:   Imaging Personally Reviewed:  Electrocardiogram Personally Reviewed:    COORDINATION OF CARE:  Care Discussed with Consultants/Other Providers [Y/N]:  Prior or Outpatient Records Reviewed [Y/N]:

## 2023-08-20 PROCEDURE — 99232 SBSQ HOSP IP/OBS MODERATE 35: CPT

## 2023-08-20 RX ADMIN — Medication 500 MILLIGRAM(S): at 14:09

## 2023-08-20 RX ADMIN — Medication 120 MILLIGRAM(S): at 05:35

## 2023-08-20 RX ADMIN — Medication 250 MILLIGRAM(S): at 17:20

## 2023-08-20 RX ADMIN — Medication 100 MILLIGRAM(S): at 22:36

## 2023-08-20 RX ADMIN — Medication 500 MILLIGRAM(S): at 21:29

## 2023-08-20 RX ADMIN — ATORVASTATIN CALCIUM 80 MILLIGRAM(S): 80 TABLET, FILM COATED ORAL at 21:28

## 2023-08-20 RX ADMIN — Medication 100 MILLIGRAM(S): at 05:35

## 2023-08-20 RX ADMIN — APIXABAN 5 MILLIGRAM(S): 2.5 TABLET, FILM COATED ORAL at 10:14

## 2023-08-20 RX ADMIN — APIXABAN 5 MILLIGRAM(S): 2.5 TABLET, FILM COATED ORAL at 21:29

## 2023-08-20 RX ADMIN — Medication 6 MILLIGRAM(S): at 21:29

## 2023-08-20 RX ADMIN — Medication 500 MILLIGRAM(S): at 05:35

## 2023-08-20 RX ADMIN — Medication 250 MILLIGRAM(S): at 05:35

## 2023-08-20 NOTE — PROGRESS NOTE ADULT - SUBJECTIVE AND OBJECTIVE BOX
Cc: cerebellar CVA    HPI: Patient with no new medical issues today.  Pain controlled, no chest pain, no N/V, no Fevers/Chills. No other new ROS  Has been tolerating rehabilitation program.    acetaminophen     Tablet .. 650 milliGRAM(s) Oral every 6 hours PRN  acetaminophen     Tablet .. 650 milliGRAM(s) Oral every 6 hours PRN  aluminum hydroxide/magnesium hydroxide/simethicone Suspension 30 milliLiter(s) Oral every 4 hours PRN  apixaban 5 milliGRAM(s) Oral every 12 hours  atorvastatin 80 milliGRAM(s) Oral at bedtime  bisacodyl Suppository 10 milliGRAM(s) Rectal daily PRN  diltiazem    milliGRAM(s) Oral daily  indomethacin Suppository 100 milliGRAM(s) Rectal once  levoFLOXacin  Tablet 750 milliGRAM(s) Oral every 24 hours  melatonin 6 milliGRAM(s) Oral at bedtime  metoprolol succinate  milliGRAM(s) Oral daily  metroNIDAZOLE    Tablet 500 milliGRAM(s) Oral every 8 hours  psyllium Powder 1 Packet(s) Oral daily  saccharomyces boulardii 250 milliGRAM(s) Oral two times a day      ICU Vital Signs Last 24 Hrs  T(C): 36.7 (19 Aug 2023 19:46), Max: 36.7 (19 Aug 2023 19:46)  T(F): 98.1 (19 Aug 2023 19:46), Max: 98.1 (19 Aug 2023 19:46)  HR: 88 (20 Aug 2023 05:33) (88 - 88)  BP: 139/90 (20 Aug 2023 05:33) (124/81 - 139/90)  RR: 15 (20 Aug 2023 05:33) (15 - 16)  SpO2: 98% (20 Aug 2023 05:33) (98% - 98%)    O2 Parameters below as of 20 Aug 2023 05:33  Patient On (Oxygen Delivery Method): room air            In NAD  HEENT- EOMI  Heart- Well Perfused  Lungs- No resp distress, no use of accessory resp muscles  Abd- colostomy bag  Ext- No calf pain  Neuro- Exam unchanged  Psych- Affect wnl          Imp: Patient with diagnosis of    cerebellar CVA      admitted for comprehensive acute rehabilitation.    Plan:  - Continue therapies  - DVT prophylaxis  - Skin- Turn q2h, check skin daily  - Continue current medications; patient medically stable.   - Patient is stable to continue current rehabilitation program.   - Medicine notes appreciated

## 2023-08-20 NOTE — PROGRESS NOTE ADULT - SUBJECTIVE AND OBJECTIVE BOX
Patient is a 78y old  Female who presents with a chief complaint of cerebellar CVA (19 Aug 2023 09:21)      SUBJECTIVE / OVERNIGHT EVENTS:  Pt seen and examined at bedside. No acute events overnight.  Pt denies cp, palpitations, sob, abd pain, N/V, fever, chills.    ROS:  All other review of systems negative    Allergies    No Known Allergies    Intolerances        MEDICATIONS  (STANDING):  apixaban 5 milliGRAM(s) Oral every 12 hours  atorvastatin 80 milliGRAM(s) Oral at bedtime  diltiazem    milliGRAM(s) Oral daily  indomethacin Suppository 100 milliGRAM(s) Rectal once  levoFLOXacin  Tablet 750 milliGRAM(s) Oral every 24 hours  melatonin 6 milliGRAM(s) Oral at bedtime  metoprolol succinate  milliGRAM(s) Oral daily  metroNIDAZOLE    Tablet 500 milliGRAM(s) Oral every 8 hours  psyllium Powder 1 Packet(s) Oral daily  saccharomyces boulardii 250 milliGRAM(s) Oral two times a day    MEDICATIONS  (PRN):  acetaminophen     Tablet .. 650 milliGRAM(s) Oral every 6 hours PRN Mild Pain (1 - 3)  acetaminophen     Tablet .. 650 milliGRAM(s) Oral every 6 hours PRN Temp greater or equal to 38C (100.4F)  aluminum hydroxide/magnesium hydroxide/simethicone Suspension 30 milliLiter(s) Oral every 4 hours PRN Dyspepsia  bisacodyl Suppository 10 milliGRAM(s) Rectal daily PRN Constipation      Vital Signs Last 24 Hrs  T(C): 36.7 (19 Aug 2023 19:46), Max: 36.7 (19 Aug 2023 19:46)  T(F): 98.1 (19 Aug 2023 19:46), Max: 98.1 (19 Aug 2023 19:46)  HR: 88 (20 Aug 2023 05:33) (88 - 88)  BP: 139/90 (20 Aug 2023 05:33) (124/81 - 139/90)  BP(mean): --  RR: 15 (20 Aug 2023 05:33) (15 - 16)  SpO2: 98% (20 Aug 2023 05:33) (98% - 98%)    Parameters below as of 20 Aug 2023 05:33  Patient On (Oxygen Delivery Method): room air      CAPILLARY BLOOD GLUCOSE        I&O's Summary      PHYSICAL EXAM:  GENERAL: NAD, well-developed female  HEAD:  Atraumatic, Normocephalic  NECK: Supple, No JVD  CHEST/LUNG: Clear to auscultation bilaterally; No wheeze, nonlabored breathing  HEART: Regular rate and rhythm; No murmurs, rubs, or gallops  ABDOMEN: Soft, Nontender, Nondistended; Bowel sounds present  EXTREMITIES: No clubbing, cyanosis, or edema  PSYCH: calm, appropriate mood      LABS:                    RADIOLOGY & ADDITIONAL TESTS:  Results Reviewed:   Imaging Personally Reviewed:  Electrocardiogram Personally Reviewed:    COORDINATION OF CARE:  Care Discussed with Consultants/Other Providers [Y/N]:  Prior or Outpatient Records Reviewed [Y/N]:

## 2023-08-20 NOTE — PROGRESS NOTE ADULT - ASSESSMENT
From primary team notes:  78 year old female with PMH HTN, and HLD who presented to [facility] ED 7/26/23 s/p fall from chair with + head strike. She was found to have new onset AFIB with RVR and Rhabdomyolysis. Her hospital course was complicated by acute cholecystitis and cholelithiasis requiring percutaneous enrique drain via IR on 7/7, Bacteremia, and cerebellar infarct     # right cerebellar CVA with incoordination, imbalance, cognitive impairment  - MRI 8/1: acute right cerebellar infarct. Scattered foci of T2/FLAIR hyperintensity in the bilateral hemispheric white matter  - ASA and Atorvastatin  - continue Comprehensive Rehab Program of PT/OT/SLP  - 3 hours a day, 5 days a week  - recreation therapy, psychology services   - Precautions: cardiac, AC, fall, AMS, enrique drain    # HTN  - Metoprolol XL 100mg daily  - Diltiazem CD 120mg daily  - /79 - 134/85) 8/18    # AFib RVR  - Metoprolol XL 100mg daily  - Eliquis 5 q12hrs   - HR 88-93 8/18    # Rhabdomyolysis  - S/p IVF  - CK 5502 7/25--> 109 8/4  - resolved    #  Cholecystitis   - s/p percutaneous cholecystectomy via IR on 7/7   -  A/P CT 8/7: Percutaneous cholecystostomy catheter is malpositioned within the descending colon distal to the cecum. Inflammation adjacent to the colon and appendix, which is otherwise normal in caliber, likely reactive. Dilated common bile duct with suspicion of choledocholithiasis in the distal CBD. Cholelithiasis with gallbladder wall thickening and pericholecystic fat stranding, consistent with acute cholecystitis. Acute diverticulitis in the mid sigmoid colon with associated localized perforation.  - MRCP 8/9: distended CBD up to 12mm + 2 stones in bile duct w/ borderline central intrahepatic ductal distention, + cholelithiasis decreased inflammation,  - GI appreciated. s/p ERCP 8/11 . Sphincterotomy performed with stent placement, reviewed with patient  8/14   - LFTS WNL  - Surgery appreciated 8/7. Low suspicion for non drained infectious process. Leave drain in as precaution. May be candidate for outpatient laparoscopic cholecystectomy.  - continue levaquin/flagyl. Probiotic  - f/u outpatient for drain removal and cholecystectomy, discussed with patient    # bibasilar atelectasis noted on MRCP  - incentive spirometry, breathing exercises     # RUL Lung nodule found on CT chest (7/25)  - Incidental finding   - F/U PCP outpatient for repeat CT    # GI  - Senna dc due to loose stool 8/16  - metamucil for bulking 8/16  - on florastor secondary to Abx  - toileting program    #   - h/o retention. continue monitoring bladder scan, SC for PVR >350 ml  - toileting program  - PVR 57-80 ml 8/18    # podiatry  - podiatry appreciated, s/p nail debridement    # Sleep  - Melatonin PRN     # Pain Mgmt   - Tylenol PRN    # FEN   - Diet - Regular + Thins  [DASH/TLC]    - Food preferences: no cheese, no yogurt, no fish, no apple juice placed in EMR    #  DVT PPX:  - Eliquis     # Case discussed in IDT rounds 8/14 (follow up):  - CG transfers, ambulates 25 feet with RW and min assist, CG bADLs, Deficits in STM, problem solving, and carry over. Significant deficits in word retrieval and sentence completion  - goals adjusted: supervision bADLs, CG stairs, supervision iaDLS and during waking hours  - caregiver training today 8/18. Patient encouraged to let team know of any concerns during sessions and training so we can target them for dc as well  - target dc home 8/24 with caregiver support waking hours and home Pt OT SLP    # LABs  CBC BMP 8/21      ---------------  Outpatient/Follow-Up:    Alli Pascual  Surgery  [address] [place] [address]  Phone: [phone]  Fax: [phone]  Follow Up Time: 1 week    Alexis Simon  Cardiovascular Disease  [address] [place], [place] [id number]  Phone: [phone]  Fax: [phone]  Follow Up Time: 1 week    BRIAN ALBA  [address] [place] [address]  Phone: ([phone]  Fax: ()-

## 2023-08-21 LAB
ALBUMIN SERPL ELPH-MCNC: 2.5 G/DL — LOW (ref 3.3–5)
ALP SERPL-CCNC: 77 U/L — SIGNIFICANT CHANGE UP (ref 40–120)
ALT FLD-CCNC: 21 U/L — SIGNIFICANT CHANGE UP (ref 10–45)
ANION GAP SERPL CALC-SCNC: 9 MMOL/L — SIGNIFICANT CHANGE UP (ref 5–17)
AST SERPL-CCNC: 31 U/L — SIGNIFICANT CHANGE UP (ref 10–40)
BASOPHILS # BLD AUTO: 0.03 K/UL — SIGNIFICANT CHANGE UP (ref 0–0.2)
BASOPHILS NFR BLD AUTO: 0.4 % — SIGNIFICANT CHANGE UP (ref 0–2)
BILIRUB SERPL-MCNC: 0.4 MG/DL — SIGNIFICANT CHANGE UP (ref 0.2–1.2)
BUN SERPL-MCNC: 11 MG/DL — SIGNIFICANT CHANGE UP (ref 7–23)
CALCIUM SERPL-MCNC: 8.6 MG/DL — SIGNIFICANT CHANGE UP (ref 8.4–10.5)
CHLORIDE SERPL-SCNC: 102 MMOL/L — SIGNIFICANT CHANGE UP (ref 96–108)
CO2 SERPL-SCNC: 29 MMOL/L — SIGNIFICANT CHANGE UP (ref 22–31)
CREAT SERPL-MCNC: 0.79 MG/DL — SIGNIFICANT CHANGE UP (ref 0.5–1.3)
EGFR: 77 ML/MIN/1.73M2 — SIGNIFICANT CHANGE UP
EOSINOPHIL # BLD AUTO: 0.27 K/UL — SIGNIFICANT CHANGE UP (ref 0–0.5)
EOSINOPHIL NFR BLD AUTO: 3.4 % — SIGNIFICANT CHANGE UP (ref 0–6)
GLUCOSE SERPL-MCNC: 100 MG/DL — HIGH (ref 70–99)
HCT VFR BLD CALC: 40 % — SIGNIFICANT CHANGE UP (ref 34.5–45)
HGB BLD-MCNC: 13 G/DL — SIGNIFICANT CHANGE UP (ref 11.5–15.5)
IMM GRANULOCYTES NFR BLD AUTO: 0.1 % — SIGNIFICANT CHANGE UP (ref 0–0.9)
LYMPHOCYTES # BLD AUTO: 1.02 K/UL — SIGNIFICANT CHANGE UP (ref 1–3.3)
LYMPHOCYTES # BLD AUTO: 13 % — SIGNIFICANT CHANGE UP (ref 13–44)
MCHC RBC-ENTMCNC: 30.1 PG — SIGNIFICANT CHANGE UP (ref 27–34)
MCHC RBC-ENTMCNC: 32.5 GM/DL — SIGNIFICANT CHANGE UP (ref 32–36)
MCV RBC AUTO: 92.6 FL — SIGNIFICANT CHANGE UP (ref 80–100)
MONOCYTES # BLD AUTO: 0.73 K/UL — SIGNIFICANT CHANGE UP (ref 0–0.9)
MONOCYTES NFR BLD AUTO: 9.3 % — SIGNIFICANT CHANGE UP (ref 2–14)
NEUTROPHILS # BLD AUTO: 5.81 K/UL — SIGNIFICANT CHANGE UP (ref 1.8–7.4)
NEUTROPHILS NFR BLD AUTO: 73.8 % — SIGNIFICANT CHANGE UP (ref 43–77)
NRBC # BLD: 0 /100 WBCS — SIGNIFICANT CHANGE UP (ref 0–0)
PLATELET # BLD AUTO: 221 K/UL — SIGNIFICANT CHANGE UP (ref 150–400)
POTASSIUM SERPL-MCNC: 3.9 MMOL/L — SIGNIFICANT CHANGE UP (ref 3.5–5.3)
POTASSIUM SERPL-SCNC: 3.9 MMOL/L — SIGNIFICANT CHANGE UP (ref 3.5–5.3)
PROT SERPL-MCNC: 6.4 G/DL — SIGNIFICANT CHANGE UP (ref 6–8.3)
RBC # BLD: 4.32 M/UL — SIGNIFICANT CHANGE UP (ref 3.8–5.2)
RBC # FLD: 14.2 % — SIGNIFICANT CHANGE UP (ref 10.3–14.5)
SODIUM SERPL-SCNC: 140 MMOL/L — SIGNIFICANT CHANGE UP (ref 135–145)
WBC # BLD: 7.87 K/UL — SIGNIFICANT CHANGE UP (ref 3.8–10.5)
WBC # FLD AUTO: 7.87 K/UL — SIGNIFICANT CHANGE UP (ref 3.8–10.5)

## 2023-08-21 PROCEDURE — 99232 SBSQ HOSP IP/OBS MODERATE 35: CPT

## 2023-08-21 PROCEDURE — 71045 X-RAY EXAM CHEST 1 VIEW: CPT | Mod: 26

## 2023-08-21 RX ORDER — IPRATROPIUM/ALBUTEROL SULFATE 18-103MCG
3 AEROSOL WITH ADAPTER (GRAM) INHALATION ONCE
Refills: 0 | Status: COMPLETED | OUTPATIENT
Start: 2023-08-21 | End: 2023-08-21

## 2023-08-21 RX ADMIN — Medication 500 MILLIGRAM(S): at 05:34

## 2023-08-21 RX ADMIN — Medication 100 MILLIGRAM(S): at 23:33

## 2023-08-21 RX ADMIN — Medication 120 MILLIGRAM(S): at 05:34

## 2023-08-21 RX ADMIN — Medication 500 MILLIGRAM(S): at 21:43

## 2023-08-21 RX ADMIN — APIXABAN 5 MILLIGRAM(S): 2.5 TABLET, FILM COATED ORAL at 21:44

## 2023-08-21 RX ADMIN — APIXABAN 5 MILLIGRAM(S): 2.5 TABLET, FILM COATED ORAL at 08:49

## 2023-08-21 RX ADMIN — Medication 100 MILLIGRAM(S): at 05:34

## 2023-08-21 RX ADMIN — ATORVASTATIN CALCIUM 80 MILLIGRAM(S): 80 TABLET, FILM COATED ORAL at 21:43

## 2023-08-21 RX ADMIN — Medication 250 MILLIGRAM(S): at 17:29

## 2023-08-21 RX ADMIN — Medication 650 MILLIGRAM(S): at 23:36

## 2023-08-21 RX ADMIN — Medication 250 MILLIGRAM(S): at 05:35

## 2023-08-21 RX ADMIN — Medication 500 MILLIGRAM(S): at 14:52

## 2023-08-21 RX ADMIN — Medication 3 MILLILITER(S): at 22:15

## 2023-08-21 RX ADMIN — Medication 1 PACKET(S): at 12:56

## 2023-08-21 RX ADMIN — Medication 100 MILLIGRAM(S): at 20:28

## 2023-08-21 RX ADMIN — Medication 6 MILLIGRAM(S): at 21:43

## 2023-08-21 RX ADMIN — Medication 100 MILLIGRAM(S): at 14:50

## 2023-08-21 NOTE — PROGRESS NOTE ADULT - SUBJECTIVE AND OBJECTIVE BOX
Patient is a 78y old  Female who presents with a chief complaint of cerebellar CVA (21 Aug 2023 08:38)      Patient seen and examined at bedside. No overnight events.  Admits to chronic cough     REVIEW OF SYSTEMS:  CONSTITUTIONAL: No fever or chills  CARDIOVASCULAR: No chest pain, palpitations    ALLERGIES:  No Known Allergies    MEDICATIONS  (STANDING):  apixaban 5 milliGRAM(s) Oral every 12 hours  atorvastatin 80 milliGRAM(s) Oral at bedtime  diltiazem    milliGRAM(s) Oral daily  indomethacin Suppository 100 milliGRAM(s) Rectal once  levoFLOXacin  Tablet 750 milliGRAM(s) Oral every 24 hours  melatonin 6 milliGRAM(s) Oral at bedtime  metoprolol succinate  milliGRAM(s) Oral daily  metroNIDAZOLE    Tablet 500 milliGRAM(s) Oral every 8 hours  psyllium Powder 1 Packet(s) Oral daily  saccharomyces boulardii 250 milliGRAM(s) Oral two times a day    MEDICATIONS  (PRN):  acetaminophen     Tablet .. 650 milliGRAM(s) Oral every 6 hours PRN Mild Pain (1 - 3)  acetaminophen     Tablet .. 650 milliGRAM(s) Oral every 6 hours PRN Temp greater or equal to 38C (100.4F)  aluminum hydroxide/magnesium hydroxide/simethicone Suspension 30 milliLiter(s) Oral every 4 hours PRN Dyspepsia  benzonatate 100 milliGRAM(s) Oral three times a day PRN Cough  bisacodyl Suppository 10 milliGRAM(s) Rectal daily PRN Constipation    Vital Signs Last 24 Hrs  T(F): 97.7 (21 Aug 2023 08:33), Max: 97.7 (21 Aug 2023 08:33)  HR: 87 (21 Aug 2023 08:33) (87 - 98)  BP: 116/79 (21 Aug 2023 08:33) (116/79 - 135/84)  RR: 16 (21 Aug 2023 08:33) (14 - 16)  SpO2: 96% (21 Aug 2023 08:33) (96% - 97%)  I&O's Summary        PHYSICAL EXAM:  GENERAL: NAD  HEENT:  AT/NC, anicteric, moist mucous membranes, EOMI, PERRL, no lid-lag, conjunctiva and sclera clear  CHEST/LUNG: diminished breath sounds throughout, no wheezing,  normal respiratory effort, no intercostal retractions  HEART:  RRR, S1, S2, no murmurs; no pitting edema  ABDOMEN:  BS+, soft, nontender, nondistended  MSK/EXTREMITIES: palpable peripheral pulses, no clubbing or cyanosis  NERVOUS SYSTEM: answers questions and follows commands appropriately A&Ox3 grossly moves all extremities   PSYCH: Appropriate affect, Alert & Awake; fair judgement    LABS: Personally reviewed    CBC                        13.0   7.87  )-----------( 221      ( 21 Aug 2023 07:58 )             40.0         CMP  08-21    140  |  102  |  11  ----------------------------<  100  3.9   |  29  |  0.79    Ca    8.6      21 Aug 2023 07:58    TPro  6.4  /  Alb  2.5  /  TBili  0.4  /  DBili  x   /  AST  31  /  ALT  21  /  AlkPhos  77  08-21                  07-26 Chol 130 mg/dL LDL -- HDL 48 mg/dL Trig 74 mg/dL                  Urinalysis Basic - ( 21 Aug 2023 07:58 )    Color: x / Appearance: x / SG: x / pH: x  Gluc: 100 mg/dL / Ketone: x  / Bili: x / Urobili: x   Blood: x / Protein: x / Nitrite: x   Leuk Esterase: x / RBC: x / WBC x   Sq Epi: x / Non Sq Epi: x / Bacteria: x        COVID-19 PCR: NotDetec (08-03-23 @ 21:00)      RADIOLOGY & ADDITIONAL TESTS: Personally reviewed    Medical management discussed with Dr. Smith (physiatry) continue Eliquis for AC for a fib

## 2023-08-21 NOTE — PROVIDER CONTACT NOTE (OTHER) - ASSESSMENT
Alert and verbally responsive, VSS as recorded, with dry cough, wheezing on auscultation, denies chest pain, no HA, no vomiting.

## 2023-08-21 NOTE — PROGRESS NOTE ADULT - COMMENTS
Patient seen in OT. she complains of new cough over weekend, denies any SOB or chest tightness but has some audible wheezing. Non productive cough, no fever or chills, no rhinitis or nasal congestion, no myalgia. no leukocytosis on blood work. Was given tessalon perle over weekend but cough persists/not progressive but not improved    No abdmoinal pain. Per nursing, loose stool/incontinence has significantly improved

## 2023-08-21 NOTE — PROGRESS NOTE ADULT - SUBJECTIVE AND OBJECTIVE BOX
Patient is a 78y old  Female who presents with a chief complaint of cerebellar CVA (20 Aug 2023 09:33)      HPI:  Karen Carlos is a 78 year old female RH dominant with PMH of HTN, and HLD; who presented to [place] ED on 7/26/23 s/p fall from chair with + head strike, on the floor for over an hour. ER workup was significant AFIB with RVR and rhabdomyolysis. She was given Cardizem IVP and IVF with improvement HR to the 70s. She was diagnosed with new onset AFIB, seen by cardiology and started on Metoprolol and Diltiazem for rate control and was anticoagulated with Eliquis.  IV fluids were administered for rhabdomyolysis.     RUQ US was significant for acute cholecystitis and cholelithiasis with pericholecystic fluid and gallbladder wall thickening. Her HIDA scan resulted positive and a percutaneous cholecystectomy tube was placed by IR on 7/7.  Her blood cultures resulted positive for Strept Anginosus for which she was started on IV antibiotics.     Her hospital course was complicated by neurological changes/altered mental status. CT head was performed which resulted negative; MRI of the head was significant for a R cerebellar infarct.    PM&R was consulted and deemed her an appropriate candidate for IRF. She was medically stabilized and cleared for discharge to [facility].  (03 Aug 2023 13:49)      PAST MEDICAL & SURGICAL HISTORY:  HTN (hypertension)      Hyperlipidemia      Tinnitus  right ear      Seasonal allergies      S/P knee replacement          MEDICATIONS  (STANDING):  apixaban 5 milliGRAM(s) Oral every 12 hours  atorvastatin 80 milliGRAM(s) Oral at bedtime  diltiazem    milliGRAM(s) Oral daily  indomethacin Suppository 100 milliGRAM(s) Rectal once  levoFLOXacin  Tablet 750 milliGRAM(s) Oral every 24 hours  melatonin 6 milliGRAM(s) Oral at bedtime  metoprolol succinate  milliGRAM(s) Oral daily  metroNIDAZOLE    Tablet 500 milliGRAM(s) Oral every 8 hours  psyllium Powder 1 Packet(s) Oral daily  saccharomyces boulardii 250 milliGRAM(s) Oral two times a day    MEDICATIONS  (PRN):  acetaminophen     Tablet .. 650 milliGRAM(s) Oral every 6 hours PRN Mild Pain (1 - 3)  acetaminophen     Tablet .. 650 milliGRAM(s) Oral every 6 hours PRN Temp greater or equal to 38C (100.4F)  aluminum hydroxide/magnesium hydroxide/simethicone Suspension 30 milliLiter(s) Oral every 4 hours PRN Dyspepsia  benzonatate 100 milliGRAM(s) Oral three times a day PRN Cough  bisacodyl Suppository 10 milliGRAM(s) Rectal daily PRN Constipation      Allergies    No Known Allergies    Intolerances          VITALS  78y  Vital Signs Last 24 Hrs  T(C): 36.5 (21 Aug 2023 08:33), Max: 36.7 (20 Aug 2023 11:38)  T(F): 97.7 (21 Aug 2023 08:33), Max: 98 (20 Aug 2023 11:38)  HR: 87 (21 Aug 2023 08:33) (86 - 98)  BP: 116/79 (21 Aug 2023 08:33) (116/79 - 135/84)  BP(mean): --  RR: 16 (21 Aug 2023 08:33) (14 - 16)  SpO2: 96% (21 Aug 2023 08:33) (96% - 97%)    Parameters below as of 21 Aug 2023 08:33  Patient On (Oxygen Delivery Method): room air      Daily     Daily         RECENT LABS:                          13.0   7.87  )-----------( 221      ( 21 Aug 2023 07:58 )             40.0                     CAPILLARY BLOOD GLUCOSE                   Patient is a 78y old  Female who presents with a chief complaint of cerebellar CVA (20 Aug 2023 09:33)      HPI:  Karen Carlos is a 78 year old female RH dominant with PMH of HTN, and HLD; who presented to [facility] ED on 7/26/23 s/p fall from chair with + head strike, on the floor for over an hour. ER workup was significant AFIB with RVR and rhabdomyolysis. She was given Cardizem IVP and IVF with improvement HR to the 70s. She was diagnosed with new onset AFIB, seen by cardiology and started on Metoprolol and Diltiazem for rate control and was anticoagulated with Eliquis.  IV fluids were administered for rhabdomyolysis.     RUQ US was significant for acute cholecystitis and cholelithiasis with pericholecystic fluid and gallbladder wall thickening. Her HIDA scan resulted positive and a percutaneous cholecystectomy tube was placed by IR on 7/7.  Her blood cultures resulted positive for Strept Anginosus for which she was started on IV antibiotics.     Her hospital course was complicated by neurological changes/altered mental status. CT head was performed which resulted negative; MRI of the head was significant for a R cerebellar infarct.    PM&R was consulted and deemed her an appropriate candidate for IRF. She was medically stabilized and cleared for discharge to [facility].  (03 Aug 2023 13:49)      PAST MEDICAL & SURGICAL HISTORY:  HTN (hypertension)      Hyperlipidemia      Tinnitus  right ear      Seasonal allergies      S/P knee replacement          MEDICATIONS  (STANDING):  apixaban 5 milliGRAM(s) Oral every 12 hours  atorvastatin 80 milliGRAM(s) Oral at bedtime  diltiazem    milliGRAM(s) Oral daily  indomethacin Suppository 100 milliGRAM(s) Rectal once  levoFLOXacin  Tablet 750 milliGRAM(s) Oral every 24 hours  melatonin 6 milliGRAM(s) Oral at bedtime  metoprolol succinate  milliGRAM(s) Oral daily  metroNIDAZOLE    Tablet 500 milliGRAM(s) Oral every 8 hours  psyllium Powder 1 Packet(s) Oral daily  saccharomyces boulardii 250 milliGRAM(s) Oral two times a day    MEDICATIONS  (PRN):  acetaminophen     Tablet .. 650 milliGRAM(s) Oral every 6 hours PRN Mild Pain (1 - 3)  acetaminophen     Tablet .. 650 milliGRAM(s) Oral every 6 hours PRN Temp greater or equal to 38C (100.4F)  aluminum hydroxide/magnesium hydroxide/simethicone Suspension 30 milliLiter(s) Oral every 4 hours PRN Dyspepsia  benzonatate 100 milliGRAM(s) Oral three times a day PRN Cough  bisacodyl Suppository 10 milliGRAM(s) Rectal daily PRN Constipation      Allergies    No Known Allergies    Intolerances          VITALS  78y  Vital Signs Last 24 Hrs  T(C): 36.5 (21 Aug 2023 08:33), Max: 36.7 (20 Aug 2023 11:38)  T(F): 97.7 (21 Aug 2023 08:33), Max: 98 (20 Aug 2023 11:38)  HR: 87 (21 Aug 2023 08:33) (86 - 98)  BP: 116/79 (21 Aug 2023 08:33) (116/79 - 135/84)  BP(mean): --  RR: 16 (21 Aug 2023 08:33) (14 - 16)  SpO2: 96% (21 Aug 2023 08:33) (96% - 97%)    Parameters below as of 21 Aug 2023 08:33  Patient On (Oxygen Delivery Method): room air      Daily     Daily         RECENT LABS:                          13.0   7.87  )-----------( 221      ( 21 Aug 2023 07:58 )             40.0           08-21    140  |  102  |  11  ----------------------------<  100<H>  3.9   |  29  |  0.79    Ca    8.6      21 Aug 2023 07:58    TPro  6.4  /  Alb  2.5<L>  /  TBili  0.4  /  DBili  x   /  AST  31  /  ALT  21  /  AlkPhos  77  08-21            CAPILLARY BLOOD GLUCOSE

## 2023-08-21 NOTE — PROGRESS NOTE ADULT - ASSESSMENT
78 year old female with PMH HTN, and HLD who presented to [facility] ED 7/26/23 s/p fall from chair with + head strike. She was found to have new onset AFIB with RVR and Rhabdomyolysis. Her hospital course was complicated by acute cholecystitis and cholelithiasis requiring percutaneous enrique drain via IR on 7/7, Bacteremia, and cerebellar infarct     # right cerebellar CVA with incoordination, imbalance, cognitive impairment  - MRI 8/1: acute right cerebellar infarct. Scattered foci of T2/FLAIR hyperintensity in the bilateral hemispheric white matter  - ASA and Atorvastatin  - continue Comprehensive Rehab Program of PT/OT/SLP  - 3 hours a day, 5 days a week  - recreation therapy, psychology services   - Precautions: cardiac, AC, fall, AMS, enrique drain    # HTN  - Metoprolol XL 100mg daily  - Diltiazem CD 120mg daily  - /79 - 135/84) 8/21 controlled    # AFib RVR  - Metoprolol XL 100mg daily  - Eliquis 5 q12hrs   - HR 86-98 8/21    # Rhabdomyolysis  - S/p IVF  - CK 5502 7/25--> 109 8/4  - resolved    #  Cholecystitis   - s/p percutaneous cholecystectomy via IR on 7/7   -  A/P CT 8/7: Percutaneous cholecystostomy catheter is malpositioned within the descending colon distal to the cecum. Inflammation adjacent to the colon and appendix,likely reactive. Dilated common bile duct with suspicion of choledocholithiasis in the distal CBD. Cholelithiasis with gallbladder wall thickening and pericholecystic fat stranding. Acute diverticulitis in the mid sigmoid colon with associated localized perforation.  - MRCP 8/9: distended CBD up to 12mm + 2 stones in bile duct w/ borderline central intrahepatic ductal distention, + cholelithiasis decreased inflammation,  - GI appreciated. s/p ERCP 8/11 Sphincterotomy performed with stent placement  - Surgery appreciated 8/7. Low suspicion for non-drained infectious process. Leave drain in as precaution. May be candidate for outpatient laparoscopic cholecystectomy.  - continue levaquin/flagyl. Probiotic  - f/u outpatient for drain removal and cholecystectomy, discussed with patient    # bibasilar atelectasis noted on MRCP  - incentive spirometry, breathing exercises     # RUL Lung nodule found on CT chest (7/25)  - Incidental finding   - F/U PCP outpatient for repeat CT    # GI  - Senna dc due to loose stool 8/16  - metamucil for bulking 8/16  - on florastor secondary to Abx  - toileting program    # /h/o retention.  - continue monitoring bladder scan, SC for PVR >350 ml  - toileting program    # podiatry  - podiatry appreciated, s/p nail debridement    # Sleep  - Melatonin PRN     # Pain Mgmt   - Tylenol PRN    # FEN   - Diet - Regular + Thins  [DASH/TLC]    - Food preferences: no cheese, no yogurt, no fish, no apple juice placed in EMR    #  DVT PPX:  - Eliquis     # Case discussed in IDT rounds 8/21 (follow up):  -     - goals adjusted: supervision bADLs, CG stairs, supervision iaDLS and during waking hours  - caregiver training performed  - target dc home 8/24 with caregiver support waking hours and home Pt OT SLP    # LABs  CBC BMP 8/24      ---------------  Outpatient/Follow-Up:    Alli Pascual  Surgery  [address] [place] [address]  Phone: [phone]  Fax: [phone]  Follow Up Time: 1 week    Alexis Simon  Cardiovascular Disease  [address] [place], [place] [id number]  Phone: [phone]  Fax: [phone]  Follow Up Time: 1 week    BRIAN ALBA  [address] [place] [address]  Phone: ([phone]  Fax: ()-     78 year old female with PMH HTN, and HLD who presented to [facility] ED 7/26/23 s/p fall from chair with + head strike. She was found to have new onset AFIB with RVR and Rhabdomyolysis. Her hospital course was complicated by acute cholecystitis and cholelithiasis requiring percutaneous enrique drain via IR on 7/7, Bacteremia, and cerebellar infarct     # right cerebellar CVA with incoordination, imbalance, cognitive impairment  - MRI 8/1: acute right cerebellar infarct. Scattered foci of T2/FLAIR hyperintensity in the bilateral hemispheric white matter  - ASA and Atorvastatin  - continue Comprehensive Rehab Program of PT/OT/SLP  - 3 hours a day, 5 days a week  - recreation therapy, psychology services   - Precautions: cardiac, AC, fall, AMS, enrique drain    # HTN  - Metoprolol XL 100mg daily  - Diltiazem CD 120mg daily  - /79 - 135/84) 8/21 controlled    # AFib RVR  - Metoprolol XL 100mg daily  - Eliquis 5 q12hrs   - HR 86-98 8/21    # Rhabdomyolysis  - S/p IVF  - CK 5502 7/25--> 109 8/4  - resolved    #  Cholecystitis   - s/p percutaneous cholecystectomy via IR on 7/7   -  A/P CT 8/7: Percutaneous cholecystostomy catheter is malpositioned within the descending colon distal to the cecum. Inflammation adjacent to the colon and appendix,likely reactive. Dilated common bile duct with suspicion of choledocholithiasis in the distal CBD. Cholelithiasis with gallbladder wall thickening and pericholecystic fat stranding. Acute diverticulitis in the mid sigmoid colon with associated localized perforation.  - MRCP 8/9: distended CBD up to 12mm + 2 stones in bile duct w/ borderline central intrahepatic ductal distention, + cholelithiasis decreased inflammation,  - GI appreciated. s/p ERCP 8/11 Sphincterotomy performed with stent placement  - Surgery appreciated 8/7. Low suspicion for non-drained infectious process. Leave drain in as precaution. May be candidate for outpatient laparoscopic cholecystectomy.  - continue levaquin/flagyl. Probiotic  - LFTs:   AST  31  /  ALT  21  /  AlkPhos  77  08-21  - f/u outpatient for drain removal and cholecystectomy, discussed with patient    # bibasilar atelectasis noted on MRCP  - incentive spirometry, breathing exercises     # RUL Lung nodule found on CT chest (7/25)  - Incidental finding   - F/U PCP outpatient for repeat CT    # GI  - Senna dc due to loose stool 8/16  - metamucil for bulking 8/16  - on florastor secondary to Abx  - toileting program    # /h/o retention.  - continue monitoring bladder scan, SC for PVR >350 ml  - toileting program    # podiatry  - podiatry appreciated, s/p nail debridement    # Sleep  - Melatonin PRN     # Pain Mgmt   - Tylenol PRN    # FEN   - Diet - Regular + Thins  [DASH/TLC]    - Food preferences: no cheese, no yogurt, no fish, no apple juice placed in EMR    #  DVT PPX:  - Eliquis     # Case discussed in IDT rounds 8/21 (follow up):  -     - goals adjusted: supervision bADLs, CG stairs, supervision iaDLS and during waking hours  - caregiver training performed  - target dc home 8/24 with caregiver support waking hours and home Pt OT SLP    # LABs  CBC BMP 8/24      ---------------  Outpatient/Follow-Up:    Alli Pascual  Surgery  [address] [place] [address]  Phone: [phone]  Fax: [phone]  Follow Up Time: 1 week    Alexis Simon  Cardiovascular Disease  [address] [place], [place] [id number]  Phone: [phone]  Fax: [phone]  Follow Up Time: 1 week    BRIAN ALBA  [address] [place] [address]  Phone: ()-  Fax: ()-     78 year old female with PMH HTN, and HLD who presented to [place] ED 7/26/23 s/p fall from chair with + head strike. She was found to have new onset AFIB with RVR and Rhabdomyolysis. Her hospital course was complicated by acute cholecystitis and cholelithiasis requiring percutaneous enrique drain via IR on 7/7, Bacteremia, and cerebellar infarct     # right cerebellar CVA with incoordination, imbalance, cognitive impairment  - MRI 8/1: acute right cerebellar infarct. Scattered foci of T2/FLAIR hyperintensity in the bilateral hemispheric white matter  - ASA and Atorvastatin  - continue Comprehensive Rehab Program of PT/OT/SLP  - 3 hours a day, 5 days a week  - recreation therapy, psychology services   - Precautions: cardiac, AC, fall, AMS, enrique drain    # HTN  - Metoprolol XL 100mg daily  - Diltiazem CD 120mg daily  - /79 - 135/84) 8/21 controlled    # AFib RVR  - Metoprolol XL 100mg daily  - Eliquis 5 q12hrs   - HR 86-98 8/21    # cough  - non-productive. Afebrile, O2 sats stable WBC 7.87  - h/o atelectasis on previous CT  - CXR as discussed with hospitalist  - IS, tessalon perle    # Rhabdomyolysis  - S/p IVF  - CK 5502 7/25--> 109 8/4  - resolved    #  Cholecystitis   - s/p percutaneous cholecystectomy via IR on 7/7   -  A/P CT 8/7: Percutaneous cholecystostomy catheter is malpositioned within the descending colon distal to the cecum. Inflammation adjacent to the colon and appendix,likely reactive. Dilated common bile duct with suspicion of choledocholithiasis in the distal CBD. Cholelithiasis with gallbladder wall thickening and pericholecystic fat stranding. Acute diverticulitis in the mid sigmoid colon with associated localized perforation.  - MRCP 8/9: distended CBD up to 12mm + 2 stones in bile duct w/ borderline central intrahepatic ductal distention, + cholelithiasis decreased inflammation,  - GI appreciated. s/p ERCP 8/11 Sphincterotomy performed with stent placement  - Surgery appreciated 8/7. Low suspicion for non-drained infectious process. Leave drain in as precaution. May be candidate for outpatient laparoscopic cholecystectomy.  - continue levaquin/flagyl. Probiotic  - LFTs:   AST  31  /  ALT  21  /  AlkPhos  77  08-21  - f/u outpatient for drain removal and cholecystectomy, discussed with patient    # bibasilar atelectasis noted on MRCP  - incentive spirometry, breathing exercises     # RUL Lung nodule found on CT chest (7/25)  - Incidental finding   - F/U PCP outpatient for repeat CT    # GI  - Senna dc due to loose stool 8/16  - metamucil for bulking 8/16  - on florastor secondary to Abx  - toileting program. Incontinence episodes improved    # /h/o retention.  - continue monitoring bladder scan, SC for PVR >350 ml  - toileting program    # podiatry  - podiatry appreciated, s/p nail debridement    # Sleep  - Melatonin PRN     # Pain Mgmt   - Tylenol PRN    # FEN   - Diet - Regular + Thins  [DASH/TLC]    - Food preferences: no cheese, no yogurt, no fish, no apple juice placed in EMR    #  DVT PPX:  - Eliquis     # Case discussed in IDT rounds 8/21 (follow up):  - tolerating reg/thin liquids, increased cognition and attention. CG bADLs and functional transfers, min assist/CG stairs with bilateral HR and ambulation with RW  - goals adjusted: supervision iADLs, supervision/CG  bADLs, CG ambulation/stairs, supervision iaDLS and during waking hours  - caregiver training performed  - adjusted target dc home 8/24 with caregiver support 24/7  and home Pt OT SLP    # LABs  CXR  CBC BMP 8/24      ---------------  Outpatient/Follow-Up:    Alli Pascual  Surgery  [address] [place] [address]  Phone: [phone]  Fax: [phone]  Follow Up Time: 1 week    Alexis Simon  Cardiovascular Disease  [address] [place], [place] [id number]  Phone: [phone]  Fax: [phone]  Follow Up Time: 1 week    BRIAN ALBA  [address] [place] [address]  Phone: ([phone]  Fax: ()-

## 2023-08-22 LAB
ALBUMIN SERPL ELPH-MCNC: 2.6 G/DL — LOW (ref 3.3–5)
ALP SERPL-CCNC: 81 U/L — SIGNIFICANT CHANGE UP (ref 40–120)
ALT FLD-CCNC: 24 U/L — SIGNIFICANT CHANGE UP (ref 10–45)
ANION GAP SERPL CALC-SCNC: 8 MMOL/L — SIGNIFICANT CHANGE UP (ref 5–17)
APPEARANCE UR: CLEAR — SIGNIFICANT CHANGE UP
AST SERPL-CCNC: 25 U/L — SIGNIFICANT CHANGE UP (ref 10–40)
BILIRUB SERPL-MCNC: 0.4 MG/DL — SIGNIFICANT CHANGE UP (ref 0.2–1.2)
BILIRUB UR-MCNC: NEGATIVE — SIGNIFICANT CHANGE UP
BUN SERPL-MCNC: 13 MG/DL — SIGNIFICANT CHANGE UP (ref 7–23)
CALCIUM SERPL-MCNC: 8.7 MG/DL — SIGNIFICANT CHANGE UP (ref 8.4–10.5)
CHLORIDE SERPL-SCNC: 101 MMOL/L — SIGNIFICANT CHANGE UP (ref 96–108)
CO2 SERPL-SCNC: 29 MMOL/L — SIGNIFICANT CHANGE UP (ref 22–31)
COLOR SPEC: YELLOW — SIGNIFICANT CHANGE UP
CREAT SERPL-MCNC: 0.77 MG/DL — SIGNIFICANT CHANGE UP (ref 0.5–1.3)
DIFF PNL FLD: NEGATIVE — SIGNIFICANT CHANGE UP
EGFR: 80 ML/MIN/1.73M2 — SIGNIFICANT CHANGE UP
GLUCOSE SERPL-MCNC: 126 MG/DL — HIGH (ref 70–99)
GLUCOSE UR QL: NEGATIVE MG/DL — SIGNIFICANT CHANGE UP
HCT VFR BLD CALC: 37.7 % — SIGNIFICANT CHANGE UP (ref 34.5–45)
HGB BLD-MCNC: 12.8 G/DL — SIGNIFICANT CHANGE UP (ref 11.5–15.5)
KETONES UR-MCNC: NEGATIVE MG/DL — SIGNIFICANT CHANGE UP
LACTATE SERPL-SCNC: 1.9 MMOL/L — SIGNIFICANT CHANGE UP (ref 0.7–2)
LEUKOCYTE ESTERASE UR-ACNC: NEGATIVE — SIGNIFICANT CHANGE UP
MCHC RBC-ENTMCNC: 31.1 PG — SIGNIFICANT CHANGE UP (ref 27–34)
MCHC RBC-ENTMCNC: 34 GM/DL — SIGNIFICANT CHANGE UP (ref 32–36)
MCV RBC AUTO: 91.7 FL — SIGNIFICANT CHANGE UP (ref 80–100)
NITRITE UR-MCNC: NEGATIVE — SIGNIFICANT CHANGE UP
NRBC # BLD: 0 /100 WBCS — SIGNIFICANT CHANGE UP (ref 0–0)
PH UR: 6.5 — SIGNIFICANT CHANGE UP (ref 5–8)
PLATELET # BLD AUTO: 189 K/UL — SIGNIFICANT CHANGE UP (ref 150–400)
POTASSIUM SERPL-MCNC: 4.1 MMOL/L — SIGNIFICANT CHANGE UP (ref 3.5–5.3)
POTASSIUM SERPL-SCNC: 4.1 MMOL/L — SIGNIFICANT CHANGE UP (ref 3.5–5.3)
PROCALCITONIN SERPL-MCNC: 0.06 NG/ML — SIGNIFICANT CHANGE UP
PROT SERPL-MCNC: 6.8 G/DL — SIGNIFICANT CHANGE UP (ref 6–8.3)
PROT UR-MCNC: NEGATIVE MG/DL — SIGNIFICANT CHANGE UP
RAPID RVP RESULT: DETECTED
RBC # BLD: 4.11 M/UL — SIGNIFICANT CHANGE UP (ref 3.8–5.2)
RBC # FLD: 14.1 % — SIGNIFICANT CHANGE UP (ref 10.3–14.5)
RV+EV RNA SPEC QL NAA+PROBE: DETECTED
SARS-COV-2 RNA SPEC QL NAA+PROBE: SIGNIFICANT CHANGE UP
SODIUM SERPL-SCNC: 138 MMOL/L — SIGNIFICANT CHANGE UP (ref 135–145)
SP GR SPEC: 1 — SIGNIFICANT CHANGE UP (ref 1–1.03)
UROBILINOGEN FLD QL: 0.2 MG/DL — SIGNIFICANT CHANGE UP (ref 0.2–1)
WBC # BLD: 6.7 K/UL — SIGNIFICANT CHANGE UP (ref 3.8–10.5)
WBC # FLD AUTO: 6.7 K/UL — SIGNIFICANT CHANGE UP (ref 3.8–10.5)

## 2023-08-22 PROCEDURE — 99232 SBSQ HOSP IP/OBS MODERATE 35: CPT

## 2023-08-22 RX ORDER — DILTIAZEM HCL 120 MG
1 CAPSULE, EXT RELEASE 24 HR ORAL
Qty: 30 | Refills: 0
Start: 2023-08-22 | End: 2023-09-20

## 2023-08-22 RX ORDER — APIXABAN 2.5 MG/1
1 TABLET, FILM COATED ORAL
Qty: 60 | Refills: 0
Start: 2023-08-22 | End: 2023-09-20

## 2023-08-22 RX ORDER — METOPROLOL TARTRATE 50 MG
1 TABLET ORAL
Qty: 30 | Refills: 0
Start: 2023-08-22 | End: 2023-09-20

## 2023-08-22 RX ORDER — ATORVASTATIN CALCIUM 80 MG/1
1 TABLET, FILM COATED ORAL
Qty: 30 | Refills: 0
Start: 2023-08-22 | End: 2023-09-20

## 2023-08-22 RX ADMIN — Medication 120 MILLIGRAM(S): at 05:42

## 2023-08-22 RX ADMIN — Medication 100 MILLIGRAM(S): at 21:09

## 2023-08-22 RX ADMIN — Medication 250 MILLIGRAM(S): at 17:29

## 2023-08-22 RX ADMIN — Medication 100 MILLIGRAM(S): at 05:43

## 2023-08-22 RX ADMIN — Medication 500 MILLIGRAM(S): at 05:43

## 2023-08-22 RX ADMIN — Medication 6 MILLIGRAM(S): at 21:09

## 2023-08-22 RX ADMIN — APIXABAN 5 MILLIGRAM(S): 2.5 TABLET, FILM COATED ORAL at 21:09

## 2023-08-22 RX ADMIN — ATORVASTATIN CALCIUM 80 MILLIGRAM(S): 80 TABLET, FILM COATED ORAL at 21:09

## 2023-08-22 RX ADMIN — Medication 250 MILLIGRAM(S): at 05:43

## 2023-08-22 RX ADMIN — APIXABAN 5 MILLIGRAM(S): 2.5 TABLET, FILM COATED ORAL at 09:24

## 2023-08-22 RX ADMIN — Medication 650 MILLIGRAM(S): at 00:30

## 2023-08-22 RX ADMIN — Medication 100 MILLIGRAM(S): at 16:21

## 2023-08-22 NOTE — PROGRESS NOTE ADULT - COMMENTS
Patient seen with daughter at bedside Had fever last night and nonproductive cough. Results of fever workup and RVP discussed; no isolation needed as confirmed with hospital infection control and discussed with patient and family    we also reviewed hospital course, including medical issues, results of imaging and ERCP and functional status and recommendations for 24/7 supervision on dc. SW to further discuss with family

## 2023-08-22 NOTE — PROGRESS NOTE ADULT - ASSESSMENT
78 year old female with PMH HTN, and HLD who presented to [facility] ED 7/26/23 s/p fall from chair with + head strike. She was found to have new onset AFIB with RVR and Rhabdomyolysis. Her hospital course was complicated by acute cholecystitis and cholelithiasis requiring percutaneous enrique drain via IR on 7/7, Bacteremia, and cerebellar infarct     # right cerebellar CVA with incoordination, imbalance, cognitive impairment  - MRI 8/1: acute right cerebellar infarct. Scattered foci of T2/FLAIR hyperintensity in the bilateral hemispheric white matter  - ASA and Atorvastatin  - continue Comprehensive Rehab Program of PT/OT/SLP  - 3 hours a day, 5 days a week  - recreation therapy, psychology services   - Precautions: cardiac, AC, fall, AMS, enrique drain    # fever/cough  - T 101.2 max 8./22  - CXR 8/21: prelim read no effusion or infiltrates noted  - RVP 8/22 + enterovirus/rhinovirus. No additional precautions needed per infection control  - IS, tessalon perle  - tylenol PRN    # HTN  - Metoprolol XL 100mg daily  - Diltiazem CD 120mg daily  - BP  (125/78 - 156/80) 8/22    # AFib RVR  - Metoprolol XL 100mg daily  - Eliquis 5 q12hrs     # Rhabdomyolysis  - S/p IVF  - CK 5502 7/25--> 109 8/4  - resolved    #  Cholecystitis   - s/p percutaneous cholecystectomy via IR on 7/7   -  A/P CT 8/7: Percutaneous cholecystostomy catheter is malpositioned within the descending colon distal to the cecum. Inflammation adjacent to the colon and appendix,likely reactive. Dilated common bile duct with suspicion of choledocholithiasis in the distal CBD. Cholelithiasis with gallbladder wall thickening and pericholecystic fat stranding. Acute diverticulitis in the mid sigmoid colon with associated localized perforation.  - MRCP 8/9: distended CBD up to 12mm + 2 stones in bile duct w/ borderline central intrahepatic ductal distention, + cholelithiasis decreased inflammation,  - GI appreciated. s/p ERCP 8/11 Sphincterotomy performed with stent placement  - Surgery appreciated 8/7. Low suspicion for non-drained infectious process. Leave drain in as precaution. May be candidate for outpatient laparoscopic cholecystectomy.  - continue levaquin/flagyl. Probiotic  - LFTs:   AST  31  /  ALT  21  /  AlkPhos  77  08-21  - f/u outpatient for drain removal and cholecystectomy, discussed with patient    # bibasilar atelectasis noted on MRCP  - incentive spirometry, breathing exercises     # RUL Lung nodule found on CT chest (7/25)  - Incidental finding   - F/U PCP outpatient for repeat CT    # GI  - Senna dc due to loose stool 8/16  - metamucil for bulking 8/16  - on florastor secondary to Abx  - toileting program. Incontinence episodes improved    # /h/o retention.  - continue monitoring bladder scan, SC for PVR >350 ml  - toileting program    # podiatry  - podiatry appreciated, s/p nail debridement    # Sleep  - Melatonin PRN     # Pain Mgmt   - Tylenol PRN    # FEN   - Diet - Regular + Thins  [DASH/TLC]    - Food preferences: no cheese, no yogurt, no fish, no apple juice placed in EMR    #  DVT PPX:  - Eliquis     # Case discussed in IDT rounds 8/21 (follow up):  - tolerating reg/thin liquids, increased cognition and attention. CG bADLs and functional transfers, min assist/CG stairs with bilateral HR and ambulation with RW  - goals adjusted: supervision iADLs, supervision/CG  bADLs, CG ambulation/stairs, supervision iaDLS and during waking hours  - caregiver training performed  - adjusted target dc home 8/24 with caregiver support 24/7  and home Pt OT SLP    # LABs  CXR official read  CBC BMP 8/24      ---------------  Outpatient/Follow-Up:    Alli Pascual  Surgery  [address] [place] [address]  Phone: [phone]  Fax: [phone]  Follow Up Time: 1 week    Alexis Simon  Cardiovascular Disease  [address] [place], [place] [id number]  Phone: [phone]  Fax: [phone]  Follow Up Time: 1 week    BRIAN ALBA  [address] [place] [address]  Phone: ([phone]  Fax: ()-     78 year old female with PMH HTN, and HLD who presented to [facility] ED 7/26/23 s/p fall from chair with + head strike. She was found to have new onset AFIB with RVR and Rhabdomyolysis. Her hospital course was complicated by acute cholecystitis and cholelithiasis requiring percutaneous enrique drain via IR on 7/7, Bacteremia, and cerebellar infarct     # right cerebellar CVA with incoordination, imbalance, cognitive impairment  - MRI 8/1: acute right cerebellar infarct. Scattered foci of T2/FLAIR hyperintensity in the bilateral hemispheric white matter  - ASA and Atorvastatin  - continue Comprehensive Rehab Program of PT/OT/SLP  - 3 hours a day, 5 days a week  - recreation therapy, psychology services   - Precautions: cardiac, AC, fall, AMS, enrique drain    # fever/cough  - T 101.2 max 8./22  - CXR 8/21: prelim read no effusion or infiltrates noted  - RVP 8/22 + enterovirus/rhinovirus. No additional precautions needed per infection control  - IS, tessalon perle changed to standing  - robitussin PRN added  - tylenol PRN    # HTN  - Metoprolol XL 100mg daily  - Diltiazem CD 120mg daily  - BP  (125/78 - 156/80) 8/22    # AFib RVR  - Metoprolol XL 100mg daily  - Eliquis 5 q12hrs     # Rhabdomyolysis  - S/p IVF  - CK 5502 7/25--> 109 8/4  - resolved    #  Cholecystitis   - s/p percutaneous cholecystectomy via IR on 7/7   -  A/P CT 8/7: Percutaneous cholecystostomy catheter is malpositioned within the descending colon distal to the cecum. Inflammation adjacent to the colon and appendix,likely reactive. Dilated common bile duct with suspicion of choledocholithiasis in the distal CBD. Cholelithiasis with gallbladder wall thickening and pericholecystic fat stranding. Acute diverticulitis in the mid sigmoid colon with associated localized perforation.  - MRCP 8/9: distended CBD up to 12mm + 2 stones in bile duct w/ borderline central intrahepatic ductal distention, + cholelithiasis decreased inflammation,  - GI appreciated. s/p ERCP 8/11 Sphincterotomy performed with stent placement  - Surgery appreciated 8/7. Low suspicion for non-drained infectious process. Leave drain in as precaution. May be candidate for outpatient laparoscopic cholecystectomy.  - continue levaquin/flagyl. Probiotic  - LFTs:   AST  31  /  ALT  21  /  AlkPhos  77  08-21  - f/u outpatient for drain removal and cholecystectomy, discussed with patient    # bibasilar atelectasis noted on MRCP  - incentive spirometry, breathing exercises     # RUL Lung nodule found on CT chest (7/25)  - Incidental finding   - F/U PCP outpatient for repeat CT    # GI  - Senna dc due to loose stool 8/16  - metamucil for bulking 8/16  - on florastor secondary to Abx  - toileting program. Incontinence episodes improved    # /h/o retention.  - continue monitoring bladder scan, SC for PVR >350 ml  - toileting program    # podiatry  - podiatry appreciated, s/p nail debridement    # Sleep  - Melatonin PRN     # Pain Mgmt   - Tylenol PRN    # FEN   - Diet - Regular + Thins  [DASH/TLC]    - Food preferences: no cheese, no yogurt, no fish, no apple juice placed in EMR    #  DVT PPX:  - Eliquis     # Case discussed in IDT rounds 8/21 (follow up):  - tolerating reg/thin liquids, increased cognition and attention. CG bADLs and functional transfers, min assist/CG stairs with bilateral HR and ambulation with RW  - goals adjusted: supervision iADLs, supervision/CG  bADLs, CG ambulation/stairs, supervision iaDLS and during waking hours  - caregiver training performed  - adjusted target dc home 8/24 with caregiver support 24/7  and home Pt OT SLP    # LABs  CXR official read  CBC BMP 8/24      ---------------  Outpatient/Follow-Up:    Alli Pascual  Surgery  [address] [place] [address]  Phone: [phone]  Fax: [phone]  Follow Up Time: 1 week    Alexis Simon  Cardiovascular Disease  [address] [place], [place] [id number]  Phone: [phone]  Fax: [phone]  Follow Up Time: 1 week    BRIAN ALBA  [address] [place] [address]  Phone: ()-  Fax: ()-     78 year old female with PMH HTN, and HLD who presented to [facility] 7/26/23 s/p fall from chair with + head strike. She was found to have new onset AFIB with RVR and Rhabdomyolysis. Her hospital course was complicated by acute cholecystitis and cholelithiasis requiring percutaneous enrique drain via IR on 7/7, Bacteremia, and cerebellar infarct     # right cerebellar CVA with incoordination, imbalance, cognitive impairment  - MRI 8/1: acute right cerebellar infarct. Scattered foci of T2/FLAIR hyperintensity in the bilateral hemispheric white matter  - ASA and Atorvastatin  - continue Comprehensive Rehab Program of PT/OT/SLP  - 3 hours a day, 5 days a week  - recreation therapy, psychology services   - Precautions: cardiac, AC, fall, AMS, enrique drain    # fever/cough  - T 101.2 max 8./22  - CXR 8/21: prelim read no effusion or infiltrates noted  - RVP 8/22 + enterovirus/rhinovirus. No additional precautions needed per infection control  - IS, tessalon perle changed to standing  - robitussin PRN added  - tylenol PRN  - Reviewed with patient and daughter    # HTN  - Metoprolol XL 100mg daily  - Diltiazem CD 120mg daily  - BP  (125/78 - 156/80) 8/22    # AFib RVR  - Metoprolol XL 100mg daily  - Eliquis 5 q12hrs . Medication reviewed with patient and daughter    # Rhabdomyolysis  - S/p IVF  - CK 5502 7/25--> 109 8/4  - resolved    #  Cholecystitis   - s/p percutaneous cholecystectomy via IR on 7/7   -  A/P CT 8/7: Percutaneous cholecystostomy catheter is malpositioned within the descending colon distal to the cecum. Inflammation adjacent to the colon and appendix,likely reactive. Dilated common bile duct with suspicion of choledocholithiasis in the distal CBD. Cholelithiasis with gallbladder wall thickening and pericholecystic fat stranding. Acute diverticulitis in the mid sigmoid colon with associated localized perforation.  - MRCP 8/9: distended CBD up to 12mm + 2 stones in bile duct w/ borderline central intrahepatic ductal distention, + cholelithiasis decreased inflammation,  - GI appreciated. s/p ERCP 8/11 Sphincterotomy performed with stent placement  - Surgery appreciated 8/7. Low suspicion for non-drained infectious process. Leave drain in as precaution. May be candidate for outpatient laparoscopic cholecystectomy.  - levaquin/flagyl. --> may dc, discussed with GI 8/22  - LFTs:   AST  31  /  ALT  21  /  AlkPhos  77  08-21  - f/u outpatient for drain removal and cholecystectomy, discussed with patient    # bibasilar atelectasis noted on MRCP  - incentive spirometry, breathing exercises     # RUL Lung nodule found on CT chest (7/25)  - Incidental finding   - F/U PCP outpatient for repeat CT    # GI  - Senna dc due to loose stool 8/16  - metamucil for bulking 8/16  - on florastor secondary to Abx  - toileting program. Incontinence episodes improved    # /h/o retention.  - continue monitoring bladder scan, SC for PVR >350 ml  - toileting program    # podiatry  - podiatry appreciated, s/p nail debridement    # Sleep  - Melatonin PRN     # Pain Mgmt   - Tylenol PRN    # FEN   - Diet - Regular + Thins  [DASH/TLC]    - Food preferences: no cheese, no yogurt, no fish, no apple juice placed in EMR    #  DVT PPX:  - Eliquis     # Case discussed in IDT rounds 8/21 (follow up):  - tolerating reg/thin liquids, increased cognition and attention. CG bADLs and functional transfers, min assist/CG stairs with bilateral HR and ambulation with RW  - goals adjusted: supervision iADLs, supervision/CG  bADLs, CG ambulation/stairs, supervision iaDLS and during waking hours  - caregiver training performed  - adjusted target dc home 8/24 with caregiver support 24/7  and home Pt OT SLP  - Pharmacy: [facility] [place]    # LABs  CXR official read  CBC BMP 8/24      ---------------  Outpatient/Follow-Up:    Alli Pascual  Surgery  [address] [place] [address]  Phone: [phone]  Fax: [phone]  Follow Up Time: 1 week    Alexis Simon  Cardiovascular Disease  [address] [place], [place] [id number]  Phone: [phone]  Fax: [phone]  Follow Up Time: 1 week    BRIAN ALBA  [address] [place] [address]  Phone: ()-  Fax: ()-     78 year old female with PMH HTN, and HLD who presented to [facility] ED 7/26/23 s/p fall from chair with + head strike. She was found to have new onset AFIB with RVR and Rhabdomyolysis. Her hospital course was complicated by acute cholecystitis and cholelithiasis requiring percutaneous enrique drain via IR on 7/7, Bacteremia, and cerebellar infarct     # right cerebellar CVA with incoordination, imbalance, cognitive impairment  - MRI 8/1: acute right cerebellar infarct. Scattered foci of T2/FLAIR hyperintensity in the bilateral hemispheric white matter  - ASA and Atorvastatin  - continue Comprehensive Rehab Program of PT/OT/SLP  - 3 hours a day, 5 days a week  - recreation therapy, psychology services   - Precautions: cardiac, AC, fall, AMS, enrique drain    # fever/cough  - T 101.2 max 8./22  - CXR 8/21: prelim read no effusion or infiltrates noted  - RVP 8/22 + enterovirus/rhinovirus. No additional precautions needed per infection control  - IS, tessalon bettye changed to standing  - robitussin PRN added  - tylenol PRN  - Reviewed with patient and daughter    # HTN  - Metoprolol XL 100mg daily  - Diltiazem CD 120mg daily  - BP  (125/78 - 156/80) 8/22    # AFib RVR  - Metoprolol XL 100mg daily  - Eliquis 5 q12hrs . Medication reviewed with patient and daughter. Confirmed covered by insurance with $10 copay 8/22    # Rhabdomyolysis  - S/p IVF  - CK 5502 7/25--> 109 8/4  - resolved    #  Cholecystitis   - s/p percutaneous cholecystectomy via IR on 7/7   -  A/P CT 8/7: Percutaneous cholecystostomy catheter is malpositioned within the descending colon distal to the cecum. Inflammation adjacent to the colon and appendix,likely reactive. Dilated common bile duct with suspicion of choledocholithiasis in the distal CBD. Cholelithiasis with gallbladder wall thickening and pericholecystic fat stranding. Acute diverticulitis in the mid sigmoid colon with associated localized perforation.  - MRCP 8/9: distended CBD up to 12mm + 2 stones in bile duct w/ borderline central intrahepatic ductal distention, + cholelithiasis decreased inflammation,  - GI appreciated. s/p ERCP 8/11 Sphincterotomy performed with stent placement  - Surgery appreciated 8/7. Low suspicion for non-drained infectious process. Leave drain in as precaution. May be candidate for outpatient laparoscopic cholecystectomy.  - levaquin/flagyl. --> may dc, discussed with GI 8/22  - LFTs:   AST  31  /  ALT  21  /  AlkPhos  77  08-21  - f/u outpatient for drain removal and cholecystectomy, discussed with patient    # bibasilar atelectasis noted on MRCP  - incentive spirometry, breathing exercises     # RUL Lung nodule found on CT chest (7/25)  - Incidental finding   - F/U PCP outpatient for repeat CT    # GI  - Senna dc due to loose stool 8/16  - metamucil for bulking 8/16  - on florastor secondary to Abx  - toileting program. Incontinence episodes improved    # /h/o retention.  - continue monitoring bladder scan, SC for PVR >350 ml  - toileting program    # podiatry  - podiatry appreciated, s/p nail debridement    # Sleep  - Melatonin PRN     # Pain Mgmt   - Tylenol PRN    # FEN   - Diet - Regular + Thins  [DASH/TLC]    - Food preferences: no cheese, no yogurt, no fish, no apple juice placed in EMR    #  DVT PPX:  - Eliquis     # Case discussed in IDT rounds 8/21 (follow up):  - tolerating reg/thin liquids, increased cognition and attention. CG bADLs and functional transfers, min assist/CG stairs with bilateral HR and ambulation with RW  - goals adjusted: supervision iADLs, supervision/CG  bADLs, CG ambulation/stairs, supervision iaDLS and during waking hours  - caregiver training performed  - adjusted target dc home 8/24 with caregiver support 24/7  and home Pt OT SLP  - Pharmacy: [facility] [place]    # LABs  CXR official read  CBC BMP 8/24      ---------------  Outpatient/Follow-Up:    Alli Pascual  Surgery  [address] [place] [address]  Phone: [phone]  Fax: [phone]  Follow Up Time: 1 week    Alexis Simon  Cardiovascular Disease  [address] [place], [place] [id number]  Phone: [phone]  Fax: [phone]  Follow Up Time: 1 week    BRIAN ALBA  [address] [place] [address]  Phone: ()-  Fax: ()-

## 2023-08-22 NOTE — PROGRESS NOTE ADULT - ASSESSMENT
78F with HTN, HLD, recently hospitalized at OSH with acute stroke - with course complicated by acute cholecystitis requiring percutaneous cholecystostomy tube as well as new AF, now in acute rehab. A CT scan was performed today for new abdominal pain, which shows a malpositioned cholecystostomy tube, acute complicated diverticulitis and choledocholithiasis     #Malpositioned cholecystostomy tube  #Acute sigmoid diverticulitis with localized perforation  #Pneumoperitoneum due to above  #Choledocholithiasis, likely new (CBD was WNL on prior imaging)  #Cholecystitis  -S/p ERCP  -Will need to discuss w/ GI regarding abx duration. In the meantime, continue Levaquin/Flagyl  -currently asymptomatic continue to monitor    #Chronic AF  -c/w Toprol  -c/w Diltiazem  -c/w Eliquis    #Lung nodule  -outpatient follow-up  -hx of pulm fibrosis  -ENTEROVIRUS positive - start robitussin PRN and tesselon perrl supportive care    DVT ppx: eliquis

## 2023-08-22 NOTE — PROGRESS NOTE ADULT - TIME BILLING
Discussion regarding all options and risks; all lab values and imaging studies reviewed; discussed with Medicine
Reviewing chart notes and data, face to face time counseling the patient, communicating with Dr. Smith (physiatrist), coordinating care with SW/CM at [facility].

## 2023-08-22 NOTE — PROGRESS NOTE ADULT - SUBJECTIVE AND OBJECTIVE BOX
Patient is a 78y old  Female who presents with a chief complaint of cerebellar CVA (21 Aug 2023 13:36)      HPI:  Karen Carlos is a 78 year old female RH dominant with PMH of HTN, and HLD; who presented to [facility] ED on 23 s/p fall from chair with + head strike, on the floor for over an hour. ER workup was significant AFIB with RVR and rhabdomyolysis. She was given Cardizem IVP and IVF with improvement HR to the 70s. She was diagnosed with new onset AFIB, seen by cardiology and started on Metoprolol and Diltiazem for rate control and was anticoagulated with Eliquis.  IV fluids were administered for rhabdomyolysis.     RUQ US was significant for acute cholecystitis and cholelithiasis with pericholecystic fluid and gallbladder wall thickening. Her HIDA scan resulted positive and a percutaneous cholecystectomy tube was placed by IR on .  Her blood cultures resulted positive for Strept Anginosus for which she was started on IV antibiotics.     Her hospital course was complicated by neurological changes/altered mental status. CT head was performed which resulted negative; MRI of the head was significant for a R cerebellar infarct.    PM&R was consulted and deemed her an appropriate candidate for IRF. She was medically stabilized and cleared for discharge to [facility].  (03 Aug 2023 13:49)      PAST MEDICAL & SURGICAL HISTORY:  HTN (hypertension)      Hyperlipidemia      Tinnitus  right ear      Seasonal allergies      S/P knee replacement          MEDICATIONS  (STANDING):  apixaban 5 milliGRAM(s) Oral every 12 hours  atorvastatin 80 milliGRAM(s) Oral at bedtime  diltiazem    milliGRAM(s) Oral daily  indomethacin Suppository 100 milliGRAM(s) Rectal once  levoFLOXacin  Tablet 750 milliGRAM(s) Oral every 24 hours  melatonin 6 milliGRAM(s) Oral at bedtime  metoprolol succinate  milliGRAM(s) Oral daily  metroNIDAZOLE    Tablet 500 milliGRAM(s) Oral every 8 hours  psyllium Powder 1 Packet(s) Oral daily  saccharomyces boulardii 250 milliGRAM(s) Oral two times a day    MEDICATIONS  (PRN):  acetaminophen     Tablet .. 650 milliGRAM(s) Oral every 6 hours PRN Mild Pain (1 - 3)  acetaminophen     Tablet .. 650 milliGRAM(s) Oral every 6 hours PRN Temp greater or equal to 38C (100.4F)  aluminum hydroxide/magnesium hydroxide/simethicone Suspension 30 milliLiter(s) Oral every 4 hours PRN Dyspepsia  benzonatate 100 milliGRAM(s) Oral three times a day PRN Cough  bisacodyl Suppository 10 milliGRAM(s) Rectal daily PRN Constipation      Allergies    No Known Allergies    Intolerances          VITALS  78y  Vital Signs Last 24 Hrs  T(C): 36.8 (22 Aug 2023 05:40), Max: 38.4 (21 Aug 2023 23:44)  T(F): 98.3 (22 Aug 2023 05:40), Max: 101.2 (21 Aug 2023 23:44)  HR: 94 (22 Aug 2023 05:40) (78 - 100)  BP: 125/78 (22 Aug 2023 05:40) (125/78 - 156/80)  BP(mean): --  RR: 20 (22 Aug 2023 05:40) (16 - 24)  SpO2: 96% (22 Aug 2023 05:40) (95% - 96%)    Parameters below as of 22 Aug 2023 05:40  Patient On (Oxygen Delivery Method): room air      Daily     Daily         RECENT LABS:                          12.8   6.70  )-----------( 189      ( 22 Aug 2023 01:00 )             37.7     08-22    138  |  101  |  13  ----------------------------<  126<H>  4.1   |  29  |  0.77    Ca    8.7      22 Aug 2023 01:00    TPro  6.8  /  Alb  2.6<L>  /  TBili  0.4  /  DBili  x   /  AST  25  /  ALT  24  /  AlkPhos  81  08-22    LIVER FUNCTIONS - ( 22 Aug 2023 01:00 )  Alb: 2.6 g/dL / Pro: 6.8 g/dL / ALK PHOS: 81 U/L / ALT: 24 U/L / AST: 25 U/L / GGT: x             Urinalysis Basic - ( 22 Aug 2023 06:00 )    Color: Yellow / Appearance: Clear / S.005 / pH: x  Gluc: x / Ketone: Negative mg/dL  / Bili: Negative / Urobili: 0.2 mg/dL   Blood: x / Protein: Negative mg/dL / Nitrite: Negative   Leuk Esterase: Negative / RBC: x / WBC x   Sq Epi: x / Non Sq Epi: x / Bacteria: x          CAPILLARY BLOOD GLUCOSE

## 2023-08-22 NOTE — PROGRESS NOTE ADULT - SUBJECTIVE AND OBJECTIVE BOX
Patient is a 78y old  Female who presents with a chief complaint of cerebellar CVA (22 Aug 2023 08:35)      Patient seen and examined at bedside. No overnight events.    REVIEW OF SYSTEMS:  CONSTITUTIONAL: No fever or chills  CARDIOVASCULAR: No chest pain, palpitations    ALLERGIES:  No Known Allergies    MEDICATIONS  (STANDING):  apixaban 5 milliGRAM(s) Oral every 12 hours  atorvastatin 80 milliGRAM(s) Oral at bedtime  benzonatate 100 milliGRAM(s) Oral three times a day  diltiazem    milliGRAM(s) Oral daily  indomethacin Suppository 100 milliGRAM(s) Rectal once  melatonin 6 milliGRAM(s) Oral at bedtime  metoprolol succinate  milliGRAM(s) Oral daily  psyllium Powder 1 Packet(s) Oral daily  saccharomyces boulardii 250 milliGRAM(s) Oral two times a day    MEDICATIONS  (PRN):  acetaminophen     Tablet .. 650 milliGRAM(s) Oral every 6 hours PRN Mild Pain (1 - 3)  acetaminophen     Tablet .. 650 milliGRAM(s) Oral every 6 hours PRN Temp greater or equal to 38C (100.4F)  aluminum hydroxide/magnesium hydroxide/simethicone Suspension 30 milliLiter(s) Oral every 4 hours PRN Dyspepsia  bisacodyl Suppository 10 milliGRAM(s) Rectal daily PRN Constipation  guaiFENesin Oral Liquid (Sugar-Free) 100 milliGRAM(s) Oral every 6 hours PRN Cough    Vital Signs Last 24 Hrs  T(F): 97.5 (22 Aug 2023 09:42), Max: 101.2 (21 Aug 2023 23:44)  HR: 92 (22 Aug 2023 09:42) (78 - 100)  BP: 125/- (22 Aug 2023 09:42) (125/- - 156/80)  RR: 16 (22 Aug 2023 09:42) (16 - 24)  SpO2: 95% (22 Aug 2023 09:42) (95% - 96%)  I&O's Summary        PHYSICAL EXAM:  GENERAL: NAD  HEENT:  AT/NC, anicteric, moist mucous membranes, EOMI, PERRL, no lid-lag, conjunctiva and sclera clear  CHEST/LUNG: diminished breath sounds throughout, no wheezing,  normal respiratory effort, no intercostal retractions  HEART:  RRR, S1, S2, no murmurs; no pitting edema  ABDOMEN:  BS+, soft, nontender, nondistended  MSK/EXTREMITIES: palpable peripheral pulses, no clubbing or cyanosis  NERVOUS SYSTEM: answers questions and follows commands appropriately A&Ox3 grossly moves all extremities   PSYCH: Appropriate affect, Alert & Awake; fair judgement      LABS: Personally reviewed    CBC                        12.8   6.70  )-----------( 189      ( 22 Aug 2023 01:00 )             37.7         CMP      138  |  101  |  13  ----------------------------<  126  4.1   |  29  |  0.77    Ca    8.7      22 Aug 2023 01:00    TPro  6.8  /  Alb  2.6  /  TBili  0.4  /  DBili  x   /  AST  25  /  ALT  24  /  AlkPhos  81              Lactate, Blood: 1.9 mmol/L ( @ 01:00)        07-26 Chol 130 mg/dL LDL -- HDL 48 mg/dL Trig 74 mg/dL                  Urinalysis Basic - ( 22 Aug 2023 06:00 )    Color: Yellow / Appearance: Clear / S.005 / pH: x  Gluc: x / Ketone: Negative mg/dL  / Bili: Negative / Urobili: 0.2 mg/dL   Blood: x / Protein: Negative mg/dL / Nitrite: Negative   Leuk Esterase: Negative / RBC: x / WBC x   Sq Epi: x / Non Sq Epi: x / Bacteria: x        COVID-19 PCR: NotDetec (23 @ 21:00)      RADIOLOGY & ADDITIONAL TESTS: Personally reviewed    Medical management discussed with Dr. Smith (physiatry) start Robitussin and Loyda soto for cough

## 2023-08-23 ENCOUNTER — TRANSCRIPTION ENCOUNTER (OUTPATIENT)
Age: 78
End: 2023-08-23

## 2023-08-23 PROCEDURE — 99232 SBSQ HOSP IP/OBS MODERATE 35: CPT

## 2023-08-23 RX ORDER — MOMETASONE FUROATE 220 UG/1
1 INHALANT RESPIRATORY (INHALATION)
Qty: 1 | Refills: 0
Start: 2023-08-23 | End: 2023-08-27

## 2023-08-23 RX ORDER — MOMETASONE FUROATE 220 UG/1
1 INHALANT RESPIRATORY (INHALATION) DAILY
Refills: 0 | Status: DISCONTINUED | OUTPATIENT
Start: 2023-08-23 | End: 2023-08-28

## 2023-08-23 RX ORDER — ERYTHROMYCIN BASE 5 MG/GRAM
1 OINTMENT (GRAM) OPHTHALMIC (EYE)
Qty: 1 | Refills: 0
Start: 2023-08-23 | End: 2023-08-26

## 2023-08-23 RX ORDER — ERYTHROMYCIN BASE 5 MG/GRAM
1 OINTMENT (GRAM) OPHTHALMIC (EYE)
Refills: 0 | Status: COMPLETED | OUTPATIENT
Start: 2023-08-23 | End: 2023-08-28

## 2023-08-23 RX ADMIN — Medication 100 MILLIGRAM(S): at 05:14

## 2023-08-23 RX ADMIN — APIXABAN 5 MILLIGRAM(S): 2.5 TABLET, FILM COATED ORAL at 22:01

## 2023-08-23 RX ADMIN — Medication 100 MILLIGRAM(S): at 15:23

## 2023-08-23 RX ADMIN — Medication 1 APPLICATION(S): at 17:24

## 2023-08-23 RX ADMIN — Medication 250 MILLIGRAM(S): at 05:13

## 2023-08-23 RX ADMIN — APIXABAN 5 MILLIGRAM(S): 2.5 TABLET, FILM COATED ORAL at 08:11

## 2023-08-23 RX ADMIN — Medication 1 APPLICATION(S): at 22:01

## 2023-08-23 RX ADMIN — Medication 100 MILLIGRAM(S): at 22:00

## 2023-08-23 RX ADMIN — Medication 6 MILLIGRAM(S): at 22:01

## 2023-08-23 RX ADMIN — Medication 100 MILLIGRAM(S): at 22:01

## 2023-08-23 RX ADMIN — Medication 100 MILLIGRAM(S): at 05:13

## 2023-08-23 RX ADMIN — Medication 250 MILLIGRAM(S): at 17:25

## 2023-08-23 RX ADMIN — ATORVASTATIN CALCIUM 80 MILLIGRAM(S): 80 TABLET, FILM COATED ORAL at 22:01

## 2023-08-23 RX ADMIN — Medication 120 MILLIGRAM(S): at 05:14

## 2023-08-23 NOTE — DISCHARGE NOTE NURSING/CASE MANAGEMENT/SOCIAL WORK - NSDCFUADDAPPT_GEN_ALL_CORE_FT
*NOTE:  From social work: Family is hiring someone to be with pt 24x7 for supervision and/or family will stay with pt to provide support*

## 2023-08-23 NOTE — DISCHARGE NOTE NURSING/CASE MANAGEMENT/SOCIAL WORK - NSDCPEFALRISK_GEN_ALL_CORE
For information on Fall & Injury Prevention, visit: [email or web address] OR  [email or web address] OR  https://www.cdc.gov/steadi/patient.html

## 2023-08-23 NOTE — PROGRESS NOTE ADULT - ASSESSMENT
78F with HTN, HLD, recently hospitalized at OSH with acute stroke - with course complicated by acute cholecystitis requiring percutaneous cholecystostomy tube as well as new AF, now in acute rehab. A CT scan was performed today for new abdominal pain, which shows a malpositioned cholecystostomy tube, acute complicated diverticulitis and choledocholithiasis admitted for rehab- pt/ot/dvt ppx    #Malpositioned cholecystostomy tube  #Acute sigmoid diverticulitis with localized perforation  #Pneumoperitoneum due to above  #Choledocholithiasis, likely new (CBD was WNL on prior imaging)  #Cholecystitis  -S/p ERCP  -currently asymptomatic continue to monitor  -started on keflex for mild erythema around drain site.     #Chronic AF  -c/w Toprol  -c/w Diltiazem  -c/w Eliquis    #Lung nodule  -outpatient follow-up  -hx of pulm fibrosis  -ENTEROVIRUS positive - start Robitussin PRN and tessalon sara supportive care  - will add flovent inh bid for cough    DVT ppx: Eliquis    will follow  d/w rehab team      78F with HTN, HLD, recently hospitalized at OSH with acute stroke - with course complicated by acute cholecystitis requiring percutaneous cholecystostomy tube as well as new AF, now in acute rehab. A CT scan was performed today for new abdominal pain, which shows a malpositioned cholecystostomy tube, acute complicated diverticulitis and choledocholithiasis admitted for rehab- pt/ot/dvt ppx    #Malpositioned cholecystostomy tube  #Acute sigmoid diverticulitis with localized perforation  #Pneumoperitoneum due to above  #Choledocholithiasis, likely new (CBD was WNL on prior imaging)  #Cholecystitis  -S/p ERCP  -currently asymptomatic continue to monitor      #Chronic AF  -c/w Toprol  -c/w Diltiazem  -c/w Eliquis    #Lung nodule  -outpatient follow-up  -hx of pulm fibrosis  -ENTEROVIRUS positive - start Robitussin PRN and tessalon sara supportive care  - will add flovent inh bid for cough    DVT ppx: Eliquis    will follow  d/w rehab team

## 2023-08-23 NOTE — DISCHARGE NOTE NURSING/CASE MANAGEMENT/SOCIAL WORK - NSDCVIVACCINE_GEN_ALL_CORE_FT
Tdap; 26-Jul-2023 00:18; Sara Chand (CARI); [employer]; [id number] (Exp. Date: 26-Jul-2023); IntraMuscular; Deltoid Right.; 0.5 milliLiter(s); VIS (VIS Published: 09-May-2013, VIS Presented: 26-Jul-2023);

## 2023-08-23 NOTE — DISCHARGE NOTE NURSING/CASE MANAGEMENT/SOCIAL WORK - PATIENT PORTAL LINK FT
You can access the FollowMyHealth Patient Portal offered by [facility] by registering at the following website: [email or web address]. By joining [employer]’s FollowMyHealth portal, you will also be able to view your health information using other applications (apps) compatible with our system.

## 2023-08-23 NOTE — PROGRESS NOTE ADULT - SUBJECTIVE AND OBJECTIVE BOX
Patient is a 78y old  Female who presents with a chief complaint of cerebellar CVA     seen at the bedside, c/o mild intermittent cough x 2 weks, no sob, no hypoxia.  no other complaints    Vital Signs Last 24 Hrs  T(C): 36.5 (23 Aug 2023 07:17), Max: 36.7 (22 Aug 2023 20:04)  T(F): 97.7 (23 Aug 2023 07:17), Max: 98 (22 Aug 2023 20:04)  HR: 88 (23 Aug 2023 07:17) (88 - 96)  BP: 135/86 (23 Aug 2023 07:17) (121/73 - 135/86)  BP(mean): --  RR: 16 (23 Aug 2023 07:17) (16 - 18)  SpO2: 95% (23 Aug 2023 07:17) (95% - 97%)    Parameters below as of 23 Aug 2023 07:17  Patient On (Oxygen Delivery Method): room air      GENERAL- NAD  EAR/NOSE/MOUTH/THROAT -  MMM  EYES- RAJNI, conjunctiva and Sclera clear  NECK- supple  RESPIRATORY-  clear to auscultation bilaterally, non laboured breathing  CARDIOVASCULAR - SIS2, RRR  GI - soft NT BS present  EXTREMITIES- no pedal edema  NEUROLOGY- no gross focal deficits  PSYCHIATRY- AAO X 3                  12.8                 138  | 29   | 13           6.70  >-----------< 189     ------------------------< 126                   37.7                 4.1  | 101  | 0.77                                         Ca 8.7   Mg x     Ph x      Urinalysis Basic - ( 22 Aug 2023 06:00 )    Color: Yellow / Appearance: Clear / S.005 / pH: x  Gluc: x / Ketone: Negative mg/dL  / Bili: Negative / Urobili: 0.2 mg/dL   Blood: x / Protein: Negative mg/dL / Nitrite: Negative   Leuk Esterase: Negative / RBC: x / WBC x   Sq Epi: x / Non Sq Epi: x / Bacteria: x          MEDICATIONS  (STANDING):  apixaban 5 milliGRAM(s) Oral every 12 hours  atorvastatin 80 milliGRAM(s) Oral at bedtime  benzonatate 100 milliGRAM(s) Oral three times a day  diltiazem    milliGRAM(s) Oral daily  indomethacin Suppository 100 milliGRAM(s) Rectal once  melatonin 6 milliGRAM(s) Oral at bedtime  metoprolol succinate  milliGRAM(s) Oral daily  psyllium Powder 1 Packet(s) Oral daily  saccharomyces boulardii 250 milliGRAM(s) Oral two times a day    MEDICATIONS  (PRN):  acetaminophen     Tablet .. 650 milliGRAM(s) Oral every 6 hours PRN Temp greater or equal to 38C (100.4F)  acetaminophen     Tablet .. 650 milliGRAM(s) Oral every 6 hours PRN Mild Pain (1 - 3)  aluminum hydroxide/magnesium hydroxide/simethicone Suspension 30 milliLiter(s) Oral every 4 hours PRN Dyspepsia  bisacodyl Suppository 10 milliGRAM(s) Rectal daily PRN Constipation  guaiFENesin Oral Liquid (Sugar-Free) 100 milliGRAM(s) Oral every 6 hours PRN Cough

## 2023-08-23 NOTE — PROGRESS NOTE ADULT - COMMENTS
Patient reports some discomfort drain insertion site but no N/V, no further loose stool. Discontinuation of levaquin and flagyl discussed with patient after recommendations by GI. She feels incontinence episodes have resolved w. No further fever, still has cough but improving, no audible wheezing, but trace heard on auscultation. Results of blood Cx to date discussed

## 2023-08-23 NOTE — PROGRESS NOTE ADULT - ASSESSMENT
78 year old female with PMH HTN, and HLD who presented to [place] ED 7/26/23 s/p fall from chair with + head strike. She was found to have new onset AFIB with RVR and Rhabdomyolysis. Her hospital course was complicated by acute cholecystitis and cholelithiasis requiring percutaneous enrique drain via IR on 7/7, Bacteremia, and cerebellar infarct     # right cerebellar CVA with incoordination, imbalance, cognitive impairment  - MRI 8/1: acute right cerebellar infarct. Scattered foci of T2/FLAIR hyperintensity in the bilateral hemispheric white matter  - ASA and Atorvastatin  - continue Comprehensive Rehab Program of PT/OT/SLP  - 3 hours a day, 5 days a week  - recreation therapy, psychology services   - Precautions: cardiac, AC, fall, AMS, enrique drain    # fever/cough; rhinovirus infection  - T 101.2 max 8./22. Afebrile Tm 24 =98 8/23  - CXR 8/21: Elevated right hemidiaphragm again seen. Linear atelectasis versus scar in the right apex and left lower lung. No focal lung consolidation. (official read)  - RVP 8/22 + enterovirus/rhinovirus. Reviewed with patient and daughter  - IS, tessalon perle standing  - robitussin PRN   - Blood Cx x 2 negative to date 8/23  - tylenol PRN    # HTN  - Metoprolol XL 100mg daily  - Diltiazem CD 120mg daily  - BP  (121/73 - 135/86) 8/23    # AFib RVR  - Metoprolol XL 100mg daily  - Eliquis 5 q12hrs . Medication reviewed with patient and daughter. Confirmed covered by insurance with $10 copay 8/22    # Rhabdomyolysis  - S/p IVF  - CK 5502 7/25--> 109 8/4  - resolved    #  Cholecystitis   - s/p percutaneous cholecystectomy via IR on 7/7   -  A/P CT 8/7: Percutaneous cholecystostomy catheter is malpositioned within the descending colon distal to the cecum. Inflammation adjacent to the colon and appendix,likely reactive. Dilated common bile duct with suspicion of choledocholithiasis in the distal CBD. Cholelithiasis with gallbladder wall thickening and pericholecystic fat stranding. Acute diverticulitis in the mid sigmoid colon with associated localized perforation.  - MRCP 8/9: distended CBD up to 12mm + 2 stones in bile duct w/ borderline central intrahepatic ductal distention, + cholelithiasis decreased inflammation,  - GI appreciated. s/p ERCP 8/11 Sphincterotomy performed with stent placement  - Surgery appreciated 8/7. Low suspicion for non-drained infectious process. Leave drain in as precaution. May be candidate for outpatient laparoscopic cholecystectomy.  - levaquin/flagyl. dc, discussed with GI 8/22  - f/u outpatient for drain removal and cholecystectomy, discussed with patient    # bibasilar atelectasis noted on MRCP  - incentive spirometry, breathing exercises     # RUL Lung nodule found on CT chest (7/25)  - Incidental finding   - F/U PCP outpatient for repeat CT    # GI  - Senna dc due to loose stool 8/16  - metamucil for bulking 8/16  - on florastor secondary to Abx  - toileting program    # /h/o retention.  - continue monitoring bladder scan, SC for PVR >350 ml  - toileting program    # podiatry  - podiatry appreciated, s/p nail debridement    # Sleep  - Melatonin PRN     # Pain Mgmt   - Tylenol PRN    # FEN   - Diet - Regular + Thins  [DASH/TLC]    - Food preferences: no cheese, no yogurt, no fish, no apple juice placed in EMR    #  DVT PPX:  - Eliquis     # Case discussed in IDT rounds 8/21 (follow up):  - tolerating reg/thin liquids, increased cognition and attention. CG bADLs and functional transfers, min assist/CG stairs with bilateral HR and ambulation with RW  - goals adjusted: supervision iADLs, supervision/CG  bADLs, CG ambulation/stairs, supervision iaDLS and during waking hours  - caregiver training performed  - adjusted target dc home 8/24 with caregiver support 24/7  and home Pt OT SLP  - Pharmacy: [facility] [place]. Medications sent 8/22    # LABs  CBC BMP 8/24      ---------------  Outpatient/Follow-Up:    Alli Pascual  Surgery  [address] [place] [address]  Phone: [phone]  Fax: [phone]  Follow Up Time: 1 week    Alexis Simon  Cardiovascular Disease  [address] [id number]  Phone: [phone]  Fax: [phone]  Follow Up Time: 1 week    BRIAN ALBA  [address] [place] [address]  Phone: ()-  Fax: ()-     78 year old female with PMH HTN, and HLD who presented to [place] ED 7/26/23 s/p fall from chair with + head strike. She was found to have new onset AFIB with RVR and Rhabdomyolysis. Her hospital course was complicated by acute cholecystitis and cholelithiasis requiring percutaneous enrique drain via IR on 7/7, Bacteremia, and cerebellar infarct     # right cerebellar CVA with incoordination, imbalance, cognitive impairment  - MRI 8/1: acute right cerebellar infarct. Scattered foci of T2/FLAIR hyperintensity in the bilateral hemispheric white matter  - ASA and Atorvastatin  - continue Comprehensive Rehab Program of PT/OT/SLP  - 3 hours a day, 5 days a week  - recreation therapy, psychology services   - Precautions: cardiac, AC, fall, AMS, enrique drain    # fever/cough; rhinovirus infection  - T 101.2 max 8./22. Afebrile Tm 24 =98 8/23  - CXR 8/21: Elevated right hemidiaphragm again seen. Linear atelectasis versus scar in the right apex and left lower lung. No focal lung consolidation. (official read)  - RVP 8/22 + enterovirus/rhinovirus. Reviewed with patient and daughter  - IS, tessalon perle standing  - robitussin PRN   - Blood Cx x 2 negative to date 8/23  - tylenol PRN    # HTN  - Metoprolol XL 100mg daily  - Diltiazem CD 120mg daily  - BP  (121/73 - 135/86) 8/23    # AFib RVR  - Metoprolol XL 100mg daily  - Eliquis 5 q12hrs . Medication reviewed with patient and daughter. Confirmed covered by insurance with $10 copay 8/22    # Rhabdomyolysis  - S/p IVF  - CK 5502 7/25--> 109 8/4  - resolved    #  Cholecystitis   - s/p percutaneous cholecystectomy via IR on 7/7   -  A/P CT 8/7: Percutaneous cholecystostomy catheter is malpositioned within the descending colon distal to the cecum. Inflammation adjacent to the colon and appendix,likely reactive. Dilated common bile duct with suspicion of choledocholithiasis in the distal CBD. Cholelithiasis with gallbladder wall thickening and pericholecystic fat stranding. Acute diverticulitis in the mid sigmoid colon with associated localized perforation.  - MRCP 8/9: distended CBD up to 12mm + 2 stones in bile duct w/ borderline central intrahepatic ductal distention, + cholelithiasis decreased inflammation,  - GI appreciated. s/p ERCP 8/11 Sphincterotomy performed with stent placement  - Surgery appreciated 8/7. Low suspicion for non-drained infectious process. Leave drain in as precaution. May be candidate for outpatient laparoscopic cholecystectomy.  - levaquin/flagyl. dc, discussed with GI 8/22  - f/u outpatient for drain removal and cholecystectomy, discussed with patient    # mild irritation/erythema enrique drain insertion site  - betadine area daily, cover with drain guaze daily  - keep covered while showering  - f/u hospitalist re: any need for Abx. currently afebrile, no leukocytosis  - drain: in incorrect position, no output, no need for flushes. To follow up with surgery as outpatietn for removal    # bibasilar atelectasis noted on MRCP  - incentive spirometry, breathing exercises     # RUL Lung nodule found on CT chest (7/25)  - Incidental finding   - F/U PCP outpatient for repeat CT    # GI  - Senna dc due to loose stool 8/16  - metamucil for bulking 8/16  - on florastor secondary to Abx  - toileting program    # /h/o retention.  - continue monitoring bladder scan, SC for PVR >350 ml  - toileting program    # podiatry  - podiatry appreciated, s/p nail debridement    # Sleep  - Melatonin PRN     # Pain Mgmt   - Tylenol PRN    # FEN   - Diet - Regular + Thins  [DASH/TLC]    - Food preferences: no cheese, no yogurt, no fish, no apple juice placed in EMR    #  DVT PPX:  - Eliquis     # Case discussed in IDT rounds 8/21 (follow up):  - tolerating reg/thin liquids, increased cognition and attention. CG bADLs and functional transfers, min assist/CG stairs with bilateral HR and ambulation with RW  - goals adjusted: supervision iADLs, supervision/CG  bADLs, CG ambulation/stairs, supervision iaDLS and during waking hours  - caregiver training performed  - adjusted target dc home 8/24 with caregiver support 24/7  and home Pt OT SLP  - Pharmacy: [facility] [place] [facility] [place]. Medications sent 8/22    # LABs  CBC BMP 8/24      ---------------  Outpatient/Follow-Up:    Alli Pascual  Surgery  [address] [place] [address]  Phone: [phone]  Fax: [phone]  Follow Up Time: 1 week    Alexis Simon  Cardiovascular Disease  [address] [place], [place] [id number]  Phone: [phone]  Fax: [phone]  Follow Up Time: 1 week    BRIAN ALBA  [address] [place] [address]  Phone: ()-  Fax: ()-     78 year old female with PMH HTN, and HLD who presented to [place] ED 7/26/23 s/p fall from chair with + head strike. She was found to have new onset AFIB with RVR and Rhabdomyolysis. Her hospital course was complicated by acute cholecystitis and cholelithiasis requiring percutaneous enrique drain via IR on 7/7, Bacteremia, and cerebellar infarct     # right cerebellar CVA with incoordination, imbalance, cognitive impairment  - MRI 8/1: acute right cerebellar infarct. Scattered foci of T2/FLAIR hyperintensity in the bilateral hemispheric white matter  - ASA and Atorvastatin  - continue Comprehensive Rehab Program of PT/OT/SLP  - 3 hours a day, 5 days a week  - recreation therapy, psychology services   - Precautions: cardiac, AC, fall, AMS, enrique drain    # fever/cough; rhinovirus infection  - T 101.2 max 8./22. Afebrile Tm 24 =98 8/23  - CXR 8/21: Elevated right hemidiaphragm again seen. Linear atelectasis versus scar in the right apex and left lower lung. No focal lung consolidation. (official read)  - RVP 8/22 + enterovirus/rhinovirus. Reviewed with patient and daughter  - IS, tessalon perle standing  - robitussin PRN   - Blood Cx x 2 negative to date 8/23  - tylenol PRN    # HTN  - Metoprolol XL 100mg daily  - Diltiazem CD 120mg daily  - BP  (121/73 - 135/86) 8/23    # AFib RVR  - Metoprolol XL 100mg daily  - Eliquis 5 q12hrs . Medication reviewed with patient and daughter. Confirmed covered by insurance with $10 copay 8/22    # Rhabdomyolysis  - S/p IVF  - CK 5502 7/25--> 109 8/4  - resolved    #  Cholecystitis   - s/p percutaneous cholecystectomy via IR on 7/7   -  A/P CT 8/7: Percutaneous cholecystostomy catheter is malpositioned within the descending colon distal to the cecum. Inflammation adjacent to the colon and appendix,likely reactive. Dilated common bile duct with suspicion of choledocholithiasis in the distal CBD. Cholelithiasis with gallbladder wall thickening and pericholecystic fat stranding. Acute diverticulitis in the mid sigmoid colon with associated localized perforation.  - MRCP 8/9: distended CBD up to 12mm + 2 stones in bile duct w/ borderline central intrahepatic ductal distention, + cholelithiasis decreased inflammation,  - GI appreciated. s/p ERCP 8/11 Sphincterotomy performed with stent placement  - Surgery appreciated 8/7. Low suspicion for non-drained infectious process. Leave drain in as precaution. May be candidate for outpatient laparoscopic cholecystectomy.  - levaquin/flagyl. dc, discussed with GI 8/22  - f/u outpatient for drain removal and cholecystectomy, discussed with patient    # mild irritation/erythema enrique drain insertion site  - betadine area daily, cover with drain guaze daily  - keep covered while showering  - f/u hospitalist re: any need for Abx. currently afebrile, no leukocytosis  - drain: in incorrect position, no output, no need for flushes. To follow up with surgery as outpatietn for removal    # bibasilar atelectasis noted on MRCP  - incentive spirometry, breathing exercises     # RUL Lung nodule found on CT chest (7/25)  - Incidental finding   - F/U PCP outpatient for repeat CT    # GI  - Senna dc due to loose stool 8/16  - metamucil for bulking 8/16  - on florastor secondary to Abx  - toileting program    # /h/o retention.  - continue monitoring bladder scan, SC for PVR >350 ml  - toileting program    # podiatry  - podiatry appreciated, s/p nail debridement    # Sleep  - Melatonin PRN     # Pain Mgmt   - Tylenol PRN    # FEN   - Diet - Regular + Thins  [DASH/TLC]    - Food preferences: no cheese, no yogurt, no fish, no apple juice placed in EMR    #  DVT PPX:  - Eliquis     # Case discussed in IDT rounds 8/21 (follow up):  - tolerating reg/thin liquids, increased cognition and attention. CG bADLs and functional transfers, min assist/CG stairs with bilateral HR and ambulation with RW  - goals adjusted: supervision iADLs, supervision/CG  bADLs, CG ambulation/stairs, supervision iaDLS and during waking hours  - caregiver training performed  - adjusted target dc home 8/24 with caregiver support 24/7  and home Pt OT SLP  - Pharmacy: [facility] [place]. Medications sent 8/22    # LABs  CBC BMP 8/24    addendum 3:10 PM  -patient with itching and redness in both eyes, no discharge or swelling but sl weepy look to eye. Discussed with hospitalist, will order erythromycin oitnment qID x 5 days both eyes    ---------------  Outpatient/Follow-Up:    Alli Pascual  Surgery  [address] [place] [address]  Phone: [phone]  Fax: [phone]  Follow Up Time: 1 week    Alexis Simon  Cardiovascular Disease  [address] [place], [place] [id number]  Phone: [phone]  Fax: [phone]  Follow Up Time: 1 week    BRIAN ALBA  [address] [place] [address]  Phone: ()-  Fax: ()-

## 2023-08-23 NOTE — PROGRESS NOTE ADULT - SUBJECTIVE AND OBJECTIVE BOX
Patient is a 78y old  Female who presents with a chief complaint of cerebellar CVA (22 Aug 2023 12:44)      HPI:  Karen Carlos is a 78 year old female RH dominant with PMH of HTN, and HLD; who presented [facility] s/p fall from chair with + head strike, on the floor for over an hour. ER workup was significant AFIB with RVR and rhabdomyolysis. She was given Cardizem IVP and IVF with improvement HR to the 70s. She was diagnosed with new onset AFIB, seen by cardiology and started on Metoprolol and Diltiazem for rate control and was anticoagulated with Eliquis.  IV fluids were administered for rhabdomyolysis.     RUQ US was significant for acute cholecystitis and cholelithiasis with pericholecystic fluid and gallbladder wall thickening. Her HIDA scan resulted positive and a percutaneous cholecystectomy tube was placed by IR on .  Her blood cultures resulted positive for Strept Anginosus for which she was started on IV antibiotics.     Her hospital course was complicated by neurological changes/altered mental status. CT head was performed which resulted negative; MRI of the head was significant for a R cerebellar infarct.    PM&R was consulted and deemed her an appropriate candidate for IRF. She was medically stabilized and cleared for discharge to [facility].  (03 Aug 2023 13:49)      PAST MEDICAL & SURGICAL HISTORY:  HTN (hypertension)      Hyperlipidemia      Tinnitus  right ear      Seasonal allergies      S/P knee replacement          MEDICATIONS  (STANDING):  apixaban 5 milliGRAM(s) Oral every 12 hours  atorvastatin 80 milliGRAM(s) Oral at bedtime  benzonatate 100 milliGRAM(s) Oral three times a day  diltiazem    milliGRAM(s) Oral daily  indomethacin Suppository 100 milliGRAM(s) Rectal once  melatonin 6 milliGRAM(s) Oral at bedtime  metoprolol succinate  milliGRAM(s) Oral daily  psyllium Powder 1 Packet(s) Oral daily  saccharomyces boulardii 250 milliGRAM(s) Oral two times a day    MEDICATIONS  (PRN):  acetaminophen     Tablet .. 650 milliGRAM(s) Oral every 6 hours PRN Mild Pain (1 - 3)  acetaminophen     Tablet .. 650 milliGRAM(s) Oral every 6 hours PRN Temp greater or equal to 38C (100.4F)  aluminum hydroxide/magnesium hydroxide/simethicone Suspension 30 milliLiter(s) Oral every 4 hours PRN Dyspepsia  bisacodyl Suppository 10 milliGRAM(s) Rectal daily PRN Constipation  guaiFENesin Oral Liquid (Sugar-Free) 100 milliGRAM(s) Oral every 6 hours PRN Cough      Allergies    No Known Allergies    Intolerances          VITALS  78y  Vital Signs Last 24 Hrs  T(C): 36.5 (23 Aug 2023 07:17), Max: 36.7 (22 Aug 2023 20:04)  T(F): 97.7 (23 Aug 2023 07:17), Max: 98 (22 Aug 2023 20:04)  HR: 88 (23 Aug 2023 07:17) (88 - 96)  BP: 135/86 (23 Aug 2023 07:17) (121/73 - 135/86)  BP(mean): --  RR: 16 (23 Aug 2023 07:17) (16 - 18)  SpO2: 95% (23 Aug 2023 07:17) (95% - 97%)    Parameters below as of 23 Aug 2023 07:17  Patient On (Oxygen Delivery Method): room air      Daily     Daily         RECENT LABS:                          12.8   6.70  )-----------( 189      ( 22 Aug 2023 01:00 )             37.7     08-22    138  |  101  |  13  ----------------------------<  126<H>  4.1   |  29  |  0.77    Ca    8.7      22 Aug 2023 01:00    TPro  6.8  /  Alb  2.6<L>  /  TBili  0.4  /  DBili  x   /  AST  25  /  ALT  24  /  AlkPhos  81  08-22    LIVER FUNCTIONS - ( 22 Aug 2023 01:00 )  Alb: 2.6 g/dL / Pro: 6.8 g/dL / ALK PHOS: 81 U/L / ALT: 24 U/L / AST: 25 U/L / GGT: x             Urinalysis Basic - ( 22 Aug 2023 06:00 )    Color: Yellow / Appearance: Clear / S.005 / pH: x  Gluc: x / Ketone: Negative mg/dL  / Bili: Negative / Urobili: 0.2 mg/dL   Blood: x / Protein: Negative mg/dL / Nitrite: Negative   Leuk Esterase: Negative / RBC: x / WBC x   Sq Epi: x / Non Sq Epi: x / Bacteria: x        Culture - Blood (collected 23 @ 01:00)  Source: .Blood Blood  Preliminary Report (23 @ 05:01):    No growth at 24 hours    Culture - Blood (collected 23 @ 01:00)  Source: .Blood Blood  Preliminary Report (23 @ 05:01):    No growth at 24 hours      ACC: [id number] EXAM:  XR CHEST PORTABLE URGENT 1V   ORDERED BY: LORIE CALLAHAN     PROCEDURE DATE:  2023          INTERPRETATION:  TIME OF EXAM: 2023 at 7:35 PM.    CLINICAL INFORMATION: Cough.    COMPARISON:  2023.    TECHNIQUE:   AP Portable chest x-ray.    INTERPRETATION:    Heart size is not accurately evaluated on this image. The thoracic aorta   is calcified.  An elevated right hemidiaphragm is again seen.  There is linear atelectasis versus scar in the right apex and left lower   lung.  No focal lung consolidation, pleural effusion, or pneumothorax is seen.  There is scoliosis of the spine with osteoarthritic degenerative change.      IMPRESSION:  Elevated right hemidiaphragm again seen.    Linear atelectasis versus scar in the right apex and left lower lung.    No focal lung consolidation.    --- End of Report ---            INDY AKINS MD; Attending Radiologist  This document has been electronically signed. Aug 22 2023  2:15PM    CAPILLARY BLOOD GLUCOSE

## 2023-08-24 LAB
-  AMPICILLIN/SULBACTAM: SIGNIFICANT CHANGE UP
-  CEFAZOLIN: SIGNIFICANT CHANGE UP
-  CLINDAMYCIN: SIGNIFICANT CHANGE UP
-  DAPTOMYCIN: SIGNIFICANT CHANGE UP
-  ERYTHROMYCIN: SIGNIFICANT CHANGE UP
-  GENTAMICIN: SIGNIFICANT CHANGE UP
-  LINEZOLID: SIGNIFICANT CHANGE UP
-  OXACILLIN: SIGNIFICANT CHANGE UP
-  PENICILLIN: SIGNIFICANT CHANGE UP
-  RIFAMPIN: SIGNIFICANT CHANGE UP
-  TETRACYCLINE: SIGNIFICANT CHANGE UP
-  TRIMETHOPRIM/SULFAMETHOXAZOLE: SIGNIFICANT CHANGE UP
-  VANCOMYCIN: SIGNIFICANT CHANGE UP
ALBUMIN SERPL ELPH-MCNC: 2.5 G/DL — LOW (ref 3.3–5)
ALP SERPL-CCNC: 79 U/L — SIGNIFICANT CHANGE UP (ref 40–120)
ALT FLD-CCNC: 26 U/L — SIGNIFICANT CHANGE UP (ref 10–45)
ANION GAP SERPL CALC-SCNC: 5 MMOL/L — SIGNIFICANT CHANGE UP (ref 5–17)
AST SERPL-CCNC: 41 U/L — HIGH (ref 10–40)
BASOPHILS # BLD AUTO: 0.04 K/UL — SIGNIFICANT CHANGE UP (ref 0–0.2)
BASOPHILS NFR BLD AUTO: 0.6 % — SIGNIFICANT CHANGE UP (ref 0–2)
BILIRUB SERPL-MCNC: 0.5 MG/DL — SIGNIFICANT CHANGE UP (ref 0.2–1.2)
BUN SERPL-MCNC: 12 MG/DL — SIGNIFICANT CHANGE UP (ref 7–23)
CALCIUM SERPL-MCNC: 8.6 MG/DL — SIGNIFICANT CHANGE UP (ref 8.4–10.5)
CHLORIDE SERPL-SCNC: 104 MMOL/L — SIGNIFICANT CHANGE UP (ref 96–108)
CO2 SERPL-SCNC: 31 MMOL/L — SIGNIFICANT CHANGE UP (ref 22–31)
CREAT SERPL-MCNC: 0.74 MG/DL — SIGNIFICANT CHANGE UP (ref 0.5–1.3)
CULTURE RESULTS: SIGNIFICANT CHANGE UP
EGFR: 83 ML/MIN/1.73M2 — SIGNIFICANT CHANGE UP
EOSINOPHIL # BLD AUTO: 0.28 K/UL — SIGNIFICANT CHANGE UP (ref 0–0.5)
EOSINOPHIL NFR BLD AUTO: 4.1 % — SIGNIFICANT CHANGE UP (ref 0–6)
GLUCOSE SERPL-MCNC: 89 MG/DL — SIGNIFICANT CHANGE UP (ref 70–99)
HCT VFR BLD CALC: 39.8 % — SIGNIFICANT CHANGE UP (ref 34.5–45)
HGB BLD-MCNC: 12.9 G/DL — SIGNIFICANT CHANGE UP (ref 11.5–15.5)
IMM GRANULOCYTES NFR BLD AUTO: 0.4 % — SIGNIFICANT CHANGE UP (ref 0–0.9)
LYMPHOCYTES # BLD AUTO: 1.42 K/UL — SIGNIFICANT CHANGE UP (ref 1–3.3)
LYMPHOCYTES # BLD AUTO: 21 % — SIGNIFICANT CHANGE UP (ref 13–44)
MCHC RBC-ENTMCNC: 30.5 PG — SIGNIFICANT CHANGE UP (ref 27–34)
MCHC RBC-ENTMCNC: 32.4 GM/DL — SIGNIFICANT CHANGE UP (ref 32–36)
MCV RBC AUTO: 94.1 FL — SIGNIFICANT CHANGE UP (ref 80–100)
METHOD TYPE: SIGNIFICANT CHANGE UP
MONOCYTES # BLD AUTO: 0.73 K/UL — SIGNIFICANT CHANGE UP (ref 0–0.9)
MONOCYTES NFR BLD AUTO: 10.8 % — SIGNIFICANT CHANGE UP (ref 2–14)
NEUTROPHILS # BLD AUTO: 4.25 K/UL — SIGNIFICANT CHANGE UP (ref 1.8–7.4)
NEUTROPHILS NFR BLD AUTO: 63.1 % — SIGNIFICANT CHANGE UP (ref 43–77)
NRBC # BLD: 0 /100 WBCS — SIGNIFICANT CHANGE UP (ref 0–0)
ORGANISM # SPEC MICROSCOPIC CNT: SIGNIFICANT CHANGE UP
ORGANISM # SPEC MICROSCOPIC CNT: SIGNIFICANT CHANGE UP
PLATELET # BLD AUTO: 186 K/UL — SIGNIFICANT CHANGE UP (ref 150–400)
POTASSIUM SERPL-MCNC: 4.4 MMOL/L — SIGNIFICANT CHANGE UP (ref 3.5–5.3)
POTASSIUM SERPL-SCNC: 4.4 MMOL/L — SIGNIFICANT CHANGE UP (ref 3.5–5.3)
PROT SERPL-MCNC: 6.6 G/DL — SIGNIFICANT CHANGE UP (ref 6–8.3)
RBC # BLD: 4.23 M/UL — SIGNIFICANT CHANGE UP (ref 3.8–5.2)
RBC # FLD: 14.6 % — HIGH (ref 10.3–14.5)
SODIUM SERPL-SCNC: 140 MMOL/L — SIGNIFICANT CHANGE UP (ref 135–145)
SPECIMEN SOURCE: SIGNIFICANT CHANGE UP
WBC # BLD: 6.75 K/UL — SIGNIFICANT CHANGE UP (ref 3.8–10.5)
WBC # FLD AUTO: 6.75 K/UL — SIGNIFICANT CHANGE UP (ref 3.8–10.5)

## 2023-08-24 PROCEDURE — 74177 CT ABD & PELVIS W/CONTRAST: CPT | Mod: 26

## 2023-08-24 PROCEDURE — 99233 SBSQ HOSP IP/OBS HIGH 50: CPT

## 2023-08-24 RX ORDER — IOHEXOL 300 MG/ML
30 INJECTION, SOLUTION INTRAVENOUS ONCE
Refills: 0 | Status: COMPLETED | OUTPATIENT
Start: 2023-08-24 | End: 2023-08-24

## 2023-08-24 RX ORDER — LEVALBUTEROL 1.25 MG/.5ML
1 SOLUTION, CONCENTRATE RESPIRATORY (INHALATION) EVERY 8 HOURS
Refills: 0 | Status: DISCONTINUED | OUTPATIENT
Start: 2023-08-24 | End: 2023-08-28

## 2023-08-24 RX ADMIN — Medication 100 MILLIGRAM(S): at 17:27

## 2023-08-24 RX ADMIN — APIXABAN 5 MILLIGRAM(S): 2.5 TABLET, FILM COATED ORAL at 08:37

## 2023-08-24 RX ADMIN — ATORVASTATIN CALCIUM 80 MILLIGRAM(S): 80 TABLET, FILM COATED ORAL at 22:10

## 2023-08-24 RX ADMIN — IOHEXOL 30 MILLILITER(S): 300 INJECTION, SOLUTION INTRAVENOUS at 16:12

## 2023-08-24 RX ADMIN — Medication 250 MILLIGRAM(S): at 05:35

## 2023-08-24 RX ADMIN — Medication 100 MILLIGRAM(S): at 05:36

## 2023-08-24 RX ADMIN — Medication 120 MILLIGRAM(S): at 05:35

## 2023-08-24 RX ADMIN — Medication 100 MILLIGRAM(S): at 14:11

## 2023-08-24 RX ADMIN — Medication 100 MILLIGRAM(S): at 22:03

## 2023-08-24 RX ADMIN — Medication 100 MILLIGRAM(S): at 14:24

## 2023-08-24 RX ADMIN — Medication 6 MILLIGRAM(S): at 22:03

## 2023-08-24 RX ADMIN — Medication 1 APPLICATION(S): at 05:35

## 2023-08-24 RX ADMIN — APIXABAN 5 MILLIGRAM(S): 2.5 TABLET, FILM COATED ORAL at 22:09

## 2023-08-24 RX ADMIN — Medication 250 MILLIGRAM(S): at 17:27

## 2023-08-24 RX ADMIN — Medication 100 MILLIGRAM(S): at 05:35

## 2023-08-24 RX ADMIN — MOMETASONE FUROATE 1 PUFF(S): 220 INHALANT RESPIRATORY (INHALATION) at 09:28

## 2023-08-24 NOTE — PROGRESS NOTE ADULT - SUBJECTIVE AND OBJECTIVE BOX
Patient is a 78y old  Female who presents with a chief complaint of cerebellar CVA     seen at the bedside, c/o mild intermittent cough x 2 weeks, due to RVP +   no sob, no hypoxia.  no other complaints    Vital Signs Last 24 Hrs  T(C): 36.6 (24 Aug 2023 08:02), Max: 36.7 (23 Aug 2023 20:40)  T(F): 97.9 (24 Aug 2023 08:02), Max: 98 (23 Aug 2023 20:40)  HR: 87 (24 Aug 2023 08:02) (87 - 93)  BP: 141/86 (24 Aug 2023 08:02) (123/79 - 141/86)  BP(mean): --  RR: 16 (24 Aug 2023 08:02) (16 - 16)  SpO2: 96% (24 Aug 2023 08:02) (96% - 96%)    Parameters below as of 24 Aug 2023 08:02  Patient On (Oxygen Delivery Method): room air        GENERAL- NAD  EAR/NOSE/MOUTH/THROAT -  MMM  EYES- RAJNI, conjunctiva and Sclera clear  NECK- supple  RESPIRATORY-  wheezing to auscultation bilaterally, non laboured breathing  CARDIOVASCULAR - SIS2, RRR  GI - soft NT BS present  EXTREMITIES- no pedal edema  NEUROLOGY- no gross focal deficits  PSYCHIATRY- AAO X 3                12.9                 140  | 31   | 12           6.75  >-----------< 186     ------------------------< 89                    39.8                 4.4  | 104  | 0.74                                         Ca 8.6   Mg x     Ph x      Urinalysis Basic - ( 24 Aug 2023 05:33 )    Color: x / Appearance: x / SG: x / pH: x  Gluc: 89 mg/dL / Ketone: x  / Bili: x / Urobili: x   Blood: x / Protein: x / Nitrite: x   Leuk Esterase: x / RBC: x / WBC x   Sq Epi: x / Non Sq Epi: x / Bacteria: x      Culture - Other (08.22.23 @ 01:45)    Specimen Source: .Other Culture Shannan tube drainage   Culture Results:   Moderate Staphylococcus aureus    Specimen Source: .Blood Blood (08.22.23 @ 01:00) x 2 negative        MEDICATIONS  (STANDING):  apixaban 5 milliGRAM(s) Oral every 12 hours  atorvastatin 80 milliGRAM(s) Oral at bedtime  benzonatate 100 milliGRAM(s) Oral three times a day  diltiazem    milliGRAM(s) Oral daily  erythromycin   Ointment 1 Application(s) Both EYES four times a day  indomethacin Suppository 100 milliGRAM(s) Rectal once  melatonin 6 milliGRAM(s) Oral at bedtime  metoprolol succinate  milliGRAM(s) Oral daily  mometasone 220 MICROgram(s) Inhaler 1 Puff(s) Inhalation daily  psyllium Powder 1 Packet(s) Oral daily  saccharomyces boulardii 250 milliGRAM(s) Oral two times a day    MEDICATIONS  (PRN):  acetaminophen     Tablet .. 650 milliGRAM(s) Oral every 6 hours PRN Mild Pain (1 - 3)  acetaminophen     Tablet .. 650 milliGRAM(s) Oral every 6 hours PRN Temp greater or equal to 38C (100.4F)  aluminum hydroxide/magnesium hydroxide/simethicone Suspension 30 milliLiter(s) Oral every 4 hours PRN Dyspepsia  bisacodyl Suppository 10 milliGRAM(s) Rectal daily PRN Constipation  guaiFENesin Oral Liquid (Sugar-Free) 100 milliGRAM(s) Oral every 6 hours PRN Cough

## 2023-08-24 NOTE — CONSULT NOTE ADULT - SUBJECTIVE AND OBJECTIVE BOX
HPI:   Patient is a 78y female who was admitted to [facility] on July 26 after a fall , hit head and was on floor for hours after. She had rhabdo, she was found to have strept anginosis bacteremia and acute cholecystitis and ultimately also found to have a cerebellar infarct. She had a shannan tube placed, bile gre s. anginosis and ecoli. She was on ctx in hospital then came here on levaquin and flagyl. She came here on Aug 3 for rehab. She had a repeat ct with dilated ducts, shannan tube found to go through the colon and sit in the mesentary and diverticulitis. MRI showed choledocholithiasis. She had ercp, sphinterotomy and stent. She was kept on levaquin and flagyl thought 8/22. She had  a fever to 101 on 8/21, bc neg, EV/RV pos. She has redness around shannan tube and grew mrsa .She has some pain by the tibe, has otherwise been doing ok    REVIEW OF SYSTEMS:  All other review of systems negative (Comprehensive ROS)    PAST MEDICAL & SURGICAL HISTORY:  HTN (hypertension)      Hyperlipidemia      Tinnitus  right ear      Seasonal allergies      S/P knee replacement          Allergies    No Known Allergies    Intolerances        Antimicrobials Day #  :    Other Medications:  acetaminophen     Tablet .. 650 milliGRAM(s) Oral every 6 hours PRN  acetaminophen     Tablet .. 650 milliGRAM(s) Oral every 6 hours PRN  aluminum hydroxide/magnesium hydroxide/simethicone Suspension 30 milliLiter(s) Oral every 4 hours PRN  apixaban 5 milliGRAM(s) Oral every 12 hours  atorvastatin 80 milliGRAM(s) Oral at bedtime  benzonatate 100 milliGRAM(s) Oral three times a day  bisacodyl Suppository 10 milliGRAM(s) Rectal daily PRN  diltiazem    milliGRAM(s) Oral daily  erythromycin   Ointment 1 Application(s) Both EYES four times a day  guaiFENesin Oral Liquid (Sugar-Free) 100 milliGRAM(s) Oral every 6 hours PRN  indomethacin Suppository 100 milliGRAM(s) Rectal once  levalbuterol 45 MICROgram(s) HFA Inhaler 1 Puff(s) Inhalation every 8 hours PRN  melatonin 6 milliGRAM(s) Oral at bedtime  metoprolol succinate  milliGRAM(s) Oral daily  mometasone 220 MICROgram(s) Inhaler 1 Puff(s) Inhalation daily  psyllium Powder 1 Packet(s) Oral daily  saccharomyces boulardii 250 milliGRAM(s) Oral two times a day      FAMILY HISTORY:  Family history of myocardial infarction (Mother)        SOCIAL HISTORY:  Smoking: [ ]Yes [ x]No  ETOH: [ ]Yes [x ]No  Drug Use: [ ]Yes [ x]No   [ x] Single[ ]    T(F): 97.9 (08-24-23 @ 08:02), Max: 98 (08-23-23 @ 20:40)  HR: 87 (08-24-23 @ 08:02)  BP: 141/86 (08-24-23 @ 08:02)  RR: 16 (08-24-23 @ 08:02)  SpO2: 96% (08-24-23 @ 08:02)  Wt(kg): --    PHYSICAL EXAM:  General: alert, no acute distress  Eyes:  anicteric, no conjunctival injection, no discharge  Oropharynx: no lesions or injection 	  Neck: supple, without adenopathy  Lungs: clear to auscultation  Heart: regular rate and rhythm; no murmur, rubs or gallops  Abdomen: soft, nondistended, nontender, without mass or organomegaly. red around shannan tube, tenderness when tube is moved, no active drainage.   Skin: no lesions  Extremities: no clubbing, cyanosis, or edema  Neurologic: alert, oriented, moves all extremities    LAB RESULTS:                        12.9   6.75  )-----------( 186      ( 24 Aug 2023 05:33 )             39.8     08-24    140  |  104  |  12  ----------------------------<  89  4.4   |  31  |  0.74    Ca    8.6      24 Aug 2023 05:33    TPro  6.6  /  Alb  2.5<L>  /  TBili  0.5  /  DBili  x   /  AST  41<H>  /  ALT  26  /  AlkPhos  79  08-24    LIVER FUNCTIONS - ( 24 Aug 2023 05:33 )  Alb: 2.5 g/dL / Pro: 6.6 g/dL / ALK PHOS: 79 U/L / ALT: 26 U/L / AST: 41 U/L / GGT: x           Urinalysis Basic - ( 24 Aug 2023 05:33 )    Color: x / Appearance: x / SG: x / pH: x  Gluc: 89 mg/dL / Ketone: x  / Bili: x / Urobili: x   Blood: x / Protein: x / Nitrite: x   Leuk Esterase: x / RBC: x / WBC x   Sq Epi: x / Non Sq Epi: x / Bacteria: x        MICROBIOLOGY:  RECENT CULTURES:  08-22 @ 01:45 .Other Culture Shannan tube drainage Methicillin resistant Staphylococcus aureus    Moderate Methicillin Resistant Staphylococcus aureus      08-22 @ 01:00 .Blood Blood     No growth at 48 Hours            RADIOLOGY REVIEWED:  < from: CT Abdomen and Pelvis No Cont (08.07.23 @ 11:38) >    ACC: [id number] EXAM:  CT ABDOMEN AND PELVIS   ORDERED BY: DEYANIRA WORLEY     PROCEDURE DATE:  08/07/2023          INTERPRETATION:  CLINICAL INFORMATION: Cholecystostomy, right quadrant   abdominal pain.    COMPARISON: CT chest 7/25/2023    CONTRAST/COMPLICATIONS:  IV Contrast: None  Oral Contrast: None  Complications: None at the time of this dictation.    PROCEDURE:  CT of the Abdomen and Pelvis was performed.  Sagittal and coronal reformats were performed.    FINDINGS:  LOWER CHEST: Mosaic attenuation of the lung bases.    LIVER: Within normal limits.  BILE DUCTS: Dilated common bile duct measuring up to 9 mm with   hyperdensity in the distal CBD, suspicious for choledocholithiasis  GALLBLADDER: Cholelithiasis. Gallbladder wall thickening and  pericholecystic fat stranding at the gallbladder fundus  SPLEEN: Within normal limits.  PANCREAS: Within normal limits.  ADRENALS: Within normal limits.  KIDNEYS/URETERS: Within normal limits.    BLADDER: Within normal limits.  REPRODUCTIVE ORGANS:Uterus and adnexa within normal limits.    BOWEL: No bowel obstruction. A percutaneous catheter traverses the   proximal ascending colon just distal to the cecum with pigtail noted in   the adjacent mesentery. There is inflammation adjacent to the ascending   colon and appendix, which is otherwise normal caliber. There is acute   diverticulitis involving the mid sigmoid colon with small extra luminal   locules of gas consistent with localized perforation. Associated adjacent   phlegmonous changes.  PERITONEUM: Roderick pneumoperitoneum. No loculated collection within the   limitations of this exam.  VESSELS: Within normal limits.  RETROPERITONEUM/LYMPH NODES: No lymphadenopathy.  ABDOMINAL WALL: Within normal limits.  BONES: Degenerative changesof thoracolumbar spine. Degenerative changes   of the bilateral sacroiliac joints with erosive changes at the superior   aspect of the SI joints bilaterally, possibly sequela of prior   sacroiliitis.    IMPRESSION:  Percutaneous cholecystostomy catheter is malpositioned within the   descending colon distal to the cecum. Inflammation adjacent to the colon   and appendix, which is otherwise normal in caliber, likely reactive.    Dilated common bile duct with suspicion of choledocholithiasis in the   distal CBD. Cholelithiasis with gallbladder wall thickening and   pericholecystic fat stranding, consistent with acute cholecystitis.    Acute diverticulitis in the mid sigmoid colon with associated localized   perforation.    Findings of malpositioned percutaneous cholecystomy catheter &   diverticulitis were discussed with Dr. DEYANIRA WORLEY [id number] 8/7/2023   11:49 AM by Dr. Alvarez with read back confirmation.    --- End of Report ---    < from: MR MRCP w/wo IV Cont (08.09.23 @ 15:25) >  ACC: [id number] EXAM:  MR MRCP WAW IC   ORDERED BY:  EMI CLAY     PROCEDURE DATE:  08/09/2023          INTERPRETATION:  CLINICAL INFORMATION: Prior acute cholecystitis with   percutaneous cholecystostomy tube. Cholelithiasis and choledocholithiasis.    COMPARISON: CT abdomen pelvis 8/7/2023. Right upper quadrant ultrasound   7/26/2023.    CONTRAST/COMPLICATIONS:  IV Contrast: Gadavist  8 cc administered   2 cc discarded  Oral Contrast: NONE  Complications: None reported at time of study completion    PROCEDURE:  MRI of the abdomen was performed.  MRCP was performed.    FINDINGS:    LOWER CHEST: Bibasilar atelectasis better seen on recent CT. No   visualized pleural effusion.    Motion limited evaluation.    LIVER: Liver size within normal limits. Perfusional difference of segment   6 and segment 8 on early phase compared to remainder of the liver, of   unclear etiology, suboptimally characterized due to motion. Liver   parenchyma is suboptimally assessed due to motion.  BILE DUCTS: Distended common bile duct up to 12 mm, new since 7/25/2023,   with mid/distal segment choledocholithiasis (two stones noted, image 54   series 14). Borderline central intrahepatic ductal distention.  GALLBLADDER: Cholelithiasis. Persistent, but decreased, inflammatory   changes of the gallbladder since 7/25/2023. Malpositioned cholecystostomy   tube with fluid and inflammatory changes along the catheter tract better   seen on the recent CT from 8/7/2023. Consider repeat HIDA scan to   evaluatefor persistent cholecystitis.  SPLEEN: Spleen size within normal limits  PANCREAS: Limited evaluation due to motion. 3 mm T2 hyperintense foci at   the pancreatic tail are likely a side branch IPMNs or sequelae of prior   pancreatitis.  ADRENALS: Unremarkable  KIDNEYS/URETERS: No hydronephrosis. Trace perinephric fluid.    VISUALIZED PORTIONS:  BOWEL: No bowel distention.  PERITONEUM: Trace fluid adjacent to the right colon along displaced   cholecystostomy tube catheter tract, suboptimally characterized on this   study. Additional trace fluid adjacent to diverticular disease of the   sigmoid colon, better seen on recent CT.  VESSELS: No abdominal aortic aneurysm  RETROPERITONEUM/LYMPH NODES: Small volume nodes  ABDOMINAL WALL: Unremarkable  BONES: Suboptimally characterized and abdominal protocol MRI.    IMPRESSION:    Distended common bile duct up to 12 mm, new since 7/25/2023, with   mid/distal segment choledocholithiasis (two stones noted, image 54 series   14). Borderline central intrahepatic ductal distention.    Cholelithiasis. Persistent, but decreased, inflammatory changes of the   gallbladder since 7/25/2023. Malpositioned cholecystostomy tube with   fluid and inflammatory changes along the catheter tract better seen on   the recent CT from 8/7/2023. Consider repeat HIDA scan to evaluate for   persistent cholecystitis.    Additional trace fluid adjacent to diverticular disease of the sigmoid   colon, better seen on recent CT from 8/7/2023.    3 mm T2 hyperintense foci atthe pancreatic tail are likely a side branch   IPMNs or sequelae of prior pancreatitis. Follow-up MRCP is suggested in   12 months.    --- End of Report ---        < end of copied text >    < end of copied text >        < from: Xray Chest 1 View- PORTABLE-Urgent (Xray Chest 1 View- PORTABLE-Urgent .) (08.21.23 @ 19:32) >    ACC: [id number] EXAM:  XR CHEST PORTABLE URGENT 1V   ORDERED BY: LORIE CALLAHAN     PROCEDURE DATE:  08/21/2023          INTERPRETATION:  TIME OF EXAM: August 21, 2023 at 7:35 PM.    CLINICAL INFORMATION: Cough.    COMPARISON:  July 26, 2023.    TECHNIQUE:   AP Portable chest x-ray.    INTERPRETATION:    Heart size is not accurately evaluated on this image. The thoracic aorta   is calcified.  An elevated right hemidiaphragm is again seen.  There is linear atelectasis versus scar in the right apex and left lower   lung.  No focal lung consolidation, pleural effusion, or pneumothorax is seen.  There is scoliosis of the spine with osteoarthritic degenerative change.      IMPRESSION:  Elevated right hemidiaphragm again seen.    Linear atelectasis versus scar in the right apex and left lower lung.    No focal lung consolidation.    --- End of Report ---    < end of copied text >    Impression:  Patient admitted to [facility] on July 26 after fall, had rhabdo, had strept anginosis bacteremia with acute shannan, had shannan tube placed. HAd been sent here on 8/3. Had a ct a/p showing shannan tube goes through the colon into mesentary but no symptoms so not removed. She had some diverticulitis with local perf seen too. Levaquin and flagyl were the regimen she was sent here on and kept on until 2 d ago. She had ercp, sphincterotomy and biliary stent. She had a fever 3 d ago, pos rvp for rv , bc neg, cxr neg and some redness around shannan tube grew mrsa . She does have a mild local infection around the shannan tube but no persistent active diverticulitis, cholecystitis or infection related to the displaced shannan tube are apparent She will need cholecystectomy and I suspect when that is done will be the safest way to pull the shannan tube in a controlled way to hopefully avoid bowel fistula        Recommendations:  will start a course of doxycycline for local drain site cellulitis  repeat ct a/p to be sure no abscess is forming from the diverticulitis and colon perf with tube

## 2023-08-24 NOTE — PROGRESS NOTE ADULT - ASSESSMENT
78 year old female with PMH HTN, and HLD who presented to [place] ED 7/26/23 s/p fall from chair with + head strike. She was found to have new onset AFIB with RVR and Rhabdomyolysis. Her hospital course was complicated by acute cholecystitis and cholelithiasis requiring percutaneous enrique drain via IR on 7/7, Bacteremia, and cerebellar infarct     # right cerebellar CVA with incoordination, imbalance, cognitive impairment  - MRI 8/1: acute right cerebellar infarct. Scattered foci of T2/FLAIR hyperintensity in the bilateral hemispheric white matter  - ASA and Atorvastatin  - continue Comprehensive Rehab Program of PT/OT/SLP  - 3 hours a day, 5 days a week  - recreation therapy, psychology services   - Precautions: cardiac, AC, fall, AMS, enrique drain    # fever/cough; rhinovirus infection  - T 101.2 max 8./22. Afebrile 8/24  - CXR 8/21: Elevated right hemidiaphragm again seen. Linear atelectasis versus scar in the right apex and left lower lung. No focal lung consolidation. (official read)  - RVP 8/22 + enterovirus/rhinovirus. Reviewed with patient and daughter  - IS, tessalon perle standing  - robitussin PRN   - Blood Cx resulted as mod staph aureus in 1/3 bottled 8/24. Hospitalist f/u requested. DC on hold  - WBC 6.75 8/24  - tylenol PRN    # HTN  - Metoprolol XL 100mg daily  - Diltiazem CD 120mg daily  - BP (123/79 - 141/86) 8/24    # AFib RVR  - Metoprolol XL 100mg daily  - Eliquis 5 q12hrs . Medication reviewed with patient and daughter. Confirmed covered by insurance with $10 copay 8/22  - HR 87-93 8/24    # conjunctivitis  - erythromycin ophthalmic ointment QID . Day #2/5 8/24    # Rhabdomyolysis  - S/p IVF  - CK 5502 7/25--> 109 8/4  - resolved    #  Cholecystitis   - s/p percutaneous cholecystectomy via IR on 7/7   -  A/P CT 8/7: Percutaneous cholecystostomy catheter is malpositioned within the descending colon distal to the cecum. Inflammation adjacent to the colon and appendix,likely reactive. Dilated common bile duct with suspicion of choledocholithiasis in the distal CBD. Cholelithiasis with gallbladder wall thickening and pericholecystic fat stranding. Acute diverticulitis in the mid sigmoid colon with associated localized perforation.  - MRCP 8/9: distended CBD up to 12mm + 2 stones in bile duct w/ borderline central intrahepatic ductal distention, + cholelithiasis decreased inflammation,  - GI appreciated. s/p ERCP 8/11 Sphincterotomy performed with stent placement  - Surgery appreciated 8/7. Low suspicion for non-drained infectious process. Leave drain in as precaution. May be candidate for outpatient laparoscopic cholecystectomy.  - levaquin/flagyl. dc, discussed with GI 8/22  - AST  41<H>  /  ALT  26  /  AlkPhos  79  08-24. Sl elevation AST, repeat labs in AM  - f/u outpatient for drain removal and cholecystectomy, discussed with patient    # mild irritation/erythema enrique drain insertion site  - betadine area daily, cover with drain guaze daily  - keep covered while showering  - f/u hospitalist re: any need for Abx. currently afebrile, no leukocytosis  - drain: in incorrect position, no output, no need for flushes. To follow up with surgery as outpatietn for removal    # bibasilar atelectasis noted on MRCP  - incentive spirometry, breathing exercises     # RUL Lung nodule found on CT chest (7/25)  - Incidental finding   - F/U PCP outpatient for repeat CT    # GI  - Senna dc due to loose stool 8/16  - metamucil for bulking 8/16  - on florastor secondary to Abx  - toileting program    # /h/o retention.  - continue monitoring bladder scan, SC for PVR >350 ml  - toileting program    # podiatry  - podiatry appreciated, s/p nail debridement    # Sleep  - Melatonin PRN     # Pain Mgmt   - Tylenol PRN    # FEN   - Diet - Regular + Thins  [DASH/TLC]    - Food preferences: no cheese, no yogurt, no fish, no apple juice placed in EMR    #  DVT PPX:  - Eliquis     # Case discussed in IDT rounds 8/21 (follow up):  - tolerating reg/thin liquids, increased cognition and attention. CG bADLs and functional transfers, min assist/CG stairs with bilateral HR and ambulation with RW  - goals adjusted: supervision iADLs, supervision/CG  bADLs, CG ambulation/stairs, supervision iaDLS and during waking hours  - caregiver training performed  - adjusted target dc home 8/24 with caregiver support 24/7  and home Pt OT SLP  - Pharmacy: [facility]iel. Medications sent 8/22    Daughter Bernice: [phone] informed of positive blood Cx results and hold on dc pending medicine input    # LABs  CBC CMP 8/25        ---------------  Outpatient/Follow-Up:    Alli Pascual  Surgery  [address] [place] [address]  Phone: [phone]  Fax: [phone]  Follow Up Time: 1 week    Alexis Simon  Cardiovascular Disease  [address] [id number]  Phone: [phone]  Fax: [phone]  Follow Up Time: 1 week    BRIAN ALBA  [address] [place] [address]  Phone: ()-  Fax: ()-     78 year old female with PMH HTN, and HLD who presented to [place] ED 7/26/23 s/p fall from chair with + head strike. She was found to have new onset AFIB with RVR and Rhabdomyolysis. Her hospital course was complicated by acute cholecystitis and cholelithiasis requiring percutaneous enrique drain via IR on 7/7, Bacteremia, and cerebellar infarct     # right cerebellar CVA with incoordination, imbalance, cognitive impairment  - MRI 8/1: acute right cerebellar infarct. Scattered foci of T2/FLAIR hyperintensity in the bilateral hemispheric white matter  - ASA and Atorvastatin  - continue Comprehensive Rehab Program of PT/OT/SLP  - 3 hours a day, 5 days a week  - recreation therapy, psychology services   - Precautions: cardiac, AC, fall, AMS, enrique drain    # fever/cough; rhinovirus infection  - T 101.2 max 8./22. Afebrile 8/24  - CXR 8/21: Elevated right hemidiaphragm again seen. Linear atelectasis versus scar in the right apex and left lower lung. No focal lung consolidation. (official read)  - RVP 8/22 + enterovirus/rhinovirus. Reviewed with patient and daughter  - IS, tessalon bettybrianna standing  - robitussin PRN   - Blood Cx resulted as mod staph aureus in 1/3 bottled 8/24. Discussed with hospitalist, ID consult requested  - WBC 6.75 8/24  - tylenol PRN    # HTN  - Metoprolol XL 100mg daily  - Diltiazem CD 120mg daily  - BP (123/79 - 141/86) 8/24    # AFib RVR  - Metoprolol XL 100mg daily  - Eliquis 5 q12hrs . Medication reviewed with patient and daughter. Confirmed covered by insurance with $10 copay 8/22  - HR 87-93 8/24    # conjunctivitis  - erythromycin ophthalmic ointment QID . Day #2/5 8/24    # Rhabdomyolysis  - S/p IVF  - CK 5502 7/25--> 109 8/4  - resolved    #  Cholecystitis   - s/p percutaneous cholecystectomy via IR on 7/7   -  A/P CT 8/7: Percutaneous cholecystostomy catheter is malpositioned within the descending colon distal to the cecum. Inflammation adjacent to the colon and appendix,likely reactive. Dilated common bile duct with suspicion of choledocholithiasis in the distal CBD. Cholelithiasis with gallbladder wall thickening and pericholecystic fat stranding. Acute diverticulitis in the mid sigmoid colon with associated localized perforation.  - MRCP 8/9: distended CBD up to 12mm + 2 stones in bile duct w/ borderline central intrahepatic ductal distention, + cholelithiasis decreased inflammation,  - GI appreciated. s/p ERCP 8/11 Sphincterotomy performed with stent placement  - Surgery appreciated 8/7. Low suspicion for non-drained infectious process. Leave drain in as precaution. May be candidate for outpatient laparoscopic cholecystectomy.  - levaquin/flagyl. dc, discussed with GI 8/22  - AST  41<H>  /  ALT  26  /  AlkPhos  79  08-24. Sl elevation AST, repeat labs in AM  - f/u outpatient for drain removal and cholecystectomy, discussed with patient    # mild irritation/erythema enrique drain insertion site  - betadine area daily, cover with drain guaze daily  - keep covered while showering  - f/u hospitalist re: any need for Abx. currently afebrile, no leukocytosis  - drain: in incorrect position, no output, no need for flushes. To follow up with surgery as outpatietn for removal    # bibasilar atelectasis noted on MRCP  - incentive spirometry, breathing exercises     # RUL Lung nodule found on CT chest (7/25)  - Incidental finding   - F/U PCP outpatient for repeat CT    # GI  - Senna dc due to loose stool 8/16  - metamucil for bulking 8/16  - on florastor secondary to Abx  - toileting program    # /h/o retention.  - continue monitoring bladder scan, SC for PVR >350 ml  - toileting program    # podiatry  - podiatry appreciated, s/p nail debridement    # Sleep  - Melatonin PRN     # Pain Mgmt   - Tylenol PRN    # FEN   - Diet - Regular + Thins  [DASH/TLC]    - Food preferences: no cheese, no yogurt, no fish, no apple juice placed in EMR    #  DVT PPX:  - Eliquis     # Case discussed in IDT rounds 8/21 (follow up):  - tolerating reg/thin liquids, increased cognition and attention. CG bADLs and functional transfers, min assist/CG stairs with bilateral HR and ambulation with RW  - goals adjusted: supervision iADLs, supervision/CG  bADLs, CG ambulation/stairs, supervision iaDLS and during waking hours  - caregiver training performed  - adjusted target dc home 8/24 with caregiver support 24/7  and home Pt OT SLP  - [facility]acy: [facility] [place]. Medications sent 8/22    Daughter Bernice: [phone] informed of positive blood Cx results and hold on dc pending medicine input    # LABs  CBC CMP 8/25  ID consult      ---------------  Outpatient/Follow-Up:    Alli Pascual  Surgery  [address] [place] [address]  Phone: [phone]  Fax: [phone]  Follow Up Time: 1 week    Alexis Simon  Cardiovascular Disease  [address] [id number]  Phone: [phone]  Fax: [phone]  Follow Up Time: 1 week    BRIAN ALBA  [address] [place] [address]  Phone: ()-  Fax: ()-     78 year old female with PMH HTN, and HLD who presented to [place] ED 7/26/23 s/p fall from chair with + head strike. She was found to have new onset AFIB with RVR and Rhabdomyolysis. Her hospital course was complicated by acute cholecystitis and cholelithiasis requiring percutaneous enrique drain via IR on 7/7, Bacteremia, and cerebellar infarct     # right cerebellar CVA with incoordination, imbalance, cognitive impairment  - MRI 8/1: acute right cerebellar infarct. Scattered foci of T2/FLAIR hyperintensity in the bilateral hemispheric white matter  - ASA and Atorvastatin  - continue Comprehensive Rehab Program of PT/OT/SLP  - 3 hours a day, 5 days a week  - recreation therapy, psychology services   - Precautions: cardiac, AC, fall, AMS, enrique drain    # fever/cough; rhinovirus infection  - T 101.2 max 8./22. Afebrile 8/24  - CXR 8/21: Elevated right hemidiaphragm again seen. Linear atelectasis versus scar in the right apex and left lower lung. No focal lung consolidation. (official read)  - RVP 8/22 + enterovirus/rhinovirus. Reviewed with patient and daughter  - IS, tessalon perle standing  - robitussin PRN   - Blood Cx resulted as mod staph aureus in 1/3 bottled 8/24. Discussed with hospitalist, ID consult requested  - WBC 6.75 8/24  - tylenol PRN    #  Cholecystitis   - s/p percutaneous cholecystectomy via IR on 7/7   -  A/P CT 8/7: Percutaneous cholecystostomy catheter is malpositioned within the descending colon distal to the cecum. Inflammation adjacent to the colon and appendix,likely reactive. Dilated common bile duct with suspicion of choledocholithiasis in the distal CBD. Cholelithiasis with gallbladder wall thickening and pericholecystic fat stranding. Acute diverticulitis in the mid sigmoid colon with associated localized perforation.  - MRCP 8/9: distended CBD up to 12mm + 2 stones in bile duct w/ borderline central intrahepatic ductal distention, + cholelithiasis decreased inflammation,  - s/p ERCP 8/11 Sphincterotomy performed with stent placement  - GI and Surgery appreciated 8/7. Low suspicion for non-drained infectious process. Leave drain in as precaution. May be candidate for outpatient laparoscopic cholecystectomy.  - levaquin/flagyl. dc, discussed with GI 8/22  - AST  41<H>  /  ALT  26  /  AlkPhos  79  08-24. Sl elevation AST, repeat labs in AM  - f/u outpatient for drain removal and cholecystectomy, discussed with patient    # mild irritation/erythema enrique drain insertion site  - betadine area daily, cover with drain guaze daily  - keep covered while showering  - trace drainage at drain insertion site abdomen 8/24, ID consult pending ? wound Cx  - continue dressing as above for now, reduced pain today  - drain: in incorrect position, no output, no need for flushes. To follow up with surgery as outpatient for removal    # HTN  - Metoprolol XL 100mg daily  - Diltiazem CD 120mg daily  - BP (123/79 - 141/86) 8/24    # AFib RVR  - Metoprolol XL 100mg daily  - Eliquis 5 q12hrs . Medication reviewed with patient and daughter. Confirmed covered by insurance with $10 copay 8/22  - HR 87-93 8/24    # conjunctivitis  - erythromycin ophthalmic ointment QID . Day #2/5 8/24    # bibasilar atelectasis noted on MRCP  - incentive spirometry, breathing exercises     # RUL Lung nodule found on CT chest (7/25)  - Incidental finding   - F/U PCP outpatient for repeat CT    # Rhabdomyolysis  - S/p IVF  - CK 5502 7/25--> 109 8/4  - resolved    # GI  - Senna dc due to loose stool 8/16  - metamucil for bulking 8/16  - on florastor secondary to Abx  - toileting program    # /h/o retention.  - continue monitoring bladder scan, SC for PVR >350 ml  - toileting program    # podiatry  - podiatry appreciated, s/p nail debridement    # Sleep  - Melatonin PRN     # Pain Mgmt   - Tylenol PRN    # FEN   - Diet - Regular + Thins  [DASH/TLC]    - Food preferences: no cheese, no yogurt, no fish, no apple juice placed in EMR    #  DVT PPX:  - Eliquis     # Case discussed in IDT rounds 8/21 (follow up):  - tolerating reg/thin liquids, increased cognition and attention. CG bADLs and functional transfers, min assist/CG stairs with bilateral HR and ambulation with RW  - goals adjusted: supervision iADLs, supervision/CG  bADLs, CG ambulation/stairs, supervision iaDLS and during waking hours  - caregiver training performed  - adjusted target dc home 8/24 with caregiver support 24/7  and home Pt OT SLP  - [facility]acy: [facility] [place]. Medications sent 8/22    Daughter Bernice: [phone] informed of positive blood Cx results and hold on dc pending medicine input    # LABs  CBC CMP 8/25  ID consult    time spent for evaluation, communication, management, and coordination care 50 min  ---------------  Outpatient/Follow-Up:    Alli Pascual  Surgery  [address] [place] [address]  Phone: [phone]  Fax: [phone]  Follow Up Time: 1 week    Alexis Simon  Cardiovascular Disease  [address] [place], [place] [id number]  Phone: [phone]  Fax: [phone]  Follow Up Time: 1 week    BRIAN ALBA  [address] [place] [address]  Phone: ()-  Fax: ()-     78 year old female with PMH HTN, and HLD who presented to [place] ED 7/26/23 s/p fall from chair with + head strike. She was found to have new onset AFIB with RVR and Rhabdomyolysis. Her hospital course was complicated by acute cholecystitis and cholelithiasis requiring percutaneous enrique drain via IR on 7/7, Bacteremia, and cerebellar infarct     # right cerebellar CVA with incoordination, imbalance, cognitive impairment  - MRI 8/1: acute right cerebellar infarct. Scattered foci of T2/FLAIR hyperintensity in the bilateral hemispheric white matter  - ASA and Atorvastatin  - continue Comprehensive Rehab Program of PT/OT/SLP  - 3 hours a day, 5 days a week  - recreation therapy, psychology services   - Precautions: cardiac, AC, fall, AMS, enrique drain    # fever/cough; rhinovirus infection  - T 101.2 max 8./22. Afebrile 8/24  - CXR 8/21: Elevated right hemidiaphragm again seen. Linear atelectasis versus scar in the right apex and left lower lung. No focal lung consolidation. (official read)  - RVP 8/22 + enterovirus/rhinovirus. Reviewed with patient and daughter  - IS, tessalon perle standing  - robitussin PRN   - wound Cx + S aureus 8/24. Discussed with hospitalist, ID consult requested  - WBC 6.75 8/24  - tylenol PRN    #  Cholecystitis   - s/p percutaneous cholecystectomy via IR on 7/7   -  A/P CT 8/7: Percutaneous cholecystostomy catheter is malpositioned within the descending colon distal to the cecum. Inflammation adjacent to the colon and appendix,likely reactive. Dilated common bile duct with suspicion of choledocholithiasis in the distal CBD. Cholelithiasis with gallbladder wall thickening and pericholecystic fat stranding. Acute diverticulitis in the mid sigmoid colon with associated localized perforation.  - MRCP 8/9: distended CBD up to 12mm + 2 stones in bile duct w/ borderline central intrahepatic ductal distention, + cholelithiasis decreased inflammation,  - s/p ERCP 8/11 Sphincterotomy performed with stent placement  - GI and Surgery appreciated 8/7. Low suspicion for non-drained infectious process. Leave drain in as precaution. May be candidate for outpatient laparoscopic cholecystectomy.  - levaquin/flagyl. dc, discussed with GI 8/22  - AST  41<H>  /  ALT  26  /  AlkPhos  79  08-24. Sl elevation AST, repeat labs in AM  - f/u outpatient for drain removal and cholecystectomy, discussed with patient    # mild irritation/erythema enrique drain insertion site  - betadine area daily, cover with drain guaze daily  - keep covered while showering  - trace drainage at drain insertion site abdomen 8/24, Wound Cx + staph aureus  - continue dressing as above for now, reduced pain today 8/24  - drain: in incorrect position, no output, no need for flushes. To follow up with surgery as outpatient for removal    # HTN  - Metoprolol XL 100mg daily  - Diltiazem CD 120mg daily  - BP (123/79 - 141/86) 8/24    # AFib RVR  - Metoprolol XL 100mg daily  - Eliquis 5 q12hrs . Medication reviewed with patient and daughter. Confirmed covered by insurance with $10 copay 8/22  - HR 87-93 8/24    # conjunctivitis  - erythromycin ophthalmic ointment QID . Day #2/5 8/24    # bibasilar atelectasis noted on MRCP  - incentive spirometry, breathing exercises     # RUL Lung nodule found on CT chest (7/25)  - Incidental finding   - F/U PCP outpatient for repeat CT    # Rhabdomyolysis  - S/p IVF  - CK 5502 7/25--> 109 8/4  - resolved    # GI  - Senna dc due to loose stool 8/16  - metamucil for bulking 8/16  - on florastor secondary to Abx  - toileting program    # /h/o retention.  - continue monitoring bladder scan, SC for PVR >350 ml  - toileting program    # podiatry  - podiatry appreciated, s/p nail debridement    # Sleep  - Melatonin PRN     # Pain Mgmt   - Tylenol PRN    # FEN   - Diet - Regular + Thins  [DASH/TLC]    - Food preferences: no cheese, no yogurt, no fish, no apple juice placed in EMR    #  DVT PPX:  - Eliquis     # Case discussed in IDT rounds 8/21 (follow up):  - tolerating reg/thin liquids, increased cognition and attention. CG bADLs and functional transfers, min assist/CG stairs with bilateral HR and ambulation with RW  - goals adjusted: supervision iADLs, supervision/CG  bADLs, CG ambulation/stairs, supervision iaDLS and during waking hours  - caregiver training performed  - adjusted target dc home 8/24 with caregiver support 24/7  and home Pt OT SLP  - [facility]acy: [facility] [place]. Medications sent 8/22    Daughter Bernice: [phone] informed of positive Cx results and hold on dc pending medicine input    # LABs  CBC CMP 8/25 if nto dc  ID consult    time spent for evaluation, communication, management, and coordination care 50 min  ---------------  Outpatient/Follow-Up:    Alli Pascual  Surgery  [address] [place] [address]  Phone: [phone]  Fax: [phone]  Follow Up Time: 1 week    Alexis Simon  Cardiovascular Disease  [address] [place], [place] [id number]  Phone: [phone]  Fax: [phone]  Follow Up Time: 1 week    BRIAN ALBA  [address] [place] [address]  Phone: ()-  Fax: ()-     78 year old female with PMH HTN, and HLD who presented to [facility] ED 7/26/23 s/p fall from chair with + head strike. She was found to have new onset AFIB with RVR and Rhabdomyolysis. Her hospital course was complicated by acute cholecystitis and cholelithiasis requiring percutaneous enrique drain via IR on 7/7, Bacteremia, and cerebellar infarct     # right cerebellar CVA with incoordination, imbalance, cognitive impairment  - MRI 8/1: acute right cerebellar infarct. Scattered foci of T2/FLAIR hyperintensity in the bilateral hemispheric white matter  - ASA and Atorvastatin  - continue Comprehensive Rehab Program of PT/OT/SLP  - 3 hours a day, 5 days a week  - recreation therapy, psychology services   - Precautions: cardiac, AC, fall, AMS, enrique drain    # fever/cough; rhinovirus infection  - T 101.2 max 8./22. Afebrile 8/24  - CXR 8/21: Elevated right hemidiaphragm again seen. Linear atelectasis versus scar in the right apex and left lower lung. No focal lung consolidation. (official read)  - RVP 8/22 + enterovirus/rhinovirus. Reviewed with patient and daughter  - IS, tessalon perle standing  - robitussin PRN   - wound Cx + S aureus 8/24. Discussed with hospitalist, ID consult requested  - WBC 6.75 8/24  - tylenol PRN    #  Cholecystitis   - s/p percutaneous cholecystectomy via IR on 7/7   -  A/P CT 8/7: Percutaneous cholecystostomy catheter is malpositioned within the descending colon distal to the cecum. Inflammation adjacent to the colon and appendix,likely reactive. Dilated common bile duct with suspicion of choledocholithiasis in the distal CBD. Cholelithiasis with gallbladder wall thickening and pericholecystic fat stranding. Acute diverticulitis in the mid sigmoid colon with associated localized perforation.  - MRCP 8/9: distended CBD up to 12mm + 2 stones in bile duct w/ borderline central intrahepatic ductal distention, + cholelithiasis decreased inflammation,  - s/p ERCP 8/11 Sphincterotomy performed with stent placement  - GI and Surgery appreciated 8/7. Low suspicion for non-drained infectious process. Leave drain in as precaution. May be candidate for outpatient laparoscopic cholecystectomy.  - levaquin/flagyl. dc, discussed with GI 8/22  - AST  41<H>  /  ALT  26  /  AlkPhos  79  08-24. Sl elevation AST, repeat labs in AM  - f/u outpatient for drain removal and cholecystectomy, discussed with patient    # mild irritation/erythema enrique drain insertion site  - betadine area daily, cover with drain guaze daily  - keep covered while showering  - trace drainage at drain insertion site abdomen 8/24, Wound Cx + staph aureus  - continue dressing as above for now, reduced pain today 8/24  - drain: in incorrect position, no output, no need for flushes. To follow up with surgery as outpatient for removal    # HTN  - Metoprolol XL 100mg daily  - Diltiazem CD 120mg daily  - BP (123/79 - 141/86) 8/24    # AFib RVR  - Metoprolol XL 100mg daily  - Eliquis 5 q12hrs . Medication reviewed with patient and daughter. Confirmed covered by insurance with $10 copay 8/22  - HR 87-93 8/24    # conjunctivitis  - erythromycin ophthalmic ointment QID . Day #2/5 8/24    # bibasilar atelectasis noted on MRCP  - incentive spirometry, breathing exercises     # RUL Lung nodule found on CT chest (7/25)  - Incidental finding   - F/U PCP outpatient for repeat CT    # Rhabdomyolysis  - S/p IVF  - CK 5502 7/25--> 109 8/4  - resolved    # GI  - Senna dc due to loose stool 8/16  - metamucil for bulking 8/16  - on florastor secondary to Abx  - toileting program    # /h/o retention.  - continue monitoring bladder scan, SC for PVR >350 ml  - toileting program    # podiatry  - podiatry appreciated, s/p nail debridement    # Sleep  - Melatonin PRN     # Pain Mgmt   - Tylenol PRN    # FEN   - Diet - Regular + Thins  [DASH/TLC]    - Food preferences: no cheese, no yogurt, no fish, no apple juice placed in EMR    #  DVT PPX:  - Eliquis     # Case discussed in IDT rounds 8/21 (follow up):  - tolerating reg/thin liquids, increased cognition and attention. CG bADLs and functional transfers, min assist/CG stairs with bilateral HR and ambulation with RW  - goals adjusted: supervision iADLs, supervision/CG  bADLs, CG ambulation/stairs, supervision iaDLS and during waking hours  - caregiver training performed  - adjusted target dc home 8/24 with caregiver support 24/7  and home Pt OT SLP  - Pharmacy: [facility] [place]. Medications sent 8/22    Daughter Bernice: [phone] informed of positive Cx results and hold on dc pending ID recs    # LABs  CBC CMP 8/25 if nto dc  ID consult    time spent for evaluation, communication, management, and coordination care 50 min    addendum 1:51 PM  - Case also discussed with daughter Melissa [phone]. shortly after, Cx resulted as MRSA+, will be placed in private room, on contact isolation  ---------------  Outpatient/Follow-Up:    Alli Pascual  Surgery  [address] [place] [address]  Phone: [phone]  Fax: [phone]  Follow Up Time: 1 week    Alexis Simon  Cardiovascular Disease  [address] [place], [place] [id number]  Phone: [phone]  Fax: [phone]  Follow Up Time: 1 week    BRIAN ALBA  [address] [place] [address]  Phone: ()-  Fax: ()-     78 year old female with PMH HTN, and HLD who presented to [facility] ED 7/26/23 s/p fall from chair with + head strike. She was found to have new onset AFIB with RVR and Rhabdomyolysis. Her hospital course was complicated by acute cholecystitis and cholelithiasis requiring percutaneous enrique drain via IR on 7/7, Bacteremia, and cerebellar infarct     # right cerebellar CVA with incoordination, imbalance, cognitive impairment  - MRI 8/1: acute right cerebellar infarct. Scattered foci of T2/FLAIR hyperintensity in the bilateral hemispheric white matter  - ASA and Atorvastatin  - continue Comprehensive Rehab Program of PT/OT/SLP  - 3 hours a day, 5 days a week  - recreation therapy, psychology services   - Precautions: cardiac, AC, fall, AMS, enrique drain    # fever/cough; rhinovirus infection  - T 101.2 max 8./22. Afebrile 8/24  - CXR 8/21: Elevated right hemidiaphragm again seen. Linear atelectasis versus scar in the right apex and left lower lung. No focal lung consolidation. (official read)  - RVP 8/22 + enterovirus/rhinovirus. Reviewed with patient and daughter  - IS, tessalon perle standing  - robitussin PRN   - wound Cx + S aureus 8/24. Discussed with hospitalist, ID consult requested  - WBC 6.75 8/24  - tylenol PRN    #  Cholecystitis   - s/p percutaneous cholecystectomy via IR on 7/7   -  A/P CT 8/7: Percutaneous cholecystostomy catheter is malpositioned within the descending colon distal to the cecum. Inflammation adjacent to the colon and appendix,likely reactive. Dilated common bile duct with suspicion of choledocholithiasis in the distal CBD. Cholelithiasis with gallbladder wall thickening and pericholecystic fat stranding. Acute diverticulitis in the mid sigmoid colon with associated localized perforation.  - MRCP 8/9: distended CBD up to 12mm + 2 stones in bile duct w/ borderline central intrahepatic ductal distention, + cholelithiasis decreased inflammation,  - s/p ERCP 8/11 Sphincterotomy performed with stent placement  - GI and Surgery appreciated 8/7. Low suspicion for non-drained infectious process. Leave drain in as precaution. May be candidate for outpatient laparoscopic cholecystectomy.  - levaquin/flagyl. dc, discussed with GI 8/22  - AST  41<H>  /  ALT  26  /  AlkPhos  79  08-24. Sl elevation AST, repeat labs in AM  - f/u outpatient for drain removal and cholecystectomy, discussed with patient    # mild irritation/erythema enrique drain insertion site  - betadine area daily, cover with drain guaze daily  - keep covered while showering  - trace drainage at drain insertion site abdomen 8/24, Wound Cx + staph aureus  - continue dressing as above for now, reduced pain today 8/24  - drain: in incorrect position, no output, no need for flushes. To follow up with surgery as outpatient for removal    # HTN  - Metoprolol XL 100mg daily  - Diltiazem CD 120mg daily  - BP (123/79 - 141/86) 8/24    # AFib RVR  - Metoprolol XL 100mg daily  - Eliquis 5 q12hrs . Medication reviewed with patient and daughter. Confirmed covered by insurance with $10 copay 8/22  - HR 87-93 8/24    # conjunctivitis  - erythromycin ophthalmic ointment QID . Day #2/5 8/24    # bibasilar atelectasis noted on MRCP  - incentive spirometry, breathing exercises     # RUL Lung nodule found on CT chest (7/25)  - Incidental finding   - F/U PCP outpatient for repeat CT    # Rhabdomyolysis  - S/p IVF  - CK 5502 7/25--> 109 8/4  - resolved    # GI  - Senna dc due to loose stool 8/16  - metamucil for bulking 8/16  - on florastor secondary to Abx  - toileting program    # /h/o retention.  - continue monitoring bladder scan, SC for PVR >350 ml  - toileting program    # podiatry  - podiatry appreciated, s/p nail debridement    # Sleep  - Melatonin PRN     # Pain Mgmt   - Tylenol PRN    # FEN   - Diet - Regular + Thins  [DASH/TLC]    - Food preferences: no cheese, no yogurt, no fish, no apple juice placed in EMR    #  DVT PPX:  - Eliquis     # Case discussed in IDT rounds 8/21 (follow up):  - tolerating reg/thin liquids, increased cognition and attention. CG bADLs and functional transfers, min assist/CG stairs with bilateral HR and ambulation with RW  - goals adjusted: supervision iADLs, supervision/CG  bADLs, CG ambulation/stairs, supervision iaDLS and during waking hours  - caregiver training performed  - adjusted target dc home 8/24 with caregiver support 24/7  and home Pt OT SLP  - Pharmacy: [facility] Erin Maciel. Medications sent 8/22    Daughter Bernice: [phone] informed of positive Cx results and hold on dc pending ID recs    # LABs  CBC CMP 8/25 if nto dc  ID consult    time spent for evaluation, communication, management, and coordination care 50 min    addendum 1:51 PM  - Case also discussed with daughter Melissa [phone]. shortly after, Cx resulted as MRSA+, will be placed in private room, on contact isolation    addendum 3:18 PM  ID greatly appreciated. Will start on oral doxycycline 100 mg q12 but also recommends A/P CT with Iv and oral contrast to f/u findings from first CT and r/o abscess. DC on hold  ---------------  Outpatient/Follow-Up:    Alli Pascual  Surgery  [address] [place] [address]  Phone: [phone]  Fax: [phone]  Follow Up Time: 1 week    Alexis Simon  Cardiovascular Disease  [address] [place], [place] [id number]  Phone: [phone]  Fax: [phone]  Follow Up Time: 1 week    BRIAN ALBA  [address] [place] [address]  Phone: ()-  Fax: ()-     78 year old female with PMH HTN, and HLD who presented to [place] ED 7/26/23 s/p fall from chair with + head strike. She was found to have new onset AFIB with RVR and Rhabdomyolysis. Her hospital course was complicated by acute cholecystitis and cholelithiasis requiring percutaneous enrique drain via IR on 7/7, Bacteremia, and cerebellar infarct     # right cerebellar CVA with incoordination, imbalance, cognitive impairment  - MRI 8/1: acute right cerebellar infarct. Scattered foci of T2/FLAIR hyperintensity in the bilateral hemispheric white matter  - ASA and Atorvastatin  - continue Comprehensive Rehab Program of PT/OT/SLP  - 3 hours a day, 5 days a week  - recreation therapy, psychology services   - Precautions: cardiac, AC, fall, AMS, enrique drain    # fever/cough; rhinovirus infection  - T 101.2 max 8./22. Afebrile 8/24  - CXR 8/21: Elevated right hemidiaphragm again seen. Linear atelectasis versus scar in the right apex and left lower lung. No focal lung consolidation. (official read)  - RVP 8/22 + enterovirus/rhinovirus. Reviewed with patient and daughter  - IS, tessalon perle standing  - robitussin PRN   - wound Cx + S aureus 8/24. Discussed with hospitalist, ID consult requested  - WBC 6.75 8/24  - tylenol PRN    #  Cholecystitis   - s/p percutaneous cholecystectomy via IR on 7/7   -  A/P CT 8/7: Percutaneous cholecystostomy catheter is malpositioned within the descending colon distal to the cecum. Inflammation adjacent to the colon and appendix,likely reactive. Dilated common bile duct with suspicion of choledocholithiasis in the distal CBD. Cholelithiasis with gallbladder wall thickening and pericholecystic fat stranding. Acute diverticulitis in the mid sigmoid colon with associated localized perforation.  - MRCP 8/9: distended CBD up to 12mm + 2 stones in bile duct w/ borderline central intrahepatic ductal distention, + cholelithiasis decreased inflammation,  - s/p ERCP 8/11 Sphincterotomy performed with stent placement  - GI and Surgery appreciated 8/7. Low suspicion for non-drained infectious process. Leave drain in as precaution. May be candidate for outpatient laparoscopic cholecystectomy.  - levaquin/flagyl. dc, discussed with GI 8/22  - AST  41<H>  /  ALT  26  /  AlkPhos  79  08-24. Sl elevation AST, repeat labs in AM  - f/u outpatient for drain removal and cholecystectomy, discussed with patient    # mild irritation/erythema enrique drain insertion site  - betadine area daily, cover with drain guaze daily  - keep covered while showering  - trace drainage at drain insertion site abdomen 8/24, Wound Cx + staph aureus  - continue dressing as above for now, reduced pain today 8/24  - drain: in incorrect position, no output, no need for flushes. To follow up with surgery as outpatient for removal    # HTN  - Metoprolol XL 100mg daily  - Diltiazem CD 120mg daily  - BP (123/79 - 141/86) 8/24    # AFib RVR  - Metoprolol XL 100mg daily  - Eliquis 5 q12hrs . Medication reviewed with patient and daughter. Confirmed covered by insurance with $10 copay 8/22  - HR 87-93 8/24    # conjunctivitis  - erythromycin ophthalmic ointment QID . Day #2/5 8/24    # bibasilar atelectasis noted on MRCP  - incentive spirometry, breathing exercises     # RUL Lung nodule found on CT chest (7/25)  - Incidental finding   - F/U PCP outpatient for repeat CT    # Rhabdomyolysis  - S/p IVF  - CK 5502 7/25--> 109 8/4  - resolved    # GI  - Senna dc due to loose stool 8/16  - metamucil for bulking 8/16  - on florastor secondary to Abx  - toileting program    # /h/o retention.  - continue monitoring bladder scan, SC for PVR >350 ml  - toileting program    # podiatry  - podiatry appreciated, s/p nail debridement    # Sleep  - Melatonin PRN     # Pain Mgmt   - Tylenol PRN    # FEN   - Diet - Regular + Thins  [DASH/TLC]    - Food preferences: no cheese, no yogurt, no fish, no apple juice placed in EMR    #  DVT PPX:  - Eliquis     # Case discussed in IDT rounds 8/21 (follow up):  - tolerating reg/thin liquids, increased cognition and attention. CG bADLs and functional transfers, min assist/CG stairs with bilateral HR and ambulation with RW  - goals adjusted: supervision iADLs, supervision/CG  bADLs, CG ambulation/stairs, supervision iaDLS and during waking hours  - caregiver training performed  - adjusted target dc home 8/24 with caregiver support 24/7  and home Pt OT SLP  - Pharmacy: [facility] Erin Maciel. Medications sent 8/22    Daughter Bernice: [phone] informed of positive Cx results and hold on dc pending ID recs    # LABs  CBC CMP 8/25 if nto dc  ID consult    time spent for evaluation, communication, management, and coordination care 50 min    addendum 1:51 PM  - Case also discussed with daughter Melissa [phone]. shortly after, Cx resulted as MRSA+, will be placed in private room, on contact isolation    addendum 3:18 PM  ID greatly appreciated. Will start on oral doxycycline 100 mg q12 but also recommends A/P CT with Iv and oral contrast to f/u findings from first CT and r/o abscess. DC on hold  - discussed ID recommendations with patient and daughter who are agreeable with plan  ---------------  Outpatient/Follow-Up:    Alli Pascual  Surgery  [address] [place] [address]  Phone: [phone]  Fax: [phone]  Follow Up Time: 1 week    Alexis Simon  Cardiovascular Disease  [address] [place], [place] [id number]  Phone: [phone]  Fax: [phone]  Follow Up Time: 1 week    BRIAN ALBA[address] [address]  Phone: ()-  Fax: ()-     78 year old female with PMH HTN, and HLD who presented to [place] [facility] ED 7/26/23 s/p fall from chair with + head strike. She was found to have new onset AFIB with RVR and Rhabdomyolysis. Her hospital course was complicated by acute cholecystitis and cholelithiasis requiring percutaneous enrique drain via IR on 7/7, Bacteremia, and cerebellar infarct     # right cerebellar CVA with incoordination, imbalance, cognitive impairment  - MRI 8/1: acute right cerebellar infarct. Scattered foci of T2/FLAIR hyperintensity in the bilateral hemispheric white matter  - ASA and Atorvastatin  - continue Comprehensive Rehab Program of PT/OT/SLP  - 3 hours a day, 5 days a week  - recreation therapy, psychology services   - Precautions: cardiac, AC, fall, AMS, enrique drain    # fever/cough; rhinovirus infection  - T 101.2 max 8./22. Afebrile 8/24  - CXR 8/21: Elevated right hemidiaphragm again seen. Linear atelectasis versus scar in the right apex and left lower lung. No focal lung consolidation. (official read)  - RVP 8/22 + enterovirus/rhinovirus. Reviewed with patient and daughter  - IS, tessalon perle standing  - robitussin PRN   - wound Cx + S aureus 8/24. Discussed with hospitalist, ID consult requested  - WBC 6.75 8/24  - tylenol PRN    #  Cholecystitis   - s/p percutaneous cholecystectomy via IR on 7/7   -  A/P CT 8/7: Percutaneous cholecystostomy catheter is malpositioned within the descending colon distal to the cecum. Inflammation adjacent to the colon and appendix,likely reactive. Dilated common bile duct with suspicion of choledocholithiasis in the distal CBD. Cholelithiasis with gallbladder wall thickening and pericholecystic fat stranding. Acute diverticulitis in the mid sigmoid colon with associated localized perforation.  - MRCP 8/9: distended CBD up to 12mm + 2 stones in bile duct w/ borderline central intrahepatic ductal distention, + cholelithiasis decreased inflammation,  - s/p ERCP 8/11 Sphincterotomy performed with stent placement  - GI and Surgery appreciated 8/7. Low suspicion for non-drained infectious process. Leave drain in as precaution. May be candidate for outpatient laparoscopic cholecystectomy.  - levaquin/flagyl. dc, discussed with GI 8/22  - AST  41<H>  /  ALT  26  /  AlkPhos  79  08-24. Sl elevation AST, repeat labs in AM  - f/u outpatient for drain removal and cholecystectomy, discussed with patient    # mild irritation/erythema enrique drain insertion site  - betadine area daily, cover with drain guaze daily  - keep covered while showering  - trace drainage at drain insertion site abdomen 8/24, Wound Cx + staph aureus  - continue dressing as above for now, reduced pain today 8/24  - drain: in incorrect position, no output, no need for flushes. To follow up with surgery as outpatient for removal    # HTN  - Metoprolol XL 100mg daily  - Diltiazem CD 120mg daily  - BP (123/79 - 141/86) 8/24    # AFib RVR  - Metoprolol XL 100mg daily  - Eliquis 5 q12hrs . Medication reviewed with patient and daughter. Confirmed covered by insurance with $10 copay 8/22  - HR 87-93 8/24    # conjunctivitis  - erythromycin ophthalmic ointment QID . Day #2/5 8/24    # bibasilar atelectasis noted on MRCP  - incentive spirometry, breathing exercises     # RUL Lung nodule found on CT chest (7/25)  - Incidental finding   - F/U PCP outpatient for repeat CT    # Rhabdomyolysis  - S/p IVF  - CK 5502 7/25--> 109 8/4  - resolved    # GI  - Senna dc due to loose stool 8/16  - metamucil for bulking 8/16  - on florastor secondary to Abx  - toileting program    # /h/o retention.  - continue monitoring bladder scan, SC for PVR >350 ml  - toileting program    # podiatry  - podiatry appreciated, s/p nail debridement    # Sleep  - Melatonin PRN     # Pain Mgmt   - Tylenol PRN    # FEN   - Diet - Regular + Thins  [DASH/TLC]    - Food preferences: no cheese, no yogurt, no fish, no apple juice placed in EMR    #  DVT PPX:  - Eliquis     # Case discussed in IDT rounds 8/21 (follow up):  - tolerating reg/thin liquids, increased cognition and attention. CG bADLs and functional transfers, min assist/CG stairs with bilateral HR and ambulation with RW  - goals adjusted: supervision iADLs, supervision/CG  bADLs, CG ambulation/stairs, supervision iaDLS and during waking hours  - caregiver training performed  - adjusted target dc home 8/24 with caregiver support 24/7  and home Pt OT SLP  - Pharmacy: [facility] Erin Maciel. Medications sent 8/22    Daughter Bernice: [phone] informed of positive Cx results and hold on dc pending ID recs    # LABs  CBC CMP 8/25 if nto dc  ID consult    addendum 1:51 PM  - Case also discussed with daughter Melissa [phone]. shortly after, Cx resulted as MRSA+, will be placed in private room, on contact isolation    addendum 3:18 PM  ID greatly appreciated. Will start on oral doxycycline 100 mg q12 but also recommends A/P CT with Iv and oral contrast to f/u findings from first CT and r/o abscess. DC on hold  - discussed ID recommendations with patient and daughter who are agreeable with plan    Time spent for evaluation, communication, management, and coordination care 65 min  ---------------  Outpatient/Follow-Up:    Alli Pascual  Surgery  [address] [place] [address]  Phone: [phone]  Fax: [phone]  Follow Up Time: 1 week    Alexis Simon  Cardiovascular Disease  [address] [place], [place] [id number]  Phone: [phone]  Fax: [phone]  Follow Up Time: 1 week    BRIAN ALBA[address] [address]  Phone: ()-  Fax: ()-

## 2023-08-24 NOTE — PROGRESS NOTE ADULT - GASTROINTESTINAL COMMENTS
area of enrique drain site: sl less erythematous at margins and patient reports reduced TTP. However, still surrounding redness and trace drainage, yellow, at insertion

## 2023-08-24 NOTE — PROGRESS NOTE ADULT - COMMENTS
Patient sitting in chair, no fever overnight, cough significantly improved. No chills or myalgia. WBC WNL. Results of blood CX came back +1 bottle Staph aureus, discussed with daughter Bernice this morning as well as patient. DC on hold pending ID consultation and recommendations.

## 2023-08-24 NOTE — CONSULT NOTE ADULT - CONSULT REASON
Medical management
"?position of IR drain/acute cholecystitis"
fever, red enrique tube site
Migrated Cholecystostomy Tube
Choledocolithiasis

## 2023-08-24 NOTE — PROGRESS NOTE ADULT - ASSESSMENT
78F with HTN, HLD, recently hospitalized at OSH with acute stroke - with course complicated by acute cholecystitis requiring percutaneous cholecystostomy tube as well as new AF, now in acute rehab. A CT scan was performed today for new abdominal pain, which shows a malpositioned cholecystostomy tube, acute complicated diverticulitis and choledocholithiasis admitted for rehab- pt/ot/dvt ppx    #Malpositioned cholecystostomy tube  #Acute sigmoid diverticulitis with localized perforation  #Pneumoperitoneum due to above  #Choledocholithiasis, likely new (CBD was WNL on prior imaging)  #Cholecystitis  -S/p ERCP  -started on keflex for mild erythema around drain site.   - no pain  - noted- Shannan tube drainage Culture Results: Moderate Staphylococcus aureus- ID consulted     # right eye conjunctivitis- erythromycin oint     #Chronic AF  -c/w Toprol  -c/w Diltiazem  -c/w Eliquis    #Lung nodule  -outpatient follow-up  -hx of pulm fibrosis    # cough, wheezing  - ENTEROVIRUS positive - Robitussin PRN and tessalon sara supportive care  - mometasone  inh bid for cough  - add Xopenex  -Xray Chest 1 View- PORTABLE-Urgent (Xray Chest 1 View- PORTABLE-Urgent .) (08.21.23 @ 19:32) >IMPRESSION:  Elevated right hemidiaphragm again seen.Linear atelectasis versus scar in the right apex and left lower lung.   No focal lung consolidation.    DVT ppx: Eliquis    will follow  d/w rehab team   time spent in e/m visit 55 min      78F with HTN, HLD, recently hospitalized at OSH with acute stroke - with course complicated by acute cholecystitis requiring percutaneous cholecystostomy tube as well as new AF, now in acute rehab. A CT scan was performed today for new abdominal pain, which shows a malpositioned cholecystostomy tube, acute complicated diverticulitis and choledocholithiasis admitted for rehab- pt/ot/dvt ppx    #Malpositioned cholecystostomy tube  #Acute sigmoid diverticulitis with localized perforation  #Pneumoperitoneum due to above  #Choledocholithiasis, likely new (CBD was WNL on prior imaging)  #Cholecystitis  - S/p ERCP  - no pain  - noted erythema around drain site-- Shannan tube drainage Culture Results: Moderate Staphylococcus aureus- ID consulted     # right eye conjunctivitis- erythromycin oint     #Chronic AF  -c/w Toprol  -c/w Diltiazem  -c/w Eliquis    #Lung nodule  -outpatient follow-up  -hx of pulm fibrosis    # cough, wheezing  - ENTEROVIRUS positive - Robitussin PRN and tessalon sara supportive care  - mometasone  inh bid for cough  - add Xopenex  -Xray Chest 1 View- PORTABLE-Urgent (Xray Chest 1 View- PORTABLE-Urgent .) (08.21.23 @ 19:32) >IMPRESSION:  Elevated right hemidiaphragm again seen.Linear atelectasis versus scar in the right apex and left lower lung.   No focal lung consolidation.    DVT ppx: Eliquis    will follow  d/w rehab team   time spent in e/m visit 55 min

## 2023-08-24 NOTE — PROGRESS NOTE ADULT - SUBJECTIVE AND OBJECTIVE BOX
Patient is a 78y old  Female who presents with a chief complaint of cerebellar CVA (23 Aug 2023 14:19)      HPI:  Karen Carlos is a 78 year old female RH dominant with PMH of HTN, and HLD; who presented to [facility] ED on 7/26/23 s/p fall from chair with + head strike, on the floor for over an hour. ER workup was significant AFIB with RVR and rhabdomyolysis. She was given Cardizem IVP and IVF with improvement HR to the 70s. She was diagnosed with new onset AFIB, seen by cardiology and started on Metoprolol and Diltiazem for rate control and was anticoagulated with Eliquis.  IV fluids were administered for rhabdomyolysis.     RUQ US was significant for acute cholecystitis and cholelithiasis with pericholecystic fluid and gallbladder wall thickening. Her HIDA scan resulted positive and a percutaneous cholecystectomy tube was placed by IR on 7/7.  Her blood cultures resulted positive for Strept Anginosus for which she was started on IV antibiotics.     Her hospital course was complicated by neurological changes/altered mental status. CT head was performed which resulted negative; MRI of the head was significant for a R cerebellar infarct.    PM&R was consulted and deemed her an appropriate candidate for IRF. She was medically stabilized and cleared for discharge to [facility].  (03 Aug 2023 13:49)      PAST MEDICAL & SURGICAL HISTORY:  HTN (hypertension)      Hyperlipidemia      Tinnitus  right ear      Seasonal allergies      S/P knee replacement          MEDICATIONS  (STANDING):  apixaban 5 milliGRAM(s) Oral every 12 hours  atorvastatin 80 milliGRAM(s) Oral at bedtime  benzonatate 100 milliGRAM(s) Oral three times a day  diltiazem    milliGRAM(s) Oral daily  erythromycin   Ointment 1 Application(s) Both EYES four times a day  indomethacin Suppository 100 milliGRAM(s) Rectal once  melatonin 6 milliGRAM(s) Oral at bedtime  metoprolol succinate  milliGRAM(s) Oral daily  mometasone 220 MICROgram(s) Inhaler 1 Puff(s) Inhalation daily  psyllium Powder 1 Packet(s) Oral daily  saccharomyces boulardii 250 milliGRAM(s) Oral two times a day    MEDICATIONS  (PRN):  acetaminophen     Tablet .. 650 milliGRAM(s) Oral every 6 hours PRN Mild Pain (1 - 3)  acetaminophen     Tablet .. 650 milliGRAM(s) Oral every 6 hours PRN Temp greater or equal to 38C (100.4F)  aluminum hydroxide/magnesium hydroxide/simethicone Suspension 30 milliLiter(s) Oral every 4 hours PRN Dyspepsia  bisacodyl Suppository 10 milliGRAM(s) Rectal daily PRN Constipation  guaiFENesin Oral Liquid (Sugar-Free) 100 milliGRAM(s) Oral every 6 hours PRN Cough      Allergies    No Known Allergies    Intolerances          VITALS  78y  Vital Signs Last 24 Hrs  T(C): 36.6 (24 Aug 2023 08:02), Max: 36.7 (23 Aug 2023 20:40)  T(F): 97.9 (24 Aug 2023 08:02), Max: 98 (23 Aug 2023 20:40)  HR: 87 (24 Aug 2023 08:02) (87 - 93)  BP: 141/86 (24 Aug 2023 08:02) (123/79 - 141/86)  BP(mean): --  RR: 16 (24 Aug 2023 08:02) (16 - 16)  SpO2: 96% (24 Aug 2023 08:02) (96% - 96%)    Parameters below as of 24 Aug 2023 08:02  Patient On (Oxygen Delivery Method): room air      Daily     Daily         RECENT LABS:                          12.9   6.75  )-----------( 186      ( 24 Aug 2023 05:33 )             39.8     08-24    140  |  104  |  12  ----------------------------<  89  4.4   |  31  |  0.74    Ca    8.6      24 Aug 2023 05:33    TPro  6.6  /  Alb  2.5<L>  /  TBili  0.5  /  DBili  x   /  AST  41<H>  /  ALT  26  /  AlkPhos  79  08-24    LIVER FUNCTIONS - ( 24 Aug 2023 05:33 )  Alb: 2.5 g/dL / Pro: 6.6 g/dL / ALK PHOS: 79 U/L / ALT: 26 U/L / AST: 41 U/L / GGT: x             Urinalysis Basic - ( 24 Aug 2023 05:33 )    Color: x / Appearance: x / SG: x / pH: x  Gluc: 89 mg/dL / Ketone: x  / Bili: x / Urobili: x   Blood: x / Protein: x / Nitrite: x   Leuk Esterase: x / RBC: x / WBC x   Sq Epi: x / Non Sq Epi: x / Bacteria: x        Culture - Other (collected 08-22-23 @ 01:45)  Source: .Other Culture Shannan tube drainage  Preliminary Report (08-23-23 @ 15:14):    Moderate Staphylococcus aureus    Culture - Blood (collected 08-22-23 @ 01:00)  Source: .Blood Blood  Preliminary Report (08-24-23 @ 05:01):    No growth at 48 Hours    Culture - Blood (collected 08-22-23 @ 01:00)  Source: .Blood Blood  Preliminary Report (08-24-23 @ 05:01):    No growth at 48 Hours        CAPILLARY BLOOD GLUCOSE

## 2023-08-25 LAB
ALBUMIN SERPL ELPH-MCNC: 2.4 G/DL — LOW (ref 3.3–5)
ALP SERPL-CCNC: 79 U/L — SIGNIFICANT CHANGE UP (ref 40–120)
ALT FLD-CCNC: 27 U/L — SIGNIFICANT CHANGE UP (ref 10–45)
ANION GAP SERPL CALC-SCNC: 7 MMOL/L — SIGNIFICANT CHANGE UP (ref 5–17)
AST SERPL-CCNC: 27 U/L — SIGNIFICANT CHANGE UP (ref 10–40)
BILIRUB SERPL-MCNC: 0.3 MG/DL — SIGNIFICANT CHANGE UP (ref 0.2–1.2)
BUN SERPL-MCNC: 12 MG/DL — SIGNIFICANT CHANGE UP (ref 7–23)
CALCIUM SERPL-MCNC: 8.5 MG/DL — SIGNIFICANT CHANGE UP (ref 8.4–10.5)
CHLORIDE SERPL-SCNC: 103 MMOL/L — SIGNIFICANT CHANGE UP (ref 96–108)
CO2 SERPL-SCNC: 31 MMOL/L — SIGNIFICANT CHANGE UP (ref 22–31)
CREAT SERPL-MCNC: 0.64 MG/DL — SIGNIFICANT CHANGE UP (ref 0.5–1.3)
EGFR: 91 ML/MIN/1.73M2 — SIGNIFICANT CHANGE UP
GLUCOSE SERPL-MCNC: 98 MG/DL — SIGNIFICANT CHANGE UP (ref 70–99)
HCT VFR BLD CALC: 37.5 % — SIGNIFICANT CHANGE UP (ref 34.5–45)
HGB BLD-MCNC: 12.4 G/DL — SIGNIFICANT CHANGE UP (ref 11.5–15.5)
MCHC RBC-ENTMCNC: 30.6 PG — SIGNIFICANT CHANGE UP (ref 27–34)
MCHC RBC-ENTMCNC: 33.1 GM/DL — SIGNIFICANT CHANGE UP (ref 32–36)
MCV RBC AUTO: 92.6 FL — SIGNIFICANT CHANGE UP (ref 80–100)
NRBC # BLD: 0 /100 WBCS — SIGNIFICANT CHANGE UP (ref 0–0)
PLATELET # BLD AUTO: 182 K/UL — SIGNIFICANT CHANGE UP (ref 150–400)
POTASSIUM SERPL-MCNC: 3.8 MMOL/L — SIGNIFICANT CHANGE UP (ref 3.5–5.3)
POTASSIUM SERPL-SCNC: 3.8 MMOL/L — SIGNIFICANT CHANGE UP (ref 3.5–5.3)
PROT SERPL-MCNC: 6.8 G/DL — SIGNIFICANT CHANGE UP (ref 6–8.3)
RBC # BLD: 4.05 M/UL — SIGNIFICANT CHANGE UP (ref 3.8–5.2)
RBC # FLD: 14.5 % — SIGNIFICANT CHANGE UP (ref 10.3–14.5)
SODIUM SERPL-SCNC: 141 MMOL/L — SIGNIFICANT CHANGE UP (ref 135–145)
WBC # BLD: 7.12 K/UL — SIGNIFICANT CHANGE UP (ref 3.8–10.5)
WBC # FLD AUTO: 7.12 K/UL — SIGNIFICANT CHANGE UP (ref 3.8–10.5)

## 2023-08-25 PROCEDURE — 99233 SBSQ HOSP IP/OBS HIGH 50: CPT

## 2023-08-25 PROCEDURE — 99231 SBSQ HOSP IP/OBS SF/LOW 25: CPT

## 2023-08-25 RX ORDER — VANCOMYCIN HCL 1 G
VIAL (EA) INTRAVENOUS
Refills: 0 | Status: DISCONTINUED | OUTPATIENT
Start: 2023-08-25 | End: 2023-08-27

## 2023-08-25 RX ORDER — VANCOMYCIN HCL 1 G
1000 VIAL (EA) INTRAVENOUS ONCE
Refills: 0 | Status: COMPLETED | OUTPATIENT
Start: 2023-08-25 | End: 2023-08-25

## 2023-08-25 RX ORDER — VANCOMYCIN HCL 1 G
1000 VIAL (EA) INTRAVENOUS EVERY 12 HOURS
Refills: 0 | Status: DISCONTINUED | OUTPATIENT
Start: 2023-08-25 | End: 2023-08-27

## 2023-08-25 RX ADMIN — Medication 120 MILLIGRAM(S): at 06:19

## 2023-08-25 RX ADMIN — Medication 250 MILLIGRAM(S): at 17:38

## 2023-08-25 RX ADMIN — Medication 100 MILLIGRAM(S): at 06:20

## 2023-08-25 RX ADMIN — Medication 100 MILLIGRAM(S): at 21:34

## 2023-08-25 RX ADMIN — ATORVASTATIN CALCIUM 80 MILLIGRAM(S): 80 TABLET, FILM COATED ORAL at 21:31

## 2023-08-25 RX ADMIN — Medication 100 MILLIGRAM(S): at 21:31

## 2023-08-25 RX ADMIN — Medication 250 MILLIGRAM(S): at 11:10

## 2023-08-25 RX ADMIN — APIXABAN 5 MILLIGRAM(S): 2.5 TABLET, FILM COATED ORAL at 09:56

## 2023-08-25 RX ADMIN — Medication 250 MILLIGRAM(S): at 18:00

## 2023-08-25 RX ADMIN — Medication 250 MILLIGRAM(S): at 06:19

## 2023-08-25 RX ADMIN — Medication 6 MILLIGRAM(S): at 21:32

## 2023-08-25 RX ADMIN — Medication 100 MILLIGRAM(S): at 13:03

## 2023-08-25 RX ADMIN — APIXABAN 5 MILLIGRAM(S): 2.5 TABLET, FILM COATED ORAL at 21:32

## 2023-08-25 RX ADMIN — Medication 100 MILLIGRAM(S): at 06:19

## 2023-08-25 NOTE — PROGRESS NOTE ADULT - SUBJECTIVE AND OBJECTIVE BOX
Patient is a 78y old  Female who presents with a chief complaint of cerebellar CVA (25 Aug 2023 09:35)      HPI:  Karen Carlos is a 78 year old female RH dominant with PMH of HTN, and HLD; who presented to [facility] ED on 7/26/23 s/p fall from chair with + head strike, on the floor for over an hour. ER workup was significant AFIB with RVR and rhabdomyolysis. She was given Cardizem IVP and IVF with improvement HR to the 70s. She was diagnosed with new onset AFIB, seen by cardiology and started on Metoprolol and Diltiazem for rate control and was anticoagulated with Eliquis.  IV fluids were administered for rhabdomyolysis.     RUQ US was significant for acute cholecystitis and cholelithiasis with pericholecystic fluid and gallbladder wall thickening. Her HIDA scan resulted positive and a percutaneous cholecystectomy tube was placed by IR on 7/7.  Her blood cultures resulted positive for Strept Anginosus for which she was started on IV antibiotics.     Her hospital course was complicated by neurological changes/altered mental status. CT head was performed which resulted negative; MRI of the head was significant for a R cerebellar infarct.    PM&R was consulted and deemed her an appropriate candidate for IRF. She was medically stabilized and cleared for discharge to [facility].  (03 Aug 2023 13:49)      PAST MEDICAL & SURGICAL HISTORY:  HTN (hypertension)      Hyperlipidemia      Tinnitus  right ear      Seasonal allergies      S/P knee replacement          MEDICATIONS  (STANDING):  apixaban 5 milliGRAM(s) Oral every 12 hours  atorvastatin 80 milliGRAM(s) Oral at bedtime  benzonatate 100 milliGRAM(s) Oral three times a day  diltiazem    milliGRAM(s) Oral daily  doxycycline monohydrate Capsule 100 milliGRAM(s) Oral every 12 hours  erythromycin   Ointment 1 Application(s) Both EYES four times a day  indomethacin Suppository 100 milliGRAM(s) Rectal once  melatonin 6 milliGRAM(s) Oral at bedtime  metoprolol succinate  milliGRAM(s) Oral daily  mometasone 220 MICROgram(s) Inhaler 1 Puff(s) Inhalation daily  psyllium Powder 1 Packet(s) Oral daily  saccharomyces boulardii 250 milliGRAM(s) Oral two times a day    MEDICATIONS  (PRN):  acetaminophen     Tablet .. 650 milliGRAM(s) Oral every 6 hours PRN Mild Pain (1 - 3)  acetaminophen     Tablet .. 650 milliGRAM(s) Oral every 6 hours PRN Temp greater or equal to 38C (100.4F)  aluminum hydroxide/magnesium hydroxide/simethicone Suspension 30 milliLiter(s) Oral every 4 hours PRN Dyspepsia  bisacodyl Suppository 10 milliGRAM(s) Rectal daily PRN Constipation  guaiFENesin Oral Liquid (Sugar-Free) 100 milliGRAM(s) Oral every 6 hours PRN Cough  levalbuterol 45 MICROgram(s) HFA Inhaler 1 Puff(s) Inhalation every 8 hours PRN wheezing      Allergies    No Known Allergies    Intolerances          VITALS  78y  Vital Signs Last 24 Hrs  T(C): 36.7 (25 Aug 2023 07:21), Max: 36.8 (24 Aug 2023 20:10)  T(F): 98 (25 Aug 2023 07:21), Max: 98.3 (24 Aug 2023 20:10)  HR: 99 (25 Aug 2023 07:21) (83 - 99)  BP: 159/80 (25 Aug 2023 07:21) (127/82 - 159/80)  BP(mean): --  RR: 16 (25 Aug 2023 07:21) (16 - 16)  SpO2: 96% (25 Aug 2023 07:21) (96% - 97%)    Parameters below as of 25 Aug 2023 07:21  Patient On (Oxygen Delivery Method): room air      Daily     Daily         RECENT LABS:                          12.4   7.12  )-----------( 182      ( 25 Aug 2023 05:40 )             37.5     08-25    141  |  103  |  12  ----------------------------<  98  3.8   |  31  |  0.64    Ca    8.5      25 Aug 2023 05:40    TPro  6.8  /  Alb  2.4<L>  /  TBili  0.3  /  DBili  x   /  AST  27  /  ALT  27  /  AlkPhos  79  08-25    LIVER FUNCTIONS - ( 25 Aug 2023 05:40 )  Alb: 2.4 g/dL / Pro: 6.8 g/dL / ALK PHOS: 79 U/L / ALT: 27 U/L / AST: 27 U/L / GGT: x             Urinalysis Basic - ( 25 Aug 2023 05:40 )    Color: x / Appearance: x / SG: x / pH: x  Gluc: 98 mg/dL / Ketone: x  / Bili: x / Urobili: x   Blood: x / Protein: x / Nitrite: x   Leuk Esterase: x / RBC: x / WBC x   Sq Epi: x / Non Sq Epi: x / Bacteria: x        Culture - Other (collected 08-22-23 @ 01:45)  Source: .Other Culture Shannan tube drainage  Final Report (08-24-23 @ 13:33):    Moderate Methicillin Resistant Staphylococcus aureus  Organism: Methicillin resistant Staphylococcus aureus (08-24-23 @ 13:33)  Organism: Methicillin resistant Staphylococcus aureus (08-24-23 @ 13:33)      Method Type: SVETA      -  Ampicillin/Sulbactam: R <=8/4      -  Cefazolin: R <=4      -  Clindamycin: S 0.5      -  Daptomycin: S 0.5      -  Erythromycin: R >4      -  Gentamicin: S <=1 Should not be used as monotherapy      -  Linezolid: S 2      -  Oxacillin: R >2      -  Penicillin: R >8      -  Rifampin: S <=1 Should not be used as monotherapy      -  Tetracycline: S <=1      -  Trimethoprim/Sulfamethoxazole: S <=0.5/9.5      -  Vancomycin: S 1    Culture - Blood (collected 08-22-23 @ 01:00)  Source: .Blood Blood  Preliminary Report (08-25-23 @ 05:00):    No growth at 72 Hours    Culture - Blood (collected 08-22-23 @ 01:00)  Source: .Blood Blood  Preliminary Report (08-25-23 @ 05:00):    No growth at 72 Hours      ACC: [id number] EXAM:  CT ABDOMEN AND PELVIS OC IC   ORDERED BY: SHIMA FLETCHER     PROCEDURE DATE:  08/24/2023          INTERPRETATION:  CLINICAL INFORMATION: Fever. MRSA in the wound culture   around the cholecystostomy drain. Status post ERCP and stent placement on   8/11/2023    COMPARISON: 8/7/2023 and MRCP of 8/9/2023    CONTRAST/COMPLICATIONS:  IV Contrast: Omnipaque 350  90 cc administered   10 cc discarded  Oral Contrast: Omnipaque 300  Complications: None reported at time of study completion    PROCEDURE:  CT of the Abdomen and Pelvis was performed.  Sagittal and coronal reformats were performed.    FINDINGS:  LOWER CHEST: Coronary artery calcification    LIVER: Within normal limits.  BILE DUCTS: Small amount of air in the intrahepatic biliary tree. Biliary   stent has been placed since the prior study  GALLBLADDER: Cholelithiasis.  SPLEEN: Within normal limits.  PANCREAS: Within normal limits.  ADRENALS: Within normal limits.  KIDNEYS/URETERS: Within normal limits.    BLADDER: Within normal limits.  REPRODUCTIVE ORGANS: Uterus demonstrates posterior bulging contour   suggestive of a fibroid. The adnexa are within normal limits.    BOWEL: No bowel obstruction. Appendix is normal. There is colonic   diverticulosis without diverticulitis aerated a percutaneous catheter   traverses the ascending colon the pigtail is seen in the right peritoneal   cavity.  PERITONEUM: No ascites.  VESSELS: Atherosclerotic changes.  RETROPERITONEUM/LYMPH NODES: No lymphadenopathy.  ABDOMINAL WALL: Small bilateral fat-containing inguinal hernias.  BONES: Degenerative changes.    IMPRESSION:  Again malpositioned cholecystostomy catheter is appreciated  Patient is now status post ERCP with placement of biliary stent.  Resolution of previously seen sigmoid diverticulitis  Fibroid uterus    --- End of Report ---            ANATOLIY HARPER MD; Attending Radiologist  This document has been electronically signed. Aug 24 2023  8:00PM    CAPILLARY BLOOD GLUCOSE

## 2023-08-25 NOTE — PROGRESS NOTE ADULT - SUBJECTIVE AND OBJECTIVE BOX
Patient is a 78y old  Female who presents with a chief complaint of cerebellar CVA     seen at the bedside, c/o cough due to RVP +  redness and pain around the enrique drain site,    no sob, no hypoxia.    Vital Signs Last 24 Hrs  T(C): 36.7 (25 Aug 2023 07:21), Max: 36.8 (24 Aug 2023 20:10)  T(F): 98 (25 Aug 2023 07:21), Max: 98.3 (24 Aug 2023 20:10)  HR: 99 (25 Aug 2023 07:21) (83 - 99)  BP: 159/80 (25 Aug 2023 07:21) (127/82 - 159/80)  BP(mean): --  RR: 16 (25 Aug 2023 07:21) (16 - 16)  SpO2: 96% (25 Aug 2023 07:21) (96% - 97%)    Parameters below as of 25 Aug 2023 07:21  Patient On (Oxygen Delivery Method): room air      GENERAL- NAD  EAR/NOSE/MOUTH/THROAT -  MMM  EYES- RAJNI, conjunctiva and Sclera clear  NECK- supple  RESPIRATORY-  wheezing to auscultation bilaterally, non laboured breathing  CARDIOVASCULAR - SIS2, RRR  GI - soft NT BS present  EXTREMITIES- no pedal edema  NEUROLOGY- no gross focal deficits  PSYCHIATRY- AAO X 3                 12.4                 141  | 31   | 12           7.12  >-----------< 182     ------------------------< 98                    37.5                 3.8  | 103  | 0.64                                         Ca 8.5   Mg x     Ph x      Urinalysis Basic - ( 25 Aug 2023 05:40 )    Color: x / Appearance: x / SG: x / pH: x  Gluc: 98 mg/dL / Ketone: x  / Bili: x / Urobili: x   Blood: x / Protein: x / Nitrite: x   Leuk Esterase: x / RBC: x / WBC x   Sq Epi: x / Non Sq Epi: x / Bacteria: x      Culture - Other (08.22.23 @ 01:45)    Specimen Source: .Other Culture Enrique tube drainage   Culture Results:   Moderate Staphylococcus aureus    Specimen Source: .Blood Blood (08.22.23 @ 01:00) x 2 negative    < from: CT Abdomen and Pelvis w/ Oral Cont and w/ IV Cont (08.24.23 @ 18:23) >  IMPRESSION:  Again malpositioned cholecystostomy catheter is appreciated  Patient is now status post ERCP with placement of biliary stent.  Resolution of previously seen sigmoid diverticulitis  Fibroid uterus    < end of copied text >      MEDICATIONS  (STANDING):  apixaban 5 milliGRAM(s) Oral every 12 hours  atorvastatin 80 milliGRAM(s) Oral at bedtime  benzonatate 100 milliGRAM(s) Oral three times a day  diltiazem   milliGRAM(s) Oral daily  erythromycin   Ointment 1 Application(s) Both EYES four times a day  indomethacin Suppository 100 milliGRAM(s) Rectal once  melatonin 6 milliGRAM(s) Oral at bedtime  metoprolol succinate  milliGRAM(s) Oral daily  mometasone 220 MICROgram(s) Inhaler 1 Puff(s) Inhalation daily  psyllium Powder 1 Packet(s) Oral daily  saccharomyces boulardii 250 milliGRAM(s) Oral two times a day  vancomycin  IVPB      vancomycin  IVPB 1000 milliGRAM(s) IV Intermittent every 12 hours    MEDICATIONS  (PRN):  acetaminophen     Tablet .. 650 milliGRAM(s) Oral every 6 hours PRN Mild Pain (1 - 3)  acetaminophen     Tablet .. 650 milliGRAM(s) Oral every 6 hours PRN Temp greater or equal to 38C (100.4F)  aluminum hydroxide/magnesium hydroxide/simethicone Suspension 30 milliLiter(s) Oral every 4 hours PRN Dyspepsia  bisacodyl Suppository 10 milliGRAM(s) Rectal daily PRN Constipation  guaiFENesin Oral Liquid (Sugar-Free) 100 milliGRAM(s) Oral every 6 hours PRN Cough  levalbuterol 45 MICROgram(s) HFA Inhaler 1 Puff(s) Inhalation every 8 hours PRN wheezing

## 2023-08-25 NOTE — PROGRESS NOTE ADULT - ASSESSMENT
79 yo female with a past history of HTN and HLD who was admitted to OSH after a fall.  She was found to have acute cholecystitis, strep anginosis bacteremia, new onset A fib, as well as a cerebellar infarct.  She is s/p placement of Enrique tube, was treated for bacteremia, transferred to [facility] in early August.  She is s/p ERCP for stones in CBD with sphincterotomy and stent placement.  She received a course of levaquin/flagyl for concerns of cholecystitis as well as diverticulitis with enrique tube in bowel.  She now had a recent feevr and has an exit site infection with MRSA around her enrique tube.  Blood cultures are negative, wound culture with MRSA, CT scan is without abscess.  Suggest:  1 Continue doxy  2 Monitor Enrique tube site  3.Local wound care. I am concerned with foreign   body present it may be difficult to treat infection.

## 2023-08-25 NOTE — OCCUPATIONAL THERAPY INITIAL EVALUATION ADULT - PHYSICAL ASSIST/NONPHYSICAL ASSIST, OT EVAL
Pharmacy requesting Nexium. Pt last seen 7/19/23, has appt 10/25/23. Refill sent to pharmacy. set-up required/1 person assist

## 2023-08-25 NOTE — PROGRESS NOTE ADULT - COMMENTS
Patient seen and case discussed with patient and daughter Melissa who was available via speakerphone. Patient afebrile overnight and started doxycycline; CT A/P performed, showed resolution diverticulitis and no abscess noted, however concern as skin around enrique tube appears more red/angry. She is currently on contact isolation for MRSA. Discussed with ID, will switch Abx to vanco and monitor, also suggested surgery and GI f/u. Reviewed recommendations with patient and daughter who are agreeable    DC on hold

## 2023-08-25 NOTE — PROGRESS NOTE ADULT - GASTROINTESTINAL COMMENTS
enrique insertion site: no active drainage but significant/increased erythema surrounding drain site, no fluctuance

## 2023-08-25 NOTE — PROGRESS NOTE ADULT - ASSESSMENT
78F with HTN, HLD, recently hospitalized at OSH with acute stroke - with course complicated by acute cholecystitis requiring percutaneous cholecystostomy tube as well as new AF, now in acute rehab. A CT scan was performed today for new abdominal pain, which shows a malpositioned cholecystostomy tube, acute complicated diverticulitis and choledocholithiasis admitted for rehab- pt/ot/dvt ppx    #Malpositioned cholecystostomy tube  #Acute sigmoid diverticulitis with localized perforation  #Pneumoperitoneum due to above  #Choledocholithiasis, likely new (CBD was WNL on prior imaging)  #Cholecystitis  - S/p ERCP  - no pain    #cellulitis around enrique  drain site-- Enrique tube drainage Culture Results: Moderate Staphylococcus aureus- ID consulted   - ctap noted- no abscess  - d/w ID dr. naheed coelho- appears worse today-   - change doxy to vancomycin, if responds may be able to be d/c on doxy in 24-48 hrs  - GI and surg consult     # right eye conjunctivitis- erythromycin oint - improving     #Chronic AF  -c/w Toprol  -c/w Diltiazem  -c/w Eliquis    #Lung nodule  -outpatient follow-up  -hx of pulm fibrosis    # cough, wheezing  - ENTEROVIRUS positive - Robitussin PRN and tessalon sara supportive care  - mometasone  inh bid for cough  - add Xopenex  -Xray Chest 1 View- PORTABLE-Urgent (Xray Chest 1 View- PORTABLE-Urgent .) (08.21.23 @ 19:32) >IMPRESSION:  Elevated right hemidiaphragm again seen. Linear atelectasis versus scar in the right apex and left lower lung.   No focal lung consolidation.    DVT ppx: Eliquis    will follow  d/w rehab team   time spent in e/m visit 55 min

## 2023-08-25 NOTE — CHART NOTE - NSCHARTNOTEFT_GEN_A_CORE
Got called from Dr. Smith, patient has significant redness aound ruq IR catheter.  +MRSA  I reviewed CT scan-drain is not within gb lumen.  Therefore, no real purpose of drain.     Under aseptic technique I removed IR catheter (tip intact).    Steril dressing applied.    Please have patient followup with GI as outpatient (re cholelithiasis mgt).      Thank you.    Dr. Jerman Cyr  cell#[phone]
NUTRITION FOLLOW UP    SOURCE: Patient [X)   Family [ ]    Medical Record (X)    Diet, NPO after Midnight:      NPO Start Date: 10-Aug-2023,   NPO Start Time: 23:59  Except Medications (08-10-23 @ 09:09) [Active]  Diet, DASH/TLC:   Sodium & Cholesterol Restricted  Fiber/Residue Restricted (08-09-23 @ 14:28) [Active]    Pt currently NPO for ERCP today. States that last meal was dinner last night, ate well. Typically consuming % of meals per nursing documentation. Last BM this am (loose) per pt. Preferences obtained, dislikes eggs & fish. Resume diet post procedure as able. Pt continues on florastor.     CURRENT WEIGHT:  179.2lbs (8/5)    PERTINENT MEDS:   Pertinent Medications: MEDICATIONS  (STANDING):  apixaban 5 milliGRAM(s) Oral every 12 hours  atorvastatin 80 milliGRAM(s) Oral at bedtime  diltiazem    milliGRAM(s) Oral daily  indomethacin Suppository 100 milliGRAM(s) Rectal once  levoFLOXacin  Tablet 750 milliGRAM(s) Oral every 24 hours  melatonin 3 milliGRAM(s) Oral at bedtime  metoprolol succinate  milliGRAM(s) Oral daily  metroNIDAZOLE    Tablet 500 milliGRAM(s) Oral every 8 hours  polyethylene glycol 3350 17 Gram(s) Oral daily  saccharomyces boulardii 250 milliGRAM(s) Oral two times a day  senna 2 Tablet(s) Oral at bedtime    MEDICATIONS  (PRN):  acetaminophen     Tablet .. 650 milliGRAM(s) Oral every 6 hours PRN Mild Pain (1 - 3)  acetaminophen     Tablet .. 650 milliGRAM(s) Oral every 6 hours PRN Temp greater or equal to 38C (100.4F)  aluminum hydroxide/magnesium hydroxide/simethicone Suspension 30 milliLiter(s) Oral every 4 hours PRN Dyspepsia  bisacodyl Suppository 10 milliGRAM(s) Rectal daily PRN Constipation  melatonin 3 milliGRAM(s) Oral at bedtime PRN Insomnia      PERTINENT LABS:  08-10 Na139 mmol/L Glu 97 mg/dL K+ 3.8 mmol/L Cr  0.82 mg/dL BUN 13 mg/dL 08-10 Alb 2.6 g/dL<L> 07-26 Chol 130 mg/dL LDL --    HDL 48 mg/dL<L> Trig 74 mg/dL        SKIN:  no pressure ulcers  EDEMA: +1 bilateral feet  LAST BM: 8/11 per pt     ESTIMATED NEEDS:   [X] no change since previous assessment  [ ] recalculated:     PREVIOUS NUTRITION DIAGNOSIS:    1. Inadequate oral intake     NUTRITION DIAGNOSIS is :  (x)  Ongoing, pt currently NPO for ERCP today     NEW NUTRITION DIAGNOSIS: N/A    NUTRITION RECOMMENDATIONS:   1. Resume diet as able: DASH/TLC     MONITORING AND EVALUATION:   1. Tolerance to diet prescription   2. PO intake  3. Weights  4. Labs  5. Follow Up per protocol     RD to remain available   Ashleigh Shirley RDN   [phone] or TEAMS
REHABILITATION DIAGNOSIS:  right cerebellar CVA        COMORBIDITIES/COMPLICATING CONDITIONS IMPACTING REHABILITATION:  HEALTH ISSUES - PROBLEM Dx:  dysmetria  executive dysfunction  leukocytosis  acute urinary retention  long term anticoagulation  new onset Afib  rhabdomyolysis  acute cholecystitis  bacteremia      PAST MEDICAL & SURGICAL HISTORY:  HTN (hypertension)      Hyperlipidemia      Tinnitus  right ear      Seasonal allergies      S/P knee replacement          Based upon consideration of the patient's impairments, functional status, complicating conditions and any other contributing factors and after information garnered from the assessments of all therapy disciplines involved in treating the patient and other pertinent clinicians:    INTERDISCIPLINARY REHABILITATION INTERVENTIONS:    [ x  ] Transfer Training  [ x  ] Bed Mobility  [ x  ] Therapeutic Exercise  [ x  ] Balance/Coordination Exercises  [ x  ] Locomotion training  [ x  ] Stairs    [  x ] Functional transfers  [ x  ] Activities of daily living  [  x ] Visual Perceptual training    [   x] Bowel/Bladder program  [  x ] Pain Management  [  x ] Skin/Wound Care    [  x ] Speech/Communication Exercise  [ x  ] Swallowing Exercises    [ x  ] Cognitive Exercises  [ x  ] Cognitive-linguistic Treatment  [   ] Behavioral Program    [  x ] Patient/Family Counseling  [   ] Family Training  [   ] Community Re-entry  [   ] Orthotic Evaluation/Training  [   ] Prosthetic Eval/Training    MEDICAL PROGNOSIS:  good-    REHAB POTENTIAL:  good-  EXPECTED DAILY THERAPIES:    [ x  ] PT  [ x  ] OT  [ x  ] ST    EXPECTED INTENSITY OF PROGRAM:  3 hours daily    EXPECTED FREQUENCY OF PROGRAM:  5 x week    ESTIMATED LOS:  17 days    ESTIMATED DISPOSITION:  home with caregiver support    INTERDISCIPLINARY FUNCTIONAL OUTCOMES/GOALS:         Gait/Mobility: mod independent level surfaces, CG stairs       Transfers: mod independent       ADLs: mod independent       Functional Transfers: CG/supervision       Medication Management: supervision       Communication: indepenent basic wants and needs       Cognitive:supervision iADLs       Nutrition: regular solid thin liquids       Bladder: to void freely,. continent       Bowel: continent
Reviewed chart, approached Pt for an initial consult, introduced the role of neuropsychology in the rehab team, and explained the nature and purpose of the consult. Pt declined participation, she stated that her insurance was not going to pay for extras, and that her mind was not going to change. Explained to Pt that psychological services were not billed separately, but still declined to participate. Neuropsychologist remains available if there is further need for consult.
NUTRITION FOLLOW UP    SOURCE: Patient [X)  Medical Record (X)    DIET: DASH/TLC/Fiber Restricted     PATIENT REPORT [x] diarrhea     PO INTAKE:   50-75%  [x]       CURRENT WEIGHT: 83.9 kg - (8/ 3)    PERTINENT MEDS:   Pertinent Medications: MEDICATIONS  (STANDING):  apixaban 5 milliGRAM(s) Oral every 12 hours  atorvastatin 80 milliGRAM(s) Oral at bedtime  diltiazem    milliGRAM(s) Oral daily  indomethacin Suppository 100 milliGRAM(s) Rectal once  levoFLOXacin  Tablet 750 milliGRAM(s) Oral every 24 hours  melatonin 6 milliGRAM(s) Oral at bedtime  metoprolol succinate  milliGRAM(s) Oral daily  metroNIDAZOLE    Tablet 500 milliGRAM(s) Oral every 8 hours  polyethylene glycol 3350 17 Gram(s) Oral daily  saccharomyces boulardii 250 milliGRAM(s) Oral two times a day  senna 2 Tablet(s) Oral at bedtime    MEDICATIONS  (PRN):  acetaminophen     Tablet .. 650 milliGRAM(s) Oral every 6 hours PRN Temp greater or equal to 38C (100.4F)  acetaminophen     Tablet .. 650 milliGRAM(s) Oral every 6 hours PRN Mild Pain (1 - 3)  aluminum hydroxide/magnesium hydroxide/simethicone Suspension 30 milliLiter(s) Oral every 4 hours PRN Dyspepsia  bisacodyl Suppository 10 milliGRAM(s) Rectal daily PRN Constipation      PERTINENT LABS:  08-13 Na140 mmol/L Glu 92 mg/dL K+ 3.5 mmol/L Cr  0.77 mg/dL BUN 15 mg/dL 08-13 Alb 2.7 g/dL<L> 07-26 Chol 130 mg/dL LDL --    HDL 48 mg/dL<L> Trig 74 mg/dL        SKIN:  WDL  EDEMA: WDL  LAST BM: 8/13    ESTIMATED NEEDS:   [X] no change since previous assessment    PREVIOUS NUTRITION DIAGNOSIS:  Inadequate oral intake    NUTRITION DIAGNOSIS is :  ( x )  Ongoing          NUTRITION RECOMMENDATIONS: Add Ensure once daily, adhere to food preferences     MONITORING AND EVALUATION:   1. Tolerance to diet prescription   2. PO intake  3. Weights  4. Labs  5. Follow Up per protocol     RD to remain available
NUTRITION Follow Up Note    SOURCE: Patient [X]  Medical Record [X]  Nursing Staff [X]  Family Member []    DIET: Diet, DASH/TLC:   Sodium & Cholesterol Restricted  Low Fat (LOWFAT)  No Fish  No Shellfish  Supplement Feeding Modality:  Oral  Ensure Plus High Protein Cans or Servings Per Day:  1       Frequency:  Three Times a day (08-18-23 @ 12:39) [Active]      Patient noted with fair-good appetite, consuming % of meals as per nursing flow sheets. Patient reports good acceptance to strawberry flavored ensure supplement daily. Receptive to increase supplement to Ensure Plus High Protein 8oz PO TID (Provides 1,050kcal & 60grams of Protein). Food preferences updated in patient diet kardex. Recommend continue current nutrition plan of care as tolerated.      SKIN: no pressure injuries noted    EDEMA: +1 right leg edema    LAST BM: 24-Aug-2023    WEIGHT TRENDS:  83.9 kG (8/3/23)  81.3 kG (8/4/23)    PERTINENT MEDICATIONS: MEDICATIONS  (STANDING):  apixaban 5 milliGRAM(s) Oral every 12 hours  atorvastatin 80 milliGRAM(s) Oral at bedtime  benzonatate 100 milliGRAM(s) Oral three times a day  diltiazem    milliGRAM(s) Oral daily  erythromycin   Ointment 1 Application(s) Both EYES four times a day  indomethacin Suppository 100 milliGRAM(s) Rectal once  melatonin 6 milliGRAM(s) Oral at bedtime  metoprolol succinate  milliGRAM(s) Oral daily  mometasone 220 MICROgram(s) Inhaler 1 Puff(s) Inhalation daily  psyllium Powder 1 Packet(s) Oral daily  saccharomyces boulardii 250 milliGRAM(s) Oral two times a day  vancomycin  IVPB 1000 milliGRAM(s) IV Intermittent every 12 hours  vancomycin  IVPB        MEDICATIONS  (PRN):  acetaminophen     Tablet .. 650 milliGRAM(s) Oral every 6 hours PRN Temp greater or equal to 38C (100.4F)  acetaminophen     Tablet .. 650 milliGRAM(s) Oral every 6 hours PRN Mild Pain (1 - 3)  aluminum hydroxide/magnesium hydroxide/simethicone Suspension 30 milliLiter(s) Oral every 4 hours PRN Dyspepsia  bisacodyl Suppository 10 milliGRAM(s) Rectal daily PRN Constipation  guaiFENesin Oral Liquid (Sugar-Free) 100 milliGRAM(s) Oral every 6 hours PRN Cough  levalbuterol 45 MICROgram(s) HFA Inhaler 1 Puff(s) Inhalation every 8 hours PRN wheezing      PERTINENT LABS:  08-25 Na141 mmol/L Glu 98 mg/dL K+ 3.8 mmol/L Cr  0.64 mg/dL BUN 12 mg/dL 08-25 Alb 2.4 g/dL<L>          ESTIMATED NEEDS:   [X] No Change Since Previous Assessment    PREVIOUS NUTRITION DIAGNOSIS:  1. Inadequate oral intake    NUTRITION DIAGNOSIS IS [X] Ongoing -     NEW NUTRITION DIAGNOSIS: [X] Not Applicable      INTERVENTIONS:   1. Recommend continue current nutrition plan of care as tolerated   2. Honor patients daily food preferences as feasible with prescribed diet   3. Monitor daily PO intakes, GI tolerance, labs, weights, skin integrity, & BM regularity     MONITORING & EVALUATION:   1. Weights   2. PO intakes   3. Skin integrity   4. Tolerance to diet prescription   5. Labs & POCT  6. Follow up (per protocol)    Registered Dietitian/Nutritionist Remains Available.  Trav Talavera RD, CDN    Phone# [phone]
NUTRITION Follow Up Note    SOURCE: Patient [X]  Medical Record [X]  Nursing Staff [X]  Family Member []    Diet, DASH/TLC:   Sodium & Cholesterol Restricted  Fiber/Residue Restricted  No Fish  Supplement Feeding Modality:  Oral  Ensure Plus High Protein Cans or Servings Per Day:  1       Frequency:  Daily (08-16-23 @ 13:44) [Active]    Patient noted with fair-good appetite, consuming % of meals as per nursing flow sheets. Patient reports good acceptance to strawberry flavored ensure supplement daily. Receptive to increase supplement to Ensure Plus High Protein 8oz PO TID (Provides 1,050kcal & 60grams of Protein). Recommend low fat diet 2/2 cholecystitis. Fecal incontinence noted. Patient continues on florastor. Family member provided list of foods that patient likes/dislikes (updated in diet kardex).     SKIN: bruised (ecchymotic)    EDEMA: +1 bilateral foot    LAST BM: 18-Aug-2023 (fecal incontinence)     WEIGHT TRENDS:  81.3 kG (8/4/23)  83.9 kG (8/3/23)    PERTINENT MEDICATIONS: MEDICATIONS  (STANDING):  apixaban 5 milliGRAM(s) Oral every 12 hours  atorvastatin 80 milliGRAM(s) Oral at bedtime  diltiazem    milliGRAM(s) Oral daily  indomethacin Suppository 100 milliGRAM(s) Rectal once  levoFLOXacin  Tablet 750 milliGRAM(s) Oral every 24 hours  melatonin 6 milliGRAM(s) Oral at bedtime  metoprolol succinate  milliGRAM(s) Oral daily  metroNIDAZOLE    Tablet 500 milliGRAM(s) Oral every 8 hours  psyllium Powder 1 Packet(s) Oral daily  saccharomyces boulardii 250 milliGRAM(s) Oral two times a day    MEDICATIONS  (PRN):  acetaminophen     Tablet .. 650 milliGRAM(s) Oral every 6 hours PRN Mild Pain (1 - 3)  acetaminophen     Tablet .. 650 milliGRAM(s) Oral every 6 hours PRN Temp greater or equal to 38C (100.4F)  aluminum hydroxide/magnesium hydroxide/simethicone Suspension 30 milliLiter(s) Oral every 4 hours PRN Dyspepsia  bisacodyl Suppository 10 milliGRAM(s) Rectal daily PRN Constipation      PERTINENT LABS:  08-17 Na140 mmol/L Glu 97 mg/dL K+ 3.9 mmol/L Cr  0.81 mg/dL BUN 15 mg/dL 08-17 Alb 2.6 g/dL<L> 07-26 Chol 130 mg/dL LDL --    HDL 48 mg/dL<L> Trig 74 mg/dL          ESTIMATED NEEDS:   [X] No Change Since Previous Assessment    PREVIOUS NUTRITION DIAGNOSIS:  1. Inadequate oral intake    NUTRITION DIAGNOSIS IS [X] Ongoing - Addressed with Glucerna 8oz PO TID (Provides 660kcal-30grams of Protein)     NEW NUTRITION DIAGNOSIS: [X] Not Applicable      INTERVENTIONS:   1. Increase supplement to Ensure Plus High Protein 8oz PO TID (Provides 1,050kcal & 60grams of Protein)    2. Low fat diet  3. Honor patients daily food preferences as feasible with prescribed diet   4. Monitor daily PO intakes, GI tolerance, labs, weights, skin integrity, & BM regularity     MONITORING & EVALUATION:   1. Weights   2. PO intakes   3. Skin integrity   4. Tolerance to diet prescription   5. Labs & POCT  6. Follow up (per protocol)    Registered Dietitian/Nutritionist Remains Available.  Trav Talavera RD, CDN    Phone# [phone]
case reviewed with Dr Gonzalez  Shannan tube exit looked more cellulitic  Vanco started  Shannan tube pulled  Advise limited vanco course, when site improved can switch back to doxy to complete a 7-10 day course.

## 2023-08-25 NOTE — PROGRESS NOTE ADULT - SUBJECTIVE AND OBJECTIVE BOX
CC: f/u for fever and abdominal wall cellulitis around enrique tube    Patient reports: she appears comfortable, offers no complaints    REVIEW OF SYSTEMS:  All other review of systems negative (Comprehensive ROS)    Antimicrobials Day #  :day 2  doxycycline monohydrate Capsule 100 milliGRAM(s) Oral every 12 hours    Other Medications Reviewed  MEDICATIONS  (STANDING):  apixaban 5 milliGRAM(s) Oral every 12 hours  atorvastatin 80 milliGRAM(s) Oral at bedtime  benzonatate 100 milliGRAM(s) Oral three times a day  diltiazem    milliGRAM(s) Oral daily  doxycycline monohydrate Capsule 100 milliGRAM(s) Oral every 12 hours  erythromycin   Ointment 1 Application(s) Both EYES four times a day  indomethacin Suppository 100 milliGRAM(s) Rectal once  melatonin 6 milliGRAM(s) Oral at bedtime  metoprolol succinate  milliGRAM(s) Oral daily  mometasone 220 MICROgram(s) Inhaler 1 Puff(s) Inhalation daily  psyllium Powder 1 Packet(s) Oral daily  saccharomyces boulardii 250 milliGRAM(s) Oral two times a day    T(F): 98 (08-25-23 @ 07:21), Max: 98.3 (08-24-23 @ 20:10)  HR: 99 (08-25-23 @ 07:21)  BP: 159/80 (08-25-23 @ 07:21)  RR: 16 (08-25-23 @ 07:21)  SpO2: 96% (08-25-23 @ 07:21)  Wt(kg): --    PHYSICAL EXAM:  General: alert, no acute distress  Eyes:  anicteric, no conjunctival injection, no discharge  Oropharynx: no lesions or injection 	  Neck: supple, without adenopathy  Lungs: clear to auscultation  Heart: irregular rate and rhythm; no murmur,  Abdomen: soft, nondistended, nontender, without mass .Enrique tube with erythema/induration around exit site  Skin: no lesions  Extremities: no clubbing, cyanosis, or edema  Neurologic: alert, oriented, moves all extremities    LAB RESULTS:                        12.4   7.12  )-----------( 182      ( 25 Aug 2023 05:40 )             37.5     08-25    141  |  103  |  12  ----------------------------<  98  3.8   |  31  |  0.64    Ca    8.5      25 Aug 2023 05:40    TPro  6.8  /  Alb  2.4<L>  /  TBili  0.3  /  DBili  x   /  AST  27  /  ALT  27  /  AlkPhos  79  08-25    LIVER FUNCTIONS - ( 25 Aug 2023 05:40 )  Alb: 2.4 g/dL / Pro: 6.8 g/dL / ALK PHOS: 79 U/L / ALT: 27 U/L / AST: 27 U/L / GGT: x           Urinalysis Basic - ( 25 Aug 2023 05:40 )    Color: x / Appearance: x / SG: x / pH: x  Gluc: 98 mg/dL / Ketone: x  / Bili: x / Urobili: x   Blood: x / Protein: x / Nitrite: x   Leuk Esterase: x / RBC: x / WBC x   Sq Epi: x / Non Sq Epi: x / Bacteria: x      MICROBIOLOGY:  RECENT CULTURES:  08-22 @ 01:45 .Other Culture Enrique tube drainage Methicillin resistant Staphylococcus aureus    Moderate Methicillin Resistant Staphylococcus aureus      08-22 @ 01:00 .Blood Blood     No growth at 72 Hours          RADIOLOGY REVIEWED:  < from: CT Abdomen and Pelvis w/ Oral Cont and w/ IV Cont (08.24.23 @ 18:23) >  IMPRESSION:  Again malpositioned cholecystostomy catheter is appreciated  Patient is now status post ERCP with placement of biliary stent.  Resolution of previously seen sigmoid diverticulitis  Fibroid uterus    --- End of Report -    < end of copied text >

## 2023-08-25 NOTE — CHART NOTE - NSCHARTNOTESELECT_GEN_ALL_CORE
Event Note
Nutrition Services
Event Note
IPOC/Event Note
Neuropsychology
Nutrition Services

## 2023-08-25 NOTE — PROGRESS NOTE ADULT - ASSESSMENT
78 year old female with PMH HTN, and HLD who presented to [facility] ED 7/26/23 s/p fall from chair with + head strike. She was found to have new onset AFIB with RVR and Rhabdomyolysis. Her hospital course was complicated by acute cholecystitis and cholelithiasis requiring percutaneous enrique drain via IR on 7/7, Bacteremia, and cerebellar infarct     # right cerebellar CVA with incoordination, imbalance, cognitive impairment  - MRI 8/1: acute right cerebellar infarct. Scattered foci of T2/FLAIR hyperintensity in the bilateral hemispheric white matter  - ASA and Atorvastatin  - continue Comprehensive Rehab Program of PT/OT/SLP  - 3 hours a day, 5 days a week. For home care referral Ot PT SLP on dc  - recreation therapy, psychology services   - neurology and PMR f/u  - Precautions: cardiac, AC, fall, AMS, enrique drain    # fever/cough; rhinovirus infection  - Afebrile 8/25  - CXR 8/21: Elevated right hemidiaphragm again seen. Linear atelectasis versus scar in the right apex and left lower lung. No focal lung consolidation. (official read)  - RVP 8/22 + enterovirus/rhinovirus. Reviewed with patient and daughter  - IS, tessalon perle standing  - robitussin PRN   - wound Cx + S aureus 8/24. ID appreciated, started on doxycycline 100 12. Day #2 8/25  - Repeat A/P CT + oral and IV contrast 8/24: Again malpositioned cholecystostomy catheter is appreciated. Patient is now status post ERCP with placement of biliary stent. Resolution of previously seen sigmoid diverticulitis  - ID notified of results, pending f/u for clearance for dc  - WBC 6.75 8/24  - tylenol PRN    #  Cholecystitis   - s/p percutaneous cholecystectomy via IR on 7/7   -  A/P CT 8/7: Percutaneous cholecystostomy catheter is malpositioned within the descending colon distal to the cecum. Inflammation adjacent to the colon and appendix,likely reactive. Dilated common bile duct with suspicion of choledocholithiasis in the distal CBD. Cholelithiasis with gallbladder wall thickening and pericholecystic fat stranding. Acute diverticulitis in the mid sigmoid colon with associated localized perforation.  - MRCP 8/9: distended CBD up to 12mm + 2 stones in bile duct w/ borderline central intrahepatic ductal distention, + cholelithiasis decreased inflammation,  - s/p ERCP 8/11 Sphincterotomy performed with stent placement  - GI and Surgery appreciated 8/7. Low suspicion for non-drained infectious process. Leave drain in as precaution. May be candidate for outpatient laparoscopic cholecystectomy.  - levaquin/flagyl. dc, discussed with GI 8/22  - AST  41<H>  /  ALT  26  /  AlkPhos  79  08-24. -->  AST  27  /  ALT  27  /  AlkPhos  79  08-25 stable/improved  - f/u outpatient for drain removal and cholecystectomy, discussed with patient    # mild irritation/erythema enrique drain insertion site  - betadine area daily, cover with drain guaze daily  - keep covered while showering  - trace drainage at drain insertion site abdomen 8/24, Wound Cx + staph aureus  - drain: in incorrect position, no output, no need for flushes. To follow up with surgery as outpatient for removal    # HTN  - Metoprolol XL 100mg daily  - Diltiazem CD 120mg daily  - BP  (127/82 - 159/80) 8/25    # AFib RVR  - Metoprolol XL 100mg daily  - Eliquis 5 q12hrs . Medication reviewed with patient and daughter. Confirmed covered by insurance with $10 copay 8/22  - PCP /cardiology f/u on dc    # conjunctivitis  - erythromycin ophthalmic ointment QID . Day #3/5 8/25    # bibasilar atelectasis noted on MRCP  - incentive spirometry, breathing exercises     # RUL Lung nodule found on CT chest (7/25)  - Incidental finding   - F/U PCP outpatient for repeat CT    # Rhabdomyolysis  - S/p IVF  - CK 5502 7/25--> 109 8/4  - resolved    # GI  - Senna dc due to loose stool 8/16  - metamucil for bulking 8/16  - on florastor secondary to Abx  - toileting program    # /h/o retention.  - continue monitoring bladder scan, SC for PVR >350 ml  - toileting program    # podiatry  - podiatry appreciated, s/p nail debridement    # Sleep  - Melatonin PRN     # Pain Mgmt   - Tylenol PRN    # FEN   - Diet - Regular + Thins  [DASH/TLC]    - Food preferences: no cheese, no yogurt, no fish, no apple juice placed in EMR    #  DVT PPX:  - Eliquis     # Case discussed in IDT rounds 8/21 (follow up):  - tolerating reg/thin liquids, increased cognition and attention. CG bADLs and functional transfers, min assist/CG stairs with bilateral HR and ambulation with RW  - goals adjusted: supervision iADLs, supervision/CG  bADLs, CG ambulation/stairs, supervision iaDLS and during waking hours  - caregiver training performed  - adjusted target dc home 8/24 with caregiver support 24/7  and home Pt OT SLP  - [facility] Medications sent 8/22    Daughter in law Bernice: [phone]   Daughter Melissa [phone]    # LABs  ID consult for clearance dc    Outpatient/Follow-Up:    Alli Pascual  Surgery  [address] [place] [address]  Phone: [phone]  Fax: [phone]  Follow Up Time: 1 week    Alexis Simon  Cardiovascular Disease  [address] [place], [place] [address]  Phone: [phone]  Fax: [phone]  Follow Up Time: 1 week    BRIAN ALBA  [address] [place] [address]  Phone: ()-  Fax: ()-     78 year old female with PMH HTN, and HLD who presented to [facility] ED 7/26/23 s/p fall from chair with + head strike. She was found to have new onset AFIB with RVR and Rhabdomyolysis. Her hospital course was complicated by acute cholecystitis and cholelithiasis requiring percutaneous shannan drain via IR on 7/7, Bacteremia, and cerebellar infarct     # right cerebellar CVA with incoordination, imbalance, cognitive impairment  - MRI 8/1: acute right cerebellar infarct. Scattered foci of T2/FLAIR hyperintensity in the bilateral hemispheric white matter  - ASA and Atorvastatin  - continue Comprehensive Rehab Program of PT/OT/SLP  - 3 hours a day, 5 days a week.  - recreation therapy, psychology services   - neurology and PMR f/u  - Precautions: cardiac, AC, fall, AMS, shannan drain    # rhinovirus infection  - Afebrile 8/25  - CXR 8/21: Elevated right hemidiaphragm again seen. Linear atelectasis versus scar in the right apex and left lower lung. No focal lung consolidation. (official read)  - RVP 8/22 + enterovirus/rhinovirus. Reviewed with patient and daughter  - IS, tessalon perle standing  - robitussin PRN     # Exit site infection shannan drain  - wound Cx +MRSA 8/24  - WBC 6.75 8/24. CBC in AM 8/26  - ID appreciated, started on doxycycline 100 12. Day #2 8/25--> switched to vanco 1g IV q12 8/25. Reviewed with patient and family  - Repeat A/P CT + oral and IV contrast 8/24: Again malpositioned cholecystostomy catheter is appreciated. Patient is now status post ERCP with placement of biliary stent. Resolution of previously seen sigmoid diverticulitis  - Discussed case with GI and surgery 8/25, greatly appreciated. Shannan drain removed under aseptic technique, tip intact  - for GI f/u re: cholelithiasis mgt as outpatient    #  Cholecystitis   - s/p percutaneous cholecystectomy via IR on 7/7 . Drain removed 8/25  -  A/P CT 8/7: Percutaneous cholecystostomy catheter is malpositioned within the descending colon distal to the cecum. Inflammation adjacent to the colon and appendix,likely reactive. Dilated common bile duct with suspicion of choledocholithiasis in the distal CBD. Cholelithiasis with gallbladder wall thickening and pericholecystic fat stranding. Acute diverticulitis in the mid sigmoid colon with associated localized perforation.  - MRCP 8/9: distended CBD up to 12mm + 2 stones in bile duct w/ borderline central intrahepatic ductal distention, + cholelithiasis decreased inflammation,  - s/p ERCP 8/11 Sphincterotomy performed with stent placement  - levaquin/flagyl. dc, discussed with GI 8/22  - AST  41<H>  /  ALT  26  /  AlkPhos  79  08-24. -->  AST  27  /  ALT  27  /  AlkPhos  79  08-25 stable/improved  - f/u outpatient eval cholecystectomy    # mild irritation/erythema shannan drain insertion site  - betadine area daily, cover with drain guaze daily  - keep covered while showering  - trace drainage at drain insertion site abdomen 8/24, Wound Cx + staph aureus  - drain: in incorrect position, no output, no need for flushes. To follow up with surgery as outpatient for removal    # HTN  - Metoprolol XL 100mg daily  - Diltiazem CD 120mg daily  - BP  (127/82 - 159/80) 8/25    # AFib RVR  - Metoprolol XL 100mg daily  - Eliquis 5 q12hrs . Medication reviewed with patient and daughter. Confirmed covered by insurance with $10 copay 8/22  - PCP /cardiology f/u on dc    # conjunctivitis  - erythromycin ophthalmic ointment QID . Day #3/5 8/25, no injection on exam    # bibasilar atelectasis noted on MRCP  - incentive spirometry, breathing exercises     # RUL Lung nodule found on CT chest (7/25)  - Incidental finding   - F/U PCP outpatient for repeat CT    # Rhabdomyolysis  - S/p IVF  - CK 5502 7/25--> 109 8/4  - resolved    # GI  - Senna dc due to loose stool 8/16  - metamucil for bulking 8/16  - on florastor secondary to Abx  - toileting program    # /h/o retention.  - continue monitoring bladder scan, SC for PVR >350 ml  - toileting program    # podiatry  - podiatry appreciated, s/p nail debridement    # Sleep  - Melatonin PRN     # Pain Mgmt   - Tylenol PRN    # FEN   - Diet - Regular + Thins  [DASH/TLC]    - Food preferences: no cheese, no yogurt, no fish, no apple juice placed in EMR    #  DVT PPX:  - Eliquis     # Case discussed in IDT rounds 8/21 (follow up):  - tolerating reg/thin liquids, increased cognition and attention. CG bADLs and functional transfers, min assist/CG stairs with bilateral HR and ambulation with RW  - goals adjusted: supervision iADLs, supervision/CG  bADLs, CG ambulation/stairs, supervision iaDLS and during waking hours  - caregiver training performed  - adjusted target dc home 8/24 with caregiver support 24/7  and home Pt OT SLP  - Pharmacy: [facility]. Medications sent 8/22    Daughter in law Bernice: [phone]   Daughter Melissa [phone]. Discussed case/updated recs 8/25    # LABs  CBC 8/26  CBC BMP 8/28    Time spent for evaluation, management, communication and coordination care 55 min    Outpatient/Follow-Up:    Alli Pascual  Surgery  [address] [place] [address]  Phone: [phone]  Fax: [phone]  Follow Up Time: 1 week    Alexis Simon  Cardiovascular Disease  [address] [place], [place] [id number]  Phone: [phone]  Fax: [phone]  Follow Up Time: 1 week    BRIAN ALBA  [address] [place] [address]  Phone: ()-  Fax: ()-     78 year old female with PMH HTN, and HLD who presented to [facility] ED 7/26/23 s/p fall from chair with + head strike. She was found to have new onset AFIB with RVR and Rhabdomyolysis. Her hospital course was complicated by acute cholecystitis and cholelithiasis requiring percutaneous shannan drain via IR on 7/7, Bacteremia, and cerebellar infarct     # right cerebellar CVA with incoordination, imbalance, cognitive impairment  - MRI 8/1: acute right cerebellar infarct. Scattered foci of T2/FLAIR hyperintensity in the bilateral hemispheric white matter  - ASA and Atorvastatin  - continue Comprehensive Rehab Program of PT/OT/SLP  - 3 hours a day, 5 days a week.  - recreation therapy, psychology services   - neurology and PMR f/u  - Precautions: cardiac, AC, fall, AMS, shannan drain, contact isolation    # rhinovirus infection  - Afebrile 8/25  - CXR 8/21: Elevated right hemidiaphragm again seen. Linear atelectasis versus scar in the right apex and left lower lung. No focal lung consolidation. (official read)  - RVP 8/22 + enterovirus/rhinovirus. Reviewed with patient and daughter  - IS, tessalon perle standing  - robitussin PRN     # Exit site infection shannan drain  - wound Cx +MRSA 8/24  - WBC 6.75 8/24. CBC in AM 8/26  - ID appreciated, started on doxycycline 100 12. Day #2 8/25--> switched to vanco 1g IV q12 8/25. Reviewed with patient and family  - Repeat A/P CT + oral and IV contrast 8/24: Again malpositioned cholecystostomy catheter is appreciated. Patient is now status post ERCP with placement of biliary stent. Resolution of previously seen sigmoid diverticulitis  - Discussed case with GI and surgery 8/25, greatly appreciated. Shannan drain removed under aseptic technique, tip intact  - for GI f/u re: cholelithiasis mgt as outpatient  - contact isolation MRSA    #  Cholecystitis   - s/p percutaneous cholecystectomy via IR on 7/7 . Drain removed 8/25  -  A/P CT 8/7: Percutaneous cholecystostomy catheter is malpositioned within the descending colon distal to the cecum. Inflammation adjacent to the colon and appendix,likely reactive. Dilated common bile duct with suspicion of choledocholithiasis in the distal CBD. Cholelithiasis with gallbladder wall thickening and pericholecystic fat stranding. Acute diverticulitis in the mid sigmoid colon with associated localized perforation.  - MRCP 8/9: distended CBD up to 12mm + 2 stones in bile duct w/ borderline central intrahepatic ductal distention, + cholelithiasis decreased inflammation,  - s/p ERCP 8/11 Sphincterotomy performed with stent placement  - levaquin/flagyl. dc, discussed with GI 8/22  - AST  41<H>  /  ALT  26  /  AlkPhos  79  08-24. -->  AST  27  /  ALT  27  /  AlkPhos  79  08-25 stable/improved  - f/u outpatient eval cholecystectomy    # mild irritation/erythema shannan drain insertion site  - betadine area daily, cover with drain guaze daily  - keep covered while showering  - trace drainage at drain insertion site abdomen 8/24, Wound Cx + staph aureus  - drain: in incorrect position, no output, no need for flushes. To follow up with surgery as outpatient for removal    # HTN  - Metoprolol XL 100mg daily  - Diltiazem CD 120mg daily  - BP  (127/82 - 159/80) 8/25    # AFib RVR  - Metoprolol XL 100mg daily  - Eliquis 5 q12hrs . Medication reviewed with patient and daughter. Confirmed covered by insurance with $10 copay 8/22  - PCP /cardiology f/u on dc    # conjunctivitis  - erythromycin ophthalmic ointment QID . Day #3/5 8/25, no injection on exam    # bibasilar atelectasis noted on MRCP  - incentive spirometry, breathing exercises     # RUL Lung nodule found on CT chest (7/25)  - Incidental finding   - F/U PCP outpatient for repeat CT    # Rhabdomyolysis  - S/p IVF  - CK 5502 7/25--> 109 8/4  - resolved    # GI  - Senna dc due to loose stool 8/16  - metamucil for bulking 8/16  - on florastor secondary to Abx  - toileting program    # /h/o retention.  - continue monitoring bladder scan, SC for PVR >350 ml  - toileting program    # podiatry  - podiatry appreciated, s/p nail debridement    # Sleep  - Melatonin PRN     # Pain Mgmt   - Tylenol PRN    # FEN   - Diet - Regular + Thins  [DASH/TLC]    - Food preferences: no cheese, no yogurt, no fish, no apple juice placed in EMR    #  DVT PPX:  - Eliquis     # Case discussed in IDT rounds 8/21 (follow up):  - tolerating reg/thin liquids, increased cognition and attention. CG bADLs and functional transfers, min assist/CG stairs with bilateral HR and ambulation with RW  - goals adjusted: supervision iADLs, supervision/CG  bADLs, CG ambulation/stairs, supervision iaDLS and during waking hours  - caregiver training performed  - adjusted target dc home 8/24 with caregiver support 24/7  and home Pt OT SLP  - Pharmacy: [facility]. Medications sent 8/22    Daughter in law Bernice: [phone]   Daughter Melissa [phone]. Discussed case/updated recs 8/25    # LABs  CBC 8/26  CBC BMP 8/28    Time spent for evaluation, management, communication and coordination care 55 min    Outpatient/Follow-Up:    Alli Pascual  Surgery  [address] [place] [address]  Phone: [phone]  Fax: [phone]  Follow Up Time: 1 week    Alexis Simon  Cardiovascular Disease  [address] [place], [place] [id number]  Phone: [phone]  Fax: [phone]  Follow Up Time: 1 week    BRIAN ALBA  [address] [place] [address]  Phone: ()-  Fax: ()-

## 2023-08-26 LAB
HCT VFR BLD CALC: 36.8 % — SIGNIFICANT CHANGE UP (ref 34.5–45)
HGB BLD-MCNC: 12.1 G/DL — SIGNIFICANT CHANGE UP (ref 11.5–15.5)
MCHC RBC-ENTMCNC: 30.8 PG — SIGNIFICANT CHANGE UP (ref 27–34)
MCHC RBC-ENTMCNC: 32.9 GM/DL — SIGNIFICANT CHANGE UP (ref 32–36)
MCV RBC AUTO: 93.6 FL — SIGNIFICANT CHANGE UP (ref 80–100)
NRBC # BLD: 0 /100 WBCS — SIGNIFICANT CHANGE UP (ref 0–0)
PLATELET # BLD AUTO: 179 K/UL — SIGNIFICANT CHANGE UP (ref 150–400)
RBC # BLD: 3.93 M/UL — SIGNIFICANT CHANGE UP (ref 3.8–5.2)
RBC # FLD: 14.5 % — SIGNIFICANT CHANGE UP (ref 10.3–14.5)
VANCOMYCIN TROUGH SERPL-MCNC: 9.2 UG/ML — LOW (ref 10–20)
WBC # BLD: 6.82 K/UL — SIGNIFICANT CHANGE UP (ref 3.8–10.5)
WBC # FLD AUTO: 6.82 K/UL — SIGNIFICANT CHANGE UP (ref 3.8–10.5)

## 2023-08-26 PROCEDURE — 99232 SBSQ HOSP IP/OBS MODERATE 35: CPT

## 2023-08-26 RX ADMIN — APIXABAN 5 MILLIGRAM(S): 2.5 TABLET, FILM COATED ORAL at 08:44

## 2023-08-26 RX ADMIN — APIXABAN 5 MILLIGRAM(S): 2.5 TABLET, FILM COATED ORAL at 21:42

## 2023-08-26 RX ADMIN — Medication 100 MILLIGRAM(S): at 05:40

## 2023-08-26 RX ADMIN — MOMETASONE FUROATE 1 PUFF(S): 220 INHALANT RESPIRATORY (INHALATION) at 10:38

## 2023-08-26 RX ADMIN — Medication 250 MILLIGRAM(S): at 05:40

## 2023-08-26 RX ADMIN — Medication 100 MILLIGRAM(S): at 21:42

## 2023-08-26 RX ADMIN — Medication 6 MILLIGRAM(S): at 21:42

## 2023-08-26 RX ADMIN — Medication 250 MILLIGRAM(S): at 17:58

## 2023-08-26 RX ADMIN — Medication 100 MILLIGRAM(S): at 14:24

## 2023-08-26 RX ADMIN — Medication 100 MILLIGRAM(S): at 09:09

## 2023-08-26 RX ADMIN — ATORVASTATIN CALCIUM 80 MILLIGRAM(S): 80 TABLET, FILM COATED ORAL at 21:42

## 2023-08-26 RX ADMIN — Medication 120 MILLIGRAM(S): at 05:40

## 2023-08-26 NOTE — PROGRESS NOTE ADULT - ASSESSMENT
78 year old female with PMH HTN, and HLD who presented to [facility] ED 7/26/23 s/p fall from chair with + head strike. She was found to have new onset AFIB with RVR and Rhabdomyolysis. Her hospital course was complicated by acute cholecystitis and cholelithiasis requiring percutaneous shannan drain via IR on 7/7, Bacteremia, and cerebellar infarct     # right cerebellar CVA with incoordination, imbalance, cognitive impairment  - MRI 8/1: acute right cerebellar infarct. Scattered foci of T2/FLAIR hyperintensity in the bilateral hemispheric white matter  - ASA and Atorvastatin  - continue Comprehensive Rehab Program of PT/OT/SLP  - 3 hours a day, 5 days a week.  - recreation therapy, psychology services   - neurology and PMR f/u  - Precautions: cardiac, AC, fall, AMS, shannan drain, contact isolation    # rhinovirus infection  - Afebrile, WBC 6.82 8/26  - CXR 8/21: Elevated right hemidiaphragm again seen. Linear atelectasis versus scar in the right apex and left lower lung. No focal lung consolidation. (official read)  - RVP 8/22 + enterovirus/rhinovirus. Reviewed with patient and daughter  - IS, tessalon perle standing  - robitussin PRN     # Exit site infection shannan drain  - wound Cx +MRSA 8/24  - WBC 6.75 8/24--> 6.82 8/26  - Continue vanco 1g IV q12 8/26, day #2. Vanco trough ordered for afternoon  - Repeat A/P CT + oral and IV contrast 8/24: Again malpositioned cholecystostomy catheter is appreciated. Patient is now status post ERCP with placement of biliary stent. Resolution of previously seen sigmoid diverticulitis  - Discussed case with GI and surgery 8/25, greatly appreciated. Shannan drain removed under aseptic technique, tip intact  - wound/drain care order dc  - for GI f/u re: cholelithiasis mgt as outpatient  - contact isolation MRSA    #  Cholecystitis   - s/p percutaneous cholecystectomy via IR on 7/7 . Drain removed 8/25  -  A/P CT 8/7: Percutaneous cholecystostomy catheter is malpositioned within the descending colon distal to the cecum. Inflammation adjacent to the colon and appendix,likely reactive. Dilated common bile duct with suspicion of choledocholithiasis in the distal CBD. Cholelithiasis with gallbladder wall thickening and pericholecystic fat stranding. Acute diverticulitis in the mid sigmoid colon with associated localized perforation.  - MRCP 8/9: distended CBD up to 12mm + 2 stones in bile duct w/ borderline central intrahepatic ductal distention, + cholelithiasis decreased inflammation,  - s/p ERCP 8/11 Sphincterotomy performed with stent placement  - levaquin/flagyl. dc, discussed with GI 8/22  - AST  41<H>  /  ALT  26  /  AlkPhos  79  08-24. -->  AST  27  /  ALT  27  /  AlkPhos  79  08-25 stable/improved  - f/u outpatient eval cholecystectomy    # HTN  - Metoprolol XL 100mg daily  - Diltiazem CD 120mg daily  - BP  (128/80 - 155/90) 8/26    # AFib RVR  - Metoprolol XL 100mg daily  - Eliquis 5 q12hrs . Medication reviewed with patient and daughter. Confirmed covered by insurance with $10 copay 8/22  - PCP /cardiology f/u on dc    # conjunctivitis  - erythromycin ophthalmic ointment QID . Day #4/5 8/26  - resolved    # bibasilar atelectasis noted on MRCP  - incentive spirometry, breathing exercises     # RUL Lung nodule found on CT chest (7/25)  - Incidental finding   - F/U PCP outpatient for repeat CT    # Rhabdomyolysis  - S/p IVF  - CK 5502 7/25--> 109 8/4  - resolved    # GI  - Senna dc due to loose stool 8/16  - metamucil for bulking 8/16  - on florastor secondary to Abx  - toileting program    # /h/o retention.  - continue monitoring bladder scan, SC for PVR >350 ml  - toileting program    # podiatry  - podiatry appreciated, s/p nail debridement    # Sleep  - Melatonin PRN     # Pain Mgmt   - Tylenol PRN    # FEN   - Diet - Regular + Thins  [DASH/TLC]    - Food preferences: no cheese, no yogurt, no fish, no apple juice placed in EMR    #  DVT PPX:  - Eliquis   - Doppler BLE ordered due to asymmetric LE swelling 8/26    # Case discussed in IDT rounds 8/21 (follow up):  - tolerating reg/thin liquids, increased cognition and attention. CG bADLs and functional transfers, min assist/CG stairs with bilateral HR and ambulation with RW  - goals adjusted: supervision iADLs, supervision/CG  bADLs, CG ambulation/stairs, supervision iaDLS and during waking hours  - caregiver training performed  - adjusted target dc home 8/24 with caregiver support 24/7  and home Pt OT SLP  - Pharmacy: [facility] Erin Maciel. Medications sent 8/22    Daughter in law Bernice: [phone]   Daughter Melissa [phone]    # LABs  vanco trough 8/26  doppler BLe  CBC BMP 8/28    Time spent for evaluation, management, communication and coordination care 55 min    Outpatient/Follow-Up:    Alli Pascual  Surgery  [address] [place] [address]  Phone: [phone]  Fax: [phone]  Follow Up Time: 1 week    Alexis Simon  Cardiovascular Disease  [address] [place], [place] [id number]  Phone: [phone]  Fax: [phone]  Follow Up Time: 1 week    BRIAN ALBA  [address] [place] [address]  Phone: ()-  Fax: ()-

## 2023-08-26 NOTE — PROGRESS NOTE ADULT - SUBJECTIVE AND OBJECTIVE BOX
denies abdominal pain or drainage  complaining of a dry cough for few weeks    ALLERGIES:  No Known Allergies    MEDICATIONS  (STANDING):  apixaban 5 milliGRAM(s) Oral every 12 hours  atorvastatin 80 milliGRAM(s) Oral at bedtime  benzonatate 100 milliGRAM(s) Oral three times a day  diltiazem    milliGRAM(s) Oral daily  erythromycin   Ointment 1 Application(s) Both EYES four times a day  indomethacin Suppository 100 milliGRAM(s) Rectal once  melatonin 6 milliGRAM(s) Oral at bedtime  metoprolol succinate  milliGRAM(s) Oral daily  mometasone 220 MICROgram(s) Inhaler 1 Puff(s) Inhalation daily  psyllium Powder 1 Packet(s) Oral daily  saccharomyces boulardii 250 milliGRAM(s) Oral two times a day  vancomycin  IVPB      vancomycin  IVPB 1000 milliGRAM(s) IV Intermittent every 12 hours    MEDICATIONS  (PRN):  acetaminophen     Tablet .. 650 milliGRAM(s) Oral every 6 hours PRN Temp greater or equal to 38C (100.4F)  acetaminophen     Tablet .. 650 milliGRAM(s) Oral every 6 hours PRN Mild Pain (1 - 3)  aluminum hydroxide/magnesium hydroxide/simethicone Suspension 30 milliLiter(s) Oral every 4 hours PRN Dyspepsia  bisacodyl Suppository 10 milliGRAM(s) Rectal daily PRN Constipation  guaiFENesin Oral Liquid (Sugar-Free) 100 milliGRAM(s) Oral every 6 hours PRN Cough  levalbuterol 45 MICROgram(s) HFA Inhaler 1 Puff(s) Inhalation every 8 hours PRN wheezing    Vital Signs Last 24 Hrs  T(F): 98.6 (26 Aug 2023 08:57), Max: 98.9 (25 Aug 2023 21:45)  HR: 92 (26 Aug 2023 08:57) (73 - 95)  BP: 155/90 (26 Aug 2023 08:57) (128/80 - 155/90)  RR: 16 (26 Aug 2023 08:57) (16 - 16)  SpO2: 96% (26 Aug 2023 08:57) (95% - 97%)  I&O's Summary    25 Aug 2023 07:01  -  26 Aug 2023 07:00  --------------------------------------------------------  IN: 0 mL / OUT: 600 mL / NET: -600 mL          PHYSICAL EXAM:  GENERAL: NAD  HEENT: NCAT  CHEST/LUNG: Clear to percussion bilaterally; No rales, rhonchi, wheezing  HEART: Regular rate and rhythm; No murmurs  ABDOMEN: incision site for prior perc enrique tube with surrounding erythema, no purulent discharge noted; Soft, Nontender, Nondistended; Bowel sounds present  MUSCULOSKELETAL/EXTREMITIES:  2+ Peripheral Pulses, No LE edema  PSYCH: Appropriate affect, Alert & Oriented  Neuro: grossly normal motor and sensation    LABS:                        12.1   6.82  )-----------( 179      ( 26 Aug 2023 05:19 )             36.8       08-25    141  |  103  |  12  ----------------------------<  98  3.8   |  31  |  0.64    Ca    8.5      25 Aug 2023 05:40    TPro  6.8  /  Alb  2.4  /  TBili  0.3  /  DBili  x   /  AST  27  /  ALT  27  /  AlkPhos  79  08-25                07-26 Chol 130 mg/dL LDL -- HDL 48 mg/dL Trig 74 mg/dL                  Urinalysis Basic - ( 25 Aug 2023 05:40 )    Color: x / Appearance: x / SG: x / pH: x  Gluc: 98 mg/dL / Ketone: x  / Bili: x / Urobili: x   Blood: x / Protein: x / Nitrite: x   Leuk Esterase: x / RBC: x / WBC x   Sq Epi: x / Non Sq Epi: x / Bacteria: x        Culture - Other (collected 22 Aug 2023 01:45)  Source: .Other Culture Enrique tube drainage  Final Report (24 Aug 2023 13:33):    Moderate Methicillin Resistant Staphylococcus aureus  Organism: Methicillin resistant Staphylococcus aureus (24 Aug 2023 13:33)  Organism: Methicillin resistant Staphylococcus aureus (24 Aug 2023 13:33)      Method Type: SVETA      -  Ampicillin/Sulbactam: R <=8/4      -  Cefazolin: R <=4      -  Clindamycin: S 0.5      -  Daptomycin: S 0.5      -  Erythromycin: R >4      -  Gentamicin: S <=1 Should not be used as monotherapy      -  Linezolid: S 2      -  Oxacillin: R >2      -  Penicillin: R >8      -  Rifampin: S <=1 Should not be used as monotherapy      -  Tetracycline: S <=1      -  Trimethoprim/Sulfamethoxazole: S <=0.5/9.5      -  Vancomycin: S 1    Culture - Blood (collected 22 Aug 2023 01:00)  Source: .Blood Blood  Preliminary Report (26 Aug 2023 05:01):    No growth at 4 days    Culture - Blood (collected 22 Aug 2023 01:00)  Source: .Blood Blood  Preliminary Report (26 Aug 2023 05:01):    No growth at 4 days      COVID-19 PCR: NotDetec (08-03-23 @ 21:00)      Care Discussed with Consultants/Other Providers: Yes

## 2023-08-26 NOTE — PROGRESS NOTE ADULT - GASTROINTESTINAL COMMENTS
ex enrique drain site: drain removed, still with erythema surrounding area but less red/angry. mild discharge/stoma closing, purulent appearanc. no fluctuance underneath, and margins reduced

## 2023-08-26 NOTE — PROGRESS NOTE ADULT - COMMENTS
Patient slept better last night, discomfort in abdomen improved and she is very glad to have had the tube removed. No new abdominal pain, no N/V, tolerating po. Receiving vancomycin; aware of ID recommendations, and GI and surgery recs also reviewed

## 2023-08-26 NOTE — PROGRESS NOTE ADULT - SUBJECTIVE AND OBJECTIVE BOX
Patient is a 78y old  Female who presents with a chief complaint of cerebellar CVA (25 Aug 2023 11:25)      HPI:  Karen Carlos is a 78 year old female RH dominant with PMH of HTN, and HLD; who presented to [place] ED on 7/26/23 s/p fall from chair with + head strike, on the floor for over an hour. ER workup was significant AFIB with RVR and rhabdomyolysis. She was given Cardizem IVP and IVF with improvement HR to the 70s. She was diagnosed with new onset AFIB, seen by cardiology and started on Metoprolol and Diltiazem for rate control and was anticoagulated with Eliquis.  IV fluids were administered for rhabdomyolysis.     RUQ US was significant for acute cholecystitis and cholelithiasis with pericholecystic fluid and gallbladder wall thickening. Her HIDA scan resulted positive and a percutaneous cholecystectomy tube was placed by IR on 7/7.  Her blood cultures resulted positive for Strept Anginosus for which she was started on IV antibiotics.     Her hospital course was complicated by neurological changes/altered mental status. CT head was performed which resulted negative; MRI of the head was significant for a R cerebellar infarct.    PM&R was consulted and deemed her an appropriate candidate for IRF. She was medically stabilized and cleared for discharge to [facility].  (03 Aug 2023 13:49)      PAST MEDICAL & SURGICAL HISTORY:  HTN (hypertension)      Hyperlipidemia      Tinnitus  right ear      Seasonal allergies      S/P knee replacement          MEDICATIONS  (STANDING):  apixaban 5 milliGRAM(s) Oral every 12 hours  atorvastatin 80 milliGRAM(s) Oral at bedtime  benzonatate 100 milliGRAM(s) Oral three times a day  diltiazem    milliGRAM(s) Oral daily  erythromycin   Ointment 1 Application(s) Both EYES four times a day  indomethacin Suppository 100 milliGRAM(s) Rectal once  melatonin 6 milliGRAM(s) Oral at bedtime  metoprolol succinate  milliGRAM(s) Oral daily  mometasone 220 MICROgram(s) Inhaler 1 Puff(s) Inhalation daily  psyllium Powder 1 Packet(s) Oral daily  saccharomyces boulardii 250 milliGRAM(s) Oral two times a day  vancomycin  IVPB      vancomycin  IVPB 1000 milliGRAM(s) IV Intermittent every 12 hours    MEDICATIONS  (PRN):  acetaminophen     Tablet .. 650 milliGRAM(s) Oral every 6 hours PRN Temp greater or equal to 38C (100.4F)  acetaminophen     Tablet .. 650 milliGRAM(s) Oral every 6 hours PRN Mild Pain (1 - 3)  aluminum hydroxide/magnesium hydroxide/simethicone Suspension 30 milliLiter(s) Oral every 4 hours PRN Dyspepsia  bisacodyl Suppository 10 milliGRAM(s) Rectal daily PRN Constipation  guaiFENesin Oral Liquid (Sugar-Free) 100 milliGRAM(s) Oral every 6 hours PRN Cough  levalbuterol 45 MICROgram(s) HFA Inhaler 1 Puff(s) Inhalation every 8 hours PRN wheezing      Allergies    No Known Allergies    Intolerances          VITALS  78y  Vital Signs Last 24 Hrs  T(C): 37 (26 Aug 2023 08:57), Max: 37.2 (25 Aug 2023 21:45)  T(F): 98.6 (26 Aug 2023 08:57), Max: 98.9 (25 Aug 2023 21:45)  HR: 92 (26 Aug 2023 08:57) (73 - 95)  BP: 155/90 (26 Aug 2023 08:57) (128/80 - 155/90)  BP(mean): --  RR: 16 (26 Aug 2023 08:57) (16 - 16)  SpO2: 96% (26 Aug 2023 08:57) (95% - 97%)    Parameters below as of 26 Aug 2023 08:57  Patient On (Oxygen Delivery Method): room air      Daily     Daily         RECENT LABS:                          12.1   6.82  )-----------( 179      ( 26 Aug 2023 05:19 )             36.8     08-25    141  |  103  |  12  ----------------------------<  98  3.8   |  31  |  0.64    Ca    8.5      25 Aug 2023 05:40    TPro  6.8  /  Alb  2.4<L>  /  TBili  0.3  /  DBili  x   /  AST  27  /  ALT  27  /  AlkPhos  79  08-25    LIVER FUNCTIONS - ( 25 Aug 2023 05:40 )  Alb: 2.4 g/dL / Pro: 6.8 g/dL / ALK PHOS: 79 U/L / ALT: 27 U/L / AST: 27 U/L / GGT: x             Urinalysis Basic - ( 25 Aug 2023 05:40 )    Color: x / Appearance: x / SG: x / pH: x  Gluc: 98 mg/dL / Ketone: x  / Bili: x / Urobili: x   Blood: x / Protein: x / Nitrite: x   Leuk Esterase: x / RBC: x / WBC x   Sq Epi: x / Non Sq Epi: x / Bacteria: x        Culture - Other (collected 08-22-23 @ 01:45)  Source: .Other Culture Shannan tube drainage  Final Report (08-24-23 @ 13:33):    Moderate Methicillin Resistant Staphylococcus aureus  Organism: Methicillin resistant Staphylococcus aureus (08-24-23 @ 13:33)  Organism: Methicillin resistant Staphylococcus aureus (08-24-23 @ 13:33)      Method Type: SVETA      -  Ampicillin/Sulbactam: R <=8/4      -  Cefazolin: R <=4      -  Clindamycin: S 0.5      -  Daptomycin: S 0.5      -  Erythromycin: R >4      -  Gentamicin: S <=1 Should not be used as monotherapy      -  Linezolid: S 2      -  Oxacillin: R >2      -  Penicillin: R >8      -  Rifampin: S <=1 Should not be used as monotherapy      -  Tetracycline: S <=1      -  Trimethoprim/Sulfamethoxazole: S <=0.5/9.5      -  Vancomycin: S 1    Culture - Blood (collected 08-22-23 @ 01:00)  Source: .Blood Blood  Preliminary Report (08-26-23 @ 05:01):    No growth at 4 days    Culture - Blood (collected 08-22-23 @ 01:00)  Source: .Blood Blood  Preliminary Report (08-26-23 @ 05:01):    No growth at 4 days        CAPILLARY BLOOD GLUCOSE

## 2023-08-26 NOTE — PROGRESS NOTE ADULT - ASSESSMENT
78F with HTN, HLD, recently hospitalized at OSH with acute stroke - with course complicated by acute cholecystitis requiring percutaneous cholecystostomy tube as well as new AF, now in acute rehab. A CT scan was performed today for new abdominal pain, which shows a malpositioned cholecystostomy tube, acute complicated diverticulitis and choledocholithiasis admitted for rehab- pt/ot/dvt ppx    #Malpositioned cholecystostomy tube  #Acute sigmoid diverticulitis with localized perforation  #Pneumoperitoneum due to above  #Choledocholithiasis, likely new (CBD was WNL on prior imaging)  #Cholecystitis  - S/p ERCP  - no pain  - labs reviewed, unremarkable    #cellulitis around enrique  drain site-- Enrique tube drainage Culture Results: Moderate Staphylococcus aureus- ID consulted   - ctap 8/24 reviewed, improved diverticulitis, no abscess  - antibiotics were transitioned to vancomycin -- will continue through the weekend, please obtain vanco trough prior to 4th dose. ID reccs reviewed regarding continuing doxycyline, which can be restarted possibly early next week if cellulitis improved.  - GI and surg consult     # right eye conjunctivitis- erythromycin oint - improving     #Chronic AF  -c/w Toprol  -c/w Diltiazem  -c/w Eliquis    #Lung nodule  -outpatient follow-up  -hx of pulm fibrosis    # cough, wheezing  - ENTEROVIRUS positive - Robitussin PRN and tessalon sara supportive care  - mometasone  inh bid for cough  - add Xopenex  -continue supportive care    DVT ppx: Eliquis

## 2023-08-27 LAB
CULTURE RESULTS: SIGNIFICANT CHANGE UP
CULTURE RESULTS: SIGNIFICANT CHANGE UP
SPECIMEN SOURCE: SIGNIFICANT CHANGE UP
SPECIMEN SOURCE: SIGNIFICANT CHANGE UP

## 2023-08-27 PROCEDURE — 93970 EXTREMITY STUDY: CPT | Mod: 26

## 2023-08-27 PROCEDURE — 71045 X-RAY EXAM CHEST 1 VIEW: CPT | Mod: 26

## 2023-08-27 PROCEDURE — 99232 SBSQ HOSP IP/OBS MODERATE 35: CPT

## 2023-08-27 PROCEDURE — 99233 SBSQ HOSP IP/OBS HIGH 50: CPT

## 2023-08-27 RX ORDER — VANCOMYCIN HCL 1 G
1500 VIAL (EA) INTRAVENOUS EVERY 12 HOURS
Refills: 0 | Status: DISCONTINUED | OUTPATIENT
Start: 2023-08-28 | End: 2023-08-28

## 2023-08-27 RX ORDER — NYSTATIN CREAM 100000 [USP'U]/G
1 CREAM TOPICAL
Refills: 0 | Status: DISCONTINUED | OUTPATIENT
Start: 2023-08-27 | End: 2023-08-28

## 2023-08-27 RX ORDER — VANCOMYCIN HCL 1 G
VIAL (EA) INTRAVENOUS
Refills: 0 | Status: DISCONTINUED | OUTPATIENT
Start: 2023-08-27 | End: 2023-08-28

## 2023-08-27 RX ORDER — VANCOMYCIN HCL 1 G
1500 VIAL (EA) INTRAVENOUS ONCE
Refills: 0 | Status: COMPLETED | OUTPATIENT
Start: 2023-08-27 | End: 2023-08-27

## 2023-08-27 RX ORDER — IPRATROPIUM/ALBUTEROL SULFATE 18-103MCG
3 AEROSOL WITH ADAPTER (GRAM) INHALATION EVERY 6 HOURS
Refills: 0 | Status: DISCONTINUED | OUTPATIENT
Start: 2023-08-27 | End: 2023-08-28

## 2023-08-27 RX ADMIN — ATORVASTATIN CALCIUM 80 MILLIGRAM(S): 80 TABLET, FILM COATED ORAL at 21:53

## 2023-08-27 RX ADMIN — APIXABAN 5 MILLIGRAM(S): 2.5 TABLET, FILM COATED ORAL at 21:53

## 2023-08-27 RX ADMIN — Medication 100 MILLIGRAM(S): at 05:18

## 2023-08-27 RX ADMIN — Medication 100 MILLIGRAM(S): at 21:52

## 2023-08-27 RX ADMIN — APIXABAN 5 MILLIGRAM(S): 2.5 TABLET, FILM COATED ORAL at 10:41

## 2023-08-27 RX ADMIN — Medication 250 MILLIGRAM(S): at 05:17

## 2023-08-27 RX ADMIN — Medication 100 MILLIGRAM(S): at 21:53

## 2023-08-27 RX ADMIN — Medication 3 MILLILITER(S): at 21:08

## 2023-08-27 RX ADMIN — LEVALBUTEROL 1 PUFF(S): 1.25 SOLUTION, CONCENTRATE RESPIRATORY (INHALATION) at 08:22

## 2023-08-27 RX ADMIN — MOMETASONE FUROATE 1 PUFF(S): 220 INHALANT RESPIRATORY (INHALATION) at 08:22

## 2023-08-27 RX ADMIN — Medication 3 MILLILITER(S): at 16:17

## 2023-08-27 RX ADMIN — Medication 100 MILLIGRAM(S): at 13:50

## 2023-08-27 RX ADMIN — Medication 250 MILLIGRAM(S): at 05:26

## 2023-08-27 RX ADMIN — Medication 100 MILLIGRAM(S): at 05:17

## 2023-08-27 RX ADMIN — Medication 6 MILLIGRAM(S): at 21:53

## 2023-08-27 RX ADMIN — Medication 300 MILLIGRAM(S): at 17:57

## 2023-08-27 RX ADMIN — Medication 250 MILLIGRAM(S): at 17:44

## 2023-08-27 RX ADMIN — Medication 120 MILLIGRAM(S): at 05:18

## 2023-08-27 NOTE — PROGRESS NOTE ADULT - SUBJECTIVE AND OBJECTIVE BOX
Patient is a 78y old  Female who presents with a chief complaint of cerebellar CVA (27 Aug 2023 11:41)      HPI:  Karen Carlos is a 78 year old female RH dominant with PMH of HTN, and HLD; who presented to [place] ED on 23 s/p fall from chair with + head strike, on the floor for over an hour. ER workup was significant AFIB with RVR and rhabdomyolysis. She was given Cardizem IVP and IVF with improvement HR to the 70s. She was diagnosed with new onset AFIB, seen by cardiology and started on Metoprolol and Diltiazem for rate control and was anticoagulated with Eliquis.  IV fluids were administered for rhabdomyolysis.     RUQ US was significant for acute cholecystitis and cholelithiasis with pericholecystic fluid and gallbladder wall thickening. Her HIDA scan resulted positive and a percutaneous cholecystectomy tube was placed by IR on .  Her blood cultures resulted positive for Strept Anginosus for which she was started on IV antibiotics.     Her hospital course was complicated by neurological changes/altered mental status. CT head was performed which resulted negative; MRI of the head was significant for a R cerebellar infarct.    PM&R was consulted and deemed her an appropriate candidate for IRF. She was medically stabilized and cleared for discharge to [facility].  (03 Aug 2023 13:49)      PAST MEDICAL & SURGICAL HISTORY:  HTN (hypertension)      Hyperlipidemia      Tinnitus  right ear      Seasonal allergies      S/P knee replacement          MEDICATIONS  (STANDING):  albuterol/ipratropium for Nebulization 3 milliLiter(s) Nebulizer every 6 hours  apixaban 5 milliGRAM(s) Oral every 12 hours  atorvastatin 80 milliGRAM(s) Oral at bedtime  benzonatate 100 milliGRAM(s) Oral three times a day  diltiazem    milliGRAM(s) Oral daily  erythromycin   Ointment 1 Application(s) Both EYES four times a day  melatonin 6 milliGRAM(s) Oral at bedtime  metoprolol succinate  milliGRAM(s) Oral daily  mometasone 220 MICROgram(s) Inhaler 1 Puff(s) Inhalation daily  psyllium Powder 1 Packet(s) Oral daily  saccharomyces boulardii 250 milliGRAM(s) Oral two times a day  vancomycin  IVPB      vancomycin  IVPB 1500 milliGRAM(s) IV Intermittent once    MEDICATIONS  (PRN):  acetaminophen     Tablet .. 650 milliGRAM(s) Oral every 6 hours PRN Temp greater or equal to 38C (100.4F)  acetaminophen     Tablet .. 650 milliGRAM(s) Oral every 6 hours PRN Mild Pain (1 - 3)  aluminum hydroxide/magnesium hydroxide/simethicone Suspension 30 milliLiter(s) Oral every 4 hours PRN Dyspepsia  bisacodyl Suppository 10 milliGRAM(s) Rectal daily PRN Constipation  guaiFENesin Oral Liquid (Sugar-Free) 100 milliGRAM(s) Oral every 6 hours PRN Cough  levalbuterol 45 MICROgram(s) HFA Inhaler 1 Puff(s) Inhalation every 8 hours PRN wheezing      Allergies    No Known Allergies    Intolerances          VITALS  78y  Vital Signs Last 24 Hrs  T(C): 37.1 (27 Aug 2023 09:10), Max: 37.1 (27 Aug 2023 09:10)  T(F): 98.7 (27 Aug 2023 09:10), Max: 98.7 (27 Aug 2023 09:10)  HR: 88 (27 Aug 2023 09:10) (73 - 100)  BP: 130/90 (27 Aug 2023 09:10) (125/81 - 142/82)  BP(mean): --  RR: 20 (27 Aug 2023 09:10) (15 - 20)  SpO2: 95% (27 Aug 2023 09:10) (95% - 97%)    Parameters below as of 27 Aug 2023 09:10  Patient On (Oxygen Delivery Method): room air      Daily     Daily Weight in k.8 (27 Aug 2023 01:00)        RECENT LABS:                          12.1   6.82  )-----------( 179      ( 26 Aug 2023 05:19 )             36.8                     CAPILLARY BLOOD GLUCOSE

## 2023-08-27 NOTE — PROGRESS NOTE ADULT - RESPIRATORY COMMENTS
occ wheezing
faint, occ wheezing, much improved
wheezing improved, no respiratory distress. fair+ effort
mild wheezing, no rales or rhonchi. good inspriatory effort

## 2023-08-27 NOTE — PROGRESS NOTE ADULT - GASTROINTESTINAL COMMENTS
right ex enrique drain site with significant imrpovement in erythema, reduced area involvement. Stoma nearly closed, some crusting and yellow dischage on dressing but no active oozing

## 2023-08-27 NOTE — PROGRESS NOTE ADULT - GASTROINTESTINAL
soft/nontender/normal active bowel sounds/no guarding
normal/soft/nontender/nondistended/normal active bowel sounds
soft/nontender/normal active bowel sounds/no guarding
normal/soft/nontender/nondistended/normal active bowel sounds
soft/nontender
soft/nontender
soft/nontender/no guarding/no rigidity
normal/soft/nontender/nondistended/normal active bowel sounds
soft/nontender
normal/soft/nontender/nondistended/normal active bowel sounds
normal/soft/nontender/nondistended/normal active bowel sounds
soft/nontender/normal active bowel sounds

## 2023-08-27 NOTE — PROGRESS NOTE ADULT - ASSESSMENT
78F with HTN, HLD, recently hospitalized at OSH with acute stroke - with course complicated by acute cholecystitis requiring percutaneous cholecystostomy tube as well as new AF, now in acute rehab. A CT scan was performed today for new abdominal pain, which shows a malpositioned cholecystostomy tube, acute complicated diverticulitis and choledocholithiasis admitted for rehab- pt/ot/dvt ppx    # mild asthma exacerbation  - she reports no hx of asthma however notable wheezing today compared to yesterday  - switch levalbuterol and asmanex inh to duoneb nebulizer q6h ATC for 24 hours  - CXR repeated today and reviewed, overall unremarkable, no lobar infiltrates  - c/w tessalon perles and robitussin  - if wheezing does not improve by tomorrow, add short course of prednisone po taper.    #Malpositioned cholecystostomy tube  #Acute sigmoid diverticulitis with localized perforation  #Pneumoperitoneum due to above  #Choledocholithiasis, likely new (CBD was WNL on prior imaging)  #Cholecystitis  -improved and stable    #cellulitis around enrique  drain site-- Enrique tube drainage Culture Results: Moderate Staphylococcus aureus- ID consulted   - ctap 8/24 reviewed, improved diverticulitis, no abscess. perc enrique drain removed  - antibiotics were transitioned to vancomycin -- will continue through the weekend, please obtain vanco trough prior to 4th dose. ID reccs reviewed regarding continuing doxycyline, which can be restarted possibly early next week if cellulitis improved.  - vanco trough obtained last night at appropriate time and level was 9.2, dose was increased.    # right eye conjunctivitis- erythromycin oint - improving     #Chronic AF  -c/w Toprol  -c/w Diltiazem  -c/w Eliquis    #Lung nodule  -outpatient follow-up  -hx of pulm fibrosis        DVT ppx: Eliquis    discussed with primary team.

## 2023-08-27 NOTE — PROGRESS NOTE ADULT - SUBJECTIVE AND OBJECTIVE BOX
reports ongoing coughing, mainly at night, dry cough, denies SOB    ALLERGIES:  No Known Allergies    MEDICATIONS  (STANDING):  albuterol/ipratropium for Nebulization 3 milliLiter(s) Nebulizer every 6 hours  apixaban 5 milliGRAM(s) Oral every 12 hours  atorvastatin 80 milliGRAM(s) Oral at bedtime  benzonatate 100 milliGRAM(s) Oral three times a day  diltiazem    milliGRAM(s) Oral daily  erythromycin   Ointment 1 Application(s) Both EYES four times a day  melatonin 6 milliGRAM(s) Oral at bedtime  metoprolol succinate  milliGRAM(s) Oral daily  mometasone 220 MICROgram(s) Inhaler 1 Puff(s) Inhalation daily  psyllium Powder 1 Packet(s) Oral daily  saccharomyces boulardii 250 milliGRAM(s) Oral two times a day  vancomycin  IVPB      vancomycin  IVPB 1500 milliGRAM(s) IV Intermittent once    MEDICATIONS  (PRN):  acetaminophen     Tablet .. 650 milliGRAM(s) Oral every 6 hours PRN Temp greater or equal to 38C (100.4F)  acetaminophen     Tablet .. 650 milliGRAM(s) Oral every 6 hours PRN Mild Pain (1 - 3)  aluminum hydroxide/magnesium hydroxide/simethicone Suspension 30 milliLiter(s) Oral every 4 hours PRN Dyspepsia  bisacodyl Suppository 10 milliGRAM(s) Rectal daily PRN Constipation  guaiFENesin Oral Liquid (Sugar-Free) 100 milliGRAM(s) Oral every 6 hours PRN Cough  levalbuterol 45 MICROgram(s) HFA Inhaler 1 Puff(s) Inhalation every 8 hours PRN wheezing    Vital Signs Last 24 Hrs  T(F): 98.7 (27 Aug 2023 09:10), Max: 98.7 (27 Aug 2023 09:10)  HR: 88 (27 Aug 2023 09:10) (73 - 100)  BP: 130/90 (27 Aug 2023 09:10) (125/81 - 142/82)  RR: 20 (27 Aug 2023 09:10) (15 - 20)  SpO2: 95% (27 Aug 2023 09:10) (95% - 97%)  I&O's Summary        PHYSICAL EXAM:  GENERAL: NAD  HEENT: NCAT  CHEST/LUNG: no increased WOB, +wheezing in bilateral lung fields  HEART: Regular rate and rhythm; No murmurs  ABDOMEN: +incision site with dressing C/D/I, betadine applied and no ongoing purulent discharge, erythema appears to be improving; Soft, Nontender, Nondistended; Bowel sounds present  MUSCULOSKELETAL/EXTREMITIES:  2+ Peripheral Pulses, No LE edema  PSYCH: Appropriate affect, Alert & Oriented    LABS:                        12.1   6.82  )-----------( 179      ( 26 Aug 2023 05:19 )             36.8       08-25    141  |  103  |  12  ----------------------------<  98  3.8   |  31  |  0.64    Ca    8.5      25 Aug 2023 05:40    TPro  6.8  /  Alb  2.4  /  TBili  0.3  /  DBili  x   /  AST  27  /  ALT  27  /  AlkPhos  79  08-25                07-26 Chol 130 mg/dL LDL -- HDL 48 mg/dL Trig 74 mg/dL                  Urinalysis Basic - ( 25 Aug 2023 05:40 )    Color: x / Appearance: x / SG: x / pH: x  Gluc: 98 mg/dL / Ketone: x  / Bili: x / Urobili: x   Blood: x / Protein: x / Nitrite: x   Leuk Esterase: x / RBC: x / WBC x   Sq Epi: x / Non Sq Epi: x / Bacteria: x        Culture - Other (collected 22 Aug 2023 01:45)  Source: .Other Culture Shannan tube drainage  Final Report (24 Aug 2023 13:33):    Moderate Methicillin Resistant Staphylococcus aureus  Organism: Methicillin resistant Staphylococcus aureus (24 Aug 2023 13:33)  Organism: Methicillin resistant Staphylococcus aureus (24 Aug 2023 13:33)      Method Type: SVETA      -  Ampicillin/Sulbactam: R <=8/4      -  Cefazolin: R <=4      -  Clindamycin: S 0.5      -  Daptomycin: S 0.5      -  Erythromycin: R >4      -  Gentamicin: S <=1 Should not be used as monotherapy      -  Linezolid: S 2      -  Oxacillin: R >2      -  Penicillin: R >8      -  Rifampin: S <=1 Should not be used as monotherapy      -  Tetracycline: S <=1      -  Trimethoprim/Sulfamethoxazole: S <=0.5/9.5      -  Vancomycin: S 1    Culture - Blood (collected 22 Aug 2023 01:00)  Source: .Blood Blood  Final Report (27 Aug 2023 05:00):    No growth at 5 days    Culture - Blood (collected 22 Aug 2023 01:00)  Source: .Blood Blood  Final Report (27 Aug 2023 05:00):    No growth at 5 days      COVID-19 PCR: NotDetec (08-03-23 @ 21:00)      Care Discussed with Consultants/Other Providers: Yes

## 2023-08-27 NOTE — PROGRESS NOTE ADULT - COMMENTS
Patient remains on isolation for MRSA, on IV vanco. Afebrile, no CP or palpitations, no LE pain. Abdominal pain improving. she is awaitng doppler to evaluate BLE

## 2023-08-27 NOTE — PROGRESS NOTE ADULT - ASSESSMENT
78 year old female with PMH HTN, and HLD who presented to [facility] ED 7/26/23 s/p fall from chair with + head strike. She was found to have new onset AFIB with RVR and Rhabdomyolysis. Her hospital course was complicated by acute cholecystitis and cholelithiasis requiring percutaneous shannan drain via IR on 7/7, Bacteremia, and cerebellar infarct     # right cerebellar CVA with incoordination, imbalance, cognitive impairment  - MRI 8/1: acute right cerebellar infarct. Scattered foci of T2/FLAIR hyperintensity in the bilateral hemispheric white matter  - ASA and Atorvastatin  - continue Comprehensive Rehab Program of PT/OT/SLP  - 3 hours a day, 5 days a week.  - recreation therapy, psychology services   - neurology and PMR f/u  - Precautions: cardiac, AC, fall, AMS, shannan drain, contact isolation    # rhinovirus infection  - CXR 8/21: Elevated right hemidiaphragm again seen. Linear atelectasis versus scar in the right apex and left lower lung. No focal lung consolidation. (official read)  - RVP 8/22 + enterovirus/rhinovirus. Reviewed with patient and daughter  - IS, tessalon perle standing  - robitussin PRN   - occasional wheezing. hospitalist appreciated, switch levalbuterol and asmanex inh to duoneb nebulizer q6h ATC for 24 hours  - CXR 8/27: no lobar infiltrates wet read    # Exit site infection shannan drain  - wound Cx +MRSA 8/24  - WBC 6.75 8/24--> 6.82 8/26  - Continue vanco 1g IV q12 8/26, day #3 8/27. Vanco trough 9.2 8/26  - Repeat A/P CT + oral and IV contrast 8/24: Again malpositioned cholecystostomy catheter is appreciated. Patient is now status post ERCP with placement of biliary stent. Resolution of previously seen sigmoid diverticulitis  - Discussed case with GI and surgery 8/25, greatly appreciated. Shannan drain removed under aseptic technique, tip intact  - wound care; DSD over area daily until closed  - for GI f/u re: cholelithiasis mgt as outpatient  - contact isolation MRSA    #  Cholecystitis   - s/p percutaneous cholecystectomy via IR on 7/7 . Drain removed 8/25  -  A/P CT 8/7: Percutaneous cholecystostomy catheter is malpositioned within the descending colon distal to the cecum. Inflammation adjacent to the colon and appendix,likely reactive. Dilated common bile duct with suspicion of choledocholithiasis in the distal CBD. Cholelithiasis with gallbladder wall thickening and pericholecystic fat stranding. Acute diverticulitis in the mid sigmoid colon with associated localized perforation.  - MRCP 8/9: distended CBD up to 12mm + 2 stones in bile duct w/ borderline central intrahepatic ductal distention, + cholelithiasis decreased inflammation,  - s/p ERCP 8/11 Sphincterotomy performed with stent placement  - levaquin/flagyl. dc, discussed with GI 8/22  - AST  41<H>  /  ALT  26  /  AlkPhos  79  08-24. -->  AST  27  /  ALT  27  /  AlkPhos  79  08-25 stable/improved  - f/u outpatient eval cholecystectomy    # HTN  - Metoprolol XL 100mg daily  - Diltiazem CD 120mg daily    # AFib RVR  - Metoprolol XL 100mg daily  - Eliquis 5 q12hrs . Medication reviewed with patient and daughter. Confirmed covered by insurance with $10 copay 8/22  - PCP /cardiology f/u on dc    # conjunctivitis  - erythromycin ophthalmic ointment QID . Day #5/5 8/27  - resolved    # bibasilar atelectasis noted on MRCP  - incentive spirometry, breathing exercises     # RUL Lung nodule found on CT chest (7/25)  - Incidental finding   - F/U PCP outpatient for repeat CT    # Rhabdomyolysis  - S/p IVF  - CK 5502 7/25--> 109 8/4  - resolved    # GI  - Senna dc due to loose stool 8/16  - metamucil for bulking 8/16  - on florastor secondary to Abx  - toileting program    # /h/o retention.  - continue monitoring bladder scan, SC for PVR >350 ml  - toileting program    # podiatry  - podiatry appreciated, s/p nail debridement    # Sleep  - Melatonin PRN     # Pain Mgmt   - Tylenol PRN    # FEN   - Diet - Regular + Thins  [DASH/TLC]    - Food preferences: no cheese, no yogurt, no fish, no apple juice placed in EMR    #  DVT PPX:  - Eliquis   - Doppler BLE ordered due to asymmetric LE swelling 8/26, pending    # Case discussed in IDT rounds 8/21 (follow up):  - tolerating reg/thin liquids, increased cognition and attention. CG bADLs and functional transfers, min assist/CG stairs with bilateral HR and ambulation with RW  - goals adjusted: supervision iADLs, supervision/CG  bADLs, CG ambulation/stairs, supervision iaDLS and during waking hours  - caregiver training performed  - adjusted target dc home 8/24 with caregiver support 24/7  and home Pt OT SLP  - Pharmacy: [facility]iel. Medications sent 8/22    Daughter in law Bernice: [phone]   Daughter Melissa [phone]    # LABs  doppler BLe  CBC BMP 8/28        Outpatient/Follow-Up:    Alli Pascual  Surgery  [address] [place] [address]  Phone: [phone]  Fax: [phone]  Follow Up Time: 1 week    Alexis Simon  Cardiovascular Disease  [address] [place], [place] [id number]  Phone: [phone]  Fax: [phone]  Follow Up Time: 1 week    BRIAN ALBA  [address] [place] [id number]  Phone: ()-  Fax: ()-

## 2023-08-28 VITALS — OXYGEN SATURATION: 96 %

## 2023-08-28 LAB
ALBUMIN SERPL ELPH-MCNC: 2.6 G/DL — LOW (ref 3.3–5)
ALP SERPL-CCNC: 78 U/L — SIGNIFICANT CHANGE UP (ref 40–120)
ALT FLD-CCNC: 32 U/L — SIGNIFICANT CHANGE UP (ref 10–45)
ANION GAP SERPL CALC-SCNC: 8 MMOL/L — SIGNIFICANT CHANGE UP (ref 5–17)
AST SERPL-CCNC: 31 U/L — SIGNIFICANT CHANGE UP (ref 10–40)
BASOPHILS # BLD AUTO: 0.04 K/UL — SIGNIFICANT CHANGE UP (ref 0–0.2)
BASOPHILS NFR BLD AUTO: 0.6 % — SIGNIFICANT CHANGE UP (ref 0–2)
BILIRUB SERPL-MCNC: 0.4 MG/DL — SIGNIFICANT CHANGE UP (ref 0.2–1.2)
BUN SERPL-MCNC: 13 MG/DL — SIGNIFICANT CHANGE UP (ref 7–23)
CALCIUM SERPL-MCNC: 8.6 MG/DL — SIGNIFICANT CHANGE UP (ref 8.4–10.5)
CHLORIDE SERPL-SCNC: 103 MMOL/L — SIGNIFICANT CHANGE UP (ref 96–108)
CO2 SERPL-SCNC: 30 MMOL/L — SIGNIFICANT CHANGE UP (ref 22–31)
CREAT SERPL-MCNC: 0.75 MG/DL — SIGNIFICANT CHANGE UP (ref 0.5–1.3)
EGFR: 82 ML/MIN/1.73M2 — SIGNIFICANT CHANGE UP
EOSINOPHIL # BLD AUTO: 0.23 K/UL — SIGNIFICANT CHANGE UP (ref 0–0.5)
EOSINOPHIL NFR BLD AUTO: 3.6 % — SIGNIFICANT CHANGE UP (ref 0–6)
GLUCOSE SERPL-MCNC: 144 MG/DL — HIGH (ref 70–99)
HCT VFR BLD CALC: 36.1 % — SIGNIFICANT CHANGE UP (ref 34.5–45)
HGB BLD-MCNC: 11.8 G/DL — SIGNIFICANT CHANGE UP (ref 11.5–15.5)
IMM GRANULOCYTES NFR BLD AUTO: 0.3 % — SIGNIFICANT CHANGE UP (ref 0–0.9)
LYMPHOCYTES # BLD AUTO: 1.25 K/UL — SIGNIFICANT CHANGE UP (ref 1–3.3)
LYMPHOCYTES # BLD AUTO: 19.6 % — SIGNIFICANT CHANGE UP (ref 13–44)
MCHC RBC-ENTMCNC: 30.6 PG — SIGNIFICANT CHANGE UP (ref 27–34)
MCHC RBC-ENTMCNC: 32.7 GM/DL — SIGNIFICANT CHANGE UP (ref 32–36)
MCV RBC AUTO: 93.5 FL — SIGNIFICANT CHANGE UP (ref 80–100)
MONOCYTES # BLD AUTO: 0.51 K/UL — SIGNIFICANT CHANGE UP (ref 0–0.9)
MONOCYTES NFR BLD AUTO: 8 % — SIGNIFICANT CHANGE UP (ref 2–14)
NEUTROPHILS # BLD AUTO: 4.33 K/UL — SIGNIFICANT CHANGE UP (ref 1.8–7.4)
NEUTROPHILS NFR BLD AUTO: 67.9 % — SIGNIFICANT CHANGE UP (ref 43–77)
NRBC # BLD: 0 /100 WBCS — SIGNIFICANT CHANGE UP (ref 0–0)
PLATELET # BLD AUTO: 197 K/UL — SIGNIFICANT CHANGE UP (ref 150–400)
POTASSIUM SERPL-MCNC: 3.6 MMOL/L — SIGNIFICANT CHANGE UP (ref 3.5–5.3)
POTASSIUM SERPL-SCNC: 3.6 MMOL/L — SIGNIFICANT CHANGE UP (ref 3.5–5.3)
PROT SERPL-MCNC: 6.7 G/DL — SIGNIFICANT CHANGE UP (ref 6–8.3)
RBC # BLD: 3.86 M/UL — SIGNIFICANT CHANGE UP (ref 3.8–5.2)
RBC # FLD: 14.5 % — SIGNIFICANT CHANGE UP (ref 10.3–14.5)
SODIUM SERPL-SCNC: 141 MMOL/L — SIGNIFICANT CHANGE UP (ref 135–145)
WBC # BLD: 6.38 K/UL — SIGNIFICANT CHANGE UP (ref 3.8–10.5)
WBC # FLD AUTO: 6.38 K/UL — SIGNIFICANT CHANGE UP (ref 3.8–10.5)

## 2023-08-28 PROCEDURE — 99239 HOSP IP/OBS DSCHRG MGMT >30: CPT

## 2023-08-28 RX ORDER — NYSTATIN CREAM 100000 [USP'U]/G
1 CREAM TOPICAL
Qty: 1 | Refills: 0
Start: 2023-08-28 | End: 2023-09-06

## 2023-08-28 RX ADMIN — Medication 3 MILLILITER(S): at 08:42

## 2023-08-28 RX ADMIN — Medication 100 MILLIGRAM(S): at 05:47

## 2023-08-28 RX ADMIN — Medication 120 MILLIGRAM(S): at 05:47

## 2023-08-28 RX ADMIN — Medication 300 MILLIGRAM(S): at 05:46

## 2023-08-28 RX ADMIN — Medication 3 MILLILITER(S): at 15:11

## 2023-08-28 RX ADMIN — Medication 3 MILLILITER(S): at 03:46

## 2023-08-28 RX ADMIN — Medication 650 MILLIGRAM(S): at 08:20

## 2023-08-28 RX ADMIN — Medication 100 MILLIGRAM(S): at 05:46

## 2023-08-28 RX ADMIN — Medication 650 MILLIGRAM(S): at 08:06

## 2023-08-28 RX ADMIN — NYSTATIN CREAM 1 APPLICATION(S): 100000 CREAM TOPICAL at 05:47

## 2023-08-28 RX ADMIN — Medication 250 MILLIGRAM(S): at 05:47

## 2023-08-28 RX ADMIN — MOMETASONE FUROATE 1 PUFF(S): 220 INHALANT RESPIRATORY (INHALATION) at 08:42

## 2023-08-28 RX ADMIN — Medication 100 MILLIGRAM(S): at 14:05

## 2023-08-28 RX ADMIN — APIXABAN 5 MILLIGRAM(S): 2.5 TABLET, FILM COATED ORAL at 08:47

## 2023-08-28 NOTE — PROGRESS NOTE ADULT - RESPIRATORY
normal/clear to auscultation bilaterally/no wheezes/no rales/no rhonchi
no respiratory distress
normal/clear to auscultation bilaterally/no wheezes/no rales/no rhonchi
no respiratory distress
no respiratory distress
normal/clear to auscultation bilaterally/no wheezes/no rales/no rhonchi
no respiratory distress
normal/clear to auscultation bilaterally/no wheezes/no rales/no rhonchi
normal/clear to auscultation bilaterally/no wheezes/no rales/no rhonchi/no respiratory distress
normal/clear to auscultation bilaterally/no wheezes/no rales/no rhonchi
no respiratory distress
normal/clear to auscultation bilaterally/no wheezes/no rales/no rhonchi
no rales/no rhonchi

## 2023-08-28 NOTE — PROGRESS NOTE ADULT - CONSTITUTIONAL COMMENTS
alert, NAD afebrile O x 3 grossly
alert, reduced complex reasoning and recall, reduced insight. simple yes/no fairly reliable. Afebrile NAD
Patient alert, pleasant. reduced complex reasoning and comprehension, reduced processing speed. O x  3 grossly. Afebrile
alert, mood fair/stable, NAD afebrile
alert, mood stable NAD afebrile
alert, grossly O x 3. Some difficulty with processing more complex information, fair recall events
alert, NAD afebrile, Appears comfortable, stable mood
alert, mood stable NAD Afebrile
alert, , mood fair, Although she is disappointed not to go home, she understands and is agreeable with plan
alert, NAD. Reduced compelx reasoning/ problem solving but grossly O x 3
frustrated but understanding "they've stretched [going home'] out the past week, I'm not getting my hopes up"
alert, NAD Afebrile
alert, NAD afebrile O x 3 but reduced complex reasoning and problem solving. Does remember general details of diagnosis and discussions re: drain and open enrique in future
alert, mildly frustrated with delay in dc but understands (said "I'm going home today" when rounds started)
mood stable with awareness of recommendations for ERCP and diagnosis. Afebrile, NAD, attention improved
alert NAD IO x 3
afebrile, NAD O x 3

## 2023-08-28 NOTE — PROGRESS NOTE ADULT - COMMENTS
Patient remains on contact isolation for MRSA. Afebrile, and still has cough, sl improved along with reduced wheezing after nebulizer Rx. Pain in abdomen has improved. Awaiting ID evaluation to see if can go home on oral Abx

## 2023-08-28 NOTE — PROGRESS NOTE ADULT - SUBJECTIVE AND OBJECTIVE BOX
Patient is a 78y old  Female who presents with a chief complaint of cerebellar CVA (27 Aug 2023 12:01)      HPI:  Karen Carlos is a 78 year old female RH dominant with PMH of HTN, and HLD; who presented to [facility] ED on 7/26/23 s/p fall from chair with + head strike, on the floor for over an hour. ER workup was significant AFIB with RVR and rhabdomyolysis. She was given Cardizem IVP and IVF with improvement HR to the 70s. She was diagnosed with new onset AFIB, seen by cardiology and started on Metoprolol and Diltiazem for rate control and was anticoagulated with Eliquis.  IV fluids were administered for rhabdomyolysis.     RUQ US was significant for acute cholecystitis and cholelithiasis with pericholecystic fluid and gallbladder wall thickening. Her HIDA scan resulted positive and a percutaneous cholecystectomy tube was placed by IR on 7/7.  Her blood cultures resulted positive for Strept Anginosus for which she was started on IV antibiotics.     Her hospital course was complicated by neurological changes/altered mental status. CT head was performed which resulted negative; MRI of the head was significant for a R cerebellar infarct.    PM&R was consulted and deemed her an appropriate candidate for IRF. She was medically stabilized and cleared for discharge to [facility].  (03 Aug 2023 13:49)      PAST MEDICAL & SURGICAL HISTORY:  HTN (hypertension)      Hyperlipidemia      Tinnitus  right ear      Seasonal allergies      S/P knee replacement          MEDICATIONS  (STANDING):  albuterol/ipratropium for Nebulization 3 milliLiter(s) Nebulizer every 6 hours  apixaban 5 milliGRAM(s) Oral every 12 hours  atorvastatin 80 milliGRAM(s) Oral at bedtime  benzonatate 100 milliGRAM(s) Oral three times a day  diltiazem    milliGRAM(s) Oral daily  erythromycin   Ointment 1 Application(s) Both EYES four times a day  melatonin 6 milliGRAM(s) Oral at bedtime  metoprolol succinate  milliGRAM(s) Oral daily  mometasone 220 MICROgram(s) Inhaler 1 Puff(s) Inhalation daily  nystatin Powder 1 Application(s) Topical two times a day  psyllium Powder 1 Packet(s) Oral daily  saccharomyces boulardii 250 milliGRAM(s) Oral two times a day  vancomycin  IVPB      vancomycin  IVPB 1500 milliGRAM(s) IV Intermittent every 12 hours    MEDICATIONS  (PRN):  acetaminophen     Tablet .. 650 milliGRAM(s) Oral every 6 hours PRN Temp greater or equal to 38C (100.4F)  acetaminophen     Tablet .. 650 milliGRAM(s) Oral every 6 hours PRN Mild Pain (1 - 3)  aluminum hydroxide/magnesium hydroxide/simethicone Suspension 30 milliLiter(s) Oral every 4 hours PRN Dyspepsia  bisacodyl Suppository 10 milliGRAM(s) Rectal daily PRN Constipation  guaiFENesin Oral Liquid (Sugar-Free) 100 milliGRAM(s) Oral every 6 hours PRN Cough  levalbuterol 45 MICROgram(s) HFA Inhaler 1 Puff(s) Inhalation every 8 hours PRN wheezing      Allergies    No Known Allergies    Intolerances          VITALS  78y  Vital Signs Last 24 Hrs  T(C): 36.7 (28 Aug 2023 07:30), Max: 36.8 (27 Aug 2023 19:35)  T(F): 98 (28 Aug 2023 07:30), Max: 98.2 (27 Aug 2023 19:35)  HR: 89 (28 Aug 2023 08:48) (84 - 103)  BP: 141/88 (28 Aug 2023 07:30) (141/88 - 146/89)  BP(mean): --  RR: 18 (28 Aug 2023 07:30) (16 - 18)  SpO2: 95% (28 Aug 2023 08:48) (95% - 96%)    Parameters below as of 28 Aug 2023 08:48  Patient On (Oxygen Delivery Method): room air      Daily     Daily         RECENT LABS:                          11.8   6.38  )-----------( 197      ( 28 Aug 2023 06:28 )             36.1     08-28    141  |  103  |  13  ----------------------------<  144<H>  3.6   |  30  |  0.75    Ca    8.6      28 Aug 2023 06:28    TPro  6.7  /  Alb  2.6<L>  /  TBili  0.4  /  DBili  x   /  AST  31  /  ALT  32  /  AlkPhos  78  08-28    LIVER FUNCTIONS - ( 28 Aug 2023 06:28 )  Alb: 2.6 g/dL / Pro: 6.7 g/dL / ALK PHOS: 78 U/L / ALT: 32 U/L / AST: 31 U/L / GGT: x             ACC: [id number] EXAM:  US DPLX LWR EXT VEINS COMPL BI   ORDERED BY:   ANTONETTE ALICEA     PROCEDURE DATE:  08/27/2023          INTERPRETATION:  CLINICAL INFORMATION: Bilateral leg swelling.    COMPARISON: Venous Doppler dated 07/26/2023.    TECHNIQUE: Duplex sonography of the BILATERAL LOWER extremity veins with   color and spectral Doppler, with and without compression.    FINDINGS:    RIGHT:  Normal compressibility of the RIGHT common femoral, femoral and popliteal   veins.  Doppler examination shows normal spontaneous and phasic flow.  No RIGHT calf vein thrombosis is detected.    LEFT:  Normal compressibility of the LEFT common femoral, femoral and popliteal   veins.  Doppler examination shows normal spontaneous and phasic flow.  No LEFT calf vein thrombosis is detected.    IMPRESSION:  No evidence of deep venous thrombosis in either lower extremity.            --- End of Report ---            ANGEILC TRACEY MD; Attending Radiologist  This document has been electronically signed. Aug 27 2023  3:28PM  Urinalysis Basic - ( 28 Aug 2023 06:28 )    Color: x / Appearance: x / SG: x / pH: x  Gluc: 144 mg/dL / Ketone: x  / Bili: x / Urobili: x   Blood: x / Protein: x / Nitrite: x   Leuk Esterase: x / RBC: x / WBC x   Sq Epi: x / Non Sq Epi: x / Bacteria: x          CAPILLARY BLOOD GLUCOSE                   Patient is a 78y old  Female who presents with a chief complaint of cerebellar CVA (27 Aug 2023 12:01)      HPI:  Karen Carlos is a 78 year old female RH dominant with PMH of HTN, and HLD; who presented to [facility] ED on 7/26/23 s/p fall from chair with + head strike, on the floor for over an hour. ER workup was significant AFIB with RVR and rhabdomyolysis. She was given Cardizem IVP and IVF with improvement HR to the 70s. She was diagnosed with new onset AFIB, seen by cardiology and started on Metoprolol and Diltiazem for rate control and was anticoagulated with Eliquis.  IV fluids were administered for rhabdomyolysis.     RUQ US was significant for acute cholecystitis and cholelithiasis with pericholecystic fluid and gallbladder wall thickening. Her HIDA scan resulted positive and a percutaneous cholecystectomy tube was placed by IR on 7/7.  Her blood cultures resulted positive for Strept Anginosus for which she was started on IV antibiotics.     Her hospital course was complicated by neurological changes/altered mental status. CT head was performed which resulted negative; MRI of the head was significant for a R cerebellar infarct.    PM&R was consulted and deemed her an appropriate candidate for IRF. She was medically stabilized and cleared for discharge to [facility].  (03 Aug 2023 13:49)      PAST MEDICAL & SURGICAL HISTORY:  HTN (hypertension)      Hyperlipidemia      Tinnitus  right ear      Seasonal allergies      S/P knee replacement          MEDICATIONS  (STANDING):  albuterol/ipratropium for Nebulization 3 milliLiter(s) Nebulizer every 6 hours  apixaban 5 milliGRAM(s) Oral every 12 hours  atorvastatin 80 milliGRAM(s) Oral at bedtime  benzonatate 100 milliGRAM(s) Oral three times a day  diltiazem    milliGRAM(s) Oral daily  erythromycin   Ointment 1 Application(s) Both EYES four times a day  melatonin 6 milliGRAM(s) Oral at bedtime  metoprolol succinate  milliGRAM(s) Oral daily  mometasone 220 MICROgram(s) Inhaler 1 Puff(s) Inhalation daily  nystatin Powder 1 Application(s) Topical two times a day  psyllium Powder 1 Packet(s) Oral daily  saccharomyces boulardii 250 milliGRAM(s) Oral two times a day  vancomycin  IVPB      vancomycin  IVPB 1500 milliGRAM(s) IV Intermittent every 12 hours    MEDICATIONS  (PRN):  acetaminophen     Tablet .. 650 milliGRAM(s) Oral every 6 hours PRN Temp greater or equal to 38C (100.4F)  acetaminophen     Tablet .. 650 milliGRAM(s) Oral every 6 hours PRN Mild Pain (1 - 3)  aluminum hydroxide/magnesium hydroxide/simethicone Suspension 30 milliLiter(s) Oral every 4 hours PRN Dyspepsia  bisacodyl Suppository 10 milliGRAM(s) Rectal daily PRN Constipation  guaiFENesin Oral Liquid (Sugar-Free) 100 milliGRAM(s) Oral every 6 hours PRN Cough  levalbuterol 45 MICROgram(s) HFA Inhaler 1 Puff(s) Inhalation every 8 hours PRN wheezing      Allergies    No Known Allergies    Intolerances          VITALS  78y  Vital Signs Last 24 Hrs  T(C): 36.7 (28 Aug 2023 07:30), Max: 36.8 (27 Aug 2023 19:35)  T(F): 98 (28 Aug 2023 07:30), Max: 98.2 (27 Aug 2023 19:35)  HR: 89 (28 Aug 2023 08:48) (84 - 103)  BP: 141/88 (28 Aug 2023 07:30) (141/88 - 146/89)  BP(mean): --  RR: 18 (28 Aug 2023 07:30) (16 - 18)  SpO2: 95% (28 Aug 2023 08:48) (95% - 96%)    Parameters below as of 28 Aug 2023 08:48  Patient On (Oxygen Delivery Method): room air      Daily     Daily         RECENT LABS:                          11.8   6.38  )-----------( 197      ( 28 Aug 2023 06:28 )             36.1     08-28    141  |  103  |  13  ----------------------------<  144<H>  3.6   |  30  |  0.75    Ca    8.6      28 Aug 2023 06:28    TPro  6.7  /  Alb  2.6<L>  /  TBili  0.4  /  DBili  x   /  AST  31  /  ALT  32  /  AlkPhos  78  08-28    LIVER FUNCTIONS - ( 28 Aug 2023 06:28 )  Alb: 2.6 g/dL / Pro: 6.7 g/dL / ALK PHOS: 78 U/L / ALT: 32 U/L / AST: 31 U/L / GGT: x             ACC: [id number] EXAM:  US DPLX LWR EXT VEINS COMPL BI   ORDERED BY:   ANTONETTE ALICEA     PROCEDURE DATE:  08/27/2023          INTERPRETATION:  CLINICAL INFORMATION: Bilateral leg swelling.    COMPARISON: Venous Doppler dated 07/26/2023.    TECHNIQUE: Duplex sonography of the BILATERAL LOWER extremity veins with   color and spectral Doppler, with and without compression.    FINDINGS:    RIGHT:  Normal compressibility of the RIGHT common femoral, femoral and popliteal   veins.  Doppler examination shows normal spontaneous and phasic flow.  No RIGHT calf vein thrombosis is detected.    LEFT:  Normal compressibility of the LEFT common femoral, femoral and popliteal   veins.  Doppler examination shows normal spontaneous and phasic flow.  No LEFT calf vein thrombosis is detected.    IMPRESSION:  No evidence of deep venous thrombosis in either lower extremity.            --- End of Report ---            ANGELIC TRACEY MD; Attending Radiologist  This document has been electronically signed. Aug 27 2023  3:28PM  Urinalysis Basic - ( 28 Aug 2023 06:28 )    Color: x / Appearance: x / SG: x / pH: x  Gluc: 144 mg/dL / Ketone: x  / Bili: x / Urobili: x   Blood: x / Protein: x / Nitrite: x   Leuk Esterase: x / RBC: x / WBC x   Sq Epi: x / Non Sq Epi: x / Bacteria: x          CAPILLARY BLOOD GLUCOSE

## 2023-08-28 NOTE — PROGRESS NOTE ADULT - REASON FOR ADMISSION
cerebellar CVA

## 2023-08-28 NOTE — PROGRESS NOTE ADULT - PROVIDER SPECIALTY LIST ADULT
Hospitalist
Infectious Disease
Physiatry
Rehab Medicine
Gastroenterology
Hospitalist
Hospitalist
Infectious Disease
Physiatry
Rehab Medicine
Hospitalist
Physiatry
Rehab Medicine
Rehab Medicine
Surgery
Physiatry
Gastroenterology
Physiatry
Gastroenterology

## 2023-08-28 NOTE — PROGRESS NOTE ADULT - GASTROINTESTINAL COMMENTS
abdominal ex enrique site wound: nearly closed, mild dried drainage on dressing, no active oozing, Erythema significantly improved, less intense, smaller area

## 2023-08-28 NOTE — PROGRESS NOTE ADULT - CARDIOVASCULAR
normal/regular rate and rhythm/S1 S2 present/no gallops/no rub/no murmur
regular rate and rhythm
normal/regular rate and rhythm/S1 S2 present/no gallops/no rub/no murmur
normal/regular rate and rhythm/S1 S2 present/no gallops/no rub/no murmur

## 2023-08-28 NOTE — PROGRESS NOTE ADULT - SUBJECTIVE AND OBJECTIVE BOX
CC: f/u for  infected enrique tube site  Patient reports  she wants to go home  REVIEW OF SYSTEMS:  All other review of systems negative (Comprehensive ROS)    Antimicrobials Day #  :4 iv vanco,  vancomycin  IVPB      vancomycin  IVPB 1500 milliGRAM(s) IV Intermittent every 12 hours    Other Medications Reviewed    T(F): 98 (08-28-23 @ 07:30), Max: 98.2 (08-27-23 @ 19:35)  HR: 89 (08-28-23 @ 08:48)  BP: 141/88 (08-28-23 @ 07:30)  RR: 18 (08-28-23 @ 07:30)  SpO2: 96% (08-28-23 @ 15:35)  Wt(kg): --    PHYSICAL EXAM:  General: alert, no acute distress  Eyes:  anicteric, no conjunctival injection, no discharge  Oropharynx: no lesions or injection 	  Neck: supple, without adenopathy  Lungs: clear to auscultation  Heart: regular rate and rhythm; no murmur, rubs or gallops  Abdomen: soft, nondistended, nontender, without mass or organomegaly. wound is crusty and a little red  Skin: no lesions  Extremities: no clubbing, cyanosis, or edema  Neurologic: alert, oriented, moves all extremities    LAB RESULTS:                        11.8   6.38  )-----------( 197      ( 28 Aug 2023 06:28 )             36.1     08-28    141  |  103  |  13  ----------------------------<  144<H>  3.6   |  30  |  0.75    Ca    8.6      28 Aug 2023 06:28    TPro  6.7  /  Alb  2.6<L>  /  TBili  0.4  /  DBili  x   /  AST  31  /  ALT  32  /  AlkPhos  78  08-28    LIVER FUNCTIONS - ( 28 Aug 2023 06:28 )  Alb: 2.6 g/dL / Pro: 6.7 g/dL / ALK PHOS: 78 U/L / ALT: 32 U/L / AST: 31 U/L / GGT: x           Urinalysis Basic - ( 28 Aug 2023 06:28 )    Color: x / Appearance: x / SG: x / pH: x  Gluc: 144 mg/dL / Ketone: x  / Bili: x / Urobili: x   Blood: x / Protein: x / Nitrite: x   Leuk Esterase: x / RBC: x / WBC x   Sq Epi: x / Non Sq Epi: x / Bacteria: x      MICROBIOLOGY:  RECENT CULTURES:      RADIOLOGY REVIEWED:  < from: CT Abdomen and Pelvis w/ Oral Cont and w/ IV Cont (08.24.23 @ 18:23) >    ACC: [id number] EXAM:  CT ABDOMEN AND PELVIS OC IC   ORDERED BY: SHIMA FLETCHER     PROCEDURE DATE:  08/24/2023          INTERPRETATION:  CLINICAL INFORMATION: Fever. MRSA in the wound culture   around the cholecystostomy drain. Status post ERCP and stentplacement on   8/11/2023    COMPARISON: 8/7/2023 and MRCP of 8/9/2023    CONTRAST/COMPLICATIONS:  IV Contrast: Omnipaque 350  90 cc administered   10 cc discarded  Oral Contrast: Omnipaque 300  Complications: None reported at time of study completion    PROCEDURE:  CT of the Abdomen and Pelvis was performed.  Sagittal and coronal reformats were performed.    FINDINGS:  LOWER CHEST: Coronary artery calcification    LIVER: Within normal limits.  BILE DUCTS: Small amount of air in the intrahepatic biliary tree. Biliary   stent has been placed since the prior study  GALLBLADDER: Cholelithiasis.  SPLEEN: Within normal limits.  PANCREAS: Within normal limits.  ADRENALS: Within normal limits.  KIDNEYS/URETERS: Within normal limits.    BLADDER: Within normal limits.  REPRODUCTIVE ORGANS: Uterus demonstrates posterior bulging contour   suggestive of a fibroid. The adnexa are within normal limits.    BOWEL: No bowel obstruction. Appendix is normal. There is colonic   diverticulosis without diverticulitis aerated a percutaneous catheter   traverses the ascending colon the pigtail is seen in the right peritoneal   cavity.  PERITONEUM: No ascites.  VESSELS: Atherosclerotic changes.  RETROPERITONEUM/LYMPH NODES: No lymphadenopathy.  ABDOMINAL WALL:Small bilateral fat-containing inguinal hernias.  BONES: Degenerative changes.    IMPRESSION:  Again malpositioned cholecystostomy catheter is appreciated  Patient is now status post ERCP with placement of biliary stent.  Resolution of previously seen sigmoid diverticulitis  Fibroid uterus    --- End of Report ---            ANATOLIY HARPER MD; Attending Radiologist    < end of copied text >              Assessment:  77 yo female with a past history of HTN and HLD who was admitted to OSH after a fall.  She was found to have acute cholecystitis, strep anginosis bacteremia, new onset A fib, as well as a cerebellar infarct.  She is s/p placement of Enrique tube, was treated for bacteremia, transferred to [facility] in early August.  She is s/p ERCP for stones in CBD with sphincterotomy and stent placement.  She received a course of levaquin/flagyl for concerns of cholecystitis as well as diverticulitis with enrique tube in bowel.  She had a recent feevr and has an exit site infection with MRSA around her enrique tube.  Blood cultures are negative, wound culture with MRSA, CT scan is without abscess. she now had a few days of vancomycin and the enrique tube has been removed. The wound is healing with now contained infection  Suggest:  can change to po doxycycline 100mg po bid for another 10 days  No ID objection to discharge home

## 2023-08-28 NOTE — PROGRESS NOTE ADULT - ASSESSMENT
78 year old female with PMH HTN, and HLD who presented to [place] ED 7/26/23 s/p fall from chair with + head strike. She was found to have new onset AFIB with RVR and Rhabdomyolysis. Her hospital course was complicated by acute cholecystitis and cholelithiasis requiring percutaneous shannan drain via IR on 7/7, Bacteremia, and cerebellar infarct     # right cerebellar CVA with incoordination, imbalance, cognitive impairment  - MRI 8/1: acute right cerebellar infarct. Scattered foci of T2/FLAIR hyperintensity in the bilateral hemispheric white matter  - ASA and Atorvastatin  - continue Comprehensive Rehab Program of PT/OT/SLP  - 3 hours a day, 5 days a week. For home care referral, VNs, home Pt OT SLP on dc  - neurology and PMR f/u  - Precautions: cardiac, AC, fall, AMS, shannan drain, contact isolation    # Exit site infection shannan drain  - wound Cx +MRSA 8/24  - WBC 6.75 8/24--> 6.82 8/26  - Continue vanco 1g IV q12 day #4 8/28. Vanco trough 9.2 8/26  - Repeat A/P CT + oral and IV contrast 8/24: Again malpositioned cholecystostomy catheter is appreciated. Patient is now status post ERCP with placement of biliary stent. Resolution of previously seen sigmoid diverticulitis  - Surgery: Shannan drain removed under aseptic technique, tip intact  - wound care; DSD over area daily until closed  - for GI f/u re: cholelithiasis mgt as outpatient  - contact isolation MRSA    #  Cholecystitis   - s/p percutaneous cholecystectomy via IR on 7/7 . Drain removed 8/25  -  A/P CT 8/7: Percutaneous cholecystostomy catheter is malpositioned within the descending colon distal to the cecum. Inflammation adjacent to the colon and appendix,likely reactive. Dilated common bile duct with suspicion of choledocholithiasis in the distal CBD. Cholelithiasis with gallbladder wall thickening and pericholecystic fat stranding. Acute diverticulitis in the mid sigmoid colon with associated localized perforation.  - MRCP 8/9: distended CBD up to 12mm + 2 stones in bile duct w/ borderline central intrahepatic ductal distention, + cholelithiasis decreased inflammation,  - s/p ERCP 8/11 Sphincterotomy performed with stent placement  - completed levaquin/flagyl  -   AST  31  /  ALT  32  /  AlkPhos  78  08-28  - f/u outpatient eval cholecystectomy    # rhinovirus infection  - CXR 8/21: Linear atelectasis versus scar in the right apex and left lower lung. No focal lung consolidation.  - CXR 8/27: no infiltrates  - RVP 8/22 + enterovirus/rhinovirus  - IS, tessalon perle, robitussin PRN   - s/p duoneb nebulizer q6h ATC for 24 hours    # RUL Lung nodule found on CT chest (7/25)  - Incidental finding   - F/U PCP outpatient for repeat CT    # HTN  - Metoprolol XL 100mg daily  - Diltiazem CD 120mg daily  - BP  (141/88 - 146/89) 8/28    # AFib RVR  - Metoprolol XL 100mg daily  - Eliquis 5 q12hrs . Medication reviewed with patient and daughter. Confirmed covered by insurance with $10 copay 8/22  - PCP /cardiology f/u on dc    # conjunctivitis  - completed erythromycin ophthalmic ointment QID x 5 days  - resolved    # Rhabdomyolysis  - S/p IVF  - CK 5502 7/25--> 109 8/4  - resolved    # GI  - Senna dc due to loose stool 8/16  - metamucil for bulking 8/16  - on florastor secondary to Abx    # /h/o retention.  - voiding well  - toileting program    # podiatry  - podiatry appreciated, s/p nail debridement    # Sleep  - Melatonin PRN     # Pain Mgmt   - Tylenol PRN    # FEN   - Diet - Regular + Thins  [DASH/TLC]    - Food preferences: no cheese, no yogurt, no fish, no apple juice placed in EMR    #  DVT PPX:  - Eliquis   - Doppler BLE 8/27: No evidence of deep venous thrombosis in either lower extremity.    # Case discussed in IDT rounds 8/28:  -      - goals adjusted: supervision iADLs, supervision/CG  bADLs, CG ambulation/stairs, supervision iaDLS and during waking hours  - caregiver training performed  - adjusted target dc home 8/24 with caregiver support 24/7  and home Pt OT SLP  - Pharmacy: [facility]. Medications sent 8/22    Daughter in law Bernice: [phone]   Daughter Melissa [phone]    # LABs  doppler BLe  CBC BMP 8/28        Outpatient/Follow-Up:    Alli Pascual  Surgery  [address] [place] [address]  Phone: [phone]  Fax: [phone]  Follow Up Time: 1 week    Alexis Simon  Cardiovascular Disease  [address] [place], [place] [id number]  Phone: [phone]  Fax: [phone]  Follow Up Time: 1 week    BRIAN ALBA  [address] [place] [address]  Phone: ()-  Fax: ()-     78 year old female with PMH HTN, and HLD who presented to [place] ED 7/26/23 s/p fall from chair with + head strike. She was found to have new onset AFIB with RVR and Rhabdomyolysis. Her hospital course was complicated by acute cholecystitis and cholelithiasis requiring percutaneous shannan drain via IR on 7/7, Bacteremia, and cerebellar infarct     # right cerebellar CVA with incoordination, imbalance, cognitive impairment  - MRI 8/1: acute right cerebellar infarct. Scattered foci of T2/FLAIR hyperintensity in the bilateral hemispheric white matter  - ASA and Atorvastatin  - continue Comprehensive Rehab Program of PT/OT/SLP  - 3 hours a day, 5 days a week. For home care referral, VNs, home Pt OT SLP on dc  - neurology and PMR f/u  - Precautions: cardiac, AC, fall, AMS, shannan drain, contact isolation    # Exit site infection shannan drain  - wound Cx +MRSA 8/24  - Repeat A/P CT + oral and IV contrast 8/24: Again malpositioned cholecystostomy catheter is appreciated. Patient is now status post ERCP with placement of biliary stent. Resolution of previously seen sigmoid diverticulitis  - Surgery: Shannan drain removed under aseptic technique, tip intact  - WBC 6.75 8/24--> 6.82 8/26--> 6.38 8/28  - On vanco 1g IV q12 day #4 8/28. Vanco trough 9.2 8/26. ID pending re: Abx recs in preparation for dc, to see this afternoon  - wound care; DSD over area daily until closed  - for GI f/u re: cholelithiasis mgt as outpatient  - contact isolation MRSA    #  Cholecystitis   - s/p percutaneous cholecystectomy via IR on 7/7 . Drain removed 8/25  -  A/P CT 8/7: Percutaneous cholecystostomy catheter is malpositioned within the descending colon distal to the cecum. Inflammation adjacent to the colon and appendix,likely reactive. Dilated common bile duct with suspicion of choledocholithiasis in the distal CBD. Cholelithiasis with gallbladder wall thickening and pericholecystic fat stranding. Acute diverticulitis in the mid sigmoid colon with associated localized perforation.  - MRCP 8/9: distended CBD up to 12mm + 2 stones in bile duct w/ borderline central intrahepatic ductal distention, + cholelithiasis decreased inflammation,  - s/p ERCP 8/11 Sphincterotomy performed with stent placement  - completed levaquin/flagyl  -   AST  31  /  ALT  32  /  AlkPhos  78  08-28  - f/u outpatient eval cholecystectomy    # rhinovirus infection  - CXR 8/21: Linear atelectasis versus scar in the right apex and left lower lung. No focal lung consolidation.  - CXR 8/27: no infiltrates  - RVP 8/22 + enterovirus/rhinovirus  - IS, tessalon perle, robitussin PRN   - s/p duoneb nebulizer q6h ATC for 24 hours    # RUL Lung nodule found on CT chest (7/25)  - Incidental finding   - F/U PCP outpatient for repeat CT    # HTN  - Metoprolol XL 100mg daily  - Diltiazem CD 120mg daily  - BP  (141/88 - 146/89) 8/28    # AFib RVR  - Metoprolol XL 100mg daily  - Eliquis 5 q12hrs . Medication reviewed with patient and daughter. Confirmed covered by insurance with $10 copay 8/22  - PCP /cardiology f/u on dc    # conjunctivitis  - completed erythromycin ophthalmic ointment QID x 5 days  - resolved    # Rhabdomyolysis  - S/p IVF  - CK 5502 7/25--> 109 8/4  - resolved    # GI  - Senna dc due to loose stool 8/16  - metamucil for bulking 8/16  - on florastor secondary to Abx    # /h/o retention.  - voiding well  - toileting program    # podiatry  - podiatry appreciated, s/p nail debridement    # Sleep  - Melatonin PRN     # Pain Mgmt   - Tylenol PRN    # FEN   - Diet - Regular + Thins  [DASH/TLC]    - Food preferences: no cheese, no yogurt, no fish, no apple juice placed in EMR    #  DVT PPX:  - Eliquis   - Doppler BLE 8/27: No evidence of deep venous thrombosis in either lower extremity.    # Case discussed in IDT rounds 8/28:  - Eating, grooming, hygiene mod independent, CG transfers, supervision toileting, dressing with set up. CG ambulation, mild-mod cognitive deficits  - will need supervision iADLs on dc  - target: possible dc home today 8/28 if cleared by ID. Home care referral for Ot PT SLP, caregiver assist (caregiver training has been perofrmed)  - [facility] [place]. Medications sent 8/22    Daughter in law Bernice: [phone]   Daughter Melissa [phone]    # LABs  doppler BLe  CBC BMP 8/28        Outpatient/Follow-Up:    Alli Pascual  Surgery  [address] [place] [address]  Phone: [phone]  Fax: [phone]  Follow Up Time: 1 week    Alexis Simon  Cardiovascular Disease  [address] [place], [place] [id number]  Phone: [phone]  Fax: [phone]  Follow Up Time: 1 week    BRIAN ALBA  [address] [place] [address]  Phone: ()-  Fax: ()-     78 year old female with PMH HTN, and HLD who presented to [place] ED 7/26/23 s/p fall from chair with + head strike. She was found to have new onset AFIB with RVR and Rhabdomyolysis. Her hospital course was complicated by acute cholecystitis and cholelithiasis requiring percutaneous shannan drain via IR on 7/7, Bacteremia, and cerebellar infarct     # right cerebellar CVA with incoordination, imbalance, cognitive impairment  - MRI 8/1: acute right cerebellar infarct. Scattered foci of T2/FLAIR hyperintensity in the bilateral hemispheric white matter  - ASA and Atorvastatin  - continue Comprehensive Rehab Program of PT/OT/SLP  - 3 hours a day, 5 days a week. For home care referral, VNs, home Pt OT SLP on dc  - neurology and PMR f/u  - Precautions: cardiac, AC, fall, AMS, shannan drain, contact isolation    # Exit site infection shannan drain  - wound Cx +MRSA 8/24  - Repeat A/P CT + oral and IV contrast 8/24: Again malpositioned cholecystostomy catheter is appreciated. Patient is now status post ERCP with placement of biliary stent. Resolution of previously seen sigmoid diverticulitis  - Surgery: Shannan drain removed under aseptic technique, tip intact  - WBC 6.75 8/24--> 6.82 8/26--> 6.38 8/28  - On vanco 1g IV q12 day #4 8/28. Vanco trough 9.2 8/26. ID pending re: Abx recs in preparation for dc, to see this afternoon  - wound care; DSD over area daily until closed  - for GI f/u re: cholelithiasis mgt as outpatient  - contact isolation MRSA    #  Cholecystitis   - s/p percutaneous cholecystectomy via IR on 7/7 . Drain removed 8/25  -  A/P CT 8/7: Percutaneous cholecystostomy catheter is malpositioned within the descending colon distal to the cecum. Inflammation adjacent to the colon and appendix,likely reactive. Dilated common bile duct with suspicion of choledocholithiasis in the distal CBD. Cholelithiasis with gallbladder wall thickening and pericholecystic fat stranding. Acute diverticulitis in the mid sigmoid colon with associated localized perforation.  - MRCP 8/9: distended CBD up to 12mm + 2 stones in bile duct w/ borderline central intrahepatic ductal distention, + cholelithiasis decreased inflammation,  - s/p ERCP 8/11 Sphincterotomy performed with stent placement  - completed levaquin/flagyl  -   AST  31  /  ALT  32  /  AlkPhos  78  08-28  - f/u outpatient eval cholecystectomy    # rhinovirus infection  - CXR 8/21: Linear atelectasis versus scar in the right apex and left lower lung. No focal lung consolidation.  - CXR 8/27: no infiltrates  - RVP 8/22 + enterovirus/rhinovirus  - IS, tessalon perle, robitussin PRN   - s/p duoneb nebulizer q6h ATC for 24 hours    # RUL Lung nodule found on CT chest (7/25)  - Incidental finding   - F/U PCP outpatient for repeat CT    # HTN  - Metoprolol XL 100mg daily  - Diltiazem CD 120mg daily  - BP  (141/88 - 146/89) 8/28    # AFib RVR  - Metoprolol XL 100mg daily  - Eliquis 5 q12hrs . Medication reviewed with patient and daughter. Confirmed covered by insurance with $10 copay 8/22  - PCP /cardiology f/u on dc    # conjunctivitis  - completed erythromycin ophthalmic ointment QID x 5 days  - resolved    # Rhabdomyolysis  - S/p IVF  - CK 5502 7/25--> 109 8/4  - resolved    # GI  - Senna dc due to loose stool 8/16  - metamucil for bulking 8/16  - on florastor secondary to Abx    # /h/o retention.  - voiding well  - toileting program    # podiatry  - podiatry appreciated, s/p nail debridement    # Sleep  - Melatonin PRN     # Pain Mgmt   - Tylenol PRN    # FEN   - Diet - Regular + Thins  [DASH/TLC]    - Food preferences: no cheese, no yogurt, no fish, no apple juice placed in EMR    #  DVT PPX:  - Eliquis   - Doppler BLE 8/27: No evidence of deep venous thrombosis in either lower extremity.    # Case discussed in IDT rounds 8/28:  - Eating, grooming, hygiene mod independent, CG transfers, supervision toileting, dressing with set up. CG ambulation, mild-mod cognitive deficits  - will need supervision iADLs on dc  - target: possible dc home today 8/28 if cleared by ID. Home care referral for Ot PT SLP, caregiver assist (caregiver training has been perofrmed)  - [facility] Medications sent 8/22    Daughter in law Bernice: [phone]   Daughter Melissa [phone]    addendum 4:40 PM  ID greatly appreciated. Recommends doxycycline 100 mg q12 x 10 more days, but is cleared from ID perspective for dc. ID discussed with patient and daughter; I also called family to review recommendations for Abx change and dressing recs. Daughter is calling private hire home care for services tonight and she and patient are agreeable to/desirous of dc home tongiht as well. Medications sent to pharmacy, rehab team notified      Outpatient/Follow-Up:    Alli Pascual  Surgery  [address] [place] [address]  Phone: [phone]  Fax: [phone]  Follow Up Time: 1 week    Alexis Simon  Cardiovascular Disease  [address] [place], [place] [id number]  Phone: [phone]  Fax: [phone]  Follow Up Time: 1 week    BRIAN ALBA  [address] [place] [address]  Phone: ()-  Fax: ()-     78 year old female with PMH HTN, and HLD who presented to [place] ED 7/26/23 s/p fall from chair with + head strike. She was found to have new onset AFIB with RVR and Rhabdomyolysis. Her hospital course was complicated by acute cholecystitis and cholelithiasis requiring percutaneous shannan drain via IR on 7/7, Bacteremia, and cerebellar infarct     # right cerebellar CVA with incoordination, imbalance, cognitive impairment  - MRI 8/1: acute right cerebellar infarct. Scattered foci of T2/FLAIR hyperintensity in the bilateral hemispheric white matter  - ASA and Atorvastatin  - continue Comprehensive Rehab Program of PT/OT/SLP  - 3 hours a day, 5 days a week. For home care referral, VNs, home Pt OT SLP on dc  - neurology and PMR f/u  - Precautions: cardiac, AC, fall, AMS, shannan drain, contact isolation    # Exit site infection shannan drain  - wound Cx +MRSA 8/24  - Repeat A/P CT + oral and IV contrast 8/24: Again malpositioned cholecystostomy catheter is appreciated. Patient is now status post ERCP with placement of biliary stent. Resolution of previously seen sigmoid diverticulitis  - Surgery: Shannan drain removed under aseptic technique, tip intact  - WBC 6.75 8/24--> 6.82 8/26--> 6.38 8/28  - On vanco 1g IV q12 day #4 8/28. Vanco trough 9.2 8/26. ID pending re: Abx recs in preparation for dc, to see this afternoon  - wound care; DSD over area daily until closed  - for GI f/u re: cholelithiasis mgt as outpatient  - contact isolation MRSA    #  Cholecystitis   - s/p percutaneous cholecystectomy via IR on 7/7 . Drain removed 8/25  -  A/P CT 8/7: Percutaneous cholecystostomy catheter is malpositioned within the descending colon distal to the cecum. Inflammation adjacent to the colon and appendix,likely reactive. Dilated common bile duct with suspicion of choledocholithiasis in the distal CBD. Cholelithiasis with gallbladder wall thickening and pericholecystic fat stranding. Acute diverticulitis in the mid sigmoid colon with associated localized perforation.  - MRCP 8/9: distended CBD up to 12mm + 2 stones in bile duct w/ borderline central intrahepatic ductal distention, + cholelithiasis decreased inflammation,  - s/p ERCP 8/11 Sphincterotomy performed with stent placement  - completed levaquin/flagyl  -   AST  31  /  ALT  32  /  AlkPhos  78  08-28  - f/u outpatient eval cholecystectomy    # rhinovirus infection  - CXR 8/21: Linear atelectasis versus scar in the right apex and left lower lung. No focal lung consolidation.  - CXR 8/27: no infiltrates  - RVP 8/22 + enterovirus/rhinovirus  - IS, tessalon perle, robitussin PRN   - s/p duoneb nebulizer q6h ATC for 24 hours    # RUL Lung nodule found on CT chest (7/25)  - Incidental finding   - F/U PCP outpatient for repeat CT    # HTN  - Metoprolol XL 100mg daily  - Diltiazem CD 120mg daily  - BP  (141/88 - 146/89) 8/28    # AFib RVR  - Metoprolol XL 100mg daily  - Eliquis 5 q12hrs . Medication reviewed with patient and daughter. Confirmed covered by insurance with $10 copay 8/22  - PCP /cardiology f/u on dc    # conjunctivitis  - completed erythromycin ophthalmic ointment QID x 5 days  - resolved    # Rhabdomyolysis  - S/p IVF  - CK 5502 7/25--> 109 8/4  - resolved    # GI  - Senna dc due to loose stool 8/16  - metamucil for bulking 8/16  - on florastor secondary to Abx    # /h/o retention.  - voiding well  - toileting program    # podiatry  - podiatry appreciated, s/p nail debridement    # Sleep  - Melatonin PRN     # Pain Mgmt   - Tylenol PRN    # FEN   - Diet - Regular + Thins  [DASH/TLC]    - Food preferences: no cheese, no yogurt, no fish, no apple juice placed in EMR    #  DVT PPX:  - Eliquis   - Doppler BLE 8/27: No evidence of deep venous thrombosis in either lower extremity.    # Case discussed in IDT rounds 8/28:  - Eating, grooming, hygiene mod independent, CG transfers, supervision toileting, dressing with set up. CG ambulation, mild-mod cognitive deficits  - will need supervision iADLs on dc  - target: possible dc home today 8/28 if cleared by ID. Home care referral for Ot PT SLP, caregiver assist (caregiver training has been perofrmed)  - [facility] [place]. Medications sent 8/22    Daughter in law Bernice: [phone]   Daughter Melissa [phone]    addendum 4:40 PM  ID greatly appreciated. Recommends doxycycline 100 mg q12 x 10 more days, but is cleared from ID perspective for dc. ID discussed with patient and daughter; I also called family to review recommendations for Abx change and dressing recs. Daughter is calling private hire home care for services tonight and she and patient are agreeable to/desirous of dc home tongiht as well. Medications sent to pharmacy, rehab team notified  -time spent for evaluation, management, communication, coordination, education and dc 60 min    Outpatient/Follow-Up:    Alli Pascual  Surgery  [address] [place] [address]  Phone: [phone]  Fax: [phone]  Follow Up Time: 1 week    Alexis Simon  Cardiovascular Disease  [address] [place], [place] [id number]  Phone: [phone]  Fax: [phone]  Follow Up Time: 1 week    BRIAN ALBA  [address] [place] [address]  Phone: ()-  Fax: ()-

## 2023-08-28 NOTE — PROGRESS NOTE ADULT - MOTOR
no calf swelling +soft no TTP  right UR 4+/5   LUE 5/5  right HF 5-/5 quad 5/5 ham 5-/5 ankle PF 5/5  left HF 5-/5 quad 5/5 ankle PF and DF 5/5.
RLE 1+ edema, left LE trace edema, pitting bilaterally  no erytheam or warmth  no calf TTP
bilateral calves: trace pretibial edema, no erytheam or warmth  calves soft no TTP no cord palpable  no TTP    right gross grasp 4+/5 but reduced GMC and FMC
bilateral calves soft no tTP  no erythema or warmth no pedal edema
bilateral calves soft no TTP  no erythema or warmth
no calf swelling or TTP today  no erythema or warmth
bilateral calves soft no TTP  no erythema or warmth  +soft
no calf swelling +soft no tTP bilaterally  no pedal edema
trace non pitting edema right LE no erythema or warmth  no TTP  calves soft bilaterally
bilateral calves soft no TTP  no erythema or warmth
no calf swelling +soft no TTP
+BLE trace swelling left and mild pitting edema right  no erythema or warmth no TTP  no UE swelling
bilateral calves soft no TTP  reduced swelling, no erythema or warmth
bilateral calves soft no tTP
bilateral calves soft no tTP
no calf swelling +soft no TTP  no erythema or warmth
no pitting edema BLE  calves soft no TTP

## 2023-09-05 ENCOUNTER — APPOINTMENT (OUTPATIENT)
Dept: SURGERY | Facility: CLINIC | Age: 78
End: 2023-09-05
Payer: MEDICARE

## 2023-09-05 VITALS — HEART RATE: 80 BPM | SYSTOLIC BLOOD PRESSURE: 163 MMHG | OXYGEN SATURATION: 98 % | DIASTOLIC BLOOD PRESSURE: 98 MMHG

## 2023-09-05 DIAGNOSIS — Z98.890 OTHER SPECIFIED POSTPROCEDURAL STATES: ICD-10-CM

## 2023-09-05 DIAGNOSIS — Z86.73 PERSONAL HISTORY OF TRANSIENT ISCHEMIC ATTACK (TIA), AND CEREBRAL INFARCTION W/OUT RESIDUAL DEFICITS: ICD-10-CM

## 2023-09-05 DIAGNOSIS — K80.01 CALCULUS OF GALLBLADDER WITH ACUTE CHOLECYSTITIS WITH OBSTRUCTION: ICD-10-CM

## 2023-09-05 DIAGNOSIS — K80.20 CALCULUS OF GALLBLADDER W/OUT CHOLECYSTITIS W/OUT OBSTRUCTION: ICD-10-CM

## 2023-09-05 PROCEDURE — 99214 OFFICE O/P EST MOD 30 MIN: CPT

## 2023-09-05 NOTE — HISTORY OF PRESENT ILLNESS
[de-identified] : Recent hospitalization at [facility] in late July with subsequent transfer to rehab in [place].  Patient presented status post fall and imaging demonstrated a distended gallbladder with stones wall thickening and pericholecystic fluid suspicious for cholecystitis.  The patient underwent a HIDA scan which demonstrated absence of the gallbladder consistent with acute cholecystitis and having experienced a new CVA the patient underwent percutaneous cholecystostomy.  Returns today accompanied by her son and daughter to discuss definitive management.  While at rehab she developed an infection around the cholecystostomy catheter.  Imaging demonstrated dislodgment of the catheter from the gallbladder.  Patient states this was removed by the surgeon at [facility].  She was also diagnosed at that time as having a choledocholithiasis and underwent biliary stenting as well with Dr. Scottie Leach.

## 2023-09-05 NOTE — PHYSICAL EXAM
[Normal Breath Sounds] : Normal breath sounds [Normal Heart Sounds] : normal heart sounds [Normal Rate and Rhythm] : normal rate and rhythm [No Rash or Lesion] : No rash or lesion [Alert] : alert [Oriented to Person] : oriented to person [Oriented to Place] : oriented to place [Oriented to Time] : oriented to time [Calm] : calm [de-identified] : Elderly woman in no acute distress [de-identified] : NCAT, PERRLA, EOMI.  No scleral icterus or jaundice.  Hard of hearing. [de-identified] : Soft, nontender nondistended, positive bowel sounds in all four quads.  No hernia or masses. No rebound or guarding.  Small puncture wound along the right flank consistent with having recently undergone removal of the cholecystostomy.  No purulence or bilious drainage. [de-identified] : Ambulating with a rolling walker

## 2023-09-05 NOTE — ASSESSMENT
[FreeTextEntry1] : 78-year-old woman presenting for follow-up evaluation and discussion of definitive management of gallstones.  As mentioned in the HPI the patient had undergone cholecystostomy drainage while at [facility] but was subsequently found to have a dislodged cholecystostomy tube during work-up while at [facility].  Cholecystostomy tube was removed by the surgeons at [facility].  The patient underwent ERCP with biliary stenting for choledocholithiasis as well.  Presents to the office today completely asymptomatic, tolerating regular diet.  I discussed with the patient and family elective cholecystectomy however given the recent CVA medical, cardiac and neurologic clearances would need to be obtained.  The patient and family would like to avoid surgery altogether.  I discussed with the patient and family the risks of potential recurrent cholecystitis which may necessitate either replacement of the drain or more urgent surgical intervention.  Signs and symptoms of cholecystitis have been discussed.  Should the patient develop fever, chills, nauseousness, vomiting worsening right upper quadrant pain or jaundice she should present to her nearest emergency room for surgical evaluation.  With respect to the history of choledocholithiasis and biliary stenting, the patient should certainly Follow-up with GI for stent removal.  To further assess the condition of her gallbladder at this time,HIDA scan to evaluate biliary function to rule out acutely obstructed gallbladder.  Patient will follow-up with me upon completion of the HIDA scan

## 2023-09-20 ENCOUNTER — TRANSCRIPTION ENCOUNTER (OUTPATIENT)
Age: 78
End: 2023-09-20

## 2023-09-21 ENCOUNTER — APPOINTMENT (OUTPATIENT)
Dept: NUCLEAR MEDICINE | Facility: CLINIC | Age: 78
End: 2023-09-21

## 2023-09-21 ENCOUNTER — OUTPATIENT (OUTPATIENT)
Dept: OUTPATIENT SERVICES | Facility: HOSPITAL | Age: 78
LOS: 1 days | End: 2023-09-21
Payer: MEDICARE

## 2023-09-21 ENCOUNTER — OUTPATIENT (OUTPATIENT)
Dept: OUTPATIENT SERVICES | Facility: HOSPITAL | Age: 78
LOS: 1 days | End: 2023-09-21

## 2023-09-21 DIAGNOSIS — K80.20 CALCULUS OF GALLBLADDER WITHOUT CHOLECYSTITIS WITHOUT OBSTRUCTION: ICD-10-CM

## 2023-09-21 DIAGNOSIS — Z96.659 PRESENCE OF UNSPECIFIED ARTIFICIAL KNEE JOINT: Chronic | ICD-10-CM

## 2023-09-21 DIAGNOSIS — K80.50 CALCULUS OF BILE DUCT WITHOUT CHOLANGITIS OR CHOLECYSTITIS WITHOUT OBSTRUCTION: ICD-10-CM

## 2023-09-21 PROCEDURE — 78226 HEPATOBILIARY SYSTEM IMAGING: CPT | Mod: MH

## 2023-09-21 PROCEDURE — A9537: CPT

## 2023-09-21 PROCEDURE — 78226 HEPATOBILIARY SYSTEM IMAGING: CPT | Mod: 26,MH

## 2023-09-29 ENCOUNTER — APPOINTMENT (OUTPATIENT)
Dept: GASTROENTEROLOGY | Facility: CLINIC | Age: 78
End: 2023-09-29
Payer: MEDICARE

## 2023-09-29 VITALS
RESPIRATION RATE: 16 BRPM | SYSTOLIC BLOOD PRESSURE: 138 MMHG | HEIGHT: 62 IN | OXYGEN SATURATION: 98 % | WEIGHT: 171 LBS | BODY MASS INDEX: 31.47 KG/M2 | TEMPERATURE: 98 F | HEART RATE: 79 BPM | DIASTOLIC BLOOD PRESSURE: 86 MMHG

## 2023-09-29 DIAGNOSIS — K80.50 CALCULUS OF BILE DUCT W/OUT CHOLANGITIS OR CHOLECYSTITIS W/OUT OBSTRUCTION: ICD-10-CM

## 2023-09-29 DIAGNOSIS — Z78.9 OTHER SPECIFIED HEALTH STATUS: ICD-10-CM

## 2023-09-29 PROCEDURE — 99215 OFFICE O/P EST HI 40 MIN: CPT

## 2023-09-29 RX ORDER — ATORVASTATIN CALCIUM 80 MG/1
80 TABLET, FILM COATED ORAL
Refills: 0 | Status: ACTIVE | COMMUNITY

## 2023-09-29 RX ORDER — DILTIAZEM HYDROCHLORIDE 120 MG/1
120 TABLET ORAL
Refills: 0 | Status: ACTIVE | COMMUNITY

## 2023-09-29 RX ORDER — APIXABAN 5 MG/1
5 TABLET, FILM COATED ORAL
Refills: 0 | Status: ACTIVE | COMMUNITY

## 2023-09-29 RX ORDER — METOPROLOL TARTRATE 75 MG/1
TABLET, FILM COATED ORAL
Refills: 0 | Status: ACTIVE | COMMUNITY

## 2023-10-02 ENCOUNTER — APPOINTMENT (OUTPATIENT)
Dept: GASTROENTEROLOGY | Facility: HOSPITAL | Age: 78
End: 2023-10-02

## 2023-10-02 ENCOUNTER — OUTPATIENT (OUTPATIENT)
Dept: OUTPATIENT SERVICES | Facility: HOSPITAL | Age: 78
LOS: 1 days | End: 2023-10-02
Payer: MEDICARE

## 2023-10-02 ENCOUNTER — TRANSCRIPTION ENCOUNTER (OUTPATIENT)
Age: 78
End: 2023-10-02

## 2023-10-02 DIAGNOSIS — Z96.659 PRESENCE OF UNSPECIFIED ARTIFICIAL KNEE JOINT: Chronic | ICD-10-CM

## 2023-10-02 DIAGNOSIS — K80.50 CALCULUS OF BILE DUCT WITHOUT CHOLANGITIS OR CHOLECYSTITIS WITHOUT OBSTRUCTION: ICD-10-CM

## 2023-10-02 PROCEDURE — 43264 ERCP REMOVE DUCT CALCULI: CPT

## 2023-10-02 PROCEDURE — C1773: CPT

## 2023-10-02 PROCEDURE — 74330 X-RAY BILE/PANC ENDOSCOPY: CPT

## 2023-10-02 PROCEDURE — C9399: CPT

## 2023-10-02 PROCEDURE — C1748: CPT

## 2023-10-02 DEVICE — DUODENOSCOPE EXALT MODEL D SINGLE USE: Type: IMPLANTABLE DEVICE | Status: FUNCTIONAL

## 2023-10-02 DEVICE — HYDRATOME 44: Type: IMPLANTABLE DEVICE | Status: FUNCTIONAL

## 2023-10-02 DEVICE — CATH BLLN BIL RX 9-12CM: Type: IMPLANTABLE DEVICE | Status: FUNCTIONAL

## 2023-10-02 RX ORDER — SODIUM CHLORIDE 9 MG/ML
500 INJECTION INTRAMUSCULAR; INTRAVENOUS; SUBCUTANEOUS
Refills: 0 | Status: COMPLETED | OUTPATIENT
Start: 2023-10-02 | End: 2023-10-02

## 2023-10-02 RX ORDER — INDOMETHACIN 50 MG
100 CAPSULE ORAL ONCE
Refills: 0 | Status: COMPLETED | OUTPATIENT
Start: 2023-10-02 | End: 2023-10-02

## 2023-10-02 RX ADMIN — Medication 100 MILLIGRAM(S): at 15:00

## 2023-10-02 RX ADMIN — Medication 100 MILLIGRAM(S): at 14:00

## 2023-10-02 RX ADMIN — SODIUM CHLORIDE 75 MILLILITER(S): 9 INJECTION INTRAMUSCULAR; INTRAVENOUS; SUBCUTANEOUS at 13:14

## 2023-10-19 PROCEDURE — 87040 BLOOD CULTURE FOR BACTERIA: CPT

## 2023-10-19 PROCEDURE — 80053 COMPREHEN METABOLIC PANEL: CPT

## 2023-10-19 PROCEDURE — A9579: CPT

## 2023-10-19 PROCEDURE — 80076 HEPATIC FUNCTION PANEL: CPT

## 2023-10-19 PROCEDURE — 87070 CULTURE OTHR SPECIMN AEROBIC: CPT

## 2023-10-19 PROCEDURE — 87635 SARS-COV-2 COVID-19 AMP PRB: CPT

## 2023-10-19 PROCEDURE — 92610 EVALUATE SWALLOWING FUNCTION: CPT

## 2023-10-19 PROCEDURE — 97110 THERAPEUTIC EXERCISES: CPT

## 2023-10-19 PROCEDURE — C9399: CPT

## 2023-10-19 PROCEDURE — 87186 SC STD MICRODIL/AGAR DIL: CPT

## 2023-10-19 PROCEDURE — 97535 SELF CARE MNGMENT TRAINING: CPT

## 2023-10-19 PROCEDURE — 0225U NFCT DS DNA&RNA 21 SARSCOV2: CPT

## 2023-10-19 PROCEDURE — 85027 COMPLETE CBC AUTOMATED: CPT

## 2023-10-19 PROCEDURE — 94640 AIRWAY INHALATION TREATMENT: CPT

## 2023-10-19 PROCEDURE — 97167 OT EVAL HIGH COMPLEX 60 MIN: CPT

## 2023-10-19 PROCEDURE — 84145 PROCALCITONIN (PCT): CPT

## 2023-10-19 PROCEDURE — 71045 X-RAY EXAM CHEST 1 VIEW: CPT

## 2023-10-19 PROCEDURE — 92523 SPEECH SOUND LANG COMPREHEN: CPT

## 2023-10-19 PROCEDURE — 97116 GAIT TRAINING THERAPY: CPT

## 2023-10-19 PROCEDURE — 74183 MRI ABD W/O CNTR FLWD CNTR: CPT

## 2023-10-19 PROCEDURE — 74330 X-RAY BILE/PANC ENDOSCOPY: CPT

## 2023-10-19 PROCEDURE — 74177 CT ABD & PELVIS W/CONTRAST: CPT

## 2023-10-19 PROCEDURE — 97530 THERAPEUTIC ACTIVITIES: CPT

## 2023-10-19 PROCEDURE — 97112 NEUROMUSCULAR REEDUCATION: CPT

## 2023-10-19 PROCEDURE — 80202 ASSAY OF VANCOMYCIN: CPT

## 2023-10-19 PROCEDURE — 80048 BASIC METABOLIC PNL TOTAL CA: CPT

## 2023-10-19 PROCEDURE — 83605 ASSAY OF LACTIC ACID: CPT

## 2023-10-19 PROCEDURE — C1769: CPT

## 2023-10-19 PROCEDURE — 82550 ASSAY OF CK (CPK): CPT

## 2023-10-19 PROCEDURE — 93970 EXTREMITY STUDY: CPT

## 2023-10-19 PROCEDURE — C1889: CPT

## 2023-10-19 PROCEDURE — 81001 URINALYSIS AUTO W/SCOPE: CPT

## 2023-10-19 PROCEDURE — 92507 TX SP LANG VOICE COMM INDIV: CPT

## 2023-10-19 PROCEDURE — 36415 COLL VENOUS BLD VENIPUNCTURE: CPT

## 2023-10-19 PROCEDURE — C1748: CPT

## 2023-10-19 PROCEDURE — 97163 PT EVAL HIGH COMPLEX 45 MIN: CPT

## 2023-10-19 PROCEDURE — 85025 COMPLETE CBC W/AUTO DIFF WBC: CPT

## 2023-10-19 PROCEDURE — 74176 CT ABD & PELVIS W/O CONTRAST: CPT

## 2023-10-19 PROCEDURE — C2617: CPT

## 2023-11-09 NOTE — PROGRESS NOTE ADULT - SUBJECTIVE AND OBJECTIVE BOX
SUBJECTIVE: Ovn at 23:00 pt in AFib w/ RVR, , resolution s/p Cardizem 10mg IV x1. Day 1 s/p perc enrique drain, 150cc dark brown output. Pt seen and examined at bedside. Patient comfortable and in no-apparent distress. No nausea, vomiting, diarrhea. Pain is controlled.     Vital Signs Last 24 Hrs  T(C): 37 (28 Jul 2023 04:10), Max: 37 (28 Jul 2023 04:10)  T(F): 98.6 (28 Jul 2023 04:10), Max: 98.6 (28 Jul 2023 04:10)  HR: 126 (28 Jul 2023 04:10) (94 - 148)  BP: 167/92 (28 Jul 2023 04:10) (108/57 - 167/92)  BP(mean): 113 (27 Jul 2023 14:11) (113 - 113)  RR: 20 (28 Jul 2023 04:10) (18 - 28)  SpO2: 91% (28 Jul 2023 04:10) (91% - 100%)    Parameters below as of 28 Jul 2023 04:10  Patient On (Oxygen Delivery Method): room air        Physical Exam:  General Appearance: Appears well, NAD  Respiratory: No labored breathing  CV: irregularly irregular rhythm  Abdomen: Soft, nontender, dressing c/d/i, perc enrique drain functioning    LABS:                        12.3   15.29 )-----------( 170      ( 27 Jul 2023 07:47 )             37.6     07-27    x   |  x   |  x   ----------------------------<  x   3.9   |  x   |  x     Ca    8.4<L>      27 Jul 2023 18:35  Phos  3.2     07-27  Mg     2.4     07-27    TPro  6.7  /  Alb  2.5<L>  /  TBili  0.9  /  DBili  x   /  AST  74<H>  /  ALT  56  /  AlkPhos  108  07-27      Urinalysis Basic - ( 27 Jul 2023 18:35 )    Color: x / Appearance: x / SG: x / pH: x  Gluc: 150 mg/dL / Ketone: x  / Bili: x / Urobili: x   Blood: x / Protein: x / Nitrite: x   Leuk Esterase: x / RBC: x / WBC x   Sq Epi: x / Non Sq Epi: x / Bacteria: x        INs and OUTs:   PAST MEDICAL HISTORY:  Heart murmur     HTN (hypertension)

## 2023-12-22 ENCOUNTER — APPOINTMENT (RX ONLY)
Dept: URBAN - METROPOLITAN AREA CLINIC 341 | Facility: CLINIC | Age: 78
Setting detail: DERMATOLOGY
End: 2023-12-22

## 2023-12-22 DIAGNOSIS — L82.1 OTHER SEBORRHEIC KERATOSIS: ICD-10-CM

## 2023-12-22 DIAGNOSIS — D18.0 HEMANGIOMA: ICD-10-CM

## 2023-12-22 DIAGNOSIS — D22 MELANOCYTIC NEVI: ICD-10-CM

## 2023-12-22 DIAGNOSIS — L81.4 OTHER MELANIN HYPERPIGMENTATION: ICD-10-CM

## 2023-12-22 PROBLEM — D22.5 MELANOCYTIC NEVI OF TRUNK: Status: ACTIVE | Noted: 2023-12-22

## 2023-12-22 PROBLEM — D18.01 HEMANGIOMA OF SKIN AND SUBCUTANEOUS TISSUE: Status: ACTIVE | Noted: 2023-12-22

## 2023-12-22 PROCEDURE — 99213 OFFICE O/P EST LOW 20 MIN: CPT

## 2023-12-22 PROCEDURE — ? TREATMENT REGIMEN

## 2023-12-22 PROCEDURE — ? COUNSELING

## 2023-12-22 ASSESSMENT — LOCATION DETAILED DESCRIPTION DERM
LOCATION DETAILED: RIGHT MEDIAL UPPER BACK
LOCATION DETAILED: LEFT SUPERIOR UPPER BACK
LOCATION DETAILED: MID TRAPEZIAL NECK
LOCATION DETAILED: LEFT SUPERIOR MEDIAL UPPER BACK

## 2023-12-22 ASSESSMENT — LOCATION SIMPLE DESCRIPTION DERM
LOCATION SIMPLE: TRAPEZIAL NECK
LOCATION SIMPLE: RIGHT UPPER BACK
LOCATION SIMPLE: LEFT UPPER BACK

## 2023-12-22 ASSESSMENT — LOCATION ZONE DERM
LOCATION ZONE: NECK
LOCATION ZONE: TRUNK

## 2024-01-06 NOTE — DIETITIAN INITIAL EVALUATION ADULT - PROBLEM SELECTOR PROBLEM 7
HTN (hypertension)
Orientation to room/Bed in low position, brakes on/Side rails x 2 or 4 up, assess large gaps, such that a patient could get extremity or other body part entrapped, use additional safety procedures/Use of non-skid footwear for ambulating patients, use of appropriate size clothing to prevent risk of tripping/Assess eliminations need, assist as needed

## 2024-07-31 ENCOUNTER — OFFICE (OUTPATIENT)
Dept: URBAN - METROPOLITAN AREA CLINIC 109 | Facility: CLINIC | Age: 79
Setting detail: OPHTHALMOLOGY
End: 2024-07-31
Payer: MEDICARE

## 2024-07-31 DIAGNOSIS — H16.223: ICD-10-CM

## 2024-07-31 DIAGNOSIS — H02.835: ICD-10-CM

## 2024-07-31 DIAGNOSIS — H02.832: ICD-10-CM

## 2024-07-31 DIAGNOSIS — H40.013: ICD-10-CM

## 2024-07-31 DIAGNOSIS — H18.592: ICD-10-CM

## 2024-07-31 DIAGNOSIS — H02.831: ICD-10-CM

## 2024-07-31 DIAGNOSIS — H02.834: ICD-10-CM

## 2024-07-31 PROCEDURE — 99213 OFFICE O/P EST LOW 20 MIN: CPT | Performed by: OPHTHALMOLOGY

## 2024-07-31 ASSESSMENT — CONFRONTATIONAL VISUAL FIELD TEST (CVF)
OD_FINDINGS: FULL
OS_FINDINGS: FULL

## 2024-07-31 ASSESSMENT — LID POSITION - DERMATOCHALASIS
OD_DERMATOCHALASIS: RLL RUL 1+
OS_DERMATOCHALASIS: LLL LUL 1+

## 2024-08-01 ENCOUNTER — RX ONLY (RX ONLY)
Age: 79
End: 2024-08-01

## 2024-08-01 ENCOUNTER — DOCTOR'S OFFICE (OUTPATIENT)
Age: 79
Setting detail: OPHTHALMOLOGY
End: 2024-08-01
Payer: MEDICARE

## 2024-08-01 DIAGNOSIS — H34.8112: ICD-10-CM

## 2024-08-01 PROBLEM — H02.834 DERMATOCHALASIS; RIGHT UPPER LID, RIGHT LOWER LID, LEFT UPPER LID, LEFT LOWER LID: Status: ACTIVE | Noted: 2024-08-01

## 2024-08-01 PROBLEM — H18.592: Status: ACTIVE | Noted: 2024-07-31

## 2024-08-01 PROBLEM — H02.832 DERMATOCHALASIS; RIGHT UPPER LID, RIGHT LOWER LID, LEFT UPPER LID, LEFT LOWER LID: Status: ACTIVE | Noted: 2024-08-01

## 2024-08-01 PROBLEM — H02.835 DERMATOCHALASIS; RIGHT UPPER LID, RIGHT LOWER LID, LEFT UPPER LID, LEFT LOWER LID: Status: ACTIVE | Noted: 2024-08-01

## 2024-08-01 PROBLEM — H02.831 DERMATOCHALASIS; RIGHT UPPER LID, RIGHT LOWER LID, LEFT UPPER LID, LEFT LOWER LID: Status: ACTIVE | Noted: 2024-08-01

## 2024-08-01 PROCEDURE — 67028 INJECTION EYE DRUG: CPT | Mod: RT | Performed by: OPHTHALMOLOGY

## 2024-08-01 PROCEDURE — 92134 CPTRZ OPH DX IMG PST SGM RTA: CPT | Performed by: OPHTHALMOLOGY

## 2024-08-01 ASSESSMENT — LID POSITION - DERMATOCHALASIS
OS_DERMATOCHALASIS: LLL LUL 1+
OD_DERMATOCHALASIS: RLL RUL 1+

## 2024-09-20 ENCOUNTER — OFFICE (OUTPATIENT)
Dept: URBAN - METROPOLITAN AREA CLINIC 109 | Facility: CLINIC | Age: 79
Setting detail: OPHTHALMOLOGY
End: 2024-09-20
Payer: MEDICARE

## 2024-09-20 DIAGNOSIS — D31.40: ICD-10-CM

## 2024-09-20 DIAGNOSIS — H40.013: ICD-10-CM

## 2024-09-20 PROCEDURE — 92133 CPTRZD OPH DX IMG PST SGM ON: CPT | Performed by: OPHTHALMOLOGY

## 2024-09-20 PROCEDURE — 76514 ECHO EXAM OF EYE THICKNESS: CPT | Performed by: OPHTHALMOLOGY

## 2024-09-20 PROCEDURE — 92083 EXTENDED VISUAL FIELD XM: CPT | Performed by: OPHTHALMOLOGY

## 2024-09-20 PROCEDURE — 99213 OFFICE O/P EST LOW 20 MIN: CPT | Performed by: OPHTHALMOLOGY

## 2024-09-20 ASSESSMENT — LID POSITION - DERMATOCHALASIS
OD_DERMATOCHALASIS: RLL RUL 1+
OS_DERMATOCHALASIS: LLL LUL 1+

## 2024-09-20 ASSESSMENT — CONFRONTATIONAL VISUAL FIELD TEST (CVF)
OS_FINDINGS: FULL
OD_FINDINGS: FULL

## 2024-12-09 ENCOUNTER — DOCTOR'S OFFICE (OUTPATIENT)
Facility: LOCATION | Age: 79
Setting detail: OPHTHALMOLOGY
End: 2024-12-09
Payer: MEDICARE

## 2024-12-09 DIAGNOSIS — H34.8112: ICD-10-CM

## 2024-12-09 DIAGNOSIS — H43.813: ICD-10-CM

## 2024-12-09 DIAGNOSIS — H16.223: ICD-10-CM

## 2024-12-09 DIAGNOSIS — H35.033: ICD-10-CM

## 2024-12-09 DIAGNOSIS — H43.393: ICD-10-CM

## 2024-12-09 PROCEDURE — 99214 OFFICE O/P EST MOD 30 MIN: CPT | Performed by: OPHTHALMOLOGY

## 2024-12-09 PROCEDURE — 92134 CPTRZ OPH DX IMG PST SGM RTA: CPT | Performed by: OPHTHALMOLOGY

## 2024-12-09 ASSESSMENT — REFRACTION_AUTOREFRACTION
OD_SPHERE: +1.00
OD_AXIS: 086
OD_CYLINDER: -3.00
OS_SPHERE: +0.75
OS_AXIS: 080
OS_CYLINDER: -1.50

## 2024-12-09 ASSESSMENT — KERATOMETRY
OD_K2POWER_DIOPTERS: 45.37
OD_K1POWER_DIOPTERS: 44.37
OS_AXISANGLE_DEGREES: 009
OS_K2POWER_DIOPTERS: 46.12
OS_K1POWER_DIOPTERS: 44.37
OD_AXISANGLE_DEGREES: 171

## 2024-12-09 ASSESSMENT — VISUAL ACUITY
OS_BCVA: 20/80-2
OD_BCVA: 20/25

## 2024-12-09 ASSESSMENT — LID POSITION - DERMATOCHALASIS
OS_DERMATOCHALASIS: LLL LUL 1+
OD_DERMATOCHALASIS: RLL RUL 1+

## 2024-12-09 ASSESSMENT — SUPERFICIAL PUNCTATE KERATITIS (SPK)
OD_SPK: T 1+
OS_SPK: T 1+

## 2024-12-09 ASSESSMENT — CORNEAL DYSTROPHY - POSTERIOR
OS_POSTERIORDYSTROPHY: GUTTATA
OD_POSTERIORDYSTROPHY: GUTTATA

## 2024-12-20 ENCOUNTER — RX ONLY (RX ONLY)
Age: 79
End: 2024-12-20

## 2024-12-20 ENCOUNTER — OFFICE (OUTPATIENT)
Dept: URBAN - METROPOLITAN AREA CLINIC 109 | Facility: CLINIC | Age: 79
Setting detail: OPHTHALMOLOGY
End: 2024-12-20
Payer: MEDICARE

## 2024-12-20 ENCOUNTER — APPOINTMENT (OUTPATIENT)
Dept: URBAN - METROPOLITAN AREA CLINIC 341 | Facility: CLINIC | Age: 79
Setting detail: DERMATOLOGY
End: 2024-12-20

## 2024-12-20 DIAGNOSIS — H34.8112: ICD-10-CM

## 2024-12-20 DIAGNOSIS — L82.1 OTHER SEBORRHEIC KERATOSIS: ICD-10-CM

## 2024-12-20 DIAGNOSIS — L81.4 OTHER MELANIN HYPERPIGMENTATION: ICD-10-CM

## 2024-12-20 DIAGNOSIS — H40.013: ICD-10-CM

## 2024-12-20 DIAGNOSIS — D22 MELANOCYTIC NEVI: ICD-10-CM

## 2024-12-20 DIAGNOSIS — H43.393: ICD-10-CM

## 2024-12-20 DIAGNOSIS — H16.223: ICD-10-CM

## 2024-12-20 DIAGNOSIS — D18.0 HEMANGIOMA: ICD-10-CM

## 2024-12-20 PROBLEM — D18.01 HEMANGIOMA OF SKIN AND SUBCUTANEOUS TISSUE: Status: ACTIVE | Noted: 2024-12-20

## 2024-12-20 PROBLEM — H02.834 DERMATOCHALASIS; RIGHT UPPER LID, RIGHT LOWER LID, LEFT UPPER LID, LEFT LOWER LID: Status: ACTIVE | Noted: 2024-12-20

## 2024-12-20 PROBLEM — D22.5 MELANOCYTIC NEVI OF TRUNK: Status: ACTIVE | Noted: 2024-12-20

## 2024-12-20 PROBLEM — H02.835 DERMATOCHALASIS; RIGHT UPPER LID, RIGHT LOWER LID, LEFT UPPER LID, LEFT LOWER LID: Status: ACTIVE | Noted: 2024-12-20

## 2024-12-20 PROBLEM — H02.832 DERMATOCHALASIS; RIGHT UPPER LID, RIGHT LOWER LID, LEFT UPPER LID, LEFT LOWER LID: Status: ACTIVE | Noted: 2024-12-20

## 2024-12-20 PROBLEM — H02.831 DERMATOCHALASIS; RIGHT UPPER LID, RIGHT LOWER LID, LEFT UPPER LID, LEFT LOWER LID: Status: ACTIVE | Noted: 2024-12-20

## 2024-12-20 PROCEDURE — ? COUNSELING

## 2024-12-20 PROCEDURE — ? FULL BODY SKIN EXAM

## 2024-12-20 PROCEDURE — 92083 EXTENDED VISUAL FIELD XM: CPT | Performed by: OPHTHALMOLOGY

## 2024-12-20 PROCEDURE — 92250 FUNDUS PHOTOGRAPHY W/I&R: CPT | Performed by: OPHTHALMOLOGY

## 2024-12-20 PROCEDURE — 68761 CLOSE TEAR DUCT OPENING: CPT | Mod: 50 | Performed by: OPHTHALMOLOGY

## 2024-12-20 PROCEDURE — 99213 OFFICE O/P EST LOW 20 MIN: CPT

## 2024-12-20 PROCEDURE — ? OTC TREATMENT REGIMEN

## 2024-12-20 PROCEDURE — 99213 OFFICE O/P EST LOW 20 MIN: CPT | Mod: 25 | Performed by: OPHTHALMOLOGY

## 2024-12-20 ASSESSMENT — PACHYMETRY
OD_CT_CORRECTION: 2
OS_CT_CORRECTION: 0
OS_CT_UM: 547
OD_CT_UM: 519

## 2024-12-20 ASSESSMENT — KERATOMETRY
OD_AXISANGLE_DEGREES: 171
OD_K1POWER_DIOPTERS: 44.37
OD_K2POWER_DIOPTERS: 45.37
OS_K2POWER_DIOPTERS: 46.12
OS_AXISANGLE_DEGREES: 009
OS_K1POWER_DIOPTERS: 44.37

## 2024-12-20 ASSESSMENT — LOCATION DETAILED DESCRIPTION DERM
LOCATION DETAILED: PERIUMBILICAL SKIN
LOCATION DETAILED: EPIGASTRIC SKIN

## 2024-12-20 ASSESSMENT — VISUAL ACUITY
OS_BCVA: 20/60
OD_BCVA: 20/40

## 2024-12-20 ASSESSMENT — LID POSITION - DERMATOCHALASIS
OS_DERMATOCHALASIS: LLL LUL 1+
OD_DERMATOCHALASIS: RLL RUL 1+

## 2024-12-20 ASSESSMENT — LOCATION SIMPLE DESCRIPTION DERM: LOCATION SIMPLE: ABDOMEN

## 2024-12-20 ASSESSMENT — TONOMETRY
OD_IOP_MMHG: 15
OS_IOP_MMHG: 15

## 2024-12-20 ASSESSMENT — REFRACTION_AUTOREFRACTION
OD_CYLINDER: -3.00
OS_CYLINDER: -3.00
OD_SPHERE: +0.50
OS_SPHERE: +0.50
OD_AXIS: 85
OS_AXIS: 85

## 2024-12-20 ASSESSMENT — LACRIMAL DUCT - ASSESSMENT
OS_LACRIMAL_DUCT: OASYS
OD_LACRIMAL_DUCT: OASYS

## 2024-12-20 ASSESSMENT — CONFRONTATIONAL VISUAL FIELD TEST (CVF)
OS_FINDINGS: FULL
OD_FINDINGS: FULL

## 2024-12-20 ASSESSMENT — SUPERFICIAL PUNCTATE KERATITIS (SPK)
OD_SPK: 2+
OS_SPK: 2+

## 2024-12-20 ASSESSMENT — CORNEAL DYSTROPHY - POSTERIOR
OD_POSTERIORDYSTROPHY: GUTTATA
OS_POSTERIORDYSTROPHY: GUTTATA

## 2024-12-20 ASSESSMENT — PUNCTA - ASSESSMENT
OD_PUNCTA: 3MON PLUG
OS_PUNCTA: 3MON PLUG

## 2024-12-20 ASSESSMENT — LOCATION ZONE DERM: LOCATION ZONE: TRUNK

## (undated) DEVICE — TUBING CAP SET ERBEFLO CLEVERCAP HYBRID CO2 FOR OLYMPUS SCOPES AND UCR

## (undated) DEVICE — SYR LUER SLIP TIP 30CC

## (undated) DEVICE — KIT ENDO PROCEDURE CUST W/VLV

## (undated) DEVICE — DRSG CURITY GAUZE SPONGE 4 X 4" 12-PLY

## (undated) DEVICE — INJ SYS RAP REFIL

## (undated) DEVICE — SOL IRR POUR H2O 1000ML

## (undated) DEVICE — ORGANIZER MIO MEDICAL DEVICE DISP

## (undated) DEVICE — FORCEP RADIAL JAW 4 W NDL 2.4MM 2.8MM 240CM ORANGE DISP

## (undated) DEVICE — LOK DVC RX AND BIOPSY

## (undated) DEVICE — PLASTIC SOLUTION BOWL 160Z

## (undated) DEVICE — ELCTR GROUNDING PAD ADULT COVIDIEN

## (undated) DEVICE — TUBING SUCTION 20FT

## (undated) DEVICE — CONTAINER FORMALIN BUFF 10% 60ML